# Patient Record
Sex: FEMALE | Race: WHITE | Employment: OTHER | URBAN - METROPOLITAN AREA
[De-identification: names, ages, dates, MRNs, and addresses within clinical notes are randomized per-mention and may not be internally consistent; named-entity substitution may affect disease eponyms.]

---

## 2023-12-21 ENCOUNTER — HOSPITAL ENCOUNTER (INPATIENT)
Age: 73
LOS: 13 days | Discharge: NURSING, REHAB OR LONG-TERM CARE | DRG: 330 | End: 2024-01-03
Attending: INTERNAL MEDICINE | Admitting: INTERNAL MEDICINE
Payer: MEDICARE

## 2023-12-21 DIAGNOSIS — K65.1 INTRA-ABDOMINAL ABSCESS (HCC): ICD-10-CM

## 2023-12-21 DIAGNOSIS — K57.40 DIVERTICULAR DISEASE OF BOTH SMALL AND LARGE INTESTINE WITH PERFORATION AND ABSCESS: Primary | ICD-10-CM

## 2023-12-21 DIAGNOSIS — K57.32 SIGMOID DIVERTICULITIS: ICD-10-CM

## 2023-12-21 PROBLEM — K63.0 ABSCESS OF SIGMOID COLON: Status: ACTIVE | Noted: 2023-12-21

## 2023-12-21 LAB
ALBUMIN SERPL-MCNC: 3.5 G/DL (ref 3.4–5)
ALBUMIN/GLOB SERPL: 1.1 {RATIO} (ref 1.1–2.2)
ALP SERPL-CCNC: 118 U/L (ref 40–129)
ALT SERPL-CCNC: 23 U/L (ref 10–40)
ANION GAP SERPL CALCULATED.3IONS-SCNC: 11 MMOL/L (ref 3–16)
AST SERPL-CCNC: 24 U/L (ref 15–37)
BILIRUB SERPL-MCNC: 0.9 MG/DL (ref 0–1)
BUN SERPL-MCNC: 14 MG/DL (ref 7–20)
CALCIUM SERPL-MCNC: 9.2 MG/DL (ref 8.3–10.6)
CHLORIDE SERPL-SCNC: 105 MMOL/L (ref 99–110)
CO2 SERPL-SCNC: 22 MMOL/L (ref 21–32)
CREAT SERPL-MCNC: 0.7 MG/DL (ref 0.6–1.2)
DEPRECATED RDW RBC AUTO: 13.1 % (ref 12.4–15.4)
GFR SERPLBLD CREATININE-BSD FMLA CKD-EPI: >60 ML/MIN/{1.73_M2}
GLUCOSE SERPL-MCNC: 175 MG/DL (ref 70–99)
HCT VFR BLD AUTO: 34 % (ref 36–48)
HGB BLD-MCNC: 11.8 G/DL (ref 12–16)
LACTATE BLDV-SCNC: 1.4 MMOL/L (ref 0.4–2)
MCH RBC QN AUTO: 33.1 PG (ref 26–34)
MCHC RBC AUTO-ENTMCNC: 34.8 G/DL (ref 31–36)
MCV RBC AUTO: 95.2 FL (ref 80–100)
PLATELET # BLD AUTO: 223 K/UL (ref 135–450)
PMV BLD AUTO: 7.6 FL (ref 5–10.5)
POTASSIUM SERPL-SCNC: 4.1 MMOL/L (ref 3.5–5.1)
PROT SERPL-MCNC: 6.8 G/DL (ref 6.4–8.2)
RBC # BLD AUTO: 3.57 M/UL (ref 4–5.2)
SODIUM SERPL-SCNC: 138 MMOL/L (ref 136–145)
WBC # BLD AUTO: 9 K/UL (ref 4–11)

## 2023-12-21 PROCEDURE — 36415 COLL VENOUS BLD VENIPUNCTURE: CPT

## 2023-12-21 PROCEDURE — 6360000002 HC RX W HCPCS: Performed by: INTERNAL MEDICINE

## 2023-12-21 PROCEDURE — 2580000003 HC RX 258: Performed by: INTERNAL MEDICINE

## 2023-12-21 PROCEDURE — 85027 COMPLETE CBC AUTOMATED: CPT

## 2023-12-21 PROCEDURE — 1200000000 HC SEMI PRIVATE

## 2023-12-21 PROCEDURE — 83605 ASSAY OF LACTIC ACID: CPT

## 2023-12-21 PROCEDURE — 87040 BLOOD CULTURE FOR BACTERIA: CPT

## 2023-12-21 PROCEDURE — 80053 COMPREHEN METABOLIC PANEL: CPT

## 2023-12-21 PROCEDURE — 6370000000 HC RX 637 (ALT 250 FOR IP): Performed by: INTERNAL MEDICINE

## 2023-12-21 RX ORDER — MAGNESIUM SULFATE IN WATER 40 MG/ML
2000 INJECTION, SOLUTION INTRAVENOUS PRN
Status: DISCONTINUED | OUTPATIENT
Start: 2023-12-21 | End: 2024-01-03 | Stop reason: HOSPADM

## 2023-12-21 RX ORDER — SODIUM CHLORIDE 0.9 % (FLUSH) 0.9 %
5-40 SYRINGE (ML) INJECTION PRN
Status: DISCONTINUED | OUTPATIENT
Start: 2023-12-21 | End: 2024-01-03 | Stop reason: HOSPADM

## 2023-12-21 RX ORDER — SODIUM CHLORIDE 0.9 % (FLUSH) 0.9 %
5-40 SYRINGE (ML) INJECTION EVERY 12 HOURS SCHEDULED
Status: DISCONTINUED | OUTPATIENT
Start: 2023-12-21 | End: 2024-01-03 | Stop reason: HOSPADM

## 2023-12-21 RX ORDER — ACETAMINOPHEN 650 MG/1
650 SUPPOSITORY RECTAL EVERY 6 HOURS PRN
Status: DISCONTINUED | OUTPATIENT
Start: 2023-12-21 | End: 2024-01-03 | Stop reason: HOSPADM

## 2023-12-21 RX ORDER — ENOXAPARIN SODIUM 100 MG/ML
40 INJECTION SUBCUTANEOUS DAILY
Status: DISCONTINUED | OUTPATIENT
Start: 2023-12-22 | End: 2023-12-28

## 2023-12-21 RX ORDER — POTASSIUM CHLORIDE 7.45 MG/ML
10 INJECTION INTRAVENOUS PRN
Status: DISCONTINUED | OUTPATIENT
Start: 2023-12-21 | End: 2024-01-03 | Stop reason: HOSPADM

## 2023-12-21 RX ORDER — ACETAMINOPHEN 325 MG/1
650 TABLET ORAL EVERY 6 HOURS PRN
Status: DISCONTINUED | OUTPATIENT
Start: 2023-12-21 | End: 2024-01-03 | Stop reason: HOSPADM

## 2023-12-21 RX ORDER — POTASSIUM CHLORIDE 20 MEQ/1
40 TABLET, EXTENDED RELEASE ORAL PRN
Status: DISCONTINUED | OUTPATIENT
Start: 2023-12-21 | End: 2024-01-03 | Stop reason: HOSPADM

## 2023-12-21 RX ORDER — SODIUM CHLORIDE, SODIUM LACTATE, POTASSIUM CHLORIDE, CALCIUM CHLORIDE 600; 310; 30; 20 MG/100ML; MG/100ML; MG/100ML; MG/100ML
INJECTION, SOLUTION INTRAVENOUS CONTINUOUS
Status: ACTIVE | OUTPATIENT
Start: 2023-12-21 | End: 2023-12-23

## 2023-12-21 RX ORDER — ONDANSETRON 4 MG/1
4 TABLET, ORALLY DISINTEGRATING ORAL EVERY 8 HOURS PRN
Status: DISCONTINUED | OUTPATIENT
Start: 2023-12-21 | End: 2024-01-03 | Stop reason: HOSPADM

## 2023-12-21 RX ORDER — ONDANSETRON 2 MG/ML
4 INJECTION INTRAMUSCULAR; INTRAVENOUS EVERY 6 HOURS PRN
Status: DISCONTINUED | OUTPATIENT
Start: 2023-12-21 | End: 2024-01-03 | Stop reason: HOSPADM

## 2023-12-21 RX ORDER — SODIUM CHLORIDE 9 MG/ML
INJECTION, SOLUTION INTRAVENOUS PRN
Status: DISCONTINUED | OUTPATIENT
Start: 2023-12-21 | End: 2024-01-03 | Stop reason: HOSPADM

## 2023-12-21 RX ORDER — POLYETHYLENE GLYCOL 3350 17 G/17G
17 POWDER, FOR SOLUTION ORAL DAILY PRN
Status: DISCONTINUED | OUTPATIENT
Start: 2023-12-21 | End: 2024-01-03 | Stop reason: HOSPADM

## 2023-12-21 RX ADMIN — SODIUM CHLORIDE: 9 INJECTION, SOLUTION INTRAVENOUS at 23:41

## 2023-12-21 RX ADMIN — Medication 10 ML: at 23:41

## 2023-12-21 RX ADMIN — SODIUM CHLORIDE, POTASSIUM CHLORIDE, SODIUM LACTATE AND CALCIUM CHLORIDE: 600; 310; 30; 20 INJECTION, SOLUTION INTRAVENOUS at 23:47

## 2023-12-21 RX ADMIN — PIPERACILLIN AND TAZOBACTAM 3375 MG: 3; .375 INJECTION, POWDER, FOR SOLUTION INTRAVENOUS at 23:45

## 2023-12-21 RX ADMIN — ACETAMINOPHEN 650 MG: 325 TABLET ORAL at 23:48

## 2023-12-21 RX ADMIN — ONDANSETRON 4 MG: 2 INJECTION INTRAMUSCULAR; INTRAVENOUS at 23:48

## 2023-12-21 ASSESSMENT — PAIN DESCRIPTION - LOCATION
LOCATION: ABDOMEN
LOCATION: ABDOMEN

## 2023-12-21 ASSESSMENT — PAIN SCALES - GENERAL
PAINLEVEL_OUTOF10: 3
PAINLEVEL_OUTOF10: 7
PAINLEVEL_OUTOF10: 7

## 2023-12-21 ASSESSMENT — PAIN DESCRIPTION - PAIN TYPE: TYPE: ACUTE PAIN

## 2023-12-21 ASSESSMENT — PAIN - FUNCTIONAL ASSESSMENT: PAIN_FUNCTIONAL_ASSESSMENT: ACTIVITIES ARE NOT PREVENTED

## 2023-12-21 ASSESSMENT — PAIN DESCRIPTION - DESCRIPTORS
DESCRIPTORS: ACHING;CRUSHING
DESCRIPTORS: ACHING

## 2023-12-21 ASSESSMENT — PAIN DESCRIPTION - ONSET: ONSET: ON-GOING

## 2023-12-21 ASSESSMENT — PAIN SCALES - WONG BAKER: WONGBAKER_NUMERICALRESPONSE: 2

## 2023-12-21 ASSESSMENT — PAIN DESCRIPTION - FREQUENCY: FREQUENCY: CONTINUOUS

## 2023-12-21 ASSESSMENT — PAIN DESCRIPTION - ORIENTATION
ORIENTATION: LOWER
ORIENTATION: LOWER;MID

## 2023-12-22 ENCOUNTER — APPOINTMENT (OUTPATIENT)
Dept: CT IMAGING | Age: 73
DRG: 330 | End: 2023-12-22
Attending: INTERNAL MEDICINE
Payer: MEDICARE

## 2023-12-22 LAB
ALBUMIN SERPL-MCNC: 3.1 G/DL (ref 3.4–5)
ALBUMIN/GLOB SERPL: 0.9 {RATIO} (ref 1.1–2.2)
ALP SERPL-CCNC: 100 U/L (ref 40–129)
ALT SERPL-CCNC: 21 U/L (ref 10–40)
ANION GAP SERPL CALCULATED.3IONS-SCNC: 12 MMOL/L (ref 3–16)
AST SERPL-CCNC: 17 U/L (ref 15–37)
BASOPHILS # BLD: 0 K/UL (ref 0–0.2)
BASOPHILS NFR BLD: 0.1 %
BILIRUB SERPL-MCNC: 0.7 MG/DL (ref 0–1)
BUN SERPL-MCNC: 17 MG/DL (ref 7–20)
CALCIUM SERPL-MCNC: 9.1 MG/DL (ref 8.3–10.6)
CHLORIDE SERPL-SCNC: 107 MMOL/L (ref 99–110)
CO2 SERPL-SCNC: 22 MMOL/L (ref 21–32)
CREAT SERPL-MCNC: 0.8 MG/DL (ref 0.6–1.2)
DEPRECATED RDW RBC AUTO: 13.2 % (ref 12.4–15.4)
EOSINOPHIL # BLD: 0 K/UL (ref 0–0.6)
EOSINOPHIL NFR BLD: 0.3 %
GFR SERPLBLD CREATININE-BSD FMLA CKD-EPI: >60 ML/MIN/{1.73_M2}
GLUCOSE SERPL-MCNC: 153 MG/DL (ref 70–99)
HCT VFR BLD AUTO: 32.4 % (ref 36–48)
HGB BLD-MCNC: 11 G/DL (ref 12–16)
INR PPP: 1.29 (ref 0.84–1.16)
LACTATE BLDV-SCNC: 1.3 MMOL/L (ref 0.4–2)
LYMPHOCYTES # BLD: 0.6 K/UL (ref 1–5.1)
LYMPHOCYTES NFR BLD: 6.4 %
MCH RBC QN AUTO: 32.5 PG (ref 26–34)
MCHC RBC AUTO-ENTMCNC: 34.1 G/DL (ref 31–36)
MCV RBC AUTO: 95.2 FL (ref 80–100)
MONOCYTES # BLD: 0.9 K/UL (ref 0–1.3)
MONOCYTES NFR BLD: 9.9 %
NEUTROPHILS # BLD: 7.2 K/UL (ref 1.7–7.7)
NEUTROPHILS NFR BLD: 83.3 %
PLATELET # BLD AUTO: 213 K/UL (ref 135–450)
PMV BLD AUTO: 7.7 FL (ref 5–10.5)
POTASSIUM SERPL-SCNC: 4 MMOL/L (ref 3.5–5.1)
PROT SERPL-MCNC: 6.4 G/DL (ref 6.4–8.2)
PROTHROMBIN TIME: 16.1 SEC (ref 11.5–14.8)
RBC # BLD AUTO: 3.4 M/UL (ref 4–5.2)
SODIUM SERPL-SCNC: 141 MMOL/L (ref 136–145)
WBC # BLD AUTO: 8.7 K/UL (ref 4–11)

## 2023-12-22 PROCEDURE — 0W9J30Z DRAINAGE OF PELVIC CAVITY WITH DRAINAGE DEVICE, PERCUTANEOUS APPROACH: ICD-10-PCS | Performed by: RADIOLOGY

## 2023-12-22 PROCEDURE — 87186 SC STD MICRODIL/AGAR DIL: CPT

## 2023-12-22 PROCEDURE — 6360000002 HC RX W HCPCS: Performed by: NURSE PRACTITIONER

## 2023-12-22 PROCEDURE — 6360000002 HC RX W HCPCS: Performed by: RADIOLOGY

## 2023-12-22 PROCEDURE — 99222 1ST HOSP IP/OBS MODERATE 55: CPT | Performed by: SURGERY

## 2023-12-22 PROCEDURE — 2580000003 HC RX 258: Performed by: INTERNAL MEDICINE

## 2023-12-22 PROCEDURE — 80053 COMPREHEN METABOLIC PANEL: CPT

## 2023-12-22 PROCEDURE — 1200000000 HC SEMI PRIVATE

## 2023-12-22 PROCEDURE — 85610 PROTHROMBIN TIME: CPT

## 2023-12-22 PROCEDURE — 83605 ASSAY OF LACTIC ACID: CPT

## 2023-12-22 PROCEDURE — 6360000002 HC RX W HCPCS: Performed by: INTERNAL MEDICINE

## 2023-12-22 PROCEDURE — 87077 CULTURE AEROBIC IDENTIFY: CPT

## 2023-12-22 PROCEDURE — 87070 CULTURE OTHR SPECIMN AEROBIC: CPT

## 2023-12-22 PROCEDURE — 85025 COMPLETE CBC W/AUTO DIFF WBC: CPT

## 2023-12-22 PROCEDURE — 87205 SMEAR GRAM STAIN: CPT

## 2023-12-22 PROCEDURE — 99151 MOD SED SAME PHYS/QHP <5 YRS: CPT

## 2023-12-22 PROCEDURE — 36415 COLL VENOUS BLD VENIPUNCTURE: CPT

## 2023-12-22 RX ORDER — MIDAZOLAM HYDROCHLORIDE 2 MG/2ML
INJECTION, SOLUTION INTRAMUSCULAR; INTRAVENOUS PRN
Status: COMPLETED | OUTPATIENT
Start: 2023-12-22 | End: 2023-12-22

## 2023-12-22 RX ORDER — HYDROMORPHONE HYDROCHLORIDE 1 MG/ML
0.5 INJECTION, SOLUTION INTRAMUSCULAR; INTRAVENOUS; SUBCUTANEOUS
Status: DISCONTINUED | OUTPATIENT
Start: 2023-12-22 | End: 2023-12-23

## 2023-12-22 RX ORDER — FENTANYL CITRATE 50 UG/ML
INJECTION, SOLUTION INTRAMUSCULAR; INTRAVENOUS PRN
Status: COMPLETED | OUTPATIENT
Start: 2023-12-22 | End: 2023-12-22

## 2023-12-22 RX ORDER — IBUPROFEN 200 MG
200 TABLET ORAL DAILY
COMMUNITY

## 2023-12-22 RX ADMIN — FENTANYL CITRATE 25 MCG: 50 INJECTION, SOLUTION INTRAMUSCULAR; INTRAVENOUS at 15:29

## 2023-12-22 RX ADMIN — MIDAZOLAM HYDROCHLORIDE 0.5 MG: 1 INJECTION, SOLUTION INTRAMUSCULAR; INTRAVENOUS at 15:32

## 2023-12-22 RX ADMIN — FENTANYL CITRATE 25 MCG: 50 INJECTION, SOLUTION INTRAMUSCULAR; INTRAVENOUS at 15:34

## 2023-12-22 RX ADMIN — Medication 10 ML: at 08:46

## 2023-12-22 RX ADMIN — PIPERACILLIN AND TAZOBACTAM 3375 MG: 3; .375 INJECTION, POWDER, FOR SOLUTION INTRAVENOUS at 16:10

## 2023-12-22 RX ADMIN — MIDAZOLAM HYDROCHLORIDE 1 MG: 1 INJECTION, SOLUTION INTRAMUSCULAR; INTRAVENOUS at 15:24

## 2023-12-22 RX ADMIN — PIPERACILLIN AND TAZOBACTAM 3375 MG: 3; .375 INJECTION, POWDER, FOR SOLUTION INTRAVENOUS at 08:45

## 2023-12-22 RX ADMIN — MIDAZOLAM HYDROCHLORIDE 1 MG: 1 INJECTION, SOLUTION INTRAMUSCULAR; INTRAVENOUS at 15:09

## 2023-12-22 RX ADMIN — FENTANYL CITRATE 25 MCG: 50 INJECTION, SOLUTION INTRAMUSCULAR; INTRAVENOUS at 15:31

## 2023-12-22 RX ADMIN — FENTANYL CITRATE 50 MCG: 50 INJECTION, SOLUTION INTRAMUSCULAR; INTRAVENOUS at 15:24

## 2023-12-22 RX ADMIN — MIDAZOLAM HYDROCHLORIDE 0.5 MG: 1 INJECTION, SOLUTION INTRAMUSCULAR; INTRAVENOUS at 15:29

## 2023-12-22 RX ADMIN — HYDROMORPHONE HYDROCHLORIDE 0.5 MG: 1 INJECTION, SOLUTION INTRAMUSCULAR; INTRAVENOUS; SUBCUTANEOUS at 22:27

## 2023-12-22 RX ADMIN — FENTANYL CITRATE 50 MCG: 50 INJECTION, SOLUTION INTRAMUSCULAR; INTRAVENOUS at 15:09

## 2023-12-22 ASSESSMENT — PAIN DESCRIPTION - LOCATION
LOCATION: ABDOMEN
LOCATION: ABDOMEN

## 2023-12-22 ASSESSMENT — PAIN DESCRIPTION - DESCRIPTORS
DESCRIPTORS: ACHING;SHARP
DESCRIPTORS: ACHING;SHARP

## 2023-12-22 ASSESSMENT — PAIN SCALES - GENERAL
PAINLEVEL_OUTOF10: 7
PAINLEVEL_OUTOF10: 3

## 2023-12-22 ASSESSMENT — PAIN DESCRIPTION - ORIENTATION
ORIENTATION: LOWER
ORIENTATION: LOWER;MID

## 2023-12-22 NOTE — CARE COORDINATION
General surgery note states bowel rest and po abx's at discharge.     No therapy ordered at this time. CM will follow for discharge plan and needs.     Bonnie Latif RN, BSN  [phone]

## 2023-12-22 NOTE — PROGRESS NOTES
Admission completed, patient alert and oriented, she was transfer from [facility] via EMS, patient c/o lower abd pain, she state that she haven't void since 4 am in the morning, bladder scan done w/ 357 ML urine retention, purewick in place, room air, hypoactive bowel sounds X 4 quadrants, lactated ringers 75ml/hr infusing, Iv antibiotic administered, vitals checked, neurovascular WNL, abdomen tender to palpate, distended, patient c/o nausea zofran 4 mg given, acetaminophen 650mg oral with sip of water, patient is NPO, care plane discussed with the patient.  Rosa Mclean RN

## 2023-12-22 NOTE — CARE COORDINATION
Chart reviewed at this time for discharge planning. Pt from [facility]. General surgery consult is pending. CM will follow for discharge needs.       Bonnie Latif RN, BSN  [phone]

## 2023-12-22 NOTE — PROGRESS NOTES
[facility]    12/22/2023 8:00 AM        Name: Tammy Frederick .              Admitted: 12/21/2023  Primary Care Provider: No primary care provider on file. (Tel: None)      Subjective:  .    Admitted with abd pain  CT shows sigmoid abscess with diverticulitis   Pt currently reports abd pain associated the the passage of gas.      Lives alone with her dogs in [facility].      She was NOT on any AC prior to admission   She does NOT take any prescription meds         Reviewed interval ancillary notes    Current Medications  sodium chloride flush 0.9 % injection 5-40 mL, 2 times per day  sodium chloride flush 0.9 % injection 5-40 mL, PRN  0.9 % sodium chloride infusion, PRN  potassium chloride (KLOR-CON M) extended release tablet 40 mEq, PRN   Or  potassium bicarb-citric acid (EFFER-K) effervescent tablet 40 mEq, PRN   Or  potassium chloride 10 mEq/100 mL IVPB (Peripheral Line), PRN  magnesium sulfate 2000 mg in 50 mL IVPB premix, PRN  enoxaparin (LOVENOX) injection 40 mg, Daily  ondansetron (ZOFRAN-ODT) disintegrating tablet 4 mg, Q8H PRN   Or  ondansetron (ZOFRAN) injection 4 mg, Q6H PRN  polyethylene glycol (GLYCOLAX) packet 17 g, Daily PRN  acetaminophen (TYLENOL) tablet 650 mg, Q6H PRN   Or  acetaminophen (TYLENOL) suppository 650 mg, Q6H PRN  lactated ringers IV soln infusion, Continuous  piperacillin-tazobactam (ZOSYN) 3,375 mg in sodium chloride 0.9 % 50 mL IVPB (mini-bag), Q8H        Objective:  /62   Pulse 93   Temp 97.6 °F (36.4 °C) (Oral)   Resp 16   Ht 1.524 m (5')   Wt 55.8 kg (123 lb)   SpO2 95%   BMI 24.02 kg/m²     Intake/Output Summary (Last 24 hours) at 12/22/2023 0800  Last data filed at 12/22/2023 0345  Gross per 24 hour   Intake 43.7 ml   Output 450 ml   Net -406.3 ml      Wt Readings from Last 3 Encounters:   12/21/23 55.8 kg (123 lb)   07/08/15 63.5 kg (140 lb)   07/07/11 58.1 kg (128 lb)       General appearance:  Appears comfortable  Eyes:  Sclera clear. Pupils equal.  ENT: Moist oral mucosa. Trachea midline, no adenopathy.  Cardiovascular: Regular rhythm, normal S1, S2. No murmur. No edema in lower extremities  Respiratory: Not using accessory muscles. Good inspiratory effort. Clear to auscultation bilaterally, no wheeze or crackles.   GI: Abdomen soft with active bowel sounds.  Generalized tenderness noted.     Musculoskeletal: No cyanosis in digits, neck supple  Neurology: CN 2-12 grossly intact. No speech or motor deficits  Psych: Normal affect. Alert and oriented in time, place and person  Skin: Warm, dry, normal turgor    Labs and Tests:  CBC:   Recent Labs     12/21/23 2249 12/22/23  0602   WBC 9.0 8.7   HGB 11.8* 11.0*    213     BMP:    Recent Labs     12/21/23 2249 12/22/23  0602    141   K 4.1 4.0    107   CO2 22 22   BUN 14 17   CREATININE 0.7 0.8   GLUCOSE 175* 153*     Hepatic:   Recent Labs     12/21/23 2249 12/22/23  0602   AST 24 17   ALT 23 21   BILITOT 0.9 0.7   ALKPHOS 118 100         Problem List  Principal Problem:    Abscess of sigmoid colon  Resolved Problems:    * No resolved hospital problems. *       Assessment & Plan:   Abscess of Colon:  I have placed consult and called IR for drainage of abscess.  She has been placed on IV Zosyn and GS will follow.  Abd pain/ diverticulitis:  on zosyn.  I added IV dilaudid  1/2 mg for pain control  Continue with IV hydration and supportive care.  NPO for now   Tobacco use:  cessation encouraged        Diet: Diet NPO  Code:Full Code  DVT PPX lovenox on hold for IR       Lanny Bray, APRN - CNP   12/22/2023 8:00 AM

## 2023-12-22 NOTE — PRE SEDATION
Sedation Pre-Procedure Note    Patient Name: Tammy Frederick   YOB: 1950  Room/Bed: [facility]-4480/4480-01  Medical Record Number: [id number]  Date: 12/22/2023   Time: 4:20 PM       Indication:  Abdominal Drain Placement    Consent: I have discussed with the patient and/or the patient representative the indication, alternatives, and the possible risks and/or complications of the planned procedure and the anesthesia methods. The patient and/or patient representative appear to understand and agree to proceed.    Vital Signs:   Vitals:    12/22/23 1600   BP: 134/83   Pulse: (!) 103   Resp: 16   Temp: 98.6 °F (37 °C)   SpO2: 93%       Past Medical History:   has a past medical history of Arthritis.    Past Surgical History:   has a past surgical history that includes Abdomen surgery; Total knee arthroplasty (5/16/2011); and joint replacement (9/17/12).    Medications:   Scheduled Meds:    sodium chloride flush  5-40 mL IntraVENous 2 times per day    [Held by provider] enoxaparin  40 mg SubCUTAneous Daily    piperacillin-tazobactam  3,375 mg IntraVENous Q8H     Continuous Infusions:    sodium chloride 10 mL/hr at 12/21/23 2341    lactated ringers IV soln 75 mL/hr at 12/21/23 2347     PRN Meds: HYDROmorphone, sodium chloride flush, sodium chloride, potassium chloride **OR** potassium alternative oral replacement **OR** potassium chloride, magnesium sulfate, ondansetron **OR** ondansetron, polyethylene glycol, acetaminophen **OR** acetaminophen  Home Meds:   Prior to Admission medications    Medication Sig Start Date End Date Taking? Authorizing Provider   nicotine polacrilex (NICORETTE) 2 MG gum Take 1 each by mouth as needed for Smoking cessation   Yes Provider, MD Ivone   ibuprofen (ADVIL;MOTRIN) 200 MG tablet Take 1 tablet by mouth daily   Yes ProviderIvone MD   rivaroxaban (XARELTO) 10 MG TABS tablet for DVT PROPHYLAXIS Take 1 tablet by mouth Daily. 9/18/12   Crescencio Sanders MD   therapeutic

## 2023-12-22 NOTE — PROGRESS NOTES
4 Eyes Skin Assessment     NAME:  Tammy Frederick  YOB: 1950  MEDICAL RECORD NUMBER:  [id number]    The patient is being assessed for  Admission    I agree that at least one RN has performed a thorough Head to Toe Skin Assessment on the patient. ALL assessment sites listed below have been assessed.      Areas assessed by both nurses:    Other all areas, no skin issues        Does the Patient have a Wound? No noted wound(s)       Rodriguez Prevention initiated by RN: Yes  Wound Care Orders initiated by RN: No    Pressure Injury (Stage 3,4, Unstageable, DTI, NWPT, and Complex wounds) if present, place Wound referral order by RN under : No    New Ostomies, if present place, Ostomy referral order under : No     Nurse 1 eSignature: Electronically signed by Rosa Mclean RN on 12/22/23 at 12:45 AM EST    **SHARE this note so that the co-signing nurse can place an eSignature**    Nurse 2 eSignature: Electronically signed by Zulay Aragon RN on 12/22/23 at 12:47 AM EST

## 2023-12-22 NOTE — BRIEF OP NOTE
Brief Postoperative Note      Patient: Tammy Frederick  YOB: 1950  MRN: [id number]    Date of Procedure: 12/22/2023    Abdominopelvic Abscess    Post-Op Diagnosis: Same       CT Guided Abscess Drain Placement    Lulu Gama MD    Anesthesia: Moderate Sedation    Estimated Blood Loss (mL): Minimal    Complications: None    Implants:  12 Citizen of [place] MPD      Drains:   Closed/Suction Drain Left Abdomen Bulb (Active)       External Urinary Catheter (Active)   Site Assessment Clean,dry & intact 12/22/23 0800       Findings: Successful 12 Citizen of [place] left anterior approach (no safe posterior window) abdominopelvic abscess drain placement. 10 cc of pus aspirated and sent to microbiology      Electronically signed by Lulu Gama MD on 12/22/2023 at 4:25 PM

## 2023-12-22 NOTE — H&P
Hospital Medicine History & Physical      Patient Name: Tammy Frederick    : 1950    PCP: No primary care provider on file.    Date of Service:  Patient seen and examined on 2023    Chief Complaint: Abdominal pain    History Of Present Illness:    Tammy Frederick is a 73 y.o. female who presents with lower abdominal pain.  Currently patient symptoms started about 5 days ago with lower abdominal pain.  For this she presented to OB/GYN 2 days later who did an ultrasound with no abnormality seen she was now sent to the ED for imaging-CAT scan.  Of note she was diagnosed with a UTI and sent home with oral antibiotics and as needed pain medication.  On the day of presentation, in the morning she says that worsening abdominal pain.  Also complains of inability to pass urine CAT scan shows presence of sigmoid abscess with diverticulitis.  Transferred to [place] for further management by general surgery.    Past Medical History:    Patient has a past medical history of Arthritis.    Past Surgical History:    Patient has a past surgical history that includes Abdomen surgery; Total knee arthroplasty (2011); and joint replacement (12).    Medications Prior to Admission:      Prior to Admission medications    Medication Sig Start Date End Date Taking? Authorizing Provider   rivaroxaban (XARELTO) 10 MG TABS tablet for DVT PROPHYLAXIS Take 1 tablet by mouth Daily. 12   Crescencio Sanders MD   therapeutic multivitamin-minerals (THERAGRAN-M) tablet Take 1 tablet by mouth daily.      Provider, MD Ivone       Allergies:  Patient has no known allergies.    Social History:      TOBACCO:   reports that she has never smoked. She does not have any smokeless tobacco history on file.  ETOH:   reports current alcohol use.  DRUGS:  has no history on file for drug use.    Family History:      Reviewed in detail positive as follows:    No family history on file.    REVIEW OF SYSTEMS:

## 2023-12-22 NOTE — PLAN OF CARE
Problem: Discharge Planning  Goal: Discharge to home or other facility with appropriate resources  12/22/2023 0847 by Anne Mann, RN  Outcome: Progressing     Problem: Pain  Goal: Verbalizes/displays adequate comfort level or baseline comfort level  12/22/2023 0847 by Anne Mann, RN  Outcome: Progressing     Problem: ABCDS Injury Assessment  Goal: Absence of physical injury  12/22/2023 0847 by Anne Mann, RN  Outcome: Progressing

## 2023-12-22 NOTE — CONSULTS
[facility]     Consult                                                     Patient Name: Tammy Frederick  MRN: [id number]  YOB: 1950  Admission date: 12/21/2023 10:11 PM   Date of evaluation: 12/22/2023  Primary Care Physician: No primary care provider on file.  Reason for consult: Abdominal pain  History of Present Illness:    Ms. Frederick is a 73 y.o. female who presents with sharp left lower quadrant abdominal pain that began about 5 days ago.  Initially presented to the gynecologist who did not ultrasound without abnormality and with continued symptoms referred to the ER where CT showed diverticulitis with abscess.  This was at an outside hospital in [place] and transferred to [facility] for further evaluation with surgery and interventional radiology.  This is her first attack.  Last colonoscopy more than 5 years ago and normal per patient.  Pain is worse with moving and coughing and better after urinating.  Last stool 2 days ago but passing flatus as recently as today.  Denies fevers chills chest pain dyspnea jaundice dysuria change in appetite weight nausea vomiting or other complaints.  Abdominal surgical history includes cholecystectomy and no other significant medical conditions    Past Medical History:        Diagnosis Date    Arthritis        Past Surgical History:        Procedure Laterality Date    ABDOMINAL SURGERY      cholecystectomy    JOINT REPLACEMENT  9/17/12    right knee    TOTAL KNEE ARTHROPLASTY  5/16/2011    Left knee       Scheduled Meds:   sodium chloride flush  5-40 mL IntraVENous 2 times per day    [Held by provider] enoxaparin  40 mg SubCUTAneous Daily    piperacillin-tazobactam  3,375 mg IntraVENous Q8H     Continuous Infusions:   sodium chloride 10 mL/hr at 12/21/23 2341    lactated ringers IV soln 75 mL/hr at 12/21/23 2347     PRN Meds:.sodium chloride flush, sodium chloride, potassium chloride **OR** potassium alternative oral  Pulse Resp SpO2 Height Weight   23 0830 116/73 98.1 °F (36.7 °C) Oral 93 16 94 % -- --   23 0545 115/62 97.6 °F (36.4 °C) Oral 93 16 95 % -- --   23 0200 102/67 98 °F (36.7 °C) Oral 90 16 94 % -- --   23 2200 106/68 97.6 °F (36.4 °C) Oral 77 16 95 % 1.524 m (5') 55.8 kg (123 lb)      TEMPERATURE HISTORY 24H: Temp (24hrs), Av.8 °F (36.6 °C), Min:97.6 °F (36.4 °C), Max:98.1 °F (36.7 °C)    BLOOD PRESSURE HISTORY: Systolic (36hrs), Av , Min:102 , Max:116    Diastolic (36hrs), Av, Min:62, Max:73    Admission Weight - Scale: 55.8 kg (123 lb)       Intake/Output Summary (Last 24 hours) at 2023 0940  Last data filed at 2023 0857  Gross per 24 hour   Intake 43.7 ml   Output 550 ml   Net -506.3 ml     Drain/tube Output:         Physical Exam:  Constitutional:  well-developed, well-nourished,   Neurologic: awake, Orientation:  Oriented to person, place, time,   follows commands, clear speech, cranial nerves grossly intact  Psychiatric: normal affect, no hallucinations  Eyes:  sclera clear, no visible discharge  Head, ears, nose, mouth, throat: normocephalic, without obvious abnormality, supple, symmetrical, trachea midline, no JVD, mucous membranes moist  Cardiac: regular rate and rhythm   Pulmonary: clear to auscultation bilaterally   GI: soft, non-distended, moderate left lower quadrant tenderness without peritonitis  Lymph: no palpable lymphadenopathy  Skin: no bruising or bleeding, normal skin color, texture, turgor, and no redness, warmth, or swelling    Labs:    CBC:    Recent Labs     23  2249 23  0602   WBC 9.0 8.7   HGB 11.8* 11.0*   HCT 34.0* 32.4*    213     BMP:    Recent Labs     23  0602    141   K 4.1 4.0    107   CO2 22 22   BUN 14 17   CREATININE 0.7 0.8   GLUCOSE 175* 153*     Hepatic:   Recent Labs     23  0602   AST 24 17   ALT 23 21   BILITOT 0.9 0.7   ALKPHOS 118 100     Amylase: No

## 2023-12-23 ENCOUNTER — APPOINTMENT (OUTPATIENT)
Dept: GENERAL RADIOLOGY | Age: 73
DRG: 330 | End: 2023-12-23
Attending: INTERNAL MEDICINE
Payer: MEDICARE

## 2023-12-23 LAB
ALBUMIN SERPL-MCNC: 3.2 G/DL (ref 3.4–5)
ALBUMIN/GLOB SERPL: 0.9 {RATIO} (ref 1.1–2.2)
ALP SERPL-CCNC: 106 U/L (ref 40–129)
ALT SERPL-CCNC: 15 U/L (ref 10–40)
ANION GAP SERPL CALCULATED.3IONS-SCNC: 12 MMOL/L (ref 3–16)
APTT BLD: 35.3 SEC (ref 22.7–35.9)
AST SERPL-CCNC: 13 U/L (ref 15–37)
BASOPHILS # BLD: 0 K/UL (ref 0–0.2)
BASOPHILS NFR BLD: 0.2 %
BILIRUB SERPL-MCNC: 0.5 MG/DL (ref 0–1)
BUN SERPL-MCNC: 20 MG/DL (ref 7–20)
CALCIUM SERPL-MCNC: 9.5 MG/DL (ref 8.3–10.6)
CHLORIDE SERPL-SCNC: 107 MMOL/L (ref 99–110)
CO2 SERPL-SCNC: 24 MMOL/L (ref 21–32)
CREAT SERPL-MCNC: 0.6 MG/DL (ref 0.6–1.2)
DEPRECATED RDW RBC AUTO: 13.6 % (ref 12.4–15.4)
EOSINOPHIL # BLD: 0 K/UL (ref 0–0.6)
EOSINOPHIL NFR BLD: 0.2 %
GFR SERPLBLD CREATININE-BSD FMLA CKD-EPI: >60 ML/MIN/{1.73_M2}
GLUCOSE SERPL-MCNC: 152 MG/DL (ref 70–99)
HCT VFR BLD AUTO: 34.1 % (ref 36–48)
HGB BLD-MCNC: 11.3 G/DL (ref 12–16)
INR PPP: 1.25 (ref 0.84–1.16)
LACTATE BLDV-SCNC: 1 MMOL/L (ref 0.4–2)
LACTATE BLDV-SCNC: 1.1 MMOL/L (ref 0.4–2)
LYMPHOCYTES # BLD: 0.5 K/UL (ref 1–5.1)
LYMPHOCYTES NFR BLD: 3.9 %
MAGNESIUM SERPL-MCNC: 2.2 MG/DL (ref 1.8–2.4)
MCH RBC QN AUTO: 32 PG (ref 26–34)
MCHC RBC AUTO-ENTMCNC: 33.2 G/DL (ref 31–36)
MCV RBC AUTO: 96.5 FL (ref 80–100)
MONOCYTES # BLD: 0.9 K/UL (ref 0–1.3)
MONOCYTES NFR BLD: 7.2 %
NEUTROPHILS # BLD: 11.5 K/UL (ref 1.7–7.7)
NEUTROPHILS NFR BLD: 88.5 %
PHOSPHATE SERPL-MCNC: 3.5 MG/DL (ref 2.5–4.9)
PLATELET # BLD AUTO: 267 K/UL (ref 135–450)
PMV BLD AUTO: 7.5 FL (ref 5–10.5)
POTASSIUM SERPL-SCNC: 4.7 MMOL/L (ref 3.5–5.1)
PREALB SERPL-MCNC: 5.7 MG/DL (ref 20–40)
PROT SERPL-MCNC: 6.8 G/DL (ref 6.4–8.2)
PROTHROMBIN TIME: 15.7 SEC (ref 11.5–14.8)
RBC # BLD AUTO: 3.53 M/UL (ref 4–5.2)
SODIUM SERPL-SCNC: 143 MMOL/L (ref 136–145)
TRANSFERRIN SERPL-MCNC: 144 MG/DL (ref 200–360)
WBC # BLD AUTO: 13 K/UL (ref 4–11)

## 2023-12-23 PROCEDURE — 71045 X-RAY EXAM CHEST 1 VIEW: CPT

## 2023-12-23 PROCEDURE — 1200000000 HC SEMI PRIVATE

## 2023-12-23 PROCEDURE — 85025 COMPLETE CBC W/AUTO DIFF WBC: CPT

## 2023-12-23 PROCEDURE — 6370000000 HC RX 637 (ALT 250 FOR IP): Performed by: NURSE PRACTITIONER

## 2023-12-23 PROCEDURE — 2580000003 HC RX 258: Performed by: SURGERY

## 2023-12-23 PROCEDURE — 85730 THROMBOPLASTIN TIME PARTIAL: CPT

## 2023-12-23 PROCEDURE — 84134 ASSAY OF PREALBUMIN: CPT

## 2023-12-23 PROCEDURE — 99232 SBSQ HOSP IP/OBS MODERATE 35: CPT | Performed by: SURGERY

## 2023-12-23 PROCEDURE — 6360000002 HC RX W HCPCS: Performed by: NURSE PRACTITIONER

## 2023-12-23 PROCEDURE — 80053 COMPREHEN METABOLIC PANEL: CPT

## 2023-12-23 PROCEDURE — 83605 ASSAY OF LACTIC ACID: CPT

## 2023-12-23 PROCEDURE — 84466 ASSAY OF TRANSFERRIN: CPT

## 2023-12-23 PROCEDURE — 74018 RADEX ABDOMEN 1 VIEW: CPT

## 2023-12-23 PROCEDURE — 83735 ASSAY OF MAGNESIUM: CPT

## 2023-12-23 PROCEDURE — 36415 COLL VENOUS BLD VENIPUNCTURE: CPT

## 2023-12-23 PROCEDURE — 6360000002 HC RX W HCPCS: Performed by: INTERNAL MEDICINE

## 2023-12-23 PROCEDURE — 84100 ASSAY OF PHOSPHORUS: CPT

## 2023-12-23 PROCEDURE — 2580000003 HC RX 258: Performed by: INTERNAL MEDICINE

## 2023-12-23 PROCEDURE — 85610 PROTHROMBIN TIME: CPT

## 2023-12-23 PROCEDURE — 6370000000 HC RX 637 (ALT 250 FOR IP): Performed by: SURGERY

## 2023-12-23 RX ORDER — HYDROMORPHONE HYDROCHLORIDE 1 MG/ML
1 INJECTION, SOLUTION INTRAMUSCULAR; INTRAVENOUS; SUBCUTANEOUS
Status: DISCONTINUED | OUTPATIENT
Start: 2023-12-23 | End: 2024-01-02

## 2023-12-23 RX ORDER — HYDROMORPHONE HYDROCHLORIDE 1 MG/ML
0.5 INJECTION, SOLUTION INTRAMUSCULAR; INTRAVENOUS; SUBCUTANEOUS
Status: DISCONTINUED | OUTPATIENT
Start: 2023-12-23 | End: 2024-01-02

## 2023-12-23 RX ORDER — OXYCODONE HYDROCHLORIDE 5 MG/1
10 TABLET ORAL EVERY 4 HOURS PRN
Status: DISCONTINUED | OUTPATIENT
Start: 2023-12-23 | End: 2024-01-03 | Stop reason: HOSPADM

## 2023-12-23 RX ORDER — PROMETHAZINE HYDROCHLORIDE 25 MG/1
25 SUPPOSITORY RECTAL EVERY 6 HOURS PRN
Status: DISCONTINUED | OUTPATIENT
Start: 2023-12-23 | End: 2024-01-03 | Stop reason: HOSPADM

## 2023-12-23 RX ORDER — BISACODYL 10 MG
10 SUPPOSITORY, RECTAL RECTAL DAILY PRN
Status: DISCONTINUED | OUTPATIENT
Start: 2023-12-23 | End: 2024-01-03 | Stop reason: HOSPADM

## 2023-12-23 RX ORDER — BISACODYL 10 MG
10 SUPPOSITORY, RECTAL RECTAL ONCE
Status: DISCONTINUED | OUTPATIENT
Start: 2023-12-23 | End: 2024-01-03 | Stop reason: HOSPADM

## 2023-12-23 RX ORDER — HYDROMORPHONE HYDROCHLORIDE 1 MG/ML
1 INJECTION, SOLUTION INTRAMUSCULAR; INTRAVENOUS; SUBCUTANEOUS EVERY 4 HOURS PRN
Status: DISCONTINUED | OUTPATIENT
Start: 2023-12-23 | End: 2023-12-29

## 2023-12-23 RX ORDER — ACETAMINOPHEN 325 MG/1
650 TABLET ORAL EVERY 6 HOURS
Status: DISCONTINUED | OUTPATIENT
Start: 2023-12-23 | End: 2023-12-28 | Stop reason: SDUPTHER

## 2023-12-23 RX ORDER — OXYCODONE HYDROCHLORIDE 5 MG/1
5 TABLET ORAL EVERY 4 HOURS PRN
Status: DISCONTINUED | OUTPATIENT
Start: 2023-12-23 | End: 2024-01-03 | Stop reason: HOSPADM

## 2023-12-23 RX ADMIN — PIPERACILLIN AND TAZOBACTAM 3375 MG: 3; .375 INJECTION, POWDER, FOR SOLUTION INTRAVENOUS at 23:34

## 2023-12-23 RX ADMIN — PROMETHAZINE HYDROCHLORIDE 25 MG: 25 SUPPOSITORY RECTAL at 18:27

## 2023-12-23 RX ADMIN — Medication 10 ML: at 09:40

## 2023-12-23 RX ADMIN — ONDANSETRON 4 MG: 2 INJECTION INTRAMUSCULAR; INTRAVENOUS at 09:38

## 2023-12-23 RX ADMIN — PIPERACILLIN AND TAZOBACTAM 3375 MG: 3; .375 INJECTION, POWDER, FOR SOLUTION INTRAVENOUS at 16:49

## 2023-12-23 RX ADMIN — ACETAMINOPHEN 650 MG: 325 TABLET ORAL at 21:46

## 2023-12-23 RX ADMIN — SODIUM CHLORIDE, POTASSIUM CHLORIDE, SODIUM LACTATE AND CALCIUM CHLORIDE: 600; 310; 30; 20 INJECTION, SOLUTION INTRAVENOUS at 16:42

## 2023-12-23 RX ADMIN — PIPERACILLIN AND TAZOBACTAM 3375 MG: 3; .375 INJECTION, POWDER, FOR SOLUTION INTRAVENOUS at 09:40

## 2023-12-23 RX ADMIN — HYDROMORPHONE HYDROCHLORIDE 1 MG: 1 INJECTION, SOLUTION INTRAMUSCULAR; INTRAVENOUS; SUBCUTANEOUS at 16:49

## 2023-12-23 RX ADMIN — HYDROMORPHONE HYDROCHLORIDE 0.5 MG: 1 INJECTION, SOLUTION INTRAMUSCULAR; INTRAVENOUS; SUBCUTANEOUS at 09:39

## 2023-12-23 RX ADMIN — PIPERACILLIN AND TAZOBACTAM 3375 MG: 3; .375 INJECTION, POWDER, FOR SOLUTION INTRAVENOUS at 00:32

## 2023-12-23 ASSESSMENT — PAIN SCALES - WONG BAKER
WONGBAKER_NUMERICALRESPONSE: 0
WONGBAKER_NUMERICALRESPONSE: 0

## 2023-12-23 ASSESSMENT — PAIN SCALES - GENERAL
PAINLEVEL_OUTOF10: 10
PAINLEVEL_OUTOF10: 6

## 2023-12-23 NOTE — PROGRESS NOTES
[facility]  Progress Note    Pt Name: Tammy Frederick  MRN: [id number]  YOB: 1950  Date of evaluation: 2023    Chief Complaint: abdominal pain      Subjective:    No acute events overnight  Pain and nausea worse this morning  Passing stool this morning    Vital Signs:  Patient Vitals for the past 24 hrs:   BP Temp Temp src Pulse Resp SpO2   23 1140 (!) 141/76 98.2 °F (36.8 °C) Oral 93 18 92 %   23 1009 -- -- -- -- 17 --   23 0915 (!) 145/76 98.1 °F (36.7 °C) Oral 99 18 92 %   23 0345 139/76 -- -- 99 18 --   23 0115 118/71 -- -- 92 18 --   23 2215 124/75 98 °F (36.7 °C) Oral 98 18 92 %   23 1604 -- -- -- -- 16 --   23 1601 -- -- -- -- 16 --   23 1600 134/83 98.6 °F (37 °C) Oral (!) 103 16 93 %   23 1540 (!) 145/90 -- -- 96 19 92 %   23 1535 127/79 -- -- 92 16 97 %   23 1530 119/72 -- -- 91 15 96 %   23 1525 115/71 -- -- 87 14 97 %   23 1520 120/74 -- -- (!) 103 22 97 %   23 1515 126/76 -- -- 96 21 97 %   23 1510 133/78 -- -- 94 28 97 %   23 1506 (!) 143/84 -- -- 94 18 97 %      TEMPERATURE HISTORY 24H: Temp (24hrs), Av.2 °F (36.8 °C), Min:98 °F (36.7 °C), Max:98.6 °F (37 °C)    BLOOD PRESSURE HISTORY: Systolic (36hrs), Av , Min:102 , Max:145    Diastolic (36hrs), Av, Min:62, Max:90      Intake/Output:    I/O last 3 completed shifts:  In: 43.7 [IV Piggyback:43.7]  Out: 570 [Urine:550; Drains:20]  No intake/output data recorded.  Drain/tube Output:    Closed/Suction Drain Left Abdomen Bulb-Output (ml): 20 ml      Physical Exam:  General: awake, alert, no acute distress, oriented to person, place, time and situation  Cardiac: regular rate and rhythm   Pulmonary: clear to auscultation bilaterally   Abdomen: soft, distended, moderate mid and left lower tenderness without peritonitis, drain with purulent output    Labs:  CBC:    Recent Labs      ORDERING SYSTEM PROVIDED HISTORY: abscess... pls do today... TECHNOLOGIST PROVIDED HISTORY: Reason for exam:->abscess... pls do today... Reason for Exam: Abscess of sigmoid colon TECHNIQUE: Automated exposure control, iterative reconstruction, and/or weight based adjustment of the mA/kV was utilized to reduce the radiation dose to as low as reasonably achievable. CONTRAST: None. SEDATION: 3 mg versed and 175 mcg fentanyl were titrated intravenously for moderate sedation monitored under my direction.  Total intraservice time of sedation was 35 minutes.  The patient's vital signs were monitored throughout the procedure and recorded in the patient's medical record by the nurse. FLUOROSCOPY DOSE AND TYPE: Radiation dose (DLP): 339.06 mGy-cm DESCRIPTION OF PROCEDURE: Informed consent was obtained after a detailed explanation of the procedure including risks, benefits, and alternatives.  Universal protocol was observed. Sterile gowns, masks, hats and gloves utilized for maximal sterile barrier. Patient was unable to tolerate lying prone on the CT table and thus limited CT of the abdomen and pelvis was performed with the patient and partially right lateral decubitus position which she felt most comfortable.  Once an appropriate percutaneous left anterolateral trajectory was marked on the patient's skin the site prepped and draped in sterile technique.  1% lidocaine was used for local anesthesia.  A 5 Pakistani Yueh needle was passed from a left anterolateral approach into the collection, 035 wire was coiled within the collection, subsequently a 12 [place] MPD drain was placed and the [place] loop formed within the collection.  Limited CT of the abdomen pelvis was again performed to confirm appropriate positioning of the tube.  10 cc of pus was aspirated and sent to microbiology for analysis.  The tube was secured by suture and sterile dressings were applied.  The patient tolerated the procedure well. FINDINGS: Left abdominopelvic

## 2023-12-23 NOTE — PROGRESS NOTES
Patient noted to be tearful this morning with 10/10 pain flank pain on left side of Abdomen with hypoactive bowel sounds in all four quadrants. BILLY drainage noted to be a purulent drainage. Bulb to suction not much output this shift. NG placed for N&V and distention. 18F 55 cm and verified placement with x-ray. Patient is NPO.  Pain medication given per mar Shift assessment done, VSS, A/O. Neuro checks WNL.  1630: patient still having episodes of vomiting, phenergan suppository ordered.

## 2023-12-23 NOTE — PLAN OF CARE
Problem: Discharge Planning  Goal: Discharge to home or other facility with appropriate resources  12/23/2023 1106 by Keesha Rojas, RN  Outcome: Progressing     Problem: Pain  Goal: Verbalizes/displays adequate comfort level or baseline comfort level  12/23/2023 1106 by Keesha Rojas, RN  Outcome: Progressing     Problem: ABCDS Injury Assessment  Goal: Absence of physical injury  12/23/2023 1106 by Keesha Rojas, RN  Outcome: Progressing

## 2023-12-23 NOTE — PROGRESS NOTES
[facility]    12/23/2023 7:45 AM        Name: Tammy Frederick .              Admitted: 12/21/2023  Primary Care Provider: No primary care provider on file. (Tel: None)      Subjective:  .    Admitted with abd pain  CT shows sigmoid abscess with diverticulitis   Pt had IR drain inserted on 12/22  Abd is very distended this am  She reports increased pain  NO bowel sounds or flatus.        Lives alone with her dogs in [facility].        Reviewed interval ancillary notes    Current Medications  HYDROmorphone HCl PF (DILAUDID) injection 0.5 mg, Q3H PRN  sodium chloride flush 0.9 % injection 5-40 mL, 2 times per day  sodium chloride flush 0.9 % injection 5-40 mL, PRN  0.9 % sodium chloride infusion, PRN  potassium chloride (KLOR-CON M) extended release tablet 40 mEq, PRN   Or  potassium bicarb-citric acid (EFFER-K) effervescent tablet 40 mEq, PRN   Or  potassium chloride 10 mEq/100 mL IVPB (Peripheral Line), PRN  magnesium sulfate 2000 mg in 50 mL IVPB premix, PRN  [Held by provider] enoxaparin (LOVENOX) injection 40 mg, Daily  ondansetron (ZOFRAN-ODT) disintegrating tablet 4 mg, Q8H PRN   Or  ondansetron (ZOFRAN) injection 4 mg, Q6H PRN  polyethylene glycol (GLYCOLAX) packet 17 g, Daily PRN  acetaminophen (TYLENOL) tablet 650 mg, Q6H PRN   Or  acetaminophen (TYLENOL) suppository 650 mg, Q6H PRN  lactated ringers IV soln infusion, Continuous  piperacillin-tazobactam (ZOSYN) 3,375 mg in sodium chloride 0.9 % 50 mL IVPB (mini-bag), Q8H        Objective:  /76   Pulse 99   Temp 98 °F (36.7 °C) (Oral)   Resp 18   Ht 1.524 m (5')   Wt 55.8 kg (123 lb)   SpO2 92%   BMI 24.02 kg/m²     Intake/Output Summary (Last 24 hours) at 12/23/2023 0745  Last data filed at 12/23/2023 0645  Gross per 24 hour   Intake --   Output 120 ml   Net -120 ml        Wt Readings from Last 3 Encounters:   12/21/23 55.8 kg (123 lb)   07/08/15 63.5 kg (140 lb)   07/07/11 58.1 kg (128 lb)       General

## 2023-12-23 NOTE — PROGRESS NOTES
Physician Progress Note      PATIENT:               SURYA SYKES  [facility] #:                  [id number]  :                       1950  ADMIT DATE:       2023 10:11 PM  DISCH DATE:  RESPONDING  PROVIDER #:        BLADIMIR Nugent CNP          QUERY TEXT:    Patient admitted with diverticulitis with abscess. If possible, please   document in progress notes and discharge summary further specificity regarding   the location of the abscess:    The medical record reflects the following:  Risk Factors:  Diverticulitis  Clinical Indicators: CT \"FINDINGS:  Left abdominopelvic abscess without safe window for posterior percutaneous   approach, thus left anterolateral approach was utilized to place the drain.\"    per H&P \"CT A/P showed sigmoid diverticulitis with 4.5 cm abscess.\" General   surgery \"she does have an abscess along the sigmoid from read of CT from   outside hospital and interventional radiology is consulted for percutaneous   drainage\"  Treatment: IV Zosyn, General surgery consult, and percutaneous drain placed  Options provided:  -- Diverticulitis with peritoneal abscess  -- Diverticulitis with intra-abdominal cavity abscess  -- Diverticulitis with focal abscess only  -- Other - I will add my own diagnosis  -- Disagree - Not applicable / Not valid  -- Disagree - Clinically unable to determine / Unknown  -- Refer to Clinical Documentation Reviewer    PROVIDER RESPONSE TEXT:    This patient has diverticulitis with focal abscess only.    Query created by: Raine Hopkins on 2023 1:47 PM      Electronically signed by:  BLADIMIR Nugent CNP 2023 4:16   PM

## 2023-12-24 LAB
ANION GAP SERPL CALCULATED.3IONS-SCNC: 11 MMOL/L (ref 3–16)
BACTERIA FLD AEROBE CULT: ABNORMAL
BASOPHILS # BLD: 0 K/UL (ref 0–0.2)
BASOPHILS NFR BLD: 0 %
BUN SERPL-MCNC: 19 MG/DL (ref 7–20)
CALCIUM SERPL-MCNC: 9.1 MG/DL (ref 8.3–10.6)
CHLORIDE SERPL-SCNC: 107 MMOL/L (ref 99–110)
CO2 SERPL-SCNC: 25 MMOL/L (ref 21–32)
CREAT SERPL-MCNC: 0.5 MG/DL (ref 0.6–1.2)
DEPRECATED RDW RBC AUTO: 13.6 % (ref 12.4–15.4)
EOSINOPHIL # BLD: 0 K/UL (ref 0–0.6)
EOSINOPHIL NFR BLD: 0 %
GFR SERPLBLD CREATININE-BSD FMLA CKD-EPI: >60 ML/MIN/{1.73_M2}
GLUCOSE SERPL-MCNC: 151 MG/DL (ref 70–99)
GRAM STN SPEC: ABNORMAL
HCT VFR BLD AUTO: 32.2 % (ref 36–48)
HGB BLD-MCNC: 10.6 G/DL (ref 12–16)
LACTATE BLDV-SCNC: 1.1 MMOL/L (ref 0.4–2)
LYMPHOCYTES # BLD: 0.6 K/UL (ref 1–5.1)
LYMPHOCYTES NFR BLD: 4 %
MAGNESIUM SERPL-MCNC: 2 MG/DL (ref 1.8–2.4)
MCH RBC QN AUTO: 31.3 PG (ref 26–34)
MCHC RBC AUTO-ENTMCNC: 32.8 G/DL (ref 31–36)
MCV RBC AUTO: 95.4 FL (ref 80–100)
MONOCYTES # BLD: 1 K/UL (ref 0–1.3)
MONOCYTES NFR BLD: 7 %
MYELOCYTES NFR BLD MANUAL: 1 %
NEUTROPHILS # BLD: 13 K/UL (ref 1.7–7.7)
NEUTROPHILS NFR BLD: 83 %
NEUTS BAND NFR BLD MANUAL: 5 % (ref 0–7)
ORGANISM: ABNORMAL
ORGANISM: ABNORMAL
PHOSPHATE SERPL-MCNC: 3 MG/DL (ref 2.5–4.9)
PLATELET # BLD AUTO: 289 K/UL (ref 135–450)
PLATELET BLD QL SMEAR: ADEQUATE
PMV BLD AUTO: 7.5 FL (ref 5–10.5)
POTASSIUM SERPL-SCNC: 3.8 MMOL/L (ref 3.5–5.1)
RBC # BLD AUTO: 3.37 M/UL (ref 4–5.2)
RBC MORPH BLD: NORMAL
SLIDE REVIEW: ABNORMAL
SODIUM SERPL-SCNC: 143 MMOL/L (ref 136–145)
WBC # BLD AUTO: 14.6 K/UL (ref 4–11)

## 2023-12-24 PROCEDURE — 99223 1ST HOSP IP/OBS HIGH 75: CPT | Performed by: INTERNAL MEDICINE

## 2023-12-24 PROCEDURE — 6360000002 HC RX W HCPCS: Performed by: INTERNAL MEDICINE

## 2023-12-24 PROCEDURE — 99232 SBSQ HOSP IP/OBS MODERATE 35: CPT | Performed by: SURGERY

## 2023-12-24 PROCEDURE — 85025 COMPLETE CBC W/AUTO DIFF WBC: CPT

## 2023-12-24 PROCEDURE — 84100 ASSAY OF PHOSPHORUS: CPT

## 2023-12-24 PROCEDURE — 2500000003 HC RX 250 WO HCPCS: Performed by: NURSE PRACTITIONER

## 2023-12-24 PROCEDURE — 6360000002 HC RX W HCPCS: Performed by: SURGERY

## 2023-12-24 PROCEDURE — 6360000002 HC RX W HCPCS: Performed by: NURSE PRACTITIONER

## 2023-12-24 PROCEDURE — 80048 BASIC METABOLIC PNL TOTAL CA: CPT

## 2023-12-24 PROCEDURE — 83605 ASSAY OF LACTIC ACID: CPT

## 2023-12-24 PROCEDURE — 1200000000 HC SEMI PRIVATE

## 2023-12-24 PROCEDURE — 2580000003 HC RX 258: Performed by: INTERNAL MEDICINE

## 2023-12-24 PROCEDURE — 83735 ASSAY OF MAGNESIUM: CPT

## 2023-12-24 RX ORDER — FLUCONAZOLE 2 MG/ML
400 INJECTION, SOLUTION INTRAVENOUS EVERY 24 HOURS
Status: DISCONTINUED | OUTPATIENT
Start: 2023-12-24 | End: 2023-12-26

## 2023-12-24 RX ORDER — DEXTROSE MONOHYDRATE, SODIUM CHLORIDE, AND POTASSIUM CHLORIDE 50; 1.49; 4.5 G/1000ML; G/1000ML; G/1000ML
INJECTION, SOLUTION INTRAVENOUS CONTINUOUS
Status: DISCONTINUED | OUTPATIENT
Start: 2023-12-24 | End: 2023-12-29

## 2023-12-24 RX ADMIN — Medication 10 ML: at 20:32

## 2023-12-24 RX ADMIN — HYDROMORPHONE HYDROCHLORIDE 0.5 MG: 1 INJECTION, SOLUTION INTRAMUSCULAR; INTRAVENOUS; SUBCUTANEOUS at 20:25

## 2023-12-24 RX ADMIN — PIPERACILLIN AND TAZOBACTAM 3375 MG: 3; .375 INJECTION, POWDER, FOR SOLUTION INTRAVENOUS at 09:22

## 2023-12-24 RX ADMIN — HYDROMORPHONE HYDROCHLORIDE 1 MG: 1 INJECTION, SOLUTION INTRAMUSCULAR; INTRAVENOUS; SUBCUTANEOUS at 09:18

## 2023-12-24 RX ADMIN — Medication 10 ML: at 09:19

## 2023-12-24 RX ADMIN — POTASSIUM CHLORIDE, DEXTROSE MONOHYDRATE AND SODIUM CHLORIDE: 150; 5; 450 INJECTION, SOLUTION INTRAVENOUS at 13:14

## 2023-12-24 RX ADMIN — PIPERACILLIN AND TAZOBACTAM 3375 MG: 3; .375 INJECTION, POWDER, FOR SOLUTION INTRAVENOUS at 15:08

## 2023-12-24 RX ADMIN — FLUCONAZOLE 400 MG: 400 INJECTION, SOLUTION INTRAVENOUS at 17:47

## 2023-12-24 ASSESSMENT — PAIN SCALES - GENERAL
PAINLEVEL_OUTOF10: 6
PAINLEVEL_OUTOF10: 5
PAINLEVEL_OUTOF10: 7

## 2023-12-24 ASSESSMENT — PAIN DESCRIPTION - LOCATION: LOCATION: ABDOMEN

## 2023-12-24 NOTE — PLAN OF CARE
Problem: Discharge Planning  Goal: Discharge to home or other facility with appropriate resources  12/24/2023 1752 by Keesha Rojas, RN  Outcome: Progressing     Problem: Pain  Goal: Verbalizes/displays adequate comfort level or baseline comfort level  12/24/2023 1752 by Keesha Rojas, RN  Outcome: Progressing     Problem: ABCDS Injury Assessment  Goal: Absence of physical injury  12/24/2023 1752 by Keesha Rojas, RN  Outcome: Progressing

## 2023-12-24 NOTE — PROGRESS NOTES
Shift assessment done, VSS, A/O. Neuro checks WNL. Denies pain at this time.  Meds given per MAR. Standard safety measures in place. The care plan and education has been reviewed and mutually agreed upon with the patient. NG in place with adequate output this shift.

## 2023-12-24 NOTE — CONSULTS
Infectious Diseases   Consult Note      Reason for Consult:  perforated diverticulitis    Requesting Physician:  Marcelo      Date of Admission: 12/21/2023  Subjective:   CHIEF COMPLAINT:   none given       HPI:     Tammy Frederick is a 73yoF with history of OA, max TKA    Admitted through ED 12/21/23 with cc abd pain and fond to have diverticulitis with associated abscess.   No prior history of diverticulitis  S/p IR drain placement 12/22/23.  Culture is positive for mixed enteric marco.  Course further c/b ileus      She is AF   WBC remains elevated at 14.6  Output from the drain has been minimal     ID is consulted for abx management                   Current abx:  Zosyn 3.375 q8       Past Surgical History:       Diagnosis Date    Arthritis          Procedure Laterality Date    ABDOMINAL SURGERY      cholecystectomy    JOINT REPLACEMENT  9/17/12    right knee    TOTAL KNEE ARTHROPLASTY  5/16/2011    Left knee       Social History:    TOBACCO:   reports that she has never smoked. She does not have any smokeless tobacco history on file.  ETOH:   reports current alcohol use.  There is no history of illicit drug use or other significant epidemiologic exposures.      Family History:   No family history on file.  There is no family history of autoimmune diseases or significant infectious diseases.      Current Medications:    Current Facility-Administered Medications: dextrose 5 % and 0.45 % NaCl with KCl 20 mEq infusion, , IntraVENous, Continuous  bisacodyl (DULCOLAX) suppository 10 mg, 10 mg, Rectal, Once  bisacodyl (DULCOLAX) suppository 10 mg, 10 mg, Rectal, Daily PRN  HYDROmorphone HCl PF (DILAUDID) injection 0.5 mg, 0.5 mg, IntraVENous, Q2H PRN **OR** HYDROmorphone HCl PF (DILAUDID) injection 1 mg, 1 mg, IntraVENous, Q2H PRN  oxyCODONE (ROXICODONE) immediate release tablet 5 mg, 5 mg, Oral, Q4H PRN **OR** oxyCODONE (ROXICODONE) immediate release tablet 10 mg, 10 mg, Oral, Q4H PRN  acetaminophen (TYLENOL) tablet

## 2023-12-24 NOTE — PROGRESS NOTES
[facility]    12/24/2023 7:21 AM        Name: Tammy Frederick .              Admitted: 12/21/2023  Primary Care Provider: No primary care provider on file. (Tel: None)      Subjective:  .    Admitted with abd pain from [facility]   CT shows sigmoid abscess with diverticulitis   Pt had IR drain inserted on 12/22  Developed increase abd pain / bloating on 12/23:  Imaging suggested Ileus vs early SBO  ( lactic acid normal)     Feels better today.  Had BM this am.    Lives alone with her dogs in [facility].        Reviewed interval ancillary notes    Current Medications  bisacodyl (DULCOLAX) suppository 10 mg, Once  bisacodyl (DULCOLAX) suppository 10 mg, Daily PRN  HYDROmorphone HCl PF (DILAUDID) injection 0.5 mg, Q2H PRN   Or  HYDROmorphone HCl PF (DILAUDID) injection 1 mg, Q2H PRN  oxyCODONE (ROXICODONE) immediate release tablet 5 mg, Q4H PRN   Or  oxyCODONE (ROXICODONE) immediate release tablet 10 mg, Q4H PRN  acetaminophen (TYLENOL) tablet 650 mg, Q6H  HYDROmorphone HCl PF (DILAUDID) injection 1 mg, Q4H PRN  phenol 1.4 % mouth spray 1 spray, Q2H PRN  promethazine (PHENERGAN) suppository 25 mg, Q6H PRN  sodium chloride flush 0.9 % injection 5-40 mL, 2 times per day  sodium chloride flush 0.9 % injection 5-40 mL, PRN  0.9 % sodium chloride infusion, PRN  potassium chloride (KLOR-CON M) extended release tablet 40 mEq, PRN   Or  potassium bicarb-citric acid (EFFER-K) effervescent tablet 40 mEq, PRN   Or  potassium chloride 10 mEq/100 mL IVPB (Peripheral Line), PRN  magnesium sulfate 2000 mg in 50 mL IVPB premix, PRN  [Held by provider] enoxaparin (LOVENOX) injection 40 mg, Daily  ondansetron (ZOFRAN-ODT) disintegrating tablet 4 mg, Q8H PRN   Or  ondansetron (ZOFRAN) injection 4 mg, Q6H PRN  polyethylene glycol (GLYCOLAX) packet 17 g, Daily PRN  acetaminophen (TYLENOL) tablet 650 mg, Q6H PRN   Or  acetaminophen (TYLENOL) suppository 650 mg, Q6H PRN  piperacillin-tazobactam

## 2023-12-24 NOTE — PROGRESS NOTES
[facility]  Progress Note    Pt Name: Tammy Frederick  MRN: [id number]  YOB: 1950  Date of evaluation: 2023    Chief Complaint: abdominal pain      Subjective:    No acute events overnight  Pain better today  Nausea better today with NG  Passing flatus, stool with suppository    Vital Signs:  Patient Vitals for the past 24 hrs:   BP Temp Temp src Pulse Resp SpO2   23 1245 (!) 143/73 98 °F (36.7 °C) Oral 79 18 --   23 1226 129/72 98 °F (36.7 °C) Oral 75 16 (!) 87 %   23 0948 -- -- -- -- 17 --   23 0900 (!) 151/88 98.8 °F (37.1 °C) Oral 81 19 92 %   23 2345 131/76 -- -- 88 20 --   23 2130 135/87 97.7 °F (36.5 °C) Oral 82 18 --   23 1719 -- -- -- -- 17 --   23 1600 (!) 144/90 98.1 °F (36.7 °C) Oral 94 18 94 %      TEMPERATURE HISTORY 24H: Temp (24hrs), Av.1 °F (36.7 °C), Min:97.7 °F (36.5 °C), Max:98.8 °F (37.1 °C)    BLOOD PRESSURE HISTORY: Systolic (36hrs), Av , Min:129 , Max:151    Diastolic (36hrs), Av, Min:72, Max:90      Intake/Output:    I/O last 3 completed shifts:  In: -   Out: 1275 [Urine:650; Emesis/NG output:600; Drains:25]  I/O this shift:  In: -   Out: 901 [Urine:1; Emesis/NG output:900]  Drain/tube Output:    Closed/Suction Drain Left Abdomen Bulb-Output (ml): 0 ml      Physical Exam:  General: awake, alert, no acute distress, oriented to person, place, time and situation  Cardiac: regular rate and rhythm   Pulmonary: clear to auscultation bilaterally   Abdomen: soft, less distended, mild mid and left lower tenderness without peritonitis, drain with purulent output    Labs:  CBC:    Recent Labs     23  0602 23  0540 23  0544   WBC 8.7 13.0* 14.6*   HGB 11.0* 11.3* 10.6*   HCT 32.4* 34.1* 32.2*    267 289     BMP:    Recent Labs     23  0602 23  0540 23  0544    143 143   K 4.0 4.7 3.8    107 107   CO2 22 24 25   BUN 17 20 19        Scheduled Meds:   bisacodyl  10 mg Rectal Once    acetaminophen  650 mg Oral Q6H    sodium chloride flush  5-40 mL IntraVENous 2 times per day    [Held by provider] enoxaparin  40 mg SubCUTAneous Daily    piperacillin-tazobactam  3,375 mg IntraVENous Q8H     Continuous Infusions:   dextrose 5% and 0.45% NaCl with KCl 20 mEq 100 mL/hr at 12/24/23 1314    sodium chloride 10 mL/hr at 12/21/23 2341     PRN Meds:.bisacodyl, HYDROmorphone **OR** HYDROmorphone, oxyCODONE **OR** oxyCODONE, HYDROmorphone, phenol, promethazine, sodium chloride flush, sodium chloride, potassium chloride **OR** potassium alternative oral replacement **OR** potassium chloride, magnesium sulfate, ondansetron **OR** ondansetron, polyethylene glycol, acetaminophen **OR** acetaminophen      Assessment:  Patient Active Problem List   Diagnosis    OA (osteoarthritis) of knee    Right TKR    Abscess of sigmoid colon     Acute sigmoid diverticulitis with abscess s/p percutaneous drainage  Ileus     Plan:  1. Abdominal exam benign, vital signs stable, no signs of toxicity. Does not need surgical exploration unless condition deteriorates.  Appreciate IR support, percutaneous drain placed, repeat CT in 1-2 days  2. Bowel rest, NG decompression  3. Monitor bowel function, passing stool  4. IVF, monitor and replace electrolytes as needed  5. Antibiotics per ID  6. Pain medication and antiemetics as needed with caution as may mask worsening exam  7. Will need elective colonoscopy to screen for other etiologies that may be mimicking diverticulitis  8. Defer management of remainder of other medical conditions to primary and consulting teams  9. Discharge planning, patient lives several hours away and would like to establish care after discharge at a facility closer to where she lives.  With general surgery or colorectal and GI follow-up, is reasonable after discharge to follow-up with the specialties    Alban Robertson MD, FACS  12/24/2023  1:44 PM

## 2023-12-25 ENCOUNTER — APPOINTMENT (OUTPATIENT)
Dept: CT IMAGING | Age: 73
DRG: 330 | End: 2023-12-25
Attending: INTERNAL MEDICINE
Payer: MEDICARE

## 2023-12-25 LAB
ALBUMIN SERPL-MCNC: 3.1 G/DL (ref 3.4–5)
ALBUMIN/GLOB SERPL: 1 {RATIO} (ref 1.1–2.2)
ALP SERPL-CCNC: 108 U/L (ref 40–129)
ALT SERPL-CCNC: 13 U/L (ref 10–40)
ANION GAP SERPL CALCULATED.3IONS-SCNC: 11 MMOL/L (ref 3–16)
AST SERPL-CCNC: 15 U/L (ref 15–37)
BASOPHILS # BLD: 0 K/UL (ref 0–0.2)
BASOPHILS NFR BLD: 0 %
BILIRUB SERPL-MCNC: 0.4 MG/DL (ref 0–1)
BUN SERPL-MCNC: 17 MG/DL (ref 7–20)
CALCIUM SERPL-MCNC: 8.8 MG/DL (ref 8.3–10.6)
CHLORIDE SERPL-SCNC: 110 MMOL/L (ref 99–110)
CO2 SERPL-SCNC: 27 MMOL/L (ref 21–32)
CREAT SERPL-MCNC: <0.5 MG/DL (ref 0.6–1.2)
DEPRECATED RDW RBC AUTO: 13.5 % (ref 12.4–15.4)
EOSINOPHIL # BLD: 0.2 K/UL (ref 0–0.6)
EOSINOPHIL NFR BLD: 2 %
GFR SERPLBLD CREATININE-BSD FMLA CKD-EPI: >60 ML/MIN/{1.73_M2}
GLUCOSE SERPL-MCNC: 158 MG/DL (ref 70–99)
HCT VFR BLD AUTO: 30.7 % (ref 36–48)
HGB BLD-MCNC: 10.2 G/DL (ref 12–16)
LYMPHOCYTES # BLD: 1.6 K/UL (ref 1–5.1)
LYMPHOCYTES NFR BLD: 15 %
MAGNESIUM SERPL-MCNC: 2.2 MG/DL (ref 1.8–2.4)
MCH RBC QN AUTO: 31.6 PG (ref 26–34)
MCHC RBC AUTO-ENTMCNC: 33.3 G/DL (ref 31–36)
MCV RBC AUTO: 95 FL (ref 80–100)
MONOCYTES # BLD: 0.5 K/UL (ref 0–1.3)
MONOCYTES NFR BLD: 5 %
MYELOCYTES NFR BLD MANUAL: 1 %
NEUTROPHILS # BLD: 8.5 K/UL (ref 1.7–7.7)
NEUTROPHILS NFR BLD: 77 %
PHOSPHATE SERPL-MCNC: 2.6 MG/DL (ref 2.5–4.9)
PLATELET # BLD AUTO: 302 K/UL (ref 135–450)
PLATELET BLD QL SMEAR: ADEQUATE
PMV BLD AUTO: 7.5 FL (ref 5–10.5)
POTASSIUM SERPL-SCNC: 3.6 MMOL/L (ref 3.5–5.1)
PROT SERPL-MCNC: 6.2 G/DL (ref 6.4–8.2)
RBC # BLD AUTO: 3.23 M/UL (ref 4–5.2)
RBC MORPH BLD: NORMAL
SLIDE REVIEW: ABNORMAL
SODIUM SERPL-SCNC: 148 MMOL/L (ref 136–145)
WBC # BLD AUTO: 10.9 K/UL (ref 4–11)

## 2023-12-25 PROCEDURE — 99232 SBSQ HOSP IP/OBS MODERATE 35: CPT | Performed by: SURGERY

## 2023-12-25 PROCEDURE — 85025 COMPLETE CBC W/AUTO DIFF WBC: CPT

## 2023-12-25 PROCEDURE — 1200000000 HC SEMI PRIVATE

## 2023-12-25 PROCEDURE — 74177 CT ABD & PELVIS W/CONTRAST: CPT

## 2023-12-25 PROCEDURE — 80053 COMPREHEN METABOLIC PANEL: CPT

## 2023-12-25 PROCEDURE — 6360000002 HC RX W HCPCS: Performed by: NURSE PRACTITIONER

## 2023-12-25 PROCEDURE — 6360000002 HC RX W HCPCS: Performed by: INTERNAL MEDICINE

## 2023-12-25 PROCEDURE — 6360000004 HC RX CONTRAST MEDICATION

## 2023-12-25 PROCEDURE — 2500000003 HC RX 250 WO HCPCS: Performed by: NURSE PRACTITIONER

## 2023-12-25 PROCEDURE — 6360000004 HC RX CONTRAST MEDICATION: Performed by: SURGERY

## 2023-12-25 PROCEDURE — 84100 ASSAY OF PHOSPHORUS: CPT

## 2023-12-25 PROCEDURE — 2580000003 HC RX 258: Performed by: INTERNAL MEDICINE

## 2023-12-25 PROCEDURE — 83735 ASSAY OF MAGNESIUM: CPT

## 2023-12-25 RX ADMIN — DIATRIZOATE MEGLUMINE AND DIATRIZOATE SODIUM 30 ML: 660; 100 LIQUID ORAL; RECTAL at 12:31

## 2023-12-25 RX ADMIN — IOPAMIDOL 75 ML: 755 INJECTION, SOLUTION INTRAVENOUS at 15:15

## 2023-12-25 RX ADMIN — FLUCONAZOLE 400 MG: 400 INJECTION, SOLUTION INTRAVENOUS at 16:24

## 2023-12-25 RX ADMIN — Medication 10 ML: at 09:37

## 2023-12-25 RX ADMIN — POTASSIUM CHLORIDE, DEXTROSE MONOHYDRATE AND SODIUM CHLORIDE: 150; 5; 450 INJECTION, SOLUTION INTRAVENOUS at 09:33

## 2023-12-25 RX ADMIN — ONDANSETRON 4 MG: 2 INJECTION INTRAMUSCULAR; INTRAVENOUS at 09:50

## 2023-12-25 RX ADMIN — HYDROMORPHONE HYDROCHLORIDE 1 MG: 1 INJECTION, SOLUTION INTRAMUSCULAR; INTRAVENOUS; SUBCUTANEOUS at 12:28

## 2023-12-25 RX ADMIN — HYDROMORPHONE HYDROCHLORIDE 1 MG: 1 INJECTION, SOLUTION INTRAMUSCULAR; INTRAVENOUS; SUBCUTANEOUS at 21:28

## 2023-12-25 RX ADMIN — PIPERACILLIN AND TAZOBACTAM 3375 MG: 3; .375 INJECTION, POWDER, FOR SOLUTION INTRAVENOUS at 17:07

## 2023-12-25 RX ADMIN — PIPERACILLIN AND TAZOBACTAM 3375 MG: 3; .375 INJECTION, POWDER, FOR SOLUTION INTRAVENOUS at 01:14

## 2023-12-25 RX ADMIN — PIPERACILLIN AND TAZOBACTAM 3375 MG: 3; .375 INJECTION, POWDER, FOR SOLUTION INTRAVENOUS at 09:35

## 2023-12-25 ASSESSMENT — PAIN - FUNCTIONAL ASSESSMENT
PAIN_FUNCTIONAL_ASSESSMENT: ACTIVITIES ARE NOT PREVENTED
PAIN_FUNCTIONAL_ASSESSMENT: ACTIVITIES ARE NOT PREVENTED

## 2023-12-25 ASSESSMENT — PAIN SCALES - GENERAL
PAINLEVEL_OUTOF10: 0
PAINLEVEL_OUTOF10: 8
PAINLEVEL_OUTOF10: 8
PAINLEVEL_OUTOF10: 0
PAINLEVEL_OUTOF10: 5

## 2023-12-25 ASSESSMENT — PAIN DESCRIPTION - PAIN TYPE
TYPE: SURGICAL PAIN
TYPE: ACUTE PAIN

## 2023-12-25 ASSESSMENT — PAIN DESCRIPTION - ONSET: ONSET: ON-GOING

## 2023-12-25 ASSESSMENT — PAIN DESCRIPTION - ORIENTATION
ORIENTATION: LOWER
ORIENTATION: LEFT
ORIENTATION: LOWER

## 2023-12-25 ASSESSMENT — PAIN DESCRIPTION - DESCRIPTORS
DESCRIPTORS: ACHING
DESCRIPTORS: ACHING;SHARP
DESCRIPTORS: ACHING

## 2023-12-25 ASSESSMENT — PAIN DESCRIPTION - LOCATION
LOCATION: ABDOMEN

## 2023-12-25 ASSESSMENT — PAIN DESCRIPTION - FREQUENCY: FREQUENCY: INTERMITTENT

## 2023-12-25 NOTE — PROGRESS NOTES
[facility]  Progress Note    Pt Name: Tammy Frederick  MRN: [id number]  YOB: 1950  Date of evaluation: 2023    Chief Complaint: abdominal pain      Subjective:    No acute events overnight  Pain controlled  No nausea with NG  Passing flatus, stool    Vital Signs:  Patient Vitals for the past 24 hrs:   BP Temp Temp src Pulse Resp SpO2   23 0915 -- 98.5 °F (36.9 °C) Oral 75 16 95 %   23 0615 -- -- -- -- -- 90 %   23 0530 (!) 171/83 98 °F (36.7 °C) Oral 75 -- (!) 87 %   23 0145 (!) 161/91 98 °F (36.7 °C) Oral 73 17 92 %   23 -- -- -- -- 17 --   23 (!) 158/89 98.1 °F (36.7 °C) Oral 77 18 91 %   23 1648 (!) 147/79 98.1 °F (36.7 °C) Oral 76 16 91 %   23 1245 (!) 143/73 98 °F (36.7 °C) Oral 79 18 --   23 1226 129/72 98 °F (36.7 °C) Oral 75 16 (!) 87 %      TEMPERATURE HISTORY 24H: Temp (24hrs), Av.1 °F (36.7 °C), Min:98 °F (36.7 °C), Max:98.5 °F (36.9 °C)    BLOOD PRESSURE HISTORY: Systolic (36hrs), Av , Min:129 , Max:171    Diastolic (36hrs), Av, Min:72, Max:91      Intake/Output:    I/O last 3 completed shifts:  In: -   Out:  [Urine:1; Emesis/NG output:]  I/O this shift:  In: 60 [P.O.:60]  Out: -   Drain/tube Output:    Closed/Suction Drain Left Abdomen Bulb-Output (ml): 0 ml      Physical Exam:  General: awake, alert, no acute distress, oriented to person, place, time and situation  Cardiac: regular rate and rhythm   Pulmonary: clear to auscultation bilaterally   Abdomen: soft, mildly distended, mild mid and left lower tenderness without peritonitis, drain with minimal purulent output    Labs:  CBC:    Recent Labs     23  0540 23  0544 23  0535   WBC 13.0* 14.6* 10.9   HGB 11.3* 10.6* 10.2*   HCT 34.1* 32.2* 30.7*    289 302     BMP:    Recent Labs     23  0540 23  0544 23  0535    143 148*   K 4.7 3.8 3.6    107 110   CO2

## 2023-12-25 NOTE — PROGRESS NOTES
Patient A&O X4, VS WNL, pt complaining of abdominal pain 6/10, dilaudid 0.5mg given, assessment done, pt ambulate in room with walker, NGT on intermittent suction, safety precautions in place, call light within reach, RN monitoring during shift.

## 2023-12-25 NOTE — PROGRESS NOTES
[facility]    12/25/2023 7:27 AM        Name: Tammy Frederick .              Admitted: 12/21/2023  Primary Care Provider: No primary care provider on file. (Tel: None)      Subjective:  .    Admitted with abd pain from [facility]   CT shows sigmoid abscess with diverticulitis   Pt had IR drain inserted on 12/22  Developed increase abd pain / bloating on 12/23:  Imaging suggested Ileus vs early SBO  ( lactic acid normal)     Feels better today.  Having BM's and passing gas      Evaluated by ID on 12/24/23 ,  Diflucan added     Lives alone with her dogs in [facility].        Reviewed interval ancillary notes    Current Medications  dextrose 5 % and 0.45 % NaCl with KCl 20 mEq infusion, Continuous  fluconazole (DIFLUCAN) in 0.9 % sodium chloride IVPB 400 mg, Q24H  bisacodyl (DULCOLAX) suppository 10 mg, Once  bisacodyl (DULCOLAX) suppository 10 mg, Daily PRN  HYDROmorphone HCl PF (DILAUDID) injection 0.5 mg, Q2H PRN   Or  HYDROmorphone HCl PF (DILAUDID) injection 1 mg, Q2H PRN  oxyCODONE (ROXICODONE) immediate release tablet 5 mg, Q4H PRN   Or  oxyCODONE (ROXICODONE) immediate release tablet 10 mg, Q4H PRN  acetaminophen (TYLENOL) tablet 650 mg, Q6H  HYDROmorphone HCl PF (DILAUDID) injection 1 mg, Q4H PRN  phenol 1.4 % mouth spray 1 spray, Q2H PRN  promethazine (PHENERGAN) suppository 25 mg, Q6H PRN  sodium chloride flush 0.9 % injection 5-40 mL, 2 times per day  sodium chloride flush 0.9 % injection 5-40 mL, PRN  0.9 % sodium chloride infusion, PRN  potassium chloride (KLOR-CON M) extended release tablet 40 mEq, PRN   Or  potassium bicarb-citric acid (EFFER-K) effervescent tablet 40 mEq, PRN   Or  potassium chloride 10 mEq/100 mL IVPB (Peripheral Line), PRN  magnesium sulfate 2000 mg in 50 mL IVPB premix, PRN  [Held by provider] enoxaparin (LOVENOX) injection 40 mg, Daily  ondansetron (ZOFRAN-ODT) disintegrating tablet 4 mg, Q8H PRN   Or  ondansetron (ZOFRAN) injection 4  15 13   BILITOT 0.5 0.4   ALKPHOS 106 108     12/22/23    Successful 12 Maltese left anterior approach (no safe posterior window) abdominopelvic abscess drain placement. 10 cc of pus aspirated and sent to microbiology     Abscess   Escherichia coli (1)      Antibiotic Interpretation Microscan  Method Status    ampicillin Sensitive 8 mcg/mL BACTERIAL SUSCEPTIBILITY PANEL BY PATSY     ampicillin-sulbactam Sensitive 4 mcg/mL BACTERIAL SUSCEPTIBILITY PANEL BY PATSY     ceFAZolin Sensitive <=4 mcg/mL BACTERIAL SUSCEPTIBILITY PANEL BY PATSY     cefepime Sensitive <=0.12 mcg/mL BACTERIAL SUSCEPTIBILITY PANEL BY PATSY     cefTRIAXone Sensitive <=0.25 mcg/mL BACTERIAL SUSCEPTIBILITY PANEL BY PATSY     ciprofloxacin Sensitive <=0.25 mcg/mL BACTERIAL SUSCEPTIBILITY PANEL BY PATSY     ertapenem Sensitive <=0.12 mcg/mL BACTERIAL SUSCEPTIBILITY PANEL BY PATSY     gentamicin Sensitive <=1 mcg/mL BACTERIAL SUSCEPTIBILITY PANEL BY PATSY     piperacillin-tazobactam Sensitive <=4 mcg/mL BACTERIAL SUSCEPTIBILITY PANEL BY PATSY     trimethoprim-sulfamethoxazole Sensitive <=20 mcg/mL BACTERIAL SUSCEPTIBILITY PANEL BY PATSY         Abd Xray: 12/23/23   MPRESSION:  Overall pattern of probable ileus.  A partial grade small-bowel obstruction  is considered less likely.            Problem List  Principal Problem:    Abscess of sigmoid colon  Resolved Problems:    * No resolved hospital problems. *       Assessment & Plan:   Abscess of Colon:   culture with E coli and Beta strept C:  currently on   On zosyn and diflucan.  Clinically she looks much improved.  GS and ID are following   Abd distention with possible ileus: appears to be resolving. No current pain  Abd pain/ diverticulitis:  on zosyn and IV diflucan   Tobacco use:  cessation encouraged  Appreciate all consults    Anticipate oral cipro/ flagyl at d/c         Diet: Diet NPO Exceptions are: Sips of Water with Meds  Code:Full Code  DVT PPX lovenox on hold for LESLIE Bray, APRN - CNP

## 2023-12-25 NOTE — PROGRESS NOTES
Pt began drinking PO contrast at 1230 per RN. Pt is due for scan at 1430. Called RN to notify her that transport is no longer here for the day and we would need to arrange for pt to get to us. Asked RN if she is able to bring pt in the next hour to which she stated she has 4 other patients requiring her attention and one PCA so no one will be able to do it and continued repeating she put in a transport request. Notified RN again that there is no transport here today. RN asked if CT Is able to come get pt to which I told her no. Will speak with charge nurse for best plan for pt as pt would need to drink PO again if scan gets too delayed.

## 2023-12-25 NOTE — PROGRESS NOTES
Shift assessment completed. Neuro WNL. Reports pain 5/10 and nausea at this time. AM meds administered per MAR. NGT remains in place at 50cm; draining well w/ small output. BILLY drain remains in place. The care plan and education has been reviewed and mutually agreed upon with the patient. Standard safety measures in place.

## 2023-12-25 NOTE — PLAN OF CARE
Problem: Discharge Planning  Goal: Discharge to home or other facility with appropriate resources  Recent Flowsheet Documentation  Taken 12/25/2023 0915 by Yue Watkins RN  Discharge to home or other facility with appropriate resources: Identify barriers to discharge with patient and caregiver     Problem: Pain  Goal: Verbalizes/displays adequate comfort level or baseline comfort level  Recent Flowsheet Documentation  Taken 12/25/2023 0915 by Yue Watkins RN  Verbalizes/displays adequate comfort level or baseline comfort level: Encourage patient to monitor pain and request assistance     Problem: Gastrointestinal - Adult  Goal: Minimal or absence of nausea and vomiting  Outcome: Progressing  Goal: Maintains or returns to baseline bowel function  Outcome: Progressing

## 2023-12-26 PROBLEM — A49.1 STREPTOCOCCAL INFECTION GROUP C: Status: ACTIVE | Noted: 2023-12-26

## 2023-12-26 PROBLEM — K65.1 INTRA-ABDOMINAL ABSCESS (HCC): Status: ACTIVE | Noted: 2023-12-26

## 2023-12-26 PROBLEM — K57.40 DIVERTICULAR DISEASE OF BOTH SMALL AND LARGE INTESTINE WITH PERFORATION AND ABSCESS: Status: ACTIVE | Noted: 2023-12-26

## 2023-12-26 PROBLEM — A49.8 E COLI INFECTION: Status: ACTIVE | Noted: 2023-12-26

## 2023-12-26 PROBLEM — K65.8 FECAL PERITONITIS (HCC): Status: ACTIVE | Noted: 2023-12-26

## 2023-12-26 LAB
ANION GAP SERPL CALCULATED.3IONS-SCNC: 7 MMOL/L (ref 3–16)
BACTERIA BLD CULT ORG #2: NORMAL
BACTERIA BLD CULT: NORMAL
BASOPHILS # BLD: 0 K/UL (ref 0–0.2)
BASOPHILS NFR BLD: 0 %
BUN SERPL-MCNC: 11 MG/DL (ref 7–20)
CALCIUM SERPL-MCNC: 8.7 MG/DL (ref 8.3–10.6)
CHLORIDE SERPL-SCNC: 109 MMOL/L (ref 99–110)
CO2 SERPL-SCNC: 28 MMOL/L (ref 21–32)
CREAT SERPL-MCNC: <0.5 MG/DL (ref 0.6–1.2)
CRP SERPL-MCNC: 67.1 MG/L (ref 0–5.1)
DEPRECATED RDW RBC AUTO: 13.7 % (ref 12.4–15.4)
EOSINOPHIL # BLD: 0 K/UL (ref 0–0.6)
EOSINOPHIL NFR BLD: 0 %
ERYTHROCYTE [SEDIMENTATION RATE] IN BLOOD BY WESTERGREN METHOD: 118 MM/HR (ref 0–30)
GFR SERPLBLD CREATININE-BSD FMLA CKD-EPI: >60 ML/MIN/{1.73_M2}
GLUCOSE SERPL-MCNC: 202 MG/DL (ref 70–99)
HCT VFR BLD AUTO: 29.3 % (ref 36–48)
HGB BLD-MCNC: 9.9 G/DL (ref 12–16)
LYMPHOCYTES # BLD: 1.3 K/UL (ref 1–5.1)
LYMPHOCYTES NFR BLD: 15 %
MAGNESIUM SERPL-MCNC: 2.1 MG/DL (ref 1.8–2.4)
MCH RBC QN AUTO: 32.2 PG (ref 26–34)
MCHC RBC AUTO-ENTMCNC: 33.9 G/DL (ref 31–36)
MCV RBC AUTO: 95 FL (ref 80–100)
MONOCYTES # BLD: 0.3 K/UL (ref 0–1.3)
MONOCYTES NFR BLD: 3 %
NEUTROPHILS # BLD: 7.3 K/UL (ref 1.7–7.7)
NEUTROPHILS NFR BLD: 82 %
PHOSPHATE SERPL-MCNC: 3.2 MG/DL (ref 2.5–4.9)
PLATELET # BLD AUTO: 272 K/UL (ref 135–450)
PLATELET BLD QL SMEAR: ADEQUATE
PMV BLD AUTO: 7 FL (ref 5–10.5)
POTASSIUM SERPL-SCNC: 3.5 MMOL/L (ref 3.5–5.1)
RBC # BLD AUTO: 3.08 M/UL (ref 4–5.2)
RBC MORPH BLD: NORMAL
SLIDE REVIEW: ABNORMAL
SODIUM SERPL-SCNC: 144 MMOL/L (ref 136–145)
WBC # BLD AUTO: 8.9 K/UL (ref 4–11)

## 2023-12-26 PROCEDURE — 86140 C-REACTIVE PROTEIN: CPT

## 2023-12-26 PROCEDURE — 80048 BASIC METABOLIC PNL TOTAL CA: CPT

## 2023-12-26 PROCEDURE — 6370000000 HC RX 637 (ALT 250 FOR IP): Performed by: INTERNAL MEDICINE

## 2023-12-26 PROCEDURE — 36415 COLL VENOUS BLD VENIPUNCTURE: CPT

## 2023-12-26 PROCEDURE — 99233 SBSQ HOSP IP/OBS HIGH 50: CPT | Performed by: INTERNAL MEDICINE

## 2023-12-26 PROCEDURE — 2500000003 HC RX 250 WO HCPCS: Performed by: NURSE PRACTITIONER

## 2023-12-26 PROCEDURE — 85652 RBC SED RATE AUTOMATED: CPT

## 2023-12-26 PROCEDURE — 6360000002 HC RX W HCPCS: Performed by: NURSE PRACTITIONER

## 2023-12-26 PROCEDURE — 84100 ASSAY OF PHOSPHORUS: CPT

## 2023-12-26 PROCEDURE — 83735 ASSAY OF MAGNESIUM: CPT

## 2023-12-26 PROCEDURE — 85025 COMPLETE CBC W/AUTO DIFF WBC: CPT

## 2023-12-26 PROCEDURE — 99232 SBSQ HOSP IP/OBS MODERATE 35: CPT | Performed by: SURGERY

## 2023-12-26 PROCEDURE — 1200000000 HC SEMI PRIVATE

## 2023-12-26 PROCEDURE — 6370000000 HC RX 637 (ALT 250 FOR IP): Performed by: SURGERY

## 2023-12-26 PROCEDURE — 2580000003 HC RX 258: Performed by: INTERNAL MEDICINE

## 2023-12-26 PROCEDURE — 6360000002 HC RX W HCPCS: Performed by: INTERNAL MEDICINE

## 2023-12-26 RX ORDER — FLUCONAZOLE 2 MG/ML
200 INJECTION, SOLUTION INTRAVENOUS EVERY 24 HOURS
Status: DISCONTINUED | OUTPATIENT
Start: 2023-12-26 | End: 2024-01-03 | Stop reason: HOSPADM

## 2023-12-26 RX ORDER — LACTOBACILLUS RHAMNOSUS GG 10B CELL
1 CAPSULE ORAL 2 TIMES DAILY WITH MEALS
Status: DISCONTINUED | OUTPATIENT
Start: 2023-12-26 | End: 2024-01-03 | Stop reason: HOSPADM

## 2023-12-26 RX ADMIN — HYDROMORPHONE HYDROCHLORIDE 1 MG: 1 INJECTION, SOLUTION INTRAMUSCULAR; INTRAVENOUS; SUBCUTANEOUS at 05:54

## 2023-12-26 RX ADMIN — FLUCONAZOLE 200 MG: 2 INJECTION, SOLUTION INTRAVENOUS at 14:19

## 2023-12-26 RX ADMIN — Medication 10 ML: at 08:09

## 2023-12-26 RX ADMIN — PIPERACILLIN AND TAZOBACTAM 3375 MG: 3; .375 INJECTION, POWDER, FOR SOLUTION INTRAVENOUS at 15:30

## 2023-12-26 RX ADMIN — ACETAMINOPHEN 650 MG: 325 TABLET ORAL at 15:28

## 2023-12-26 RX ADMIN — PIPERACILLIN AND TAZOBACTAM 3375 MG: 3; .375 INJECTION, POWDER, FOR SOLUTION INTRAVENOUS at 01:29

## 2023-12-26 RX ADMIN — ACETAMINOPHEN 650 MG: 325 TABLET ORAL at 11:17

## 2023-12-26 RX ADMIN — Medication 1 CAPSULE: at 18:00

## 2023-12-26 RX ADMIN — PIPERACILLIN AND TAZOBACTAM 3375 MG: 3; .375 INJECTION, POWDER, FOR SOLUTION INTRAVENOUS at 08:07

## 2023-12-26 RX ADMIN — POTASSIUM CHLORIDE, DEXTROSE MONOHYDRATE AND SODIUM CHLORIDE: 150; 5; 450 INJECTION, SOLUTION INTRAVENOUS at 15:31

## 2023-12-26 ASSESSMENT — PAIN DESCRIPTION - ORIENTATION: ORIENTATION: LEFT

## 2023-12-26 ASSESSMENT — PAIN DESCRIPTION - DESCRIPTORS: DESCRIPTORS: ACHING

## 2023-12-26 ASSESSMENT — PAIN DESCRIPTION - LOCATION: LOCATION: ABDOMEN

## 2023-12-26 ASSESSMENT — PAIN SCALES - GENERAL: PAINLEVEL_OUTOF10: 9

## 2023-12-26 NOTE — PROGRESS NOTES
148*   K 4.7 3.8 3.6    107 110   CO2 24 25 27   BUN 20 19 17   CREATININE 0.6 0.5* <0.5*   GLUCOSE 152* 151* 158*     Hepatic:   Recent Labs     12/23/23  0540 12/25/23  0535   AST 13* 15   ALT 15 13   BILITOT 0.5 0.4   ALKPHOS 106 108     Amylase: No results for input(s): \"AMYLASE\" in the last 72 hours.  Lipase: No results for input(s): \"LIPASE\" in the last 72 hours.  Mag:      Recent Labs     12/23/23  0540 12/24/23  0544 12/25/23  0535   MG 2.20 2.00 2.20      Phos:     Recent Labs     12/23/23  0540 12/24/23  0544 12/25/23  0535   PHOS 3.5 3.0 2.6      Coags:   Recent Labs     12/23/23  0540   INR 1.25*   APTT 35.3       Cultures:  Relevant cultures documented under results section     Pathology:   No relevant pathology     Imaging:  I have personally reviewed the following films:  CT ABDOMEN PELVIS W IV CONTRAST Additional Contrast? Oral (water soluble contrast through NG) [[id number]]    Collected: 12/25/23 1732    Updated: 12/26/23 1202    Narrative:     EXAMINATION:  CT OF THE ABDOMEN AND PELVIS WITH CONTRAST, 12/25/2023 3:13 pm    TECHNIQUE:  CT of the abdomen and pelvis was performed with the administration of  intravenous contrast. Multiplanar reformatted images are provided for review.  Automated exposure control, iterative reconstruction, and/or weight based  adjustment of the mA/kV was utilized to reduce the radiation dose to as low  as reasonably achievable.    COMPARISON:  None    HISTORY:  ORDERING SYSTEM PROVIDED HISTORY: Sigmoid abscess/drain followup, ileus/SBO  followup.  TECHNOLOGIST PROVIDED HISTORY:  Additional Contrast?  Oral  Reason for Exam:  Sigmoid abscess/drain followup, ileus/SBO followup.  Reason for Exam:  Abd pain, bloody stool.    FINDINGS:  Lower Chest: Lung bases demonstrate atelectatic changes and trace bilateral  effusions.  Calcified mediastinal nodes.    Organs: Liver appears unremarkable.  Status post cholecystectomy.  Pancreas  and spleen, adrenals, kidneys, aorta  confirm appropriate positioning of the tube.  10 cc of pus was aspirated and sent to microbiology for analysis.  The tube was secured by suture and sterile dressings were applied.  The patient tolerated the procedure well. FINDINGS: Left abdominopelvic abscess without safe window for posterior percutaneous approach, thus left anterolateral approach was utilized to place the drain.     Technically successful 12 [place] abdominopelvic abscess drain placement from a left anterolateral approach.       Scheduled Meds:   fluconazole  400 mg IntraVENous Q24H    bisacodyl  10 mg Rectal Once    acetaminophen  650 mg Oral Q6H    sodium chloride flush  5-40 mL IntraVENous 2 times per day    [Held by provider] enoxaparin  40 mg SubCUTAneous Daily    piperacillin-tazobactam  3,375 mg IntraVENous Q8H     Continuous Infusions:   dextrose 5% and 0.45% NaCl with KCl 20 mEq 100 mL/hr at 12/25/23 0933    sodium chloride 10 mL/hr at 12/21/23 2341     PRN Meds:.bisacodyl, HYDROmorphone **OR** HYDROmorphone, oxyCODONE **OR** oxyCODONE, HYDROmorphone, phenol, promethazine, sodium chloride flush, sodium chloride, potassium chloride **OR** potassium alternative oral replacement **OR** potassium chloride, magnesium sulfate, ondansetron **OR** ondansetron, polyethylene glycol, acetaminophen **OR** acetaminophen      Assessment:  Patient Active Problem List   Diagnosis    OA (osteoarthritis) of knee    Right TKR    Abscess of sigmoid colon     Acute sigmoid diverticulitis with abscess s/p percutaneous drainage  Ileus     Plan:  1. Abdominal exam benign, vital signs stable, no signs of toxicity. Does not need surgical exploration unless condition deteriorates.  Appreciate IR support, percutaneous drain placed and recent imaging shows collapse of fluid cavity (drain removed), two additional fluid collections one that can be drained and the other that is too deep, as she is improving will monitor these with repeat CT in 1-2 weeks  2. NG removed,

## 2023-12-26 NOTE — PROGRESS NOTES
[facility]    12/26/2023 11:08 AM        Name: Tammy Frederick .              Admitted: 12/21/2023  Primary Care Provider: No primary care provider on file. (Tel: None)      Subjective:  .    Admitted with abd pain from [facility]   CT shows sigmoid abscess with diverticulitis   Pt had IR drain inserted on 12/22. Developed increase abd pain / bloating on 12/23:  Imaging suggested Ileus vs early SBO  ( lactic acid normal)   Evaluated by ID on 12/24/23 ,  Diflucan added   Feels better today. Still complains of bloating. Had BM. No nausea. Had NG removed and is on clears.          Lives alone with her dogs in [facility].        Reviewed interval ancillary notes    Current Medications  dextrose 5 % and 0.45 % NaCl with KCl 20 mEq infusion, Continuous  fluconazole (DIFLUCAN) in 0.9 % sodium chloride IVPB 400 mg, Q24H  bisacodyl (DULCOLAX) suppository 10 mg, Once  bisacodyl (DULCOLAX) suppository 10 mg, Daily PRN  HYDROmorphone HCl PF (DILAUDID) injection 0.5 mg, Q2H PRN   Or  HYDROmorphone HCl PF (DILAUDID) injection 1 mg, Q2H PRN  oxyCODONE (ROXICODONE) immediate release tablet 5 mg, Q4H PRN   Or  oxyCODONE (ROXICODONE) immediate release tablet 10 mg, Q4H PRN  acetaminophen (TYLENOL) tablet 650 mg, Q6H  HYDROmorphone HCl PF (DILAUDID) injection 1 mg, Q4H PRN  phenol 1.4 % mouth spray 1 spray, Q2H PRN  promethazine (PHENERGAN) suppository 25 mg, Q6H PRN  sodium chloride flush 0.9 % injection 5-40 mL, 2 times per day  sodium chloride flush 0.9 % injection 5-40 mL, PRN  0.9 % sodium chloride infusion, PRN  potassium chloride (KLOR-CON M) extended release tablet 40 mEq, PRN   Or  potassium bicarb-citric acid (EFFER-K) effervescent tablet 40 mEq, PRN   Or  potassium chloride 10 mEq/100 mL IVPB (Peripheral Line), PRN  magnesium sulfate 2000 mg in 50 mL IVPB premix, PRN  [Held by provider] enoxaparin (LOVENOX) injection 40 mg, Daily  ondansetron (ZOFRAN-ODT) disintegrating tablet 4  mg, Q8H PRN   Or  ondansetron (ZOFRAN) injection 4 mg, Q6H PRN  polyethylene glycol (GLYCOLAX) packet 17 g, Daily PRN  acetaminophen (TYLENOL) tablet 650 mg, Q6H PRN   Or  acetaminophen (TYLENOL) suppository 650 mg, Q6H PRN  piperacillin-tazobactam (ZOSYN) 3,375 mg in sodium chloride 0.9 % 50 mL IVPB (mini-bag), Q8H        Objective:  BP (!) 166/87   Pulse 61   Temp 97.4 °F (36.3 °C) (Axillary)   Resp 18   Ht 1.524 m (5')   Wt 55.8 kg (123 lb)   SpO2 91%   BMI 24.02 kg/m²     Intake/Output Summary (Last 24 hours) at 12/26/2023 1108  Last data filed at 12/26/2023 0745  Gross per 24 hour   Intake 2093.28 ml   Output 300 ml   Net 1793.28 ml        Wt Readings from Last 3 Encounters:   12/21/23 55.8 kg (123 lb)   07/08/15 63.5 kg (140 lb)   07/07/11 58.1 kg (128 lb)       General appearance:  Appears chronically ill but looks much better today.  She is alert and pleasant   Eyes: Sclera clear. Pupils equal.  ENT: Moist oral mucosa. Trachea midline, no adenopathy.  Cardiovascular: Regular rhythm, normal S1, S2. No murmur. No edema in lower extremities  Respiratory: Not using accessory muscles. Good inspiratory effort. Clear to auscultation bilaterally, no wheeze or crackles.   GI: Abdomen softly distended with active  bowel sounds. No belly pain to palpation.  IR drain/ BILLY with tan to yellow purulence noted   Musculoskeletal: No cyanosis in digits, neck supple  Neurology: CN 2-12 grossly intact. No speech or motor deficits  Psych: Normal affect. Alert and oriented in time, place and person  Skin: Warm, dry, normal turgor    Labs and Tests:  CBC:   Recent Labs     12/24/23  0544 12/25/23  0535 12/26/23  0443   WBC 14.6* 10.9 8.9   HGB 10.6* 10.2* 9.9*    302 272       BMP:    Recent Labs     12/24/23  0544 12/25/23  0535 12/26/23  0443    148* 144   K 3.8 3.6 3.5    110 109   CO2 25 27 28   BUN 19 17 11   CREATININE 0.5* <0.5* <0.5*   GLUCOSE 151* 158* 202*       Hepatic:   Recent Labs

## 2023-12-26 NOTE — PROGRESS NOTES
2123: shift assessment done, VSS, ambulated to the bathroom using walker, call ligt within reach, plan of care ongoing. The care plan and education has been reviewed and mutually agreed upon with the patient.

## 2023-12-26 NOTE — PLAN OF CARE
Problem: Discharge Planning  Goal: Discharge to home or other facility with appropriate resources  Outcome: Progressing     Problem: Pain  Goal: Verbalizes/displays adequate comfort level or baseline comfort level  Outcome: Progressing     Problem: ABCDS Injury Assessment  Goal: Absence of physical injury  Outcome: Progressing     Problem: Safety - Adult  Goal: Free from fall injury  Outcome: Progressing     Problem: Gastrointestinal - Adult  Goal: Minimal or absence of nausea and vomiting  Outcome: Progressing  Goal: Maintains or returns to baseline bowel function  Outcome: Progressing

## 2023-12-26 NOTE — CONSULTS
Nutrition Education    Educated on low fiber / diverticulitis  Learners: Patient  Readiness: Eager  Method: Explanation, Demonstration, and Handout  Response: Verbalizes Understanding  Contact name and number provided.    OMARI AJ RD, LD  Contact Number: [phone]

## 2023-12-26 NOTE — CARE COORDINATION
Discharge Planning Note:    Talked with Babita Fox:    - [facility] is running benefits.    - Ertapenem IV    - Fluconazole    Will continue to follow.    MARIELA WilkersonN RN    [facility]  Phone: [phone]

## 2023-12-26 NOTE — PROGRESS NOTES
Shift assessment completed. Neuro WNL. Denies any pain at this time. AM meds administered per MAR. NGT removed per order. BILLY drain remains in place. The care plan and education has been reviewed and mutually agreed upon with the patient. Standard safety measures in place.

## 2023-12-26 NOTE — PROGRESS NOTES
Infectious Diseases   Progress Note      Admission Date: 12/21/2023  Hospital Day: Hospital Day: 6   Attending: Kendell Nguyen MD  Date of service: 12/26/2023     Chief complaint/ Reason for consult:     Fecal peritonitis and intra-abdominal abscess  Perforated diverticulitis  S/p CT-guided BILLY drain placement by interventional neurology on December 22 -abscess fluid cultures are growing group B streptococcus and E. Coli  Multiloculated intra-abdominal abscesses on CT scan done on December 25  S/p cholecystectomy in the past    Microbiology:        I have reviewed allavailable micro lab data and cultures    Blood culture (2/2) - collected on 12/21/2023: Negative  Abdominal abscess fluid culture  - collected on 12/22/2023: Group C streptococcus, E. coli    Susceptibility    Escherichia coli (1)    Antibiotic Interpretation Microscan  Method Status    ampicillin Sensitive 8 mcg/mL BACTERIAL SUSCEPTIBILITY PANEL BY PATSY     ampicillin-sulbactam Sensitive 4 mcg/mL BACTERIAL SUSCEPTIBILITY PANEL BY PATSY     ceFAZolin Sensitive <=4 mcg/mL BACTERIAL SUSCEPTIBILITY PANEL BY PATSY     cefepime Sensitive <=0.12 mcg/mL BACTERIAL SUSCEPTIBILITY PANEL BY PATSY     cefTRIAXone Sensitive <=0.25 mcg/mL BACTERIAL SUSCEPTIBILITY PANEL BY PATSY     ciprofloxacin Sensitive <=0.25 mcg/mL BACTERIAL SUSCEPTIBILITY PANEL BY PATSY     ertapenem Sensitive <=0.12 mcg/mL BACTERIAL SUSCEPTIBILITY PANEL BY PATSY     gentamicin Sensitive <=1 mcg/mL BACTERIAL SUSCEPTIBILITY PANEL BY PATSY     piperacillin-tazobactam Sensitive <=4 mcg/mL BACTERIAL SUSCEPTIBILITY PANEL BY PATSY     trimethoprim-sulfamethoxazole Sensitive <=20 mcg/mL BACTERIAL SUSCEPTIBILITY PANEL BY PATSY                  Antibiotics and immunizations:       Current antibiotics: All antibiotics and their doses were reviewed by me    Recent Abx Admin                     piperacillin-tazobactam (ZOSYN) 3,375 mg in sodium chloride 0.9 % 50 mL IVPB (mini-bag) (mg) 3,375 mg New Bag 12/26/23 0810         BILITOT 0.4 12/25/2023 05:35 AM    LABALBU 3.1 12/25/2023 05:35 AM       CPK: No results found for: \"CKTOTAL\"  ESR: No results found for: \"SEDRATE\"  CRP: No results found for: \"CRP\"        Imaging:    All pertinent images and reports for the current visit were reviewed by me during this visit.  I reviewed the chest x-ray/CT scan/MRI images and independently interpreted the findings and results today.    CT ABDOMEN PELVIS W IV CONTRAST Additional Contrast? Oral (water soluble contrast through NG)   Final Result   1. Scattered fluid collections in the abdomen and pelvis as described above.   These may be loculated abscesses.   2. No evidence of bowel obstruction or perforation.   3. Mildly prominent small bowel loops with wall thickening consistent with   enteritis.   4. Status post cholecystectomy.   5. Atelectatic changes in the lung bases with trace bilateral effusions.         XR CHEST PORTABLE   Final Result   Enteric tube tip and side port project in the expected location of the   stomach.      Bibasilar airspace opacities which could represent atelectasis and/or   infiltrate.         XR ABDOMEN (KUB) (SINGLE AP VIEW)   Final Result   Overall pattern of probable ileus.  A partial grade small-bowel obstruction   is considered less likely.         CT ABSCESS DRAINAGE W CATH PLACEMENT S&I   Final Result   Technically successful 12 Swiss abdominopelvic abscess drain placement from   a left anterolateral approach.             Medications: All current and past medications were reviewed.     fluconazole  200 mg IntraVENous Q24H    lactobacillus  1 capsule Oral BID WC    bisacodyl  10 mg Rectal Once    acetaminophen  650 mg Oral Q6H    sodium chloride flush  5-40 mL IntraVENous 2 times per day    [Held by provider] enoxaparin  40 mg SubCUTAneous Daily    piperacillin-tazobactam  3,375 mg IntraVENous Q8H        dextrose 5% and 0.45% NaCl with KCl 20 mEq Stopped (12/25/23 9918)    sodium chloride Stopped (12/23/23

## 2023-12-27 ENCOUNTER — ANESTHESIA EVENT (OUTPATIENT)
Dept: OPERATING ROOM | Age: 73
End: 2023-12-27
Payer: MEDICARE

## 2023-12-27 PROBLEM — R70.0 ELEVATED ERYTHROCYTE SEDIMENTATION RATE: Status: ACTIVE | Noted: 2023-12-27

## 2023-12-27 PROBLEM — R79.82 CRP ELEVATED: Status: ACTIVE | Noted: 2023-12-27

## 2023-12-27 PROBLEM — Z79.2 RECEIVING INTRAVENOUS ANTIBIOTIC TREATMENT AS OUTPATIENT: Status: ACTIVE | Noted: 2023-12-27

## 2023-12-27 PROBLEM — Z71.89 COMPLEX CARE COORDINATION: Status: ACTIVE | Noted: 2023-12-27

## 2023-12-27 LAB
ANION GAP SERPL CALCULATED.3IONS-SCNC: 7 MMOL/L (ref 3–16)
BASOPHILS # BLD: 0 K/UL (ref 0–0.2)
BASOPHILS NFR BLD: 0 %
BUN SERPL-MCNC: 7 MG/DL (ref 7–20)
CALCIUM SERPL-MCNC: 8.5 MG/DL (ref 8.3–10.6)
CHLORIDE SERPL-SCNC: 105 MMOL/L (ref 99–110)
CO2 SERPL-SCNC: 28 MMOL/L (ref 21–32)
CREAT SERPL-MCNC: <0.5 MG/DL (ref 0.6–1.2)
DEPRECATED RDW RBC AUTO: 13.5 % (ref 12.4–15.4)
EOSINOPHIL # BLD: 0 K/UL (ref 0–0.6)
EOSINOPHIL NFR BLD: 0 %
GFR SERPLBLD CREATININE-BSD FMLA CKD-EPI: >60 ML/MIN/{1.73_M2}
GLUCOSE SERPL-MCNC: 194 MG/DL (ref 70–99)
HCT VFR BLD AUTO: 30.6 % (ref 36–48)
HGB BLD-MCNC: 10.2 G/DL (ref 12–16)
LYMPHOCYTES # BLD: 0.8 K/UL (ref 1–5.1)
LYMPHOCYTES NFR BLD: 8 %
MAGNESIUM SERPL-MCNC: 1.8 MG/DL (ref 1.8–2.4)
MCH RBC QN AUTO: 31.9 PG (ref 26–34)
MCHC RBC AUTO-ENTMCNC: 33.4 G/DL (ref 31–36)
MCV RBC AUTO: 95.5 FL (ref 80–100)
METAMYELOCYTES NFR BLD MANUAL: 1 %
MONOCYTES # BLD: 0.8 K/UL (ref 0–1.3)
MONOCYTES NFR BLD: 8 %
NEUTROPHILS # BLD: 8.5 K/UL (ref 1.7–7.7)
NEUTROPHILS NFR BLD: 83 %
PLATELET # BLD AUTO: 316 K/UL (ref 135–450)
PLATELET BLD QL SMEAR: ADEQUATE
PMV BLD AUTO: 7.6 FL (ref 5–10.5)
POTASSIUM SERPL-SCNC: 3.6 MMOL/L (ref 3.5–5.1)
RBC # BLD AUTO: 3.21 M/UL (ref 4–5.2)
RBC MORPH BLD: NORMAL
SLIDE REVIEW: ABNORMAL
SODIUM SERPL-SCNC: 140 MMOL/L (ref 136–145)
WBC # BLD AUTO: 10.1 K/UL (ref 4–11)

## 2023-12-27 PROCEDURE — 02HV33Z INSERTION OF INFUSION DEVICE INTO SUPERIOR VENA CAVA, PERCUTANEOUS APPROACH: ICD-10-PCS | Performed by: INTERNAL MEDICINE

## 2023-12-27 PROCEDURE — 99232 SBSQ HOSP IP/OBS MODERATE 35: CPT | Performed by: SURGERY

## 2023-12-27 PROCEDURE — C1751 CATH, INF, PER/CENT/MIDLINE: HCPCS

## 2023-12-27 PROCEDURE — 85025 COMPLETE CBC W/AUTO DIFF WBC: CPT

## 2023-12-27 PROCEDURE — 36415 COLL VENOUS BLD VENIPUNCTURE: CPT

## 2023-12-27 PROCEDURE — 2500000003 HC RX 250 WO HCPCS: Performed by: NURSE PRACTITIONER

## 2023-12-27 PROCEDURE — 6370000000 HC RX 637 (ALT 250 FOR IP): Performed by: SURGERY

## 2023-12-27 PROCEDURE — 80048 BASIC METABOLIC PNL TOTAL CA: CPT

## 2023-12-27 PROCEDURE — 2580000003 HC RX 258: Performed by: PHYSICIAN ASSISTANT

## 2023-12-27 PROCEDURE — 6360000002 HC RX W HCPCS: Performed by: SURGERY

## 2023-12-27 PROCEDURE — 99233 SBSQ HOSP IP/OBS HIGH 50: CPT | Performed by: INTERNAL MEDICINE

## 2023-12-27 PROCEDURE — 1200000000 HC SEMI PRIVATE

## 2023-12-27 PROCEDURE — 6370000000 HC RX 637 (ALT 250 FOR IP): Performed by: INTERNAL MEDICINE

## 2023-12-27 PROCEDURE — 83735 ASSAY OF MAGNESIUM: CPT

## 2023-12-27 PROCEDURE — 6360000002 HC RX W HCPCS: Performed by: INTERNAL MEDICINE

## 2023-12-27 PROCEDURE — 2500000003 HC RX 250 WO HCPCS: Performed by: PHYSICIAN ASSISTANT

## 2023-12-27 PROCEDURE — 36569 INSJ PICC 5 YR+ W/O IMAGING: CPT

## 2023-12-27 PROCEDURE — 2580000003 HC RX 258: Performed by: INTERNAL MEDICINE

## 2023-12-27 RX ORDER — LACTOBACILLUS RHAMNOSUS GG 10B CELL
1 CAPSULE ORAL 2 TIMES DAILY
Qty: 60 CAPSULE | Refills: 0 | Status: SHIPPED | OUTPATIENT
Start: 2023-12-27 | End: 2024-01-26

## 2023-12-27 RX ORDER — SODIUM CHLORIDE 0.9 % (FLUSH) 0.9 %
5-40 SYRINGE (ML) INJECTION PRN
Status: DISCONTINUED | OUTPATIENT
Start: 2023-12-27 | End: 2024-01-03 | Stop reason: HOSPADM

## 2023-12-27 RX ORDER — LIDOCAINE HYDROCHLORIDE 10 MG/ML
5 INJECTION, SOLUTION EPIDURAL; INFILTRATION; INTRACAUDAL; PERINEURAL ONCE
Status: COMPLETED | OUTPATIENT
Start: 2023-12-27 | End: 2023-12-27

## 2023-12-27 RX ORDER — SODIUM CHLORIDE 9 MG/ML
25 INJECTION, SOLUTION INTRAVENOUS PRN
Status: DISCONTINUED | OUTPATIENT
Start: 2023-12-27 | End: 2024-01-03 | Stop reason: HOSPADM

## 2023-12-27 RX ORDER — SODIUM CHLORIDE 0.9 % (FLUSH) 0.9 %
5-40 SYRINGE (ML) INJECTION EVERY 12 HOURS SCHEDULED
Status: DISCONTINUED | OUTPATIENT
Start: 2023-12-27 | End: 2024-01-03 | Stop reason: HOSPADM

## 2023-12-27 RX ADMIN — OXYCODONE HYDROCHLORIDE 10 MG: 5 TABLET ORAL at 11:36

## 2023-12-27 RX ADMIN — Medication 10 ML: at 14:26

## 2023-12-27 RX ADMIN — Medication 1 CAPSULE: at 09:25

## 2023-12-27 RX ADMIN — ACETAMINOPHEN 650 MG: 325 TABLET ORAL at 16:56

## 2023-12-27 RX ADMIN — HYDROMORPHONE HYDROCHLORIDE 0.5 MG: 1 INJECTION, SOLUTION INTRAMUSCULAR; INTRAVENOUS; SUBCUTANEOUS at 05:59

## 2023-12-27 RX ADMIN — ACETAMINOPHEN 650 MG: 325 TABLET ORAL at 00:04

## 2023-12-27 RX ADMIN — FLUCONAZOLE 200 MG: 2 INJECTION, SOLUTION INTRAVENOUS at 14:21

## 2023-12-27 RX ADMIN — HYDROMORPHONE HYDROCHLORIDE 0.5 MG: 1 INJECTION, SOLUTION INTRAMUSCULAR; INTRAVENOUS; SUBCUTANEOUS at 00:29

## 2023-12-27 RX ADMIN — Medication 1 CAPSULE: at 16:56

## 2023-12-27 RX ADMIN — PIPERACILLIN AND TAZOBACTAM 3375 MG: 3; .375 INJECTION, POWDER, FOR SOLUTION INTRAVENOUS at 14:23

## 2023-12-27 RX ADMIN — LIDOCAINE HYDROCHLORIDE ANHYDROUS 5 ML: 10 INJECTION, SOLUTION INFILTRATION at 14:08

## 2023-12-27 RX ADMIN — PIPERACILLIN AND TAZOBACTAM 3375 MG: 3; .375 INJECTION, POWDER, FOR SOLUTION INTRAVENOUS at 00:06

## 2023-12-27 RX ADMIN — ACETAMINOPHEN 650 MG: 325 TABLET ORAL at 11:36

## 2023-12-27 RX ADMIN — POTASSIUM CHLORIDE, DEXTROSE MONOHYDRATE AND SODIUM CHLORIDE: 150; 5; 450 INJECTION, SOLUTION INTRAVENOUS at 18:33

## 2023-12-27 RX ADMIN — Medication 10 ML: at 14:20

## 2023-12-27 ASSESSMENT — PAIN DESCRIPTION - DESCRIPTORS
DESCRIPTORS: ACHING
DESCRIPTORS: ACHING
DESCRIPTORS: SHARP

## 2023-12-27 ASSESSMENT — PAIN DESCRIPTION - LOCATION
LOCATION: ABDOMEN

## 2023-12-27 ASSESSMENT — PAIN DESCRIPTION - ORIENTATION
ORIENTATION: LEFT
ORIENTATION: LEFT

## 2023-12-27 ASSESSMENT — PAIN - FUNCTIONAL ASSESSMENT: PAIN_FUNCTIONAL_ASSESSMENT: ACTIVITIES ARE NOT PREVENTED

## 2023-12-27 ASSESSMENT — PAIN SCALES - GENERAL
PAINLEVEL_OUTOF10: 6
PAINLEVEL_OUTOF10: 10
PAINLEVEL_OUTOF10: 2
PAINLEVEL_OUTOF10: 7

## 2023-12-27 NOTE — PROGRESS NOTES
Infectious Diseases   Progress Note      Admission Date: 12/21/2023  Hospital Day: Hospital Day: 7   Attending: Kendell Nguyen MD  Date of service: 12/27/2023     Chief complaint/ Reason for consult:     Fecal peritonitis and intra-abdominal abscess  Perforated diverticulitis  S/p CT-guided BILLY drain placement by interventional neurology on December 22 -abscess fluid cultures are growing group B streptococcus and E. Coli  Multiloculated intra-abdominal abscesses on CT scan done on December 25  S/p cholecystectomy in the past    Microbiology:        I have reviewed allavailable micro lab data and cultures    Blood culture (2/2) - collected on 12/21/2023: Negative  Abdominal abscess fluid culture  - collected on 12/22/2023: Group C streptococcus, E. coli    Susceptibility    Escherichia coli (1)    Antibiotic Interpretation Microscan  Method Status    ampicillin Sensitive 8 mcg/mL BACTERIAL SUSCEPTIBILITY PANEL BY PATSY     ampicillin-sulbactam Sensitive 4 mcg/mL BACTERIAL SUSCEPTIBILITY PANEL BY PATSY     ceFAZolin Sensitive <=4 mcg/mL BACTERIAL SUSCEPTIBILITY PANEL BY PATSY     cefepime Sensitive <=0.12 mcg/mL BACTERIAL SUSCEPTIBILITY PANEL BY PATSY     cefTRIAXone Sensitive <=0.25 mcg/mL BACTERIAL SUSCEPTIBILITY PANEL BY PATSY     ciprofloxacin Sensitive <=0.25 mcg/mL BACTERIAL SUSCEPTIBILITY PANEL BY PATSY     ertapenem Sensitive <=0.12 mcg/mL BACTERIAL SUSCEPTIBILITY PANEL BY PATSY     gentamicin Sensitive <=1 mcg/mL BACTERIAL SUSCEPTIBILITY PANEL BY PATSY     piperacillin-tazobactam Sensitive <=4 mcg/mL BACTERIAL SUSCEPTIBILITY PANEL BY PATSY     trimethoprim-sulfamethoxazole Sensitive <=20 mcg/mL BACTERIAL SUSCEPTIBILITY PANEL BY PATSY                  Antibiotics and immunizations:       Current antibiotics: All antibiotics and their doses were reviewed by me    Recent Abx Admin                     piperacillin-tazobactam (ZOSYN) 3,375 mg in sodium chloride 0.9 % 50 mL IVPB (mini-bag) (mg) 3,375 mg New Bag 12/27/23 6421

## 2023-12-27 NOTE — PROGRESS NOTES
[facility]    12/27/2023 12:15 PM        Name: Tammy Frederick .              Admitted: 12/21/2023  Primary Care Provider: No primary care provider on file. (Tel: None)      Subjective:  .    Admitted with abd pain from [facility]   CT shows sigmoid abscess with diverticulitis   Pt had IR drain inserted on 12/22. Developed increase abd pain / bloating on 12/23:  Imaging suggested Ileus vs early SBO  ( lactic acid normal). Evaluated by ID on 12/24/23 ,  Diflucan added. NG removed on 12/26.     Patient seen this am. She states she is having 8/10 pain which worsened after she drank clear ensure. She tells me she just wants to have surgery.           Lives alone with her dogs in [facility].        Reviewed interval ancillary notes    Current Medications  lidocaine PF 1 % injection 5 mL, Once  sodium chloride flush 0.9 % injection 5-40 mL, 2 times per day  sodium chloride flush 0.9 % injection 5-40 mL, PRN  0.9 % sodium chloride infusion, PRN  fluconazole (DIFLUCAN) in 0.9 % sodium chloride IVPB 200 mg, Q24H  lactobacillus (CULTURELLE) capsule 1 capsule, BID WC  dextrose 5 % and 0.45 % NaCl with KCl 20 mEq infusion, Continuous  bisacodyl (DULCOLAX) suppository 10 mg, Once  bisacodyl (DULCOLAX) suppository 10 mg, Daily PRN  HYDROmorphone HCl PF (DILAUDID) injection 0.5 mg, Q2H PRN   Or  HYDROmorphone HCl PF (DILAUDID) injection 1 mg, Q2H PRN  oxyCODONE (ROXICODONE) immediate release tablet 5 mg, Q4H PRN   Or  oxyCODONE (ROXICODONE) immediate release tablet 10 mg, Q4H PRN  acetaminophen (TYLENOL) tablet 650 mg, Q6H  HYDROmorphone HCl PF (DILAUDID) injection 1 mg, Q4H PRN  phenol 1.4 % mouth spray 1 spray, Q2H PRN  promethazine (PHENERGAN) suppository 25 mg, Q6H PRN  sodium chloride flush 0.9 % injection 5-40 mL, 2 times per day  sodium chloride flush 0.9 % injection 5-40 mL, PRN  0.9 % sodium chloride infusion, PRN  potassium chloride (KLOR-CON M) extended release tablet 40 mEq,

## 2023-12-27 NOTE — PROGRESS NOTES
1934: shift assessment done, patient verbalized she is feeling better after her NGT and BILLY drain was out, call light within reach, plan of care ongoing. The care plan and education has been reviewed and mutually agreed upon with the patient.

## 2023-12-27 NOTE — PROGRESS NOTES
Occupational and Physical Therapy  Tammy Frederick    Upon entering room patient crying and stating is in 10/10 pain. Stating \"the pain is back\"-- patient pointed to lower abdominal area.   Patient thought the pain came back after drinking some Ensure.      Nurse in room giving pain medication. Patient stated she is in too much pain to work with therapy at this time.     Will attempt again as schedule allows.    Thank you,  Abigail Cortes, OTR/L [id number]

## 2023-12-27 NOTE — PROGRESS NOTES
Shift assessment completed. Neuro WNL. Denies any pain at this time. AM meds administered per MAR. The care plan and education has been reviewed and mutually agreed upon with the patient. Standard safety measures in place.

## 2023-12-27 NOTE — PROGRESS NOTES
[facility]  Progress Note    Pt Name: Tammy Frederick  MRN: [id number]  YOB: 1950  Date of evaluation: 2023    Chief Complaint: abdominal pain      Subjective:    No acute events overnight  Pain worse this morning with liquids  No nausea but did not tolerate diet  Passing flatus, stool, feels distended    Vital Signs:  Patient Vitals for the past 24 hrs:   BP Temp Temp src Pulse Resp SpO2   23 0915 -- 98.5 °F (36.9 °C) Oral 75 16 95 %   23 0615 -- -- -- -- -- 90 %   23 0530 (!) 171/83 98 °F (36.7 °C) Oral 75 -- (!) 87 %   23 0145 (!) 161/91 98 °F (36.7 °C) Oral 73 17 92 %   23 -- -- -- -- 17 --   23 (!) 158/89 98.1 °F (36.7 °C) Oral 77 18 91 %   23 1648 (!) 147/79 98.1 °F (36.7 °C) Oral 76 16 91 %   23 1245 (!) 143/73 98 °F (36.7 °C) Oral 79 18 --   23 1226 129/72 98 °F (36.7 °C) Oral 75 16 (!) 87 %      TEMPERATURE HISTORY 24H: Temp (24hrs), Av.1 °F (36.7 °C), Min:98 °F (36.7 °C), Max:98.5 °F (36.9 °C)    BLOOD PRESSURE HISTORY: Systolic (36hrs), Av , Min:129 , Max:171    Diastolic (36hrs), Av, Min:72, Max:91      Intake/Output:    I/O last 3 completed shifts:  In: -   Out:  [Urine:1; Emesis/NG output:]  I/O this shift:  In: 60 [P.O.:60]  Out: -   Drain/tube Output:    Closed/Suction Drain Left Abdomen Bulb-Output (ml): 0 ml      Physical Exam:  General: awake, alert, no acute distress, oriented to person, place, time and situation  Cardiac: regular rate and rhythm   Pulmonary: clear to auscultation bilaterally   Abdomen: soft, mildly distended, moderate left lower abdominal tenderness, drain removed yesterday    Labs:  CBC:    Recent Labs     23  0540 23  0544 23  0535   WBC 13.0* 14.6* 10.9   HGB 11.3* 10.6* 10.2*   HCT 34.1* 32.2* 30.7*    289 302     BMP:    Recent Labs     23  0540 23  0544 23  0535    143 148*   K 4.7  positioning of the tube.  10 cc of pus was aspirated and sent to microbiology for analysis.  The tube was secured by suture and sterile dressings were applied.  The patient tolerated the procedure well. FINDINGS: Left abdominopelvic abscess without safe window for posterior percutaneous approach, thus left anterolateral approach was utilized to place the drain.     Technically successful 12 Slov[place] abdominopelvic abscess drain placement from a left anterolateral approach.       Scheduled Meds:   fluconazole  400 mg IntraVENous Q24H    bisacodyl  10 mg Rectal Once    acetaminophen  650 mg Oral Q6H    sodium chloride flush  5-40 mL IntraVENous 2 times per day    [Held by provider] enoxaparin  40 mg SubCUTAneous Daily    piperacillin-tazobactam  3,375 mg IntraVENous Q8H     Continuous Infusions:   dextrose 5% and 0.45% NaCl with KCl 20 mEq 100 mL/hr at 12/25/23 0933    sodium chloride 10 mL/hr at 12/21/23 2341     PRN Meds:.bisacodyl, HYDROmorphone **OR** HYDROmorphone, oxyCODONE **OR** oxyCODONE, HYDROmorphone, phenol, promethazine, sodium chloride flush, sodium chloride, potassium chloride **OR** potassium alternative oral replacement **OR** potassium chloride, magnesium sulfate, ondansetron **OR** ondansetron, polyethylene glycol, acetaminophen **OR** acetaminophen      Assessment:  Patient Active Problem List   Diagnosis    OA (osteoarthritis) of knee    Right TKR    Abscess of sigmoid colon     Acute sigmoid diverticulitis with abscess s/p percutaneous drainage  Ileus     Plan:  1. Abdominal exam worse today, refractory to maximal medical management and patient is not able to tolerate diet. Repeat imaging showed two fluid collections, only one amenable to drainage. Surgical intervention is the next step. Risks include but not limited to bleeding, infection, damage to surrounding structures, anastomotic and wound complications, need for additional procedures, very high likelihood of needing a colostomy that may be

## 2023-12-27 NOTE — FLOWSHEET NOTE
PICC line education:    -Risks  -Benefits  -Alternatives  -Procedure    Discussed the above with patient, verbalized understanding, answered all questions. Provided with information on PICC care to review. PICC tip verified via [employer] (Ok to use). Reported off to patient's  Nurse Yue CLANCY.

## 2023-12-27 NOTE — CARE COORDINATION
Discharge Planning Note:    Patient will need Home IV ABX upon discharge:    - Infusion Agency    [facility] - ran benefits  [address] [place] [address]     Phone: [phone]    - Nursing Agency    [facility]  [address]  [place] [address]     Phone: [phone]  Fax: [phone]    - Faxed the requested document to [facility].    Note: Surgery to reassess for possible surgery.    Will continue to follow.    MARIELA WilkersonN RN    [facility]  Phone: [phone]

## 2023-12-28 ENCOUNTER — ANESTHESIA (OUTPATIENT)
Dept: OPERATING ROOM | Age: 73
End: 2023-12-28
Payer: MEDICARE

## 2023-12-28 LAB
ALBUMIN SERPL-MCNC: 3.1 G/DL (ref 3.4–5)
ALBUMIN/GLOB SERPL: 1 {RATIO} (ref 1.1–2.2)
ALP SERPL-CCNC: 82 U/L (ref 40–129)
ALT SERPL-CCNC: 19 U/L (ref 10–40)
ANION GAP SERPL CALCULATED.3IONS-SCNC: 9 MMOL/L (ref 3–16)
AST SERPL-CCNC: 22 U/L (ref 15–37)
BASOPHILS # BLD: 0 K/UL (ref 0–0.2)
BASOPHILS NFR BLD: 0 %
BILIRUB SERPL-MCNC: 0.5 MG/DL (ref 0–1)
BUN SERPL-MCNC: 4 MG/DL (ref 7–20)
CALCIUM SERPL-MCNC: 8.4 MG/DL (ref 8.3–10.6)
CHLORIDE SERPL-SCNC: 105 MMOL/L (ref 99–110)
CO2 SERPL-SCNC: 25 MMOL/L (ref 21–32)
CREAT SERPL-MCNC: <0.5 MG/DL (ref 0.6–1.2)
DEPRECATED RDW RBC AUTO: 13.2 % (ref 12.4–15.4)
EOSINOPHIL # BLD: 0 K/UL (ref 0–0.6)
EOSINOPHIL NFR BLD: 0 %
GFR SERPLBLD CREATININE-BSD FMLA CKD-EPI: >60 ML/MIN/{1.73_M2}
GLUCOSE SERPL-MCNC: 146 MG/DL (ref 70–99)
HCT VFR BLD AUTO: 32 % (ref 36–48)
HGB BLD-MCNC: 11.3 G/DL (ref 12–16)
LYMPHOCYTES # BLD: 1 K/UL (ref 1–5.1)
LYMPHOCYTES NFR BLD: 12 %
MAGNESIUM SERPL-MCNC: 1.8 MG/DL (ref 1.8–2.4)
MCH RBC QN AUTO: 33.4 PG (ref 26–34)
MCHC RBC AUTO-ENTMCNC: 35.4 G/DL (ref 31–36)
MCV RBC AUTO: 94.4 FL (ref 80–100)
MONOCYTES # BLD: 0.2 K/UL (ref 0–1.3)
MONOCYTES NFR BLD: 2 %
NEUTROPHILS # BLD: 7.2 K/UL (ref 1.7–7.7)
NEUTROPHILS NFR BLD: 85 %
NEUTS BAND NFR BLD MANUAL: 1 % (ref 0–7)
PHOSPHATE SERPL-MCNC: 3.2 MG/DL (ref 2.5–4.9)
PLATELET # BLD AUTO: 349 K/UL (ref 135–450)
PLATELET BLD QL SMEAR: ADEQUATE
PMV BLD AUTO: 6.9 FL (ref 5–10.5)
POTASSIUM SERPL-SCNC: 3.4 MMOL/L (ref 3.5–5.1)
PROT SERPL-MCNC: 6.2 G/DL (ref 6.4–8.2)
RBC # BLD AUTO: 3.39 M/UL (ref 4–5.2)
RBC MORPH BLD: NORMAL
SLIDE REVIEW: ABNORMAL
SODIUM SERPL-SCNC: 139 MMOL/L (ref 136–145)
WBC # BLD AUTO: 8.4 K/UL (ref 4–11)

## 2023-12-28 PROCEDURE — 84100 ASSAY OF PHOSPHORUS: CPT

## 2023-12-28 PROCEDURE — 2500000003 HC RX 250 WO HCPCS: Performed by: NURSE ANESTHETIST, CERTIFIED REGISTERED

## 2023-12-28 PROCEDURE — 99233 SBSQ HOSP IP/OBS HIGH 50: CPT | Performed by: INTERNAL MEDICINE

## 2023-12-28 PROCEDURE — 7100000001 HC PACU RECOVERY - ADDTL 15 MIN: Performed by: SURGERY

## 2023-12-28 PROCEDURE — 87205 SMEAR GRAM STAIN: CPT

## 2023-12-28 PROCEDURE — 88307 TISSUE EXAM BY PATHOLOGIST: CPT

## 2023-12-28 PROCEDURE — 0DT80ZZ RESECTION OF SMALL INTESTINE, OPEN APPROACH: ICD-10-PCS | Performed by: SURGERY

## 2023-12-28 PROCEDURE — 3600000014 HC SURGERY LEVEL 4 ADDTL 15MIN: Performed by: SURGERY

## 2023-12-28 PROCEDURE — 83735 ASSAY OF MAGNESIUM: CPT

## 2023-12-28 PROCEDURE — 85025 COMPLETE CBC W/AUTO DIFF WBC: CPT

## 2023-12-28 PROCEDURE — 6360000002 HC RX W HCPCS

## 2023-12-28 PROCEDURE — 49020 DRAINAGE ABDOM ABSCESS OPEN: CPT | Performed by: SURGERY

## 2023-12-28 PROCEDURE — 3600000004 HC SURGERY LEVEL 4 BASE: Performed by: SURGERY

## 2023-12-28 PROCEDURE — 3700000000 HC ANESTHESIA ATTENDED CARE: Performed by: SURGERY

## 2023-12-28 PROCEDURE — 2709999900 HC NON-CHARGEABLE SUPPLY: Performed by: SURGERY

## 2023-12-28 PROCEDURE — C1729 CATH, DRAINAGE: HCPCS | Performed by: SURGERY

## 2023-12-28 PROCEDURE — A4217 STERILE WATER/SALINE, 500 ML: HCPCS | Performed by: SURGERY

## 2023-12-28 PROCEDURE — 6360000002 HC RX W HCPCS: Performed by: SURGERY

## 2023-12-28 PROCEDURE — 6360000002 HC RX W HCPCS: Performed by: ANESTHESIOLOGY

## 2023-12-28 PROCEDURE — 2580000003 HC RX 258: Performed by: INTERNAL MEDICINE

## 2023-12-28 PROCEDURE — 7100000000 HC PACU RECOVERY - FIRST 15 MIN: Performed by: SURGERY

## 2023-12-28 PROCEDURE — 94761 N-INVAS EAR/PLS OXIMETRY MLT: CPT

## 2023-12-28 PROCEDURE — 6370000000 HC RX 637 (ALT 250 FOR IP): Performed by: SURGERY

## 2023-12-28 PROCEDURE — 87102 FUNGUS ISOLATION CULTURE: CPT

## 2023-12-28 PROCEDURE — 44146 PARTIAL REMOVAL OF COLON: CPT | Performed by: SURGERY

## 2023-12-28 PROCEDURE — 1200000000 HC SEMI PRIVATE

## 2023-12-28 PROCEDURE — 87070 CULTURE OTHR SPECIMN AEROBIC: CPT

## 2023-12-28 PROCEDURE — 87075 CULTR BACTERIA EXCEPT BLOOD: CPT

## 2023-12-28 PROCEDURE — 2720000010 HC SURG SUPPLY STERILE: Performed by: SURGERY

## 2023-12-28 PROCEDURE — 80053 COMPREHEN METABOLIC PANEL: CPT

## 2023-12-28 PROCEDURE — 2580000003 HC RX 258: Performed by: NURSE ANESTHETIST, CERTIFIED REGISTERED

## 2023-12-28 PROCEDURE — 88302 TISSUE EXAM BY PATHOLOGIST: CPT

## 2023-12-28 PROCEDURE — 6360000002 HC RX W HCPCS: Performed by: INTERNAL MEDICINE

## 2023-12-28 PROCEDURE — 6360000002 HC RX W HCPCS: Performed by: NURSE ANESTHETIST, CERTIFIED REGISTERED

## 2023-12-28 PROCEDURE — 3700000001 HC ADD 15 MINUTES (ANESTHESIA): Performed by: SURGERY

## 2023-12-28 PROCEDURE — 2580000003 HC RX 258: Performed by: SURGERY

## 2023-12-28 PROCEDURE — 44120 REMOVAL OF SMALL INTESTINE: CPT | Performed by: SURGERY

## 2023-12-28 PROCEDURE — 0D1B0Z4 BYPASS ILEUM TO CUTANEOUS, OPEN APPROACH: ICD-10-PCS | Performed by: SURGERY

## 2023-12-28 PROCEDURE — 2700000000 HC OXYGEN THERAPY PER DAY

## 2023-12-28 PROCEDURE — 44139 MOBILIZATION OF COLON: CPT | Performed by: SURGERY

## 2023-12-28 PROCEDURE — 44955 APPENDECTOMY ADD-ON: CPT | Performed by: SURGERY

## 2023-12-28 PROCEDURE — 87076 CULTURE ANAEROBE IDENT EACH: CPT

## 2023-12-28 PROCEDURE — 0DTJ0ZZ RESECTION OF APPENDIX, OPEN APPROACH: ICD-10-PCS | Performed by: SURGERY

## 2023-12-28 PROCEDURE — 0DTN0ZZ RESECTION OF SIGMOID COLON, OPEN APPROACH: ICD-10-PCS | Performed by: SURGERY

## 2023-12-28 RX ORDER — MORPHINE SULFATE 2 MG/ML
2 INJECTION, SOLUTION INTRAMUSCULAR; INTRAVENOUS
Status: DISCONTINUED | OUTPATIENT
Start: 2023-12-28 | End: 2023-12-29

## 2023-12-28 RX ORDER — SODIUM CHLORIDE 9 MG/ML
INJECTION, SOLUTION INTRAVENOUS CONTINUOUS PRN
Status: DISCONTINUED | OUTPATIENT
Start: 2023-12-28 | End: 2023-12-28 | Stop reason: SDUPTHER

## 2023-12-28 RX ORDER — KETAMINE HCL IN NACL, ISO-OSM 100MG/10ML
SYRINGE (ML) INJECTION PRN
Status: DISCONTINUED | OUTPATIENT
Start: 2023-12-28 | End: 2023-12-28 | Stop reason: SDUPTHER

## 2023-12-28 RX ORDER — FENTANYL CITRATE 50 UG/ML
INJECTION, SOLUTION INTRAMUSCULAR; INTRAVENOUS PRN
Status: DISCONTINUED | OUTPATIENT
Start: 2023-12-28 | End: 2023-12-28 | Stop reason: SDUPTHER

## 2023-12-28 RX ORDER — LIDOCAINE HYDROCHLORIDE 10 MG/ML
1 INJECTION, SOLUTION EPIDURAL; INFILTRATION; INTRACAUDAL; PERINEURAL
Status: DISCONTINUED | OUTPATIENT
Start: 2023-12-28 | End: 2023-12-28 | Stop reason: HOSPADM

## 2023-12-28 RX ORDER — ACETAMINOPHEN 325 MG/1
650 TABLET ORAL EVERY 6 HOURS
Status: DISCONTINUED | OUTPATIENT
Start: 2023-12-28 | End: 2024-01-03 | Stop reason: HOSPADM

## 2023-12-28 RX ORDER — MAGNESIUM SULFATE HEPTAHYDRATE 500 MG/ML
INJECTION, SOLUTION INTRAMUSCULAR; INTRAVENOUS PRN
Status: DISCONTINUED | OUTPATIENT
Start: 2023-12-28 | End: 2023-12-28 | Stop reason: SDUPTHER

## 2023-12-28 RX ORDER — SODIUM CHLORIDE, SODIUM LACTATE, POTASSIUM CHLORIDE, CALCIUM CHLORIDE 600; 310; 30; 20 MG/100ML; MG/100ML; MG/100ML; MG/100ML
INJECTION, SOLUTION INTRAVENOUS CONTINUOUS
Status: DISCONTINUED | OUTPATIENT
Start: 2023-12-28 | End: 2023-12-29

## 2023-12-28 RX ORDER — DEXAMETHASONE SODIUM PHOSPHATE 4 MG/ML
INJECTION, SOLUTION INTRA-ARTICULAR; INTRALESIONAL; INTRAMUSCULAR; INTRAVENOUS; SOFT TISSUE PRN
Status: DISCONTINUED | OUTPATIENT
Start: 2023-12-28 | End: 2023-12-28 | Stop reason: SDUPTHER

## 2023-12-28 RX ORDER — ONDANSETRON 4 MG/1
4 TABLET, ORALLY DISINTEGRATING ORAL EVERY 8 HOURS PRN
Status: DISCONTINUED | OUTPATIENT
Start: 2023-12-28 | End: 2024-01-03 | Stop reason: HOSPADM

## 2023-12-28 RX ORDER — HYDRALAZINE HYDROCHLORIDE 20 MG/ML
10 INJECTION INTRAMUSCULAR; INTRAVENOUS
Status: DISCONTINUED | OUTPATIENT
Start: 2023-12-28 | End: 2023-12-28 | Stop reason: HOSPADM

## 2023-12-28 RX ORDER — ONDANSETRON 2 MG/ML
4 INJECTION INTRAMUSCULAR; INTRAVENOUS EVERY 6 HOURS PRN
Status: DISCONTINUED | OUTPATIENT
Start: 2023-12-28 | End: 2024-01-03 | Stop reason: HOSPADM

## 2023-12-28 RX ORDER — SODIUM CHLORIDE 9 MG/ML
INJECTION, SOLUTION INTRAVENOUS PRN
Status: DISCONTINUED | OUTPATIENT
Start: 2023-12-28 | End: 2023-12-28 | Stop reason: HOSPADM

## 2023-12-28 RX ORDER — SODIUM CHLORIDE 0.9 % (FLUSH) 0.9 %
5-40 SYRINGE (ML) INJECTION PRN
Status: DISCONTINUED | OUTPATIENT
Start: 2023-12-28 | End: 2023-12-28 | Stop reason: HOSPADM

## 2023-12-28 RX ORDER — LABETALOL HYDROCHLORIDE 5 MG/ML
10 INJECTION, SOLUTION INTRAVENOUS
Status: DISCONTINUED | OUTPATIENT
Start: 2023-12-28 | End: 2023-12-28 | Stop reason: HOSPADM

## 2023-12-28 RX ORDER — LIDOCAINE HYDROCHLORIDE 20 MG/ML
INJECTION, SOLUTION EPIDURAL; INFILTRATION; INTRACAUDAL; PERINEURAL PRN
Status: DISCONTINUED | OUTPATIENT
Start: 2023-12-28 | End: 2023-12-28 | Stop reason: SDUPTHER

## 2023-12-28 RX ORDER — SODIUM CHLORIDE 0.9 % (FLUSH) 0.9 %
5-40 SYRINGE (ML) INJECTION EVERY 12 HOURS SCHEDULED
Status: DISCONTINUED | OUTPATIENT
Start: 2023-12-28 | End: 2023-12-28 | Stop reason: HOSPADM

## 2023-12-28 RX ORDER — ROCURONIUM BROMIDE 10 MG/ML
INJECTION, SOLUTION INTRAVENOUS PRN
Status: DISCONTINUED | OUTPATIENT
Start: 2023-12-28 | End: 2023-12-28 | Stop reason: SDUPTHER

## 2023-12-28 RX ORDER — PROPOFOL 10 MG/ML
INJECTION, EMULSION INTRAVENOUS PRN
Status: DISCONTINUED | OUTPATIENT
Start: 2023-12-28 | End: 2023-12-28 | Stop reason: SDUPTHER

## 2023-12-28 RX ORDER — MAGNESIUM HYDROXIDE 1200 MG/15ML
LIQUID ORAL CONTINUOUS PRN
Status: COMPLETED | OUTPATIENT
Start: 2023-12-28 | End: 2023-12-28

## 2023-12-28 RX ORDER — PHENYLEPHRINE HCL IN 0.9% NACL 1 MG/10 ML
SYRINGE (ML) INTRAVENOUS PRN
Status: DISCONTINUED | OUTPATIENT
Start: 2023-12-28 | End: 2023-12-28 | Stop reason: SDUPTHER

## 2023-12-28 RX ORDER — SODIUM CHLORIDE 9 MG/ML
INJECTION, SOLUTION INTRAVENOUS CONTINUOUS
Status: DISCONTINUED | OUTPATIENT
Start: 2023-12-28 | End: 2023-12-29

## 2023-12-28 RX ORDER — HYDROMORPHONE HYDROCHLORIDE 2 MG/ML
0.25 INJECTION, SOLUTION INTRAMUSCULAR; INTRAVENOUS; SUBCUTANEOUS EVERY 5 MIN PRN
Status: DISCONTINUED | OUTPATIENT
Start: 2023-12-28 | End: 2023-12-28 | Stop reason: HOSPADM

## 2023-12-28 RX ORDER — OXYCODONE HYDROCHLORIDE 5 MG/1
5 TABLET ORAL
Status: DISCONTINUED | OUTPATIENT
Start: 2023-12-28 | End: 2023-12-28 | Stop reason: HOSPADM

## 2023-12-28 RX ORDER — SUCCINYLCHOLINE/SOD CL,ISO/PF 200MG/10ML
SYRINGE (ML) INTRAVENOUS PRN
Status: DISCONTINUED | OUTPATIENT
Start: 2023-12-28 | End: 2023-12-28 | Stop reason: SDUPTHER

## 2023-12-28 RX ORDER — MORPHINE SULFATE 4 MG/ML
4 INJECTION, SOLUTION INTRAMUSCULAR; INTRAVENOUS
Status: DISCONTINUED | OUTPATIENT
Start: 2023-12-28 | End: 2023-12-29

## 2023-12-28 RX ORDER — HYDROMORPHONE HYDROCHLORIDE 2 MG/ML
INJECTION, SOLUTION INTRAMUSCULAR; INTRAVENOUS; SUBCUTANEOUS
Status: COMPLETED
Start: 2023-12-28 | End: 2023-12-28

## 2023-12-28 RX ORDER — OXYCODONE HYDROCHLORIDE 5 MG/1
10 TABLET ORAL EVERY 4 HOURS PRN
Status: DISCONTINUED | OUTPATIENT
Start: 2023-12-28 | End: 2023-12-28 | Stop reason: SDUPTHER

## 2023-12-28 RX ORDER — ENOXAPARIN SODIUM 100 MG/ML
40 INJECTION SUBCUTANEOUS DAILY
Status: DISCONTINUED | OUTPATIENT
Start: 2023-12-28 | End: 2024-01-03 | Stop reason: HOSPADM

## 2023-12-28 RX ORDER — OXYCODONE HYDROCHLORIDE 5 MG/1
5 TABLET ORAL EVERY 4 HOURS PRN
Status: DISCONTINUED | OUTPATIENT
Start: 2023-12-28 | End: 2023-12-28 | Stop reason: SDUPTHER

## 2023-12-28 RX ORDER — SODIUM CHLORIDE 0.9 % (FLUSH) 0.9 %
5-40 SYRINGE (ML) INJECTION EVERY 12 HOURS SCHEDULED
Status: DISCONTINUED | OUTPATIENT
Start: 2023-12-28 | End: 2024-01-03 | Stop reason: HOSPADM

## 2023-12-28 RX ORDER — ONDANSETRON 2 MG/ML
INJECTION INTRAMUSCULAR; INTRAVENOUS PRN
Status: DISCONTINUED | OUTPATIENT
Start: 2023-12-28 | End: 2023-12-28 | Stop reason: SDUPTHER

## 2023-12-28 RX ORDER — FENTANYL CITRATE 50 UG/ML
25 INJECTION, SOLUTION INTRAMUSCULAR; INTRAVENOUS EVERY 5 MIN PRN
Status: DISCONTINUED | OUTPATIENT
Start: 2023-12-28 | End: 2023-12-28 | Stop reason: HOSPADM

## 2023-12-28 RX ORDER — SODIUM CHLORIDE 9 MG/ML
INJECTION, SOLUTION INTRAVENOUS PRN
Status: DISCONTINUED | OUTPATIENT
Start: 2023-12-28 | End: 2024-01-03 | Stop reason: HOSPADM

## 2023-12-28 RX ORDER — PROCHLORPERAZINE EDISYLATE 5 MG/ML
5 INJECTION INTRAMUSCULAR; INTRAVENOUS
Status: DISCONTINUED | OUTPATIENT
Start: 2023-12-28 | End: 2023-12-28 | Stop reason: HOSPADM

## 2023-12-28 RX ORDER — DEXMEDETOMIDINE HYDROCHLORIDE 100 UG/ML
INJECTION, SOLUTION INTRAVENOUS PRN
Status: DISCONTINUED | OUTPATIENT
Start: 2023-12-28 | End: 2023-12-28 | Stop reason: SDUPTHER

## 2023-12-28 RX ORDER — ONDANSETRON 2 MG/ML
4 INJECTION INTRAMUSCULAR; INTRAVENOUS
Status: DISCONTINUED | OUTPATIENT
Start: 2023-12-28 | End: 2023-12-28 | Stop reason: HOSPADM

## 2023-12-28 RX ORDER — SODIUM CHLORIDE 0.9 % (FLUSH) 0.9 %
5-40 SYRINGE (ML) INJECTION PRN
Status: DISCONTINUED | OUTPATIENT
Start: 2023-12-28 | End: 2024-01-03 | Stop reason: HOSPADM

## 2023-12-28 RX ADMIN — PIPERACILLIN AND TAZOBACTAM 3375 MG: 3; .375 INJECTION, POWDER, FOR SOLUTION INTRAVENOUS at 00:14

## 2023-12-28 RX ADMIN — FENTANYL CITRATE 50 MCG: 50 INJECTION, SOLUTION INTRAMUSCULAR; INTRAVENOUS at 10:29

## 2023-12-28 RX ADMIN — Medication 30 MG: at 11:36

## 2023-12-28 RX ADMIN — PIPERACILLIN AND TAZOBACTAM 3375 MG: 3; .375 INJECTION, POWDER, FOR SOLUTION INTRAVENOUS at 16:12

## 2023-12-28 RX ADMIN — OXYCODONE HYDROCHLORIDE 10 MG: 5 TABLET ORAL at 18:04

## 2023-12-28 RX ADMIN — OXYCODONE HYDROCHLORIDE 10 MG: 5 TABLET ORAL at 23:53

## 2023-12-28 RX ADMIN — HYDROMORPHONE HYDROCHLORIDE 0.25 MG: 2 INJECTION, SOLUTION INTRAMUSCULAR; INTRAVENOUS; SUBCUTANEOUS at 13:14

## 2023-12-28 RX ADMIN — FENTANYL CITRATE 25 MCG: 50 INJECTION, SOLUTION INTRAMUSCULAR; INTRAVENOUS at 10:37

## 2023-12-28 RX ADMIN — FENTANYL CITRATE 25 MCG: 50 INJECTION, SOLUTION INTRAMUSCULAR; INTRAVENOUS at 10:21

## 2023-12-28 RX ADMIN — HYDROMORPHONE HYDROCHLORIDE 0.25 MG: 2 INJECTION, SOLUTION INTRAMUSCULAR; INTRAVENOUS; SUBCUTANEOUS at 13:27

## 2023-12-28 RX ADMIN — HYDROMORPHONE HYDROCHLORIDE 0.25 MG: 2 INJECTION, SOLUTION INTRAMUSCULAR; INTRAVENOUS; SUBCUTANEOUS at 13:36

## 2023-12-28 RX ADMIN — ROCURONIUM BROMIDE 5 MG: 10 INJECTION, SOLUTION INTRAVENOUS at 10:29

## 2023-12-28 RX ADMIN — MAGNESIUM SULFATE HEPTAHYDRATE 1 G: 500 INJECTION, SOLUTION INTRAMUSCULAR; INTRAVENOUS at 10:37

## 2023-12-28 RX ADMIN — ROCURONIUM BROMIDE 45 MG: 10 INJECTION, SOLUTION INTRAVENOUS at 10:45

## 2023-12-28 RX ADMIN — Medication 10 ML: at 21:24

## 2023-12-28 RX ADMIN — Medication 200 MCG: at 11:45

## 2023-12-28 RX ADMIN — FENTANYL CITRATE 100 MCG: 50 INJECTION, SOLUTION INTRAMUSCULAR; INTRAVENOUS at 12:43

## 2023-12-28 RX ADMIN — ACETAMINOPHEN 650 MG: 325 TABLET ORAL at 21:21

## 2023-12-28 RX ADMIN — Medication 20 MG: at 10:59

## 2023-12-28 RX ADMIN — DEXAMETHASONE SODIUM PHOSPHATE 8 MG: 4 INJECTION, SOLUTION INTRAMUSCULAR; INTRAVENOUS at 10:37

## 2023-12-28 RX ADMIN — DEXMEDETOMIDINE HYDROCHLORIDE 10 MCG: 100 INJECTION, SOLUTION INTRAVENOUS at 10:58

## 2023-12-28 RX ADMIN — PROPOFOL 130 MG: 10 INJECTION, EMULSION INTRAVENOUS at 10:29

## 2023-12-28 RX ADMIN — HYDROMORPHONE HYDROCHLORIDE 0.25 MG: 2 INJECTION, SOLUTION INTRAMUSCULAR; INTRAVENOUS; SUBCUTANEOUS at 13:06

## 2023-12-28 RX ADMIN — SODIUM CHLORIDE: 9 INJECTION, SOLUTION INTRAVENOUS at 16:04

## 2023-12-28 RX ADMIN — SODIUM CHLORIDE, PRESERVATIVE FREE 10 ML: 5 INJECTION INTRAVENOUS at 21:25

## 2023-12-28 RX ADMIN — Medication 80 MG: at 10:30

## 2023-12-28 RX ADMIN — DEXMEDETOMIDINE HYDROCHLORIDE 10 MCG: 100 INJECTION, SOLUTION INTRAVENOUS at 11:36

## 2023-12-28 RX ADMIN — SODIUM CHLORIDE: 9 INJECTION, SOLUTION INTRAVENOUS at 10:21

## 2023-12-28 RX ADMIN — SODIUM CHLORIDE: 9 INJECTION, SOLUTION INTRAVENOUS at 12:40

## 2023-12-28 RX ADMIN — ACETAMINOPHEN 650 MG: 325 TABLET ORAL at 00:15

## 2023-12-28 RX ADMIN — SODIUM CHLORIDE, PRESERVATIVE FREE 10 ML: 5 INJECTION INTRAVENOUS at 21:26

## 2023-12-28 RX ADMIN — FENTANYL CITRATE 50 MCG: 50 INJECTION, SOLUTION INTRAMUSCULAR; INTRAVENOUS at 11:05

## 2023-12-28 RX ADMIN — HYDROMORPHONE HYDROCHLORIDE 1 MG: 1 INJECTION, SOLUTION INTRAMUSCULAR; INTRAVENOUS; SUBCUTANEOUS at 16:04

## 2023-12-28 RX ADMIN — LIDOCAINE HYDROCHLORIDE 100 MG: 20 INJECTION, SOLUTION EPIDURAL; INFILTRATION; INTRACAUDAL; PERINEURAL at 10:29

## 2023-12-28 RX ADMIN — MORPHINE SULFATE 4 MG: 4 INJECTION, SOLUTION INTRAMUSCULAR; INTRAVENOUS at 21:22

## 2023-12-28 RX ADMIN — ENOXAPARIN SODIUM 40 MG: 100 INJECTION SUBCUTANEOUS at 16:12

## 2023-12-28 RX ADMIN — ONDANSETRON 4 MG: 2 INJECTION INTRAMUSCULAR; INTRAVENOUS at 12:32

## 2023-12-28 RX ADMIN — ROCURONIUM BROMIDE 10 MG: 10 INJECTION, SOLUTION INTRAVENOUS at 11:53

## 2023-12-28 RX ADMIN — SUGAMMADEX 200 MG: 100 INJECTION, SOLUTION INTRAVENOUS at 12:32

## 2023-12-28 RX ADMIN — PIPERACILLIN AND TAZOBACTAM 3375 MG: 3; .375 INJECTION, POWDER, FOR SOLUTION INTRAVENOUS at 09:04

## 2023-12-28 RX ADMIN — PIPERACILLIN AND TAZOBACTAM 3375 MG: 3; .375 INJECTION, POWDER, FOR SOLUTION INTRAVENOUS at 23:51

## 2023-12-28 RX ADMIN — Medication 10 ML: at 21:30

## 2023-12-28 RX ADMIN — DEXMEDETOMIDINE HYDROCHLORIDE 6 MCG: 100 INJECTION, SOLUTION INTRAVENOUS at 12:45

## 2023-12-28 RX ADMIN — FENTANYL CITRATE 50 MCG: 50 INJECTION, SOLUTION INTRAMUSCULAR; INTRAVENOUS at 11:13

## 2023-12-28 ASSESSMENT — PAIN SCALES - GENERAL
PAINLEVEL_OUTOF10: 8
PAINLEVEL_OUTOF10: 6
PAINLEVEL_OUTOF10: 10
PAINLEVEL_OUTOF10: 4
PAINLEVEL_OUTOF10: 10
PAINLEVEL_OUTOF10: 8
PAINLEVEL_OUTOF10: 9
PAINLEVEL_OUTOF10: 10
PAINLEVEL_OUTOF10: 7
PAINLEVEL_OUTOF10: 2
PAINLEVEL_OUTOF10: 9
PAINLEVEL_OUTOF10: 7
PAINLEVEL_OUTOF10: 6

## 2023-12-28 ASSESSMENT — PAIN DESCRIPTION - LOCATION: LOCATION: ABDOMEN

## 2023-12-28 ASSESSMENT — PAIN - FUNCTIONAL ASSESSMENT: PAIN_FUNCTIONAL_ASSESSMENT: 0-10

## 2023-12-28 ASSESSMENT — PAIN SCALES - WONG BAKER
WONGBAKER_NUMERICALRESPONSE: 0
WONGBAKER_NUMERICALRESPONSE: 2
WONGBAKER_NUMERICALRESPONSE: 2

## 2023-12-28 ASSESSMENT — PAIN DESCRIPTION - DESCRIPTORS
DESCRIPTORS: ACHING;SHARP
DESCRIPTORS: ACHING

## 2023-12-28 ASSESSMENT — PAIN DESCRIPTION - ORIENTATION: ORIENTATION: MID

## 2023-12-28 NOTE — PROGRESS NOTES
[facility]    12/28/2023 8:55 AM        Name: Tammy Frederick .              Admitted: 12/21/2023  Primary Care Provider: No primary care provider on file. (Tel: None)      Subjective:  .    Admitted with abd pain from [facility]   CT shows sigmoid abscess with diverticulitis   Pt had IR drain inserted on 12/22. Developed increase abd pain / bloating on 12/23:  Imaging suggested Ileus vs early SBO  ( lactic acid normal). Evaluated by ID on 12/24/23 ,  Diflucan added. NG removed on 12/26.     Patient seen this am down in preop area. Pain remains 8/10. No nausea.         Lives alone with her dogs in [facility].        Reviewed interval ancillary notes    Current Medications  sodium chloride flush 0.9 % injection 5-40 mL, 2 times per day  sodium chloride flush 0.9 % injection 5-40 mL, PRN  0.9 % sodium chloride infusion, PRN  fluconazole (DIFLUCAN) in 0.9 % sodium chloride IVPB 200 mg, Q24H  lactobacillus (CULTURELLE) capsule 1 capsule, BID WC  dextrose 5 % and 0.45 % NaCl with KCl 20 mEq infusion, Continuous  bisacodyl (DULCOLAX) suppository 10 mg, Once  bisacodyl (DULCOLAX) suppository 10 mg, Daily PRN  HYDROmorphone HCl PF (DILAUDID) injection 0.5 mg, Q2H PRN   Or  HYDROmorphone HCl PF (DILAUDID) injection 1 mg, Q2H PRN  oxyCODONE (ROXICODONE) immediate release tablet 5 mg, Q4H PRN   Or  oxyCODONE (ROXICODONE) immediate release tablet 10 mg, Q4H PRN  acetaminophen (TYLENOL) tablet 650 mg, Q6H  HYDROmorphone HCl PF (DILAUDID) injection 1 mg, Q4H PRN  phenol 1.4 % mouth spray 1 spray, Q2H PRN  promethazine (PHENERGAN) suppository 25 mg, Q6H PRN  sodium chloride flush 0.9 % injection 5-40 mL, 2 times per day  sodium chloride flush 0.9 % injection 5-40 mL, PRN  0.9 % sodium chloride infusion, PRN  potassium chloride (KLOR-CON M) extended release tablet 40 mEq, PRN   Or  potassium bicarb-citric acid (EFFER-K) effervescent tablet 40 mEq, PRN   Or  potassium chloride 10 mEq/100  ID and surgery on board-currently on zosyn and diflucan. No fever or leukocytosis. Discussed with Dr Robertson. Given worsening symptoms, not tolerating diet she will undergo exploratory lap, washout, possible colostomy this am.  She is medically optimized.  Hypokalemia-replace  Ileus: resolved.           Diet: Diet NPO Exceptions are: Sips of Water with Meds  Code:Full Code  DVT PPX lovenox      Carolyn Bella PA-C   12/28/2023 8:55 AM

## 2023-12-28 NOTE — PROGRESS NOTES
Occupational/Physical Therapy    Tammy Frederick     OT/PT orders received. Pt is scheduled to have exploratory lap this date. Will hold therapy at this time and re-assess following surgery.     Angie Thompson, M/OT, OTR/L- [id number]   Thanks, Meri Holt, PT, DPT [id number]

## 2023-12-28 NOTE — ANESTHESIA POSTPROCEDURE EVALUATION
Department of Anesthesiology  Postprocedure Note    Patient: Lobito Gamboa  MRN: [id number]  [address] [facility] [address] [facility] 1950  Date of evaluation: 12/28/2023    Procedure Summary       Date: 12/28/23 Room / Location: [address]    Anesthesia Start: 1021 Anesthesia Stop: 1254    Procedure: EXPLORATORY LAPAROTOMY, SIGMOID RESECTION, SMALL BOWEL RESECTION, APPENDECTOMY, DIVERTING LOOP ILEOSTOMY (Abdomen) Diagnosis:       Sigmoid diverticulitis      (Sigmoid diverticulitis [K57.32])    Surgeons: Puja Goodman MD Responsible Provider: Corby Linares MD    Anesthesia Type: general ASA Status: 3            Anesthesia Type: No value filed. Edgard Phase I: Edgard Score: 8    Edgard Phase II:      Anesthesia Post Evaluation    Patient location during evaluation: PACU  Patient participation: complete - patient participated  Level of consciousness: awake and alert  Airway patency: patent  Nausea & Vomiting: no nausea and no vomiting  Cardiovascular status: hemodynamically stable  Respiratory status: acceptable  Hydration status: stable  Multimodal analgesia pain management approach  Pain management: adequate    No notable events documented.

## 2023-12-28 NOTE — PROGRESS NOTES
2000: shift assessment done, VSS, ambulated to the bathroom, call light within reach, plan of care ongoing. The care plan and education has been reviewed and mutually agreed upon with the patient.

## 2023-12-28 NOTE — PROGRESS NOTES
Infectious Diseases   Progress Note      Admission Date: 12/21/2023  Hospital Day: Hospital Day: 8   Attending: Kendell Nguyen MD  Date of service: 12/28/2023     Chief complaint/ Reason for consult:     Fecal peritonitis and intra-abdominal abscess  Perforated diverticulitis  S/p CT-guided BILLY drain placement by interventional neurology on December 22 -abscess fluid cultures are growing group B streptococcus and E. Coli  Multiloculated intra-abdominal abscesses on CT scan done on December 25  S/p cholecystectomy in the past    Microbiology:        I have reviewed allavailable micro lab data and cultures    Blood culture (2/2) - collected on 12/21/2023: Negative  Abdominal abscess fluid culture  - collected on 12/22/2023: Group C streptococcus, E. coli    Susceptibility    Escherichia coli (1)    Antibiotic Interpretation Microscan  Method Status    ampicillin Sensitive 8 mcg/mL BACTERIAL SUSCEPTIBILITY PANEL BY PATSY     ampicillin-sulbactam Sensitive 4 mcg/mL BACTERIAL SUSCEPTIBILITY PANEL BY PATSY     ceFAZolin Sensitive <=4 mcg/mL BACTERIAL SUSCEPTIBILITY PANEL BY PATSY     cefepime Sensitive <=0.12 mcg/mL BACTERIAL SUSCEPTIBILITY PANEL BY PATSY     cefTRIAXone Sensitive <=0.25 mcg/mL BACTERIAL SUSCEPTIBILITY PANEL BY PATSY     ciprofloxacin Sensitive <=0.25 mcg/mL BACTERIAL SUSCEPTIBILITY PANEL BY PATSY     ertapenem Sensitive <=0.12 mcg/mL BACTERIAL SUSCEPTIBILITY PANEL BY PATSY     gentamicin Sensitive <=1 mcg/mL BACTERIAL SUSCEPTIBILITY PANEL BY PATSY     piperacillin-tazobactam Sensitive <=4 mcg/mL BACTERIAL SUSCEPTIBILITY PANEL BY PATSY     trimethoprim-sulfamethoxazole Sensitive <=20 mcg/mL BACTERIAL SUSCEPTIBILITY PANEL BY PATSY                  Antibiotics and immunizations:       Current antibiotics: All antibiotics and their doses were reviewed by me    Recent Abx Admin                     piperacillin-tazobactam (ZOSYN) 3,375 mg in sodium chloride 0.9 % 50 mL IVPB (mini-bag) (mg) 3,375 mg New Bag 12/28/23 0904      of 22, ALT of 19 and serum bilirubin 0.5.    Hemoglobin is 11.3 and platelet count 349,000    Plan:     Diagnostic Workup:    Follow-up on surgical cultures and pathology  Continue to follow  fever curve, WBC count and blood cultures.  Continue to monitor blood counts, liver and renal function.    Antimicrobials:    Will continue IV Zosyn 3.375 g every 8 hours  Continue IV fluconazole 200 mg daily  Will follow-up on the postoperative course and will determine final duration for antibiotics at discharge accordingly  Surgical site care  Pain control per primary  We will follow up on the culture results and clinical progress and will make further recommendations accordingly.  Continue close vitals monitoring.  Maintain good glycemic control.  Fall precautions.  Aspiration precautions.  Continue to watch for new fever or diarrhea.  DVT prophylaxis.  Discussed all above with patient and RN.      Drug Monitoring:    Continue monitoring for antibiotic toxicity as follows: CBC, CMP   Continue to watch for following: new or worsening fever, new hypotension, hives, lip swelling and redness or purulence at vascular access sites.     I/v access Management:    Continue to monitor i.v access sites for erythema, induration, discharge or tenderness.   As always, continue efforts to minimize tubes/lines/drains as clinically appropriate to reduce chances of line associated infections.    Patient education and counseling:        The patient was educated in detail about the side-effects of various antibiotics and things to watch for like new rashes, lip swelling, severe reaction, worsening diarrhea, break through fever etc.  Discussed patient's condition and what to expect. All of the patient's questions were addressed in a satisfactory manner and patient verbalized understanding all instructions.      Level of complexity of visit and medical decision making: High     Risk of Complications/Morbidity: High     Illness(es)/ Infection

## 2023-12-28 NOTE — PLAN OF CARE
Problem: Discharge Planning  Goal: Discharge to home or other facility with appropriate resources  12/28/2023 1154 by Elisa Gonzalez RN  Outcome: Progressing  12/27/2023 2313 by Dayron Campbell RN  Outcome: Progressing     Problem: Pain  Goal: Verbalizes/displays adequate comfort level or baseline comfort level  12/28/2023 1154 by Elisa Gonzalez RN  Outcome: Progressing  12/27/2023 2313 by Dayron Campbell RN  Outcome: Progressing     Problem: ABCDS Injury Assessment  Goal: Absence of physical injury  12/28/2023 1154 by Elisa Gonzalez RN  Outcome: Progressing  12/27/2023 2313 by Dayron Campbell RN  Outcome: Progressing     Problem: Safety - Adult  Goal: Free from fall injury  12/28/2023 1154 by Elisa Gonzalez RN  Outcome: Progressing  12/27/2023 2313 by Dayron Campbell RN  Outcome: Progressing     Problem: Gastrointestinal - Adult  Goal: Minimal or absence of nausea and vomiting  12/28/2023 1154 by Elisa Gonzalez RN  Outcome: Progressing  12/27/2023 2313 by Dayron Campbell RN  Outcome: Progressing  Goal: Maintains or returns to baseline bowel function  12/28/2023 1154 by Elisa Gonzalez RN  Outcome: Progressing  12/27/2023 2313 by Dayron Campbell RN  Outcome: Progressing     Problem: Nutrition Deficit:  Goal: Optimize nutritional status  Outcome: Progressing

## 2023-12-28 NOTE — PROGRESS NOTES
Nutrition Note    RECOMMENDATIONS  PO Diet: NPO  Other: Please obtain updated wt of the pt and document wt method  Nutrition Support:   Pt has been identified as at risk for refeeding syndrome per ASPEN guidelines. The following is recommended to reduce the risk of refeeding syndrome:  Check potassium, magnesium, and phosphorus prior to initiating nutrition and daily x 3 days following initiation of nutrition support.   Administer 100 mg thiamine prior to initiating nutrition support and continue daily for at least 5-7 days.   Pt's receiving PN should have an MVI added to their PN daily.   Monitor closely and correct lytes (Phos,Mg,K+)  Recommend discontinue IV fluids of D5/.45NS at 100 mL/hr prior to initiation of TPN and Clinimix 5/20 starting at 40 mL/hr  Recommend 250 mL 20% lipids 2 times per week    NUTRITION ASSESSMENT   Pt triggered for NPO/clear liquid status x 7 days. Pt with acute sigmoid diverticulitis with abscess s/p percutaneous drainage 12/22, developed ileus. Started on clear liquid diet 12/26 but did not tolerate d/t worsening abdominal pain. NPO today for ex lap, abdominal washout, sigmoidectomy, and possible ostomy. Pt off unit and no wt hx in EMR since 2015 prior to current admission to assess for recent wt changes. Note of decreased intake for 5 days prior to current admission. Per general surgery, will likely need initation of TPN. RD will place TPN recommendations in chart and continue to monitor.     Nutrition Related Findings: D5/.45NS at 100 mL/hr. K+ 3.4. LBM 12/27. No edema noted.  Wounds: None  Nutrition Education:  Education completed (low fiber diet education 12/26)   Nutrition Goals: Initiate nutrition support, by next RD assessment, Tolerate nutrition support at goal rate     MALNUTRITION ASSESSMENT   Acute Illness  Malnutrition Status: Insufficient data    NUTRITION DIAGNOSIS   Inadequate protein-energy intake related to altered GI function as evidenced by NPO or clear liquid  status due to medical condition    CURRENT NUTRITION THERAPIES  Diet NPO Exceptions are: Sips of Water with Meds     PO Intake: NPO   PO Supplement Intake:NPO    Current Parenteral Nutrition Recs:  Goal PN Orders Provides: Clinimix 5/20 at 80 mL/hr provides 1920 mL total volume, 1690 calories without lipids, 96 grams of protein, and dextrose load of 4.78 mg/kg/min  Additional calorie sources:  D5/.45NS at 100 mL/hr provides 408 calories per day from dextrose     ANTHROPOMETRICS  Current Height: 152.4 cm (5')  Current Weight - Scale: 55.8 kg (123 lb)    Ideal Body Weight (IBW): 100 lbs  (45 kg)        BMI: 24    COMPARATIVE STANDARDS  Total Energy Requirements (kcals/day): 0909-1875     Protein (g):         Fluid (mL/day):  8823-7278    The patient will be monitored per nutrition standards of care. Consult dietitian if additional nutrition interventions are needed prior to RD reassessment.     Hollie Noriega MS, RD, LD    Contact: [phone]

## 2023-12-28 NOTE — PROGRESS NOTES
6:06 PM  Saldana draining clear yellow urine - saldana care provided. Ostomy pouch with small amount of bloody drainage. Midline incision dressing clean dry and intact - no drainage noted. BILLY draining sanguineous drainage - bulb is compressed and has been emptied and charted. Pain controlled with dilaudid and oxycodone. Patient is satisfied at this time. Denies nausea or vomiting. Patient tolerating lcear liquid diet - 50% of meal eaten. VSS. Patient stable and denied needs when writer left room.

## 2023-12-28 NOTE — PROGRESS NOTES
Sleeping, easily aroused, VSS, pain decreased, seen by anesthesia, meets criteria for pacu discharge

## 2023-12-28 NOTE — BRIEF OP NOTE
[facility]  Brief Operative Note  Pt Name: Tammy Frederick  CSN: [id number]  YOB: 1950    Date of Procedure: 12/28/2023    Pre-operative Diagnosis: Perforated sigmoid diverticulitis with multiple intra-abdominal abscesses    Post-operative Diagnosis: same     Procedure:  Exploratory laparotomy, takedown of splenic flexure, small bowel resection with anastomosis, appendectomy, sigmoidectomy with low pelvic anastomosis and diverting loop ileostomy    Surgeon(s):  Alban Robertson MD     Surgical Assistant: Gerry Germain    Anesthesia:  General anesthesia    Findings: Full note dictated, Dictation Job Number: [id number]    Estimated Blood Loss: 50 ml    Complications: None    Specimens:  abscess purulence for culture, small bowel resection, appendix, sigmoid    Implants:  * No implants in log *    Drains: BILLY in left abdomen   Closed/Suction Drain Left Abdomen Bulb (Active)       Urinary Catheter 12/28/23 [place] (Active)       [REMOVED] Closed/Suction Drain Left Abdomen Bulb (Removed)   Site Description Clean, dry & intact 12/26/23 1934   Dressing Status Clean, dry & intact 12/26/23 1934   Drainage Appearance Brown 12/26/23 1934   Drain Status To bulb suction 12/26/23 1934   Output (ml) 0 ml 12/24/23 1318       [REMOVED] NG/OG/NJ/NE Tube Nasogastric 18 fr Left nostril (Removed)   Surrounding Skin Clean, dry & intact 12/26/23 0745   Securement device Adhesive based lara 12/26/23 0745   Status Suction-low intermittent 12/26/23 0745   Placement Verified X-Ray (Initial) 12/26/23 0745   NG/OG/NJ/NE External Measurement (cm) 50 cm 12/26/23 0745   Drainage Appearance Bile;Green 12/26/23 0745   Output (mL) 0 ml 12/26/23 0745       [REMOVED] External Urinary Catheter (Removed)   Site Assessment Clean,dry & intact 12/23/23 0800       Condition: stable     Disposition and Post-operative plan: PACU, med/surg montes     Alban Robertson MD, FACS  12/28/2023  12:33 PM

## 2023-12-28 NOTE — ANESTHESIA PRE PROCEDURE
Department of Anesthesiology  Preprocedure Note       Name:  Teofilo Simental   Age:  68 y.o.  :  1950                                          MRN:  [id number]         Date:  2023      Surgeon: Kellen Gurrola):  Ana Laura Holly MD    Procedure: Procedure(s):  EXPLORATORY LAPAROTOMY, SIGMOID RESECTION, POSSIBLE COLOSTOMY    Medications prior to admission:   Prior to Admission medications    Medication Sig Start Date End Date Taking? Authorizing Provider   lactobacillus (CULTURELLE) capsule Take 1 capsule by mouth in the morning and at bedtime 23 Yes Enzo Rivas MD   nicotine polacrilex (NICORETTE) 2 MG gum Take 1 each by mouth as needed for Smoking cessation   Yes Provider, MD Ivone   ibuprofen (ADVIL;MOTRIN) 200 MG tablet Take 1 tablet by mouth daily   Yes ProviderIvone MD   rivaroxaban (XARELTO) 10 MG TABS tablet for DVT PROPHYLAXIS Take 1 tablet by mouth Daily.  12   Yary Reyes MD   therapeutic multivitamin-minerals [facility]) tablet Take 1 tablet by mouth daily    Provider, MD Ivone       Current medications:    Current Facility-Administered Medications   Medication Dose Route Frequency Provider Last Rate Last Admin    sodium chloride flush 0.9 % injection 5-40 mL  5-40 mL IntraVENous 2 times per day Aurora Ovalle PA-C   10 mL at 23 1420    sodium chloride flush 0.9 % injection 5-40 mL  5-40 mL IntraVENous PRN Carolyn Bella PA-C        0.9 % sodium chloride infusion  25 mL IntraVENous PRN Carolyn Bella PA-C        fluconazole (DIFLUCAN) in 0.9 % sodium chloride IVPB 200 mg  200 mg IntraVENous Q24H Enzo Rivas MD   Paused at 23 1522    lactobacillus (CULTURELLE) capsule 1 capsule  1 capsule Oral BID  Shane Pate MD   1 capsule at 23 1656    dextrose 5 % and 0.45 % NaCl with KCl 20 mEq infusion   IntraVENous Continuous DechristopherLandon APRN -  mL/hr at 23 1833 New Bag at 23 1833

## 2023-12-28 NOTE — OP NOTE
[address] [address]                                OPERATIVE REPORT    PATIENT NAME: SURYA SYKES                   :        1950  MED REC NO:   [id number]                          ROOM:       [id number]  ACCOUNT NO:   [id number]                           ADMIT DATE: 2023  PROVIDER:     Alban Robertson MD    DATE OF PROCEDURE:  2023    PREOPERATIVE DIAGNOSES:  Perforated sigmoid diverticulitis with multiple  intra-abdominal abscesses.    POSTOPERATIVE DIAGNOSES:  Perforated sigmoid diverticulitis with  multiple intra-abdominal abscesses.    OPERATION PERFORMED:  Exploratory laparotomy, takedown of splenic  flexure, small bowel resection with anastomosis, appendectomy,  sigmoidectomy with low pelvic anastomosis, and diverting loop ileostomy.    SURGEON:  Alban Robertson MD    ANESTHESIA:  General.    INDICATIONS:  This is a 73-year female who presents to [facility]  with acute perforated sigmoid diverticulitis with an abscess that was  Treated with a percutaneous drain.  Repeat imaging showed that the abscess cavity  had collapse around the sigmoid but that there were two other separate  intra-abdominal abscesses, only one of which would be minimal to  percutaneous drainage.  As the patient had been improving up until the  point that percutaneous drain was removed and decided to observe the two  other abscesses.  However, the patient shortly had recurrence of  symptoms.  As only one of the other abscesses that would be able to be  drained, the only other option would be surgical intervention at this  point.  The patient lives far away with not much surgical care and as  such, we have discussed definitive surgical intervention for the sigmoid  diverticulitis, which would include exploratory laparotomy and  sigmoidectomy with possible ostomy as well as abdominal washout of these  abscess.  Risks, benefits, and  were identified and without any sign of  concern.  As the patient had multiple risk factors for anastomotic  complications including malnutrition and massively infected field as  well as a low pelvic anastomosis, I felt the diverting loop ileostomy  would be the best option for this patient; although, the anastomosis did  look good.  As this was going to be diverted, I did not test the  anastomosis for over this point.  The two ends of the small bowel were  aligned with multiple interrupted 3-0 silk sutures.  The corners of  staple lines were cut, anastomosis was created with the blue load of the  [place] stapler.  The anastomosis was visualized to be widely patent and  hemostatic and a common enterotomy was sealed with another blue load of  the TX60 staggering the staple lines to optimize blood flow to the  edges.  The small bowel was then turned to the abdomen taking care not  to open the mesentery.  Approximately 30 cm of proximal to this, an area  of small bowel, which appeared to be relatively uninvolved inflammatory  process, was chosen to be the site for the diverting loop ileostomy.  A  hole in the mesentery was then made and a red rubber catheter was placed  through this.  The patient had preoperative ostomy markings and the  right lower quadrant was used as the optimal site.  A cylinder of skin  was excised with the cautery and this was dissected down to the external  oblique with a cautery.  The anterior fascia over the rectus muscle was  incised in a cruciate fashion and the rectus muscle was split along the  direction of their fibers and the posterior sheath entered with the  cautery as well.  The tract was then dilated with my fingers and  diverting loop ileostomy brought out directly through this, taking care  not to twist the mesentery where the bowel.  Again, hemostasis was  verified in the abdomen and a 19-Ash drain was brought to the left  abdomen and laid down into the pelvis over the

## 2023-12-29 LAB
ANION GAP SERPL CALCULATED.3IONS-SCNC: 5 MMOL/L (ref 3–16)
BASOPHILS # BLD: 0.1 K/UL (ref 0–0.2)
BASOPHILS NFR BLD: 0.4 %
BUN SERPL-MCNC: 8 MG/DL (ref 7–20)
CALCIUM SERPL-MCNC: 7.1 MG/DL (ref 8.3–10.6)
CHLORIDE SERPL-SCNC: 111 MMOL/L (ref 99–110)
CO2 SERPL-SCNC: 24 MMOL/L (ref 21–32)
CREAT SERPL-MCNC: <0.5 MG/DL (ref 0.6–1.2)
DEPRECATED RDW RBC AUTO: 13.5 % (ref 12.4–15.4)
EOSINOPHIL # BLD: 0 K/UL (ref 0–0.6)
EOSINOPHIL NFR BLD: 0 %
GFR SERPLBLD CREATININE-BSD FMLA CKD-EPI: >60 ML/MIN/{1.73_M2}
GLUCOSE SERPL-MCNC: 165 MG/DL (ref 70–99)
HCT VFR BLD AUTO: 27.4 % (ref 36–48)
HGB BLD-MCNC: 9.1 G/DL (ref 12–16)
LOEFFLER MB STN SPEC: NORMAL
LYMPHOCYTES # BLD: 0.6 K/UL (ref 1–5.1)
LYMPHOCYTES NFR BLD: 3.7 %
MAGNESIUM SERPL-MCNC: 1.7 MG/DL (ref 1.8–2.4)
MCH RBC QN AUTO: 31.5 PG (ref 26–34)
MCHC RBC AUTO-ENTMCNC: 33 G/DL (ref 31–36)
MCV RBC AUTO: 95.2 FL (ref 80–100)
MONOCYTES # BLD: 0.9 K/UL (ref 0–1.3)
MONOCYTES NFR BLD: 6.1 %
NEUTROPHILS # BLD: 13.5 K/UL (ref 1.7–7.7)
NEUTROPHILS NFR BLD: 89.8 %
PHOSPHATE SERPL-MCNC: 2.6 MG/DL (ref 2.5–4.9)
PLATELET # BLD AUTO: 336 K/UL (ref 135–450)
PMV BLD AUTO: 6.8 FL (ref 5–10.5)
POTASSIUM SERPL-SCNC: 3.6 MMOL/L (ref 3.5–5.1)
RBC # BLD AUTO: 2.88 M/UL (ref 4–5.2)
SODIUM SERPL-SCNC: 140 MMOL/L (ref 136–145)
WBC # BLD AUTO: 15 K/UL (ref 4–11)

## 2023-12-29 PROCEDURE — 85025 COMPLETE CBC W/AUTO DIFF WBC: CPT

## 2023-12-29 PROCEDURE — 84100 ASSAY OF PHOSPHORUS: CPT

## 2023-12-29 PROCEDURE — 6360000002 HC RX W HCPCS: Performed by: SURGERY

## 2023-12-29 PROCEDURE — 97530 THERAPEUTIC ACTIVITIES: CPT

## 2023-12-29 PROCEDURE — 99024 POSTOP FOLLOW-UP VISIT: CPT | Performed by: SURGERY

## 2023-12-29 PROCEDURE — 94761 N-INVAS EAR/PLS OXIMETRY MLT: CPT

## 2023-12-29 PROCEDURE — 97161 PT EVAL LOW COMPLEX 20 MIN: CPT

## 2023-12-29 PROCEDURE — 94150 VITAL CAPACITY TEST: CPT

## 2023-12-29 PROCEDURE — 1200000000 HC SEMI PRIVATE

## 2023-12-29 PROCEDURE — 80048 BASIC METABOLIC PNL TOTAL CA: CPT

## 2023-12-29 PROCEDURE — 6360000002 HC RX W HCPCS: Performed by: INTERNAL MEDICINE

## 2023-12-29 PROCEDURE — 2580000003 HC RX 258: Performed by: INTERNAL MEDICINE

## 2023-12-29 PROCEDURE — 6360000002 HC RX W HCPCS: Performed by: PHYSICIAN ASSISTANT

## 2023-12-29 PROCEDURE — 2500000003 HC RX 250 WO HCPCS: Performed by: SURGERY

## 2023-12-29 PROCEDURE — 99232 SBSQ HOSP IP/OBS MODERATE 35: CPT | Performed by: INTERNAL MEDICINE

## 2023-12-29 PROCEDURE — 97116 GAIT TRAINING THERAPY: CPT

## 2023-12-29 PROCEDURE — 83735 ASSAY OF MAGNESIUM: CPT

## 2023-12-29 PROCEDURE — 6370000000 HC RX 637 (ALT 250 FOR IP): Performed by: SURGERY

## 2023-12-29 PROCEDURE — 97535 SELF CARE MNGMENT TRAINING: CPT

## 2023-12-29 PROCEDURE — 2580000003 HC RX 258: Performed by: SURGERY

## 2023-12-29 PROCEDURE — 97166 OT EVAL MOD COMPLEX 45 MIN: CPT

## 2023-12-29 RX ORDER — MAGNESIUM SULFATE 1 G/100ML
1000 INJECTION INTRAVENOUS ONCE
Status: COMPLETED | OUTPATIENT
Start: 2023-12-29 | End: 2023-12-29

## 2023-12-29 RX ORDER — DEXTROSE MONOHYDRATE, SODIUM CHLORIDE, AND POTASSIUM CHLORIDE 50; 1.49; 4.5 G/1000ML; G/1000ML; G/1000ML
INJECTION, SOLUTION INTRAVENOUS CONTINUOUS
Status: DISCONTINUED | OUTPATIENT
Start: 2023-12-29 | End: 2023-12-30

## 2023-12-29 RX ADMIN — ACETAMINOPHEN 650 MG: 325 TABLET ORAL at 04:26

## 2023-12-29 RX ADMIN — OXYCODONE HYDROCHLORIDE 10 MG: 5 TABLET ORAL at 21:57

## 2023-12-29 RX ADMIN — ACETAMINOPHEN 650 MG: 325 TABLET ORAL at 09:25

## 2023-12-29 RX ADMIN — Medication 1 CAPSULE: at 09:23

## 2023-12-29 RX ADMIN — HYDROMORPHONE HYDROCHLORIDE 1 MG: 1 INJECTION, SOLUTION INTRAMUSCULAR; INTRAVENOUS; SUBCUTANEOUS at 17:00

## 2023-12-29 RX ADMIN — MAGNESIUM SULFATE HEPTAHYDRATE 1000 MG: 1 INJECTION, SOLUTION INTRAVENOUS at 14:48

## 2023-12-29 RX ADMIN — ACETAMINOPHEN 650 MG: 325 TABLET ORAL at 21:52

## 2023-12-29 RX ADMIN — ENOXAPARIN SODIUM 40 MG: 100 INJECTION SUBCUTANEOUS at 09:24

## 2023-12-29 RX ADMIN — Medication 1 CAPSULE: at 17:04

## 2023-12-29 RX ADMIN — ERTAPENEM SODIUM 1000 MG: 1 INJECTION INTRAMUSCULAR; INTRAVENOUS at 16:05

## 2023-12-29 RX ADMIN — HYDROMORPHONE HYDROCHLORIDE 1 MG: 1 INJECTION, SOLUTION INTRAMUSCULAR; INTRAVENOUS; SUBCUTANEOUS at 10:27

## 2023-12-29 RX ADMIN — POTASSIUM CHLORIDE, DEXTROSE MONOHYDRATE AND SODIUM CHLORIDE: 150; 5; 450 INJECTION, SOLUTION INTRAVENOUS at 14:41

## 2023-12-29 RX ADMIN — Medication 10 ML: at 21:52

## 2023-12-29 RX ADMIN — OXYCODONE HYDROCHLORIDE 10 MG: 5 TABLET ORAL at 09:25

## 2023-12-29 RX ADMIN — FLUCONAZOLE 200 MG: 2 INJECTION, SOLUTION INTRAVENOUS at 14:46

## 2023-12-29 RX ADMIN — PIPERACILLIN AND TAZOBACTAM 3375 MG: 3; .375 INJECTION, POWDER, FOR SOLUTION INTRAVENOUS at 09:32

## 2023-12-29 RX ADMIN — ACETAMINOPHEN 650 MG: 325 TABLET ORAL at 17:04

## 2023-12-29 RX ADMIN — OXYCODONE HYDROCHLORIDE 10 MG: 5 TABLET ORAL at 04:38

## 2023-12-29 ASSESSMENT — PAIN DESCRIPTION - DESCRIPTORS
DESCRIPTORS: BURNING
DESCRIPTORS: ACHING
DESCRIPTORS: SHARP

## 2023-12-29 ASSESSMENT — PAIN SCALES - GENERAL
PAINLEVEL_OUTOF10: 0
PAINLEVEL_OUTOF10: 4
PAINLEVEL_OUTOF10: 10
PAINLEVEL_OUTOF10: 9
PAINLEVEL_OUTOF10: 8
PAINLEVEL_OUTOF10: 8

## 2023-12-29 ASSESSMENT — PAIN DESCRIPTION - LOCATION
LOCATION: ABDOMEN

## 2023-12-29 ASSESSMENT — PAIN - FUNCTIONAL ASSESSMENT: PAIN_FUNCTIONAL_ASSESSMENT: ACTIVITIES ARE NOT PREVENTED

## 2023-12-29 ASSESSMENT — PAIN DESCRIPTION - ORIENTATION
ORIENTATION: LEFT;MID
ORIENTATION: RIGHT
ORIENTATION: MID

## 2023-12-29 NOTE — CARE COORDINATION
Dr Pate aware to have pt get dose of Invanz here before discharge home per inpt pharmacy also.     Bonnie Latif, RN, BSN  [phone]

## 2023-12-29 NOTE — CARE COORDINATION
Per IDR's pt will not discharge today d/t had surgery yesterday.    CM contact Babita with [employer] [phone] and updated no discharge today.     Pt will go home with [facility] and information placed on haley.    Bonnie Latif RN, BSN  [phone]

## 2023-12-29 NOTE — PROGRESS NOTES
Infectious Diseases   Progress Note      Admission Date: 12/21/2023  Hospital Day: Hospital Day: 9   Attending: Ciara Archer MD  Date of service: 12/29/2023     Chief complaint/ Reason for consult:     Fecal peritonitis and intra-abdominal abscess  Perforated diverticulitis  S/p CT-guided BILLY drain placement by interventional neurology on December 22 -abscess fluid cultures are growing group B streptococcus and E. Coli  Multiloculated intra-abdominal abscesses on CT scan done on December 25  S/p cholecystectomy in the past    Microbiology:        I have reviewed allavailable micro lab data and cultures    Blood culture (2/2) - collected on 12/21/2023: Negative  Abdominal abscess fluid culture  - collected on 12/22/2023: Group C streptococcus, E. coli    Susceptibility    Escherichia coli (1)    Antibiotic Interpretation Microscan  Method Status    ampicillin Sensitive 8 mcg/mL BACTERIAL SUSCEPTIBILITY PANEL BY PATSY     ampicillin-sulbactam Sensitive 4 mcg/mL BACTERIAL SUSCEPTIBILITY PANEL BY PATSY     ceFAZolin Sensitive <=4 mcg/mL BACTERIAL SUSCEPTIBILITY PANEL BY PATSY     cefepime Sensitive <=0.12 mcg/mL BACTERIAL SUSCEPTIBILITY PANEL BY PATSY     cefTRIAXone Sensitive <=0.25 mcg/mL BACTERIAL SUSCEPTIBILITY PANEL BY PATSY     ciprofloxacin Sensitive <=0.25 mcg/mL BACTERIAL SUSCEPTIBILITY PANEL BY PATSY     ertapenem Sensitive <=0.12 mcg/mL BACTERIAL SUSCEPTIBILITY PANEL BY PATSY     gentamicin Sensitive <=1 mcg/mL BACTERIAL SUSCEPTIBILITY PANEL BY PATSY     piperacillin-tazobactam Sensitive <=4 mcg/mL BACTERIAL SUSCEPTIBILITY PANEL BY PATSY     trimethoprim-sulfamethoxazole Sensitive <=20 mcg/mL BACTERIAL SUSCEPTIBILITY PANEL BY PATSY                  Antibiotics and immunizations:       Current antibiotics: All antibiotics and their doses were reviewed by me    Recent Abx Admin                     piperacillin-tazobactam (ZOSYN) 3,375 mg in sodium chloride 0.9 % 50 mL IVPB (mini-bag) (mg) 3,375 mg New Bag 12/29/23 6711

## 2023-12-29 NOTE — PROGRESS NOTES
Assessment complete, pts on clear liquid diet, vitals done, ice pack provided, pain controlled with prn morphine and oxycodone, all her medications administered as per mar, incision sites on the abd dressing clean and dry, hollie drain emptied bloody drainage, ileostomy bag clean, saldana care done, refused for ambulation at this time but want to wait till morning to get up, care plan discussed and updated with patient.   Rosa Mclean RN

## 2023-12-29 NOTE — PROGRESS NOTES
[facility]        Post-op Note    Pt Name: Tammy Frederick  MRN: [id number]  YOB: 1950  Date of evaluation: 2023    Post-op Day #1    Subjective:    No acute events overnight  Pain controlled  No nausea with clear liquids  Small flatus and enteric output into ostomy    Vital Signs:  Patient Vitals for the past 24 hrs:   BP Temp Temp src Pulse Resp SpO2 Height   23 0508 -- -- -- -- 16 -- --   23 0415 134/84 98 °F (36.7 °C) Oral 75 16 -- --   23 0023 -- -- -- -- 16 -- --   23 2345 138/83 97.4 °F (36.3 °C) Oral 84 16 98 % --   23 -- -- -- -- 16 -- --   23 -- -- -- -- 16 -- --   23 1945 136/84 97.9 °F (36.6 °C) Oral 85 16 97 % --   23 1800 137/84 -- -- 87 16 -- --   23 1709 (!) 142/91 97.9 °F (36.6 °C) Oral 87 18 96 % --   23 1626 -- -- -- -- 16 96 % --   23 1440 129/81 98.3 °F (36.8 °C) Oral 88 15 95 % --   23 1410 127/80 98.3 °F (36.8 °C) Oral 87 15 93 % --   23 1345 128/79 -- -- 81 15 96 % --   23 1330 126/71 -- -- 74 19 95 % --   23 1315 122/68 -- -- 73 21 94 % --   23 1314 122/68 -- -- 75 27 93 % --   23 1305 132/78 97.3 °F (36.3 °C) -- 76 22 92 % --   23 1300 127/83 -- -- 73 24 95 % --   23 1255 129/69 -- -- 70 17 97 % --   23 1250 129/72 97.9 °F (36.6 °C) Temporal 72 16 98 % --   23 1018 -- -- -- -- -- -- 1.524 m (5')      TEMPERATURE HISTORY 24H: Temp (24hrs), Av.9 °F (36.6 °C), Min:97.3 °F (36.3 °C), Max:98.3 °F (36.8 °C)    BLOOD PRESSURE HISTORY: Systolic (36hrs), Av , Min:122 , Max:180    Diastolic (36hrs), Av, Min:68, Max:91      Intake/Output:    I/O last 3 completed shifts:  In: 1240 [P.O.:240; I.V.:1000]  Out: 1065 [Urine:880; Drains:185]  No intake/output data recorded.  Drain/tube Output:    [REMOVED] Closed/Suction Drain Left Abdomen Bulb-Output (ml): 0 ml  Closed/Suction Drain Left Abdomen

## 2023-12-29 NOTE — PROGRESS NOTES
around, walk dogs  Recent Falls: No falls in last 6 months     Examination   Vision:   Vision Corrective Device: wears glasses at all times  Hearing:   WFL  Perception:   WFL  Observation:   General Observation:  saldana catheter, ostomy with abdominal bandage and suction drain left abdomen bulb  Posture:   Good, mild flexion at hips due to abdominal pain  Sensation:   reports numbness and tingling in (R) UE-- sometimes by hands get numb  Proprioception:    WFL  Tone:   Normotonic  Coordination Testing:   Coordination and Movement Description: fine motor impairments, tremors, decreased speed, decreased accuracy  -- bilateral hand tremors for several years, able to feed herself soup but reports handwriting is awful  ROM:   (B) UE AROM WFL  Strength:   (B) UE strength grossly WFL    Therapist Clinical Decision Making (Complexity): medium complexity  Clinical Presentation: stable-- feels much better since surgery      Subjective  General: Patient supine in bed, pleasant and cooperative reports feeling better after surgery. Requesting to wash hair and get bath    Last BP taken by nursing in semi-fowlers position  153/80, 97/ on 1L and heart rate 87    Vitals taken supine in bed after full ADL in semi-fowlers position  130/77, heart rate 102, on room air for ADL and sats at 94%, temp 97.5    Pain: 6/10.  Location: abdomen  Pain Interventions: pain medication in place prior to arrival, ice applied, repositioned , and therapy activities modified        Activities of Daily Living  Basic Activities of Daily Living  Feeding: Independent  Feeding Comments: on liquids  Grooming: supervision  Grooming Comments: stood for 5-7 minutes to wash hair standing at sink, wash face and brush dentures.   Upper Extremity Bathing: minimal assistance  Lower Extremity Bathing: moderate assistance   Bathing Comments: sponge bathing sitting on BSC at sink  Upper Extremity Dressing: minimal assistance  Lower Extremity Dressing: moderate

## 2023-12-29 NOTE — PLAN OF CARE
Problem: Discharge Planning  Goal: Discharge to home or other facility with appropriate resources  Outcome: Progressing     Problem: Pain  Goal: Verbalizes/displays adequate comfort level or baseline comfort level  Outcome: Progressing     Problem: ABCDS Injury Assessment  Goal: Absence of physical injury  Outcome: Progressing     Problem: Safety - Adult  Goal: Free from fall injury  Outcome: Progressing     Problem: Gastrointestinal - Adult  Goal: Minimal or absence of nausea and vomiting  Outcome: Progressing  Goal: Maintains or returns to baseline bowel function  Outcome: Progressing     Problem: Nutrition Deficit:  Goal: Optimize nutritional status  Outcome: Progressing

## 2023-12-29 NOTE — PROGRESS NOTES
[facility] - Inpatient Rehabilitation Department   Phone: [phone]    Physical Therapy    [x] Initial Evaluation            [] Daily Treatment Note         [] Discharge Summary      Patient: Tammy Frederick   : 1950   MRN: [id number]   Date of Service:  2023  Admitting Diagnosis: Abscess of sigmoid colon  Current Admission Summary: Pt is a 72 yo female initially seen at [facility] in [place] for severe abdominal pain x3 days. CT of abdomen showed sigmoid diverticulitis with abscess. Pt transferred to [facility] for surgical evaluation on .    - abdominal drain placed   - worsening of pain and noted additional fluid pockets    - exploratory lap with takedown of splenic flexure, small bowel resection, appendectomy, sigmoidectomy, and diverting loop ileostomy  Past Medical History:  has a past medical history of Arthritis.  Past Surgical History:  has a past surgical history that includes Abdomen surgery; Total knee arthroplasty (2011); joint replacement (12); and colostomy (N/A, 2023).    Discharge Recommendations: Tammy Frederick scored a 18/24 on the AM-PAC short mobility form. Current research shows that an AM-PAC score of 18 or greater is typically associated with a discharge to the patient's home setting. Based on the patient's AM-PAC score and their current functional mobility deficits, it is recommended that the patient have 2-3 sessions per week of Physical Therapy at d/c to increase the patient's independence.  At this time, this patient demonstrates the endurance and safety to discharge home with PRN assist and home PT and a follow up treatment frequency of 2-3x/wk.  Please see assessment section for further patient specific details.    HOME HEALTH CARE: LEVEL 3 SAFETY  - Initial home health evaluation to occur within 24-48 hours, in patient home   - Therapy evaluations in home within 24-48 hours of discharge; including DME and home  support for 5 minutes in order to complete ADLs    Above goals reviewed on 12/29/2023.  All goals are ongoing at this time unless indicated above.      Therapy Session Time      Individual Group Co-treatment   Time In     1054   Time Out     1209   Minutes     75     Timed Code Treatment Minutes:  60 Minutes  Total Treatment Minutes:  75 Minutes       Electronically Signed By: Meri Galvin, PT      Meri Galvin PT, DPT #[id number]

## 2023-12-29 NOTE — CARE COORDINATION
EDGAR spoke to [facility] infusion branch in [phone] and the pharmacist states they will not have IV Fluconazole until January 3rd.    EDGAR called Babita with [facility] [phone] and she will have local branch supply and notify [facility] of this.     Edgar called [facility] infusion company [phone] and spoke to pharmacist Lauryn and gave IV abx verbal orders and also faxed to them at [phone].    EDGAR then noted pt has not received IV ertapenem while here in hospital and she needs dose here before discharge. EDGAR sent Perfect serve message to Dr Pate to have him order for pt to receive. Message not yet read.    EDGAR also called pt's RN and updated her on the above.    Bonnie Latif, RN, BSN  [phone]

## 2023-12-29 NOTE — CARE COORDINATION
Ostomy Referral Follow-up Progress Note      NAME:  Tammy Frederick  MEDICAL RECORD NUMBER:  [id number]  AGE: 73 y.o.   GENDER:  female  :  1950  TODAY'S DATE:  2023    Subjective:    Tammy Frederick is a 73 y.o. female referred by:   [x] Physician  [x] Nursing  [] Other:     New    Objective    /77   Pulse (!) 102   Temp 97.5 °F (36.4 °C)   Resp 18   Ht 1.524 m (5')   Wt 55.8 kg (123 lb)   SpO2 94%   BMI 24.02 kg/m²     Rodriguez Risk Score Rodriguez Scale Score: 22    Assessment   Surgeon Robertosn    Ileostomy     Patient has loop ileostomy, red, moist, protrudes above skin line. Red rubber bridge in place. Output watery       Colostomy           Urostomy               Intake/Output Summary (Last 24 hours) at 2023 1733  Last data filed at 2023 1648  Gross per 24 hour   Intake 240 ml   Output 775 ml   Net -535 ml       Plan:   Plan for Ostomy Care:        ILEOSTOMY CARE   Supplies: pouch Coloplast, wafer #[id number], pouch #[id number],  scissors, elastic barriers Coloplast #[id number]   Empty pouch when it is 1/3 to 1/2 full of gas or/and stool, this will be approximately 5 to 8 times daily.    Change ileostomy dressing every 3 to 4 days.    Change ileostomy dressing whenever it leaks to prevent skin damage. Measure stoma with each dressing change.   POUCH CHANGES  For pouch changes:  empty pouch, then remove.   Clean skin with water only and dry. DO NOT USE WIPES!!   Measure stoma. Transfer measurements to wafer. Cut wafer to fit base of stoma, snug not tight to stoma. Work red stoma bridge inside of wafer. Make sure wafer fits base of stoma. If too big; it will leak.   To place pouch; remove adhesive; line up adhesive coupling inside blue line on wafer. Press wafer and pouch together. (like closing a Ziploc bag).  Close end of pouch  Place warm blanket for 10 minutes,  over pouch to help with adhesion.   Place Elastic strips around edges of wafer to help support dressing.     Ostomy Plan of

## 2023-12-29 NOTE — PROGRESS NOTES
Incentive Spirometry education and demonstration completed by Respiratory Therapy Yes      Response to education: Very Good     Teaching Time: 5 minutes    Minimum Predicted Vital Capacity - 684 mL.  Patient's Actual Vital Capacity - 1500 mL. Turning over to Nursing for routine follow-up Yes.    Electronically signed by Sunitha Mckeon RCP on 12/29/2023 at 5:39 PM

## 2023-12-29 NOTE — PROGRESS NOTES
[facility]    12/29/2023 4:15 PM        Name: Tammy Frederick .              Admitted: 12/21/2023  Primary Care Provider: No primary care provider on file. (Tel: None)      Subjective:  .    Admitted with abd pain from [facility]   CT shows sigmoid abscess with diverticulitis   Pt had IR drain inserted on 12/22. Developed increase abd pain / bloating on 12/23:  Imaging suggested Ileus vs early SBO  ( lactic acid normal). Evaluated by ID on 12/24/23 ,  Diflucan added. NG removed on 12/26. Was taken to the OR for Exploratory laparotomy, takedown of splenic flexure, small bowel resection with anastomosis, appendectomy, sigmoidectomy with low pelvic anastomosis and diverting loop ileostomy by Dr Robertson on 12/28    Having post op pain. Feels better today than yesterday    Lives alone with her dogs in [facility].        Reviewed interval ancillary notes    Current Medications  dextrose 5 % and 0.45 % NaCl with KCl 20 mEq infusion, Continuous  ertapenem (INVanz) 1,000 mg in sodium chloride 0.9 % 50 mL IVPB (mini-bag), Q24H  sodium chloride flush 0.9 % injection 5-40 mL, 2 times per day  sodium chloride flush 0.9 % injection 5-40 mL, PRN  0.9 % sodium chloride infusion, PRN  ondansetron (ZOFRAN-ODT) disintegrating tablet 4 mg, Q8H PRN   Or  ondansetron (ZOFRAN) injection 4 mg, Q6H PRN  enoxaparin (LOVENOX) injection 40 mg, Daily  morphine (PF) injection 2 mg, Q2H PRN   Or  morphine injection 4 mg, Q2H PRN  acetaminophen (TYLENOL) tablet 650 mg, Q6H  sodium chloride flush 0.9 % injection 5-40 mL, 2 times per day  sodium chloride flush 0.9 % injection 5-40 mL, PRN  0.9 % sodium chloride infusion, PRN  fluconazole (DIFLUCAN) in 0.9 % sodium chloride IVPB 200 mg, Q24H  lactobacillus (CULTURELLE) capsule 1 capsule, BID WC  bisacodyl (DULCOLAX) suppository 10 mg, Once  bisacodyl (DULCOLAX) suppository 10 mg, Daily PRN  HYDROmorphone HCl PF (DILAUDID) injection 0.5 mg, Q2H PRN    (HCC)    E coli infection    Fecal peritonitis (HCC)    Streptococcal infection group C    Diverticular disease of both small and large intestine with perforation and abscess    Complex care coordination    Receiving intravenous antibiotic treatment as outpatient    CRP elevated    Elevated erythrocyte sedimentation rate  Resolved Problems:    * No resolved hospital problems. *       Assessment & Plan:   Diverticulitis with perforation and multiple abscess:  PO day 1 from Exploratory laparotomy, takedown of splenic flexure, small bowel resection with anastomosis, appendectomy, sigmoidectomy with low pelvic anastomosis and diverting loop ileostomy by Dr Robertson on 12/28. Cultures from previous drain positive for E coli and Beta strept C. ID and surgery on board-currently on invanz and diflucan. No fever  Leukocytosis-suspect this is reactive-repeat in am.  Ileus: resolved.           Diet: ADULT DIET; Full Liquid  ADULT ORAL NUTRITION SUPPLEMENT; Breakfast, Lunch, Dinner; Standard High Calorie/High Protein Oral Supplement  Code:Full Code  DVT PPX ruby Bella PA-C   12/29/2023 4:15 PM

## 2023-12-30 LAB
ANION GAP SERPL CALCULATED.3IONS-SCNC: 5 MMOL/L (ref 3–16)
ANION GAP SERPL CALCULATED.3IONS-SCNC: 9 MMOL/L (ref 3–16)
BASOPHILS # BLD: 0 K/UL (ref 0–0.2)
BASOPHILS # BLD: 0 K/UL (ref 0–0.2)
BASOPHILS NFR BLD: 0.1 %
BASOPHILS NFR BLD: 0.3 %
BUN SERPL-MCNC: 4 MG/DL (ref 7–20)
BUN SERPL-MCNC: 6 MG/DL (ref 7–20)
CALCIUM SERPL-MCNC: 7.9 MG/DL (ref 8.3–10.6)
CALCIUM SERPL-MCNC: 7.9 MG/DL (ref 8.3–10.6)
CHLORIDE SERPL-SCNC: 104 MMOL/L (ref 99–110)
CHLORIDE SERPL-SCNC: 104 MMOL/L (ref 99–110)
CO2 SERPL-SCNC: 23 MMOL/L (ref 21–32)
CO2 SERPL-SCNC: 26 MMOL/L (ref 21–32)
CREAT SERPL-MCNC: <0.5 MG/DL (ref 0.6–1.2)
CREAT SERPL-MCNC: <0.5 MG/DL (ref 0.6–1.2)
DEPRECATED RDW RBC AUTO: 13.4 % (ref 12.4–15.4)
DEPRECATED RDW RBC AUTO: 13.7 % (ref 12.4–15.4)
EOSINOPHIL # BLD: 0.1 K/UL (ref 0–0.6)
EOSINOPHIL # BLD: 0.1 K/UL (ref 0–0.6)
EOSINOPHIL NFR BLD: 0.8 %
EOSINOPHIL NFR BLD: 1 %
GFR SERPLBLD CREATININE-BSD FMLA CKD-EPI: >60 ML/MIN/{1.73_M2}
GFR SERPLBLD CREATININE-BSD FMLA CKD-EPI: >60 ML/MIN/{1.73_M2}
GLUCOSE BLD-MCNC: 144 MG/DL (ref 70–99)
GLUCOSE SERPL-MCNC: 147 MG/DL (ref 70–99)
GLUCOSE SERPL-MCNC: 171 MG/DL (ref 70–99)
HCT VFR BLD AUTO: 27.4 % (ref 36–48)
HCT VFR BLD AUTO: 27.6 % (ref 36–48)
HGB BLD-MCNC: 9 G/DL (ref 12–16)
HGB BLD-MCNC: 9.3 G/DL (ref 12–16)
LACTATE BLDV-SCNC: 1.8 MMOL/L (ref 0.4–2)
LYMPHOCYTES # BLD: 0.9 K/UL (ref 1–5.1)
LYMPHOCYTES # BLD: 1 K/UL (ref 1–5.1)
LYMPHOCYTES NFR BLD: 6.7 %
LYMPHOCYTES NFR BLD: 7.4 %
MAGNESIUM SERPL-MCNC: 1.5 MG/DL (ref 1.8–2.4)
MCH RBC QN AUTO: 31 PG (ref 26–34)
MCH RBC QN AUTO: 31.8 PG (ref 26–34)
MCHC RBC AUTO-ENTMCNC: 32.7 G/DL (ref 31–36)
MCHC RBC AUTO-ENTMCNC: 33.8 G/DL (ref 31–36)
MCV RBC AUTO: 94.1 FL (ref 80–100)
MCV RBC AUTO: 94.9 FL (ref 80–100)
MONOCYTES # BLD: 1.1 K/UL (ref 0–1.3)
MONOCYTES # BLD: 1.1 K/UL (ref 0–1.3)
MONOCYTES NFR BLD: 7.6 %
MONOCYTES NFR BLD: 8.1 %
NEUTROPHILS # BLD: 11 K/UL (ref 1.7–7.7)
NEUTROPHILS # BLD: 11.7 K/UL (ref 1.7–7.7)
NEUTROPHILS NFR BLD: 83.7 %
NEUTROPHILS NFR BLD: 84.3 %
PERFORMED ON: ABNORMAL
PLATELET # BLD AUTO: 377 K/UL (ref 135–450)
PLATELET # BLD AUTO: 436 K/UL (ref 135–450)
PMV BLD AUTO: 6.9 FL (ref 5–10.5)
PMV BLD AUTO: 7 FL (ref 5–10.5)
POTASSIUM SERPL-SCNC: 3.4 MMOL/L (ref 3.5–5.1)
POTASSIUM SERPL-SCNC: 4 MMOL/L (ref 3.5–5.1)
RBC # BLD AUTO: 2.91 M/UL (ref 4–5.2)
RBC # BLD AUTO: 2.91 M/UL (ref 4–5.2)
SODIUM SERPL-SCNC: 135 MMOL/L (ref 136–145)
SODIUM SERPL-SCNC: 136 MMOL/L (ref 136–145)
WBC # BLD AUTO: 13.1 K/UL (ref 4–11)
WBC # BLD AUTO: 14 K/UL (ref 4–11)

## 2023-12-30 PROCEDURE — 1200000000 HC SEMI PRIVATE

## 2023-12-30 PROCEDURE — 2580000003 HC RX 258: Performed by: SURGERY

## 2023-12-30 PROCEDURE — 6360000002 HC RX W HCPCS: Performed by: PHYSICIAN ASSISTANT

## 2023-12-30 PROCEDURE — 83735 ASSAY OF MAGNESIUM: CPT

## 2023-12-30 PROCEDURE — 6360000002 HC RX W HCPCS: Performed by: SURGERY

## 2023-12-30 PROCEDURE — 6360000002 HC RX W HCPCS: Performed by: INTERNAL MEDICINE

## 2023-12-30 PROCEDURE — 85025 COMPLETE CBC W/AUTO DIFF WBC: CPT

## 2023-12-30 PROCEDURE — 83605 ASSAY OF LACTIC ACID: CPT

## 2023-12-30 PROCEDURE — 2500000003 HC RX 250 WO HCPCS: Performed by: SURGERY

## 2023-12-30 PROCEDURE — 80048 BASIC METABOLIC PNL TOTAL CA: CPT

## 2023-12-30 PROCEDURE — 2580000003 HC RX 258: Performed by: INTERNAL MEDICINE

## 2023-12-30 PROCEDURE — 6370000000 HC RX 637 (ALT 250 FOR IP): Performed by: SURGERY

## 2023-12-30 PROCEDURE — 99024 POSTOP FOLLOW-UP VISIT: CPT | Performed by: SURGERY

## 2023-12-30 RX ORDER — HYDRALAZINE HYDROCHLORIDE 20 MG/ML
10 INJECTION INTRAMUSCULAR; INTRAVENOUS EVERY 6 HOURS PRN
Status: DISCONTINUED | OUTPATIENT
Start: 2023-12-30 | End: 2024-01-03 | Stop reason: HOSPADM

## 2023-12-30 RX ORDER — MAGNESIUM SULFATE IN WATER 40 MG/ML
2000 INJECTION, SOLUTION INTRAVENOUS ONCE
Status: COMPLETED | OUTPATIENT
Start: 2023-12-30 | End: 2023-12-30

## 2023-12-30 RX ORDER — KETOROLAC TROMETHAMINE 15 MG/ML
15 INJECTION, SOLUTION INTRAMUSCULAR; INTRAVENOUS ONCE
Status: COMPLETED | OUTPATIENT
Start: 2023-12-30 | End: 2023-12-30

## 2023-12-30 RX ORDER — POTASSIUM CHLORIDE 7.45 MG/ML
10 INJECTION INTRAVENOUS
Status: COMPLETED | OUTPATIENT
Start: 2023-12-30 | End: 2023-12-30

## 2023-12-30 RX ADMIN — ACETAMINOPHEN 650 MG: 325 TABLET ORAL at 04:21

## 2023-12-30 RX ADMIN — OXYCODONE HYDROCHLORIDE 10 MG: 5 TABLET ORAL at 11:12

## 2023-12-30 RX ADMIN — MAGNESIUM SULFATE HEPTAHYDRATE 2000 MG: 40 INJECTION, SOLUTION INTRAVENOUS at 19:13

## 2023-12-30 RX ADMIN — POTASSIUM CHLORIDE 10 MEQ: 7.46 INJECTION, SOLUTION INTRAVENOUS at 19:12

## 2023-12-30 RX ADMIN — Medication 10 ML: at 09:09

## 2023-12-30 RX ADMIN — ACETAMINOPHEN 650 MG: 325 TABLET ORAL at 16:46

## 2023-12-30 RX ADMIN — Medication 1 CAPSULE: at 16:47

## 2023-12-30 RX ADMIN — POTASSIUM CHLORIDE, DEXTROSE MONOHYDRATE AND SODIUM CHLORIDE: 150; 5; 450 INJECTION, SOLUTION INTRAVENOUS at 00:55

## 2023-12-30 RX ADMIN — HYDROMORPHONE HYDROCHLORIDE 1 MG: 1 INJECTION, SOLUTION INTRAMUSCULAR; INTRAVENOUS; SUBCUTANEOUS at 18:12

## 2023-12-30 RX ADMIN — ACETAMINOPHEN 650 MG: 325 TABLET ORAL at 22:15

## 2023-12-30 RX ADMIN — HYDROMORPHONE HYDROCHLORIDE 1 MG: 1 INJECTION, SOLUTION INTRAMUSCULAR; INTRAVENOUS; SUBCUTANEOUS at 12:26

## 2023-12-30 RX ADMIN — POTASSIUM CHLORIDE 10 MEQ: 7.46 INJECTION, SOLUTION INTRAVENOUS at 22:15

## 2023-12-30 RX ADMIN — HYDRALAZINE HYDROCHLORIDE 10 MG: 20 INJECTION INTRAMUSCULAR; INTRAVENOUS at 16:58

## 2023-12-30 RX ADMIN — ERTAPENEM SODIUM 1000 MG: 1 INJECTION INTRAMUSCULAR; INTRAVENOUS at 15:05

## 2023-12-30 RX ADMIN — ENOXAPARIN SODIUM 40 MG: 100 INJECTION SUBCUTANEOUS at 09:05

## 2023-12-30 RX ADMIN — ACETAMINOPHEN 650 MG: 325 TABLET ORAL at 09:06

## 2023-12-30 RX ADMIN — Medication 10 ML: at 09:12

## 2023-12-30 RX ADMIN — POTASSIUM CHLORIDE 10 MEQ: 7.46 INJECTION, SOLUTION INTRAVENOUS at 20:27

## 2023-12-30 RX ADMIN — FLUCONAZOLE 200 MG: 2 INJECTION, SOLUTION INTRAVENOUS at 13:23

## 2023-12-30 RX ADMIN — KETOROLAC TROMETHAMINE 15 MG: 15 INJECTION, SOLUTION INTRAMUSCULAR; INTRAVENOUS at 22:15

## 2023-12-30 RX ADMIN — POTASSIUM CHLORIDE 10 MEQ: 7.46 INJECTION, SOLUTION INTRAVENOUS at 21:16

## 2023-12-30 RX ADMIN — Medication 10 ML: at 22:21

## 2023-12-30 RX ADMIN — Medication 1 CAPSULE: at 09:06

## 2023-12-30 RX ADMIN — OXYCODONE HYDROCHLORIDE 10 MG: 5 TABLET ORAL at 04:21

## 2023-12-30 RX ADMIN — OXYCODONE HYDROCHLORIDE 10 MG: 5 TABLET ORAL at 16:46

## 2023-12-30 ASSESSMENT — PAIN DESCRIPTION - LOCATION
LOCATION: ABDOMEN

## 2023-12-30 ASSESSMENT — PAIN DESCRIPTION - FREQUENCY: FREQUENCY: CONTINUOUS

## 2023-12-30 ASSESSMENT — PAIN DESCRIPTION - PAIN TYPE
TYPE: SURGICAL PAIN

## 2023-12-30 ASSESSMENT — PAIN DESCRIPTION - ORIENTATION
ORIENTATION: RIGHT;MID
ORIENTATION: RIGHT;MID
ORIENTATION: MID
ORIENTATION: MID
ORIENTATION: RIGHT;MID
ORIENTATION: MID

## 2023-12-30 ASSESSMENT — PAIN DESCRIPTION - ONSET: ONSET: ON-GOING

## 2023-12-30 ASSESSMENT — PAIN DESCRIPTION - DESCRIPTORS
DESCRIPTORS: ACHING
DESCRIPTORS: ACHING;ITCHING
DESCRIPTORS: ACHING

## 2023-12-30 ASSESSMENT — PAIN SCALES - GENERAL
PAINLEVEL_OUTOF10: 3
PAINLEVEL_OUTOF10: 3
PAINLEVEL_OUTOF10: 9
PAINLEVEL_OUTOF10: 8
PAINLEVEL_OUTOF10: 10
PAINLEVEL_OUTOF10: 10
PAINLEVEL_OUTOF10: 8
PAINLEVEL_OUTOF10: 7
PAINLEVEL_OUTOF10: 7
PAINLEVEL_OUTOF10: 10

## 2023-12-30 NOTE — PROGRESS NOTES
Tammy Frederick is a 73 y.o. female patient.    Current Facility-Administered Medications   Medication Dose Route Frequency Provider Last Rate Last Admin    dextrose 5 % and 0.45 % NaCl with KCl 20 mEq infusion   IntraVENous Continuous Carolyn Bella PA-C 75 mL/hr at 12/30/23 1049 Rate Change at 12/30/23 1049    ertapenem (INVanz) 1,000 mg in sodium chloride 0.9 % 50 mL IVPB (mini-bag)  1,000 mg IntraVENous Q24H Shane Pate MD   Stopped at 12/29/23 1710    sodium chloride flush 0.9 % injection 5-40 mL  5-40 mL IntraVENous 2 times per day Alban Robertson MD   10 mL at 12/30/23 0909    sodium chloride flush 0.9 % injection 5-40 mL  5-40 mL IntraVENous PRN Alban Robertson MD   10 mL at 12/28/23 2125    0.9 % sodium chloride infusion   IntraVENous PRN Alban Robertson MD        ondansetron (ZOFRAN-ODT) disintegrating tablet 4 mg  4 mg Oral Q8H PRN Alban Robertson MD        Or    ondansetron (ZOFRAN) injection 4 mg  4 mg IntraVENous Q6H PRN Alban Robertson MD        enoxaparin (LOVENOX) injection 40 mg  40 mg SubCUTAneous Daily Alban Robertson MD   40 mg at 12/30/23 0905    acetaminophen (TYLENOL) tablet 650 mg  650 mg Oral Q6H Alban Robertson MD   650 mg at 12/30/23 0906    sodium chloride flush 0.9 % injection 5-40 mL  5-40 mL IntraVENous 2 times per day Alban Robertson MD   10 mL at 12/30/23 0912    sodium chloride flush 0.9 % injection 5-40 mL  5-40 mL IntraVENous PRN Alban Robertson MD   10 mL at 12/28/23 2126    0.9 % sodium chloride infusion  25 mL IntraVENous PRN Alban Robertson MD        fluconazole (DIFLUCAN) in 0.9 % sodium chloride IVPB 200 mg  200 mg IntraVENous Q24H Alban Robertson  mL/hr at 12/29/23 1446 200 mg at 12/29/23 1446    lactobacillus (CULTURELLE) capsule 1 capsule  1 capsule Oral BID WC Alban Robertson MD   1 capsule at 12/30/23 0906    bisacodyl (DULCOLAX) suppository 10 mg  10 mg Rectal Once Alban Robertson MD        bisacodyl (DULCOLAX) suppository 10 mg  10 mg Rectal

## 2023-12-30 NOTE — PROGRESS NOTES
[facility]    12/30/2023 9:54 AM        Name: Tammy Frederick .              Admitted: 12/21/2023  Primary Care Provider: No primary care provider on file. (Tel: None)      Subjective:  .    Admitted with abd pain from [facility]   CT shows sigmoid abscess with diverticulitis   Pt had IR drain inserted on 12/22. Developed increase abd pain / bloating on 12/23:  Imaging suggested Ileus vs early SBO  ( lactic acid normal). Evaluated by ID on 12/24/23 ,  Diflucan added. NG removed on 12/26. Was taken to the OR for Exploratory laparotomy, takedown of splenic flexure, small bowel resection with anastomosis, appendectomy, sigmoidectomy with low pelvic anastomosis and diverting loop ileostomy by Dr Robertson on 12/28    Having post op pain. Feels about the same today. No cp or sob. Tolerating clears but doesn't want much.    Lives alone with her dogs in [facility].        Reviewed interval ancillary notes    Current Medications  dextrose 5 % and 0.45 % NaCl with KCl 20 mEq infusion, Continuous  ertapenem (INVanz) 1,000 mg in sodium chloride 0.9 % 50 mL IVPB (mini-bag), Q24H  sodium chloride flush 0.9 % injection 5-40 mL, 2 times per day  sodium chloride flush 0.9 % injection 5-40 mL, PRN  0.9 % sodium chloride infusion, PRN  ondansetron (ZOFRAN-ODT) disintegrating tablet 4 mg, Q8H PRN   Or  ondansetron (ZOFRAN) injection 4 mg, Q6H PRN  enoxaparin (LOVENOX) injection 40 mg, Daily  acetaminophen (TYLENOL) tablet 650 mg, Q6H  sodium chloride flush 0.9 % injection 5-40 mL, 2 times per day  sodium chloride flush 0.9 % injection 5-40 mL, PRN  0.9 % sodium chloride infusion, PRN  fluconazole (DIFLUCAN) in 0.9 % sodium chloride IVPB 200 mg, Q24H  lactobacillus (CULTURELLE) capsule 1 capsule, BID WC  bisacodyl (DULCOLAX) suppository 10 mg, Once  bisacodyl (DULCOLAX) suppository 10 mg, Daily PRN  HYDROmorphone HCl PF (DILAUDID) injection 0.5 mg, Q2H PRN   Or  HYDROmorphone HCl PF (DILAUDID)

## 2023-12-30 NOTE — PROGRESS NOTES
Rapid  response called by nursing staff.  Patient with epigastric abdominal pain 10 out of 10 severe in nature slightly tachycardic with hr 110s blood pressure is elvated 168/80, patient is tearful and anxious.  Had diverticulitis with perforation and multiple abscess status post ex lap on 1228.  Patient BMP stable CBC with a white count of 13,000 from 15,000 the day before hemoglobin stable at 9.  Does have Dilaudid ordered every 2 hours last dose of Dilaudid was given at 12 PM    Does have epigastric tenderness to palpation but is not guarding hypoactive bowel sounds    Discussed case with this surgery Dr. Davis, no need for urgent imaging repeat CBC BMP and lactic acid.have asked nursing to give prn dilaudid that has been ordered     Lactic acid wnl, cbc similar to prior. Bmp stable, but with hypokalemia will replace    I spent 31 minutes providing critical care services to this patient thus far today

## 2023-12-30 NOTE — PROGRESS NOTES
Shift assessment complete, /96, pt A&Ox4 in bed, c/o pain and itching to abd 6/10. Franklin draining yellow/clear output. BILLY putting out small pink output. Ileostomy putting out watery red/brown output. Scheduled med given per MAR, POC and education reviewed with the pt. Safety measures in place. All needs met at this time, call light in reach, will continue to monitor.    1100: Franklin removed, pt tolerated well.     1200: Pt ambulated in jaramillo x1, tolerated well.     1800: Pt c/o SOB and sudden pain, MD notified, rapid called. Placed on 2L oxygen. STAT lab sent to lab.     1835: Pt vitals stable, more relaxed, still c/o pain after given pain med.

## 2023-12-31 LAB
ANION GAP SERPL CALCULATED.3IONS-SCNC: 5 MMOL/L (ref 3–16)
BASOPHILS # BLD: 0.1 K/UL (ref 0–0.2)
BASOPHILS NFR BLD: 1 %
BUN SERPL-MCNC: 6 MG/DL (ref 7–20)
CALCIUM SERPL-MCNC: 8.4 MG/DL (ref 8.3–10.6)
CHLORIDE SERPL-SCNC: 101 MMOL/L (ref 99–110)
CO2 SERPL-SCNC: 26 MMOL/L (ref 21–32)
CREAT SERPL-MCNC: <0.5 MG/DL (ref 0.6–1.2)
DEPRECATED RDW RBC AUTO: 13.8 % (ref 12.4–15.4)
EOSINOPHIL # BLD: 0 K/UL (ref 0–0.6)
EOSINOPHIL NFR BLD: 0 %
GFR SERPLBLD CREATININE-BSD FMLA CKD-EPI: >60 ML/MIN/{1.73_M2}
GLUCOSE SERPL-MCNC: 120 MG/DL (ref 70–99)
HCT VFR BLD AUTO: 24.9 % (ref 36–48)
HGB BLD-MCNC: 8.4 G/DL (ref 12–16)
LYMPHOCYTES # BLD: 0.9 K/UL (ref 1–5.1)
LYMPHOCYTES NFR BLD: 7 %
MCH RBC QN AUTO: 31.8 PG (ref 26–34)
MCHC RBC AUTO-ENTMCNC: 33.7 G/DL (ref 31–36)
MCV RBC AUTO: 94.6 FL (ref 80–100)
MONOCYTES # BLD: 0.8 K/UL (ref 0–1.3)
MONOCYTES NFR BLD: 7 %
MYELOCYTES NFR BLD MANUAL: 1 %
NEUTROPHILS # BLD: 9.3 K/UL (ref 1.7–7.7)
NEUTROPHILS NFR BLD: 83 %
PLATELET # BLD AUTO: 377 K/UL (ref 135–450)
PLATELET BLD QL SMEAR: ADEQUATE
PMV BLD AUTO: 6.6 FL (ref 5–10.5)
POTASSIUM SERPL-SCNC: 4.2 MMOL/L (ref 3.5–5.1)
RBC # BLD AUTO: 2.64 M/UL (ref 4–5.2)
RBC MORPH BLD: NORMAL
SLIDE REVIEW: ABNORMAL
SODIUM SERPL-SCNC: 132 MMOL/L (ref 136–145)
VARIANT LYMPHS NFR BLD MANUAL: 1 % (ref 0–6)
WBC # BLD AUTO: 11.1 K/UL (ref 4–11)

## 2023-12-31 PROCEDURE — 6370000000 HC RX 637 (ALT 250 FOR IP): Performed by: SURGERY

## 2023-12-31 PROCEDURE — 85025 COMPLETE CBC W/AUTO DIFF WBC: CPT

## 2023-12-31 PROCEDURE — 80048 BASIC METABOLIC PNL TOTAL CA: CPT

## 2023-12-31 PROCEDURE — 6360000002 HC RX W HCPCS: Performed by: INTERNAL MEDICINE

## 2023-12-31 PROCEDURE — 2580000003 HC RX 258: Performed by: SURGERY

## 2023-12-31 PROCEDURE — 1200000000 HC SEMI PRIVATE

## 2023-12-31 PROCEDURE — 6360000002 HC RX W HCPCS: Performed by: SURGERY

## 2023-12-31 PROCEDURE — 99024 POSTOP FOLLOW-UP VISIT: CPT | Performed by: SURGERY

## 2023-12-31 PROCEDURE — 2580000003 HC RX 258: Performed by: INTERNAL MEDICINE

## 2023-12-31 RX ADMIN — HYDROMORPHONE HYDROCHLORIDE 1 MG: 1 INJECTION, SOLUTION INTRAMUSCULAR; INTRAVENOUS; SUBCUTANEOUS at 10:03

## 2023-12-31 RX ADMIN — Medication 10 ML: at 07:49

## 2023-12-31 RX ADMIN — HYDROMORPHONE HYDROCHLORIDE 1 MG: 1 INJECTION, SOLUTION INTRAMUSCULAR; INTRAVENOUS; SUBCUTANEOUS at 21:02

## 2023-12-31 RX ADMIN — ONDANSETRON 4 MG: 2 INJECTION INTRAMUSCULAR; INTRAVENOUS at 18:08

## 2023-12-31 RX ADMIN — ACETAMINOPHEN 650 MG: 325 TABLET ORAL at 15:08

## 2023-12-31 RX ADMIN — Medication 10 ML: at 07:48

## 2023-12-31 RX ADMIN — ENOXAPARIN SODIUM 40 MG: 100 INJECTION SUBCUTANEOUS at 07:48

## 2023-12-31 RX ADMIN — ERTAPENEM SODIUM 1000 MG: 1 INJECTION INTRAMUSCULAR; INTRAVENOUS at 13:32

## 2023-12-31 RX ADMIN — OXYCODONE HYDROCHLORIDE 10 MG: 5 TABLET ORAL at 02:49

## 2023-12-31 RX ADMIN — ACETAMINOPHEN 650 MG: 325 TABLET ORAL at 04:23

## 2023-12-31 RX ADMIN — ACETAMINOPHEN 650 MG: 325 TABLET ORAL at 09:57

## 2023-12-31 RX ADMIN — HYDROMORPHONE HYDROCHLORIDE 1 MG: 1 INJECTION, SOLUTION INTRAMUSCULAR; INTRAVENOUS; SUBCUTANEOUS at 15:08

## 2023-12-31 RX ADMIN — ACETAMINOPHEN 650 MG: 325 TABLET ORAL at 23:07

## 2023-12-31 RX ADMIN — Medication 1 CAPSULE: at 07:48

## 2023-12-31 RX ADMIN — Medication 1 CAPSULE: at 15:08

## 2023-12-31 RX ADMIN — FLUCONAZOLE 200 MG: 2 INJECTION, SOLUTION INTRAVENOUS at 13:31

## 2023-12-31 ASSESSMENT — PAIN DESCRIPTION - PAIN TYPE
TYPE: SURGICAL PAIN
TYPE: SURGICAL PAIN

## 2023-12-31 ASSESSMENT — PAIN DESCRIPTION - LOCATION
LOCATION: ABDOMEN

## 2023-12-31 ASSESSMENT — PAIN DESCRIPTION - DESCRIPTORS
DESCRIPTORS: ACHING
DESCRIPTORS: ACHING;SORE

## 2023-12-31 ASSESSMENT — PAIN SCALES - GENERAL
PAINLEVEL_OUTOF10: 8
PAINLEVEL_OUTOF10: 7
PAINLEVEL_OUTOF10: 8
PAINLEVEL_OUTOF10: 4
PAINLEVEL_OUTOF10: 3
PAINLEVEL_OUTOF10: 7
PAINLEVEL_OUTOF10: 5

## 2023-12-31 ASSESSMENT — PAIN DESCRIPTION - ORIENTATION
ORIENTATION: MID

## 2023-12-31 ASSESSMENT — PAIN DESCRIPTION - ONSET: ONSET: ON-GOING

## 2023-12-31 ASSESSMENT — PAIN DESCRIPTION - FREQUENCY: FREQUENCY: INTERMITTENT

## 2023-12-31 NOTE — PROGRESS NOTES
Shift assessment complete, VSS, pt A&Ox4 in bed. Dressing to abd intact, CDI, c/o pain to abd 3/10, pt refused pain med at this time. BILLY drain putting out small pink output. Ileostomy putting small brown output. Scheduled med given per MAR, POC and education reviewed with the pt. Safety measures in place. All needs met at this time, call light in reach, will continue to monitor.     1800: Pt ambulated about 300 feet in jaramillo, tolerated well.

## 2023-12-31 NOTE — PROGRESS NOTES
Tammy Frederick is a 73 y.o. female patient.    Current Facility-Administered Medications   Medication Dose Route Frequency Provider Last Rate Last Admin    hydrALAZINE (APRESOLINE) injection 10 mg  10 mg IntraVENous Q6H PRN Carolyn Bella PA-C   10 mg at 12/30/23 1658    ertapenem (INVanz) 1,000 mg in sodium chloride 0.9 % 50 mL IVPB (mini-bag)  1,000 mg IntraVENous Q24H Shane Pate MD   Stopped at 12/31/23 1445    sodium chloride flush 0.9 % injection 5-40 mL  5-40 mL IntraVENous 2 times per day Alban Robertson MD   10 mL at 12/31/23 0748    sodium chloride flush 0.9 % injection 5-40 mL  5-40 mL IntraVENous PRN Alban Robertson MD   10 mL at 12/28/23 2125    0.9 % sodium chloride infusion   IntraVENous PRN Alban Robertson MD        ondansetron (ZOFRAN-ODT) disintegrating tablet 4 mg  4 mg Oral Q8H PRN Alban Robertson MD        Or    ondansetron (ZOFRAN) injection 4 mg  4 mg IntraVENous Q6H PRN Alban Robertson MD        enoxaparin (LOVENOX) injection 40 mg  40 mg SubCUTAneous Daily Alban Robertson MD   40 mg at 12/31/23 0748    acetaminophen (TYLENOL) tablet 650 mg  650 mg Oral Q6H Alban Robertson MD   650 mg at 12/31/23 1508    sodium chloride flush 0.9 % injection 5-40 mL  5-40 mL IntraVENous 2 times per day Alban Robertson MD   10 mL at 12/31/23 0749    sodium chloride flush 0.9 % injection 5-40 mL  5-40 mL IntraVENous PRN Alban Robertson MD   10 mL at 12/28/23 2126    0.9 % sodium chloride infusion  25 mL IntraVENous PRN Alban Robertson MD        fluconazole (DIFLUCAN) in 0.9 % sodium chloride IVPB 200 mg  200 mg IntraVENous Q24H Alban Robertson  mL/hr at 12/31/23 1331 200 mg at 12/31/23 1331    lactobacillus (CULTURELLE) capsule 1 capsule  1 capsule Oral BID WC Alban Robertson MD   1 capsule at 12/31/23 1508    bisacodyl (DULCOLAX) suppository 10 mg  10 mg Rectal Once Alban Robertson MD        bisacodyl (DULCOLAX) suppository 10 mg  10 mg Rectal Daily PRN Alban Robertson MD

## 2024-01-01 LAB
ANION GAP SERPL CALCULATED.3IONS-SCNC: 8 MMOL/L (ref 3–16)
BASOPHILS # BLD: 0.1 K/UL (ref 0–0.2)
BASOPHILS NFR BLD: 0.5 %
BUN SERPL-MCNC: 6 MG/DL (ref 7–20)
CALCIUM SERPL-MCNC: 8.8 MG/DL (ref 8.3–10.6)
CHLORIDE SERPL-SCNC: 100 MMOL/L (ref 99–110)
CO2 SERPL-SCNC: 27 MMOL/L (ref 21–32)
CREAT SERPL-MCNC: <0.5 MG/DL (ref 0.6–1.2)
DEPRECATED RDW RBC AUTO: 13.4 % (ref 12.4–15.4)
EOSINOPHIL # BLD: 0.2 K/UL (ref 0–0.6)
EOSINOPHIL NFR BLD: 1.6 %
GFR SERPLBLD CREATININE-BSD FMLA CKD-EPI: >60 ML/MIN/{1.73_M2}
GLUCOSE SERPL-MCNC: 111 MG/DL (ref 70–99)
HCT VFR BLD AUTO: 25.4 % (ref 36–48)
HGB BLD-MCNC: 8.9 G/DL (ref 12–16)
LYMPHOCYTES # BLD: 0.8 K/UL (ref 1–5.1)
LYMPHOCYTES NFR BLD: 7.9 %
MCH RBC QN AUTO: 33 PG (ref 26–34)
MCHC RBC AUTO-ENTMCNC: 35 G/DL (ref 31–36)
MCV RBC AUTO: 94.2 FL (ref 80–100)
MONOCYTES # BLD: 0.9 K/UL (ref 0–1.3)
MONOCYTES NFR BLD: 8.4 %
NEUTROPHILS # BLD: 8.6 K/UL (ref 1.7–7.7)
NEUTROPHILS NFR BLD: 81.6 %
PLATELET # BLD AUTO: 457 K/UL (ref 135–450)
PMV BLD AUTO: 6.9 FL (ref 5–10.5)
POTASSIUM SERPL-SCNC: 4.2 MMOL/L (ref 3.5–5.1)
RBC # BLD AUTO: 2.7 M/UL (ref 4–5.2)
SODIUM SERPL-SCNC: 135 MMOL/L (ref 136–145)
WBC # BLD AUTO: 10.6 K/UL (ref 4–11)

## 2024-01-01 PROCEDURE — 2580000003 HC RX 258: Performed by: SURGERY

## 2024-01-01 PROCEDURE — 6360000002 HC RX W HCPCS: Performed by: SURGERY

## 2024-01-01 PROCEDURE — 6370000000 HC RX 637 (ALT 250 FOR IP): Performed by: SURGERY

## 2024-01-01 PROCEDURE — 85025 COMPLETE CBC W/AUTO DIFF WBC: CPT

## 2024-01-01 PROCEDURE — 6360000002 HC RX W HCPCS: Performed by: INTERNAL MEDICINE

## 2024-01-01 PROCEDURE — 99024 POSTOP FOLLOW-UP VISIT: CPT | Performed by: SURGERY

## 2024-01-01 PROCEDURE — 1200000000 HC SEMI PRIVATE

## 2024-01-01 PROCEDURE — 2580000003 HC RX 258: Performed by: INTERNAL MEDICINE

## 2024-01-01 PROCEDURE — 80048 BASIC METABOLIC PNL TOTAL CA: CPT

## 2024-01-01 RX ADMIN — FLUCONAZOLE 200 MG: 2 INJECTION, SOLUTION INTRAVENOUS at 15:21

## 2024-01-01 RX ADMIN — Medication 10 ML: at 00:24

## 2024-01-01 RX ADMIN — Medication 10 ML: at 08:06

## 2024-01-01 RX ADMIN — ACETAMINOPHEN 650 MG: 325 TABLET ORAL at 15:23

## 2024-01-01 RX ADMIN — Medication 1 CAPSULE: at 08:05

## 2024-01-01 RX ADMIN — HYDROMORPHONE HYDROCHLORIDE 1 MG: 1 INJECTION, SOLUTION INTRAMUSCULAR; INTRAVENOUS; SUBCUTANEOUS at 12:49

## 2024-01-01 RX ADMIN — ACETAMINOPHEN 650 MG: 325 TABLET ORAL at 05:18

## 2024-01-01 RX ADMIN — HYDROMORPHONE HYDROCHLORIDE 1 MG: 1 INJECTION, SOLUTION INTRAMUSCULAR; INTRAVENOUS; SUBCUTANEOUS at 19:23

## 2024-01-01 RX ADMIN — ERTAPENEM SODIUM 1000 MG: 1 INJECTION INTRAMUSCULAR; INTRAVENOUS at 14:42

## 2024-01-01 RX ADMIN — Medication 10 ML: at 22:53

## 2024-01-01 RX ADMIN — HYDROMORPHONE HYDROCHLORIDE 1 MG: 1 INJECTION, SOLUTION INTRAMUSCULAR; INTRAVENOUS; SUBCUTANEOUS at 15:21

## 2024-01-01 RX ADMIN — Medication 1 CAPSULE: at 15:21

## 2024-01-01 RX ADMIN — ACETAMINOPHEN 650 MG: 325 TABLET ORAL at 23:55

## 2024-01-01 RX ADMIN — ENOXAPARIN SODIUM 40 MG: 100 INJECTION SUBCUTANEOUS at 08:05

## 2024-01-01 RX ADMIN — Medication 10 ML: at 22:52

## 2024-01-01 RX ADMIN — HYDROMORPHONE HYDROCHLORIDE 1 MG: 1 INJECTION, SOLUTION INTRAMUSCULAR; INTRAVENOUS; SUBCUTANEOUS at 00:20

## 2024-01-01 RX ADMIN — OXYCODONE HYDROCHLORIDE 10 MG: 5 TABLET ORAL at 08:05

## 2024-01-01 ASSESSMENT — PAIN DESCRIPTION - LOCATION
LOCATION: ABDOMEN

## 2024-01-01 ASSESSMENT — PAIN DESCRIPTION - DESCRIPTORS
DESCRIPTORS: ACHING;DISCOMFORT
DESCRIPTORS: ACHING;BURNING

## 2024-01-01 ASSESSMENT — PAIN DESCRIPTION - FREQUENCY
FREQUENCY: INTERMITTENT
FREQUENCY: INTERMITTENT

## 2024-01-01 ASSESSMENT — PAIN SCALES - GENERAL
PAINLEVEL_OUTOF10: 8
PAINLEVEL_OUTOF10: 7
PAINLEVEL_OUTOF10: 9
PAINLEVEL_OUTOF10: 6
PAINLEVEL_OUTOF10: 8
PAINLEVEL_OUTOF10: 8

## 2024-01-01 ASSESSMENT — PAIN DESCRIPTION - ORIENTATION
ORIENTATION: MID
ORIENTATION: MID

## 2024-01-01 ASSESSMENT — PAIN DESCRIPTION - PAIN TYPE
TYPE: SURGICAL PAIN
TYPE: SURGICAL PAIN

## 2024-01-01 ASSESSMENT — PAIN DESCRIPTION - ONSET
ONSET: ON-GOING
ONSET: ON-GOING

## 2024-01-01 NOTE — PROGRESS NOTES
[facility]    12/31/2023 1:22 PM        Name: Tammy Frederick .              Admitted: 12/21/2023  Primary Care Provider: No primary care provider on file. (Tel: None)      Subjective:  .    Admitted with abd pain from [facility]   CT shows sigmoid abscess with diverticulitis   Pt had IR drain inserted on 12/22. Developed increase abd pain / bloating on 12/23:  Imaging suggested Ileus vs early SBO  ( lactic acid normal). Evaluated by ID on 12/24/23 ,  Diflucan added. NG removed on 12/26. Was taken to the OR for Exploratory laparotomy, takedown of splenic flexure, small bowel resection with anastomosis, appendectomy, sigmoidectomy with low pelvic anastomosis and diverting loop ileostomy by Dr Robertson on 12/28    Having post op pain. Feels about the same today. No cp or sob. Did have rapid response yesterday evening however it was pain related and she had not had dilaudid in 6 hours. Tolerating full liquids.     Lives alone with her dogs in [facility].        Reviewed interval ancillary notes    Current Medications  hydrALAZINE (APRESOLINE) injection 10 mg, Q6H PRN  ertapenem (INVanz) 1,000 mg in sodium chloride 0.9 % 50 mL IVPB (mini-bag), Q24H  sodium chloride flush 0.9 % injection 5-40 mL, 2 times per day  sodium chloride flush 0.9 % injection 5-40 mL, PRN  0.9 % sodium chloride infusion, PRN  ondansetron (ZOFRAN-ODT) disintegrating tablet 4 mg, Q8H PRN   Or  ondansetron (ZOFRAN) injection 4 mg, Q6H PRN  enoxaparin (LOVENOX) injection 40 mg, Daily  acetaminophen (TYLENOL) tablet 650 mg, Q6H  sodium chloride flush 0.9 % injection 5-40 mL, 2 times per day  sodium chloride flush 0.9 % injection 5-40 mL, PRN  0.9 % sodium chloride infusion, PRN  fluconazole (DIFLUCAN) in 0.9 % sodium chloride IVPB 200 mg, Q24H  lactobacillus (CULTURELLE) capsule 1 capsule, BID WC  bisacodyl (DULCOLAX) suppository 10 mg, Once  bisacodyl (DULCOLAX) suppository 10 mg, Daily PRN  HYDROmorphone HCl

## 2024-01-01 NOTE — PROGRESS NOTES
[facility]    1/1/2024 1:19 PM        Name: Tammy Frederick .              Admitted: 12/21/2023  Primary Care Provider: No primary care provider on file. (Tel: None)      Subjective:  .    Admitted with abd pain from [facility]   CT shows sigmoid abscess with diverticulitis   Pt had IR drain inserted on 12/22. Developed increase abd pain / bloating on 12/23:  Imaging suggested Ileus vs early SBO  ( lactic acid normal). Evaluated by ID on 12/24/23 ,  Diflucan added. NG removed on 12/26. Was taken to the OR for Exploratory laparotomy, takedown of splenic flexure, small bowel resection with anastomosis, appendectomy, sigmoidectomy with low pelvic anastomosis and diverting loop ileostomy by Dr Robertson on 12/28    Having post op pain. Feels about the same today. No cp or sob. Did have rapid response Friday Saturday evening however it was pain related and she had not had dilaudid in 6 hours. Tolerating full liquids.     Lives alone with her dogs in [facility].        Reviewed interval ancillary notes    Current Medications  hydrALAZINE (APRESOLINE) injection 10 mg, Q6H PRN  ertapenem (INVanz) 1,000 mg in sodium chloride 0.9 % 50 mL IVPB (mini-bag), Q24H  sodium chloride flush 0.9 % injection 5-40 mL, 2 times per day  sodium chloride flush 0.9 % injection 5-40 mL, PRN  0.9 % sodium chloride infusion, PRN  ondansetron (ZOFRAN-ODT) disintegrating tablet 4 mg, Q8H PRN   Or  ondansetron (ZOFRAN) injection 4 mg, Q6H PRN  enoxaparin (LOVENOX) injection 40 mg, Daily  acetaminophen (TYLENOL) tablet 650 mg, Q6H  sodium chloride flush 0.9 % injection 5-40 mL, 2 times per day  sodium chloride flush 0.9 % injection 5-40 mL, PRN  0.9 % sodium chloride infusion, PRN  fluconazole (DIFLUCAN) in 0.9 % sodium chloride IVPB 200 mg, Q24H  lactobacillus (CULTURELLE) capsule 1 capsule, BID WC  bisacodyl (DULCOLAX) suppository 10 mg, Once  bisacodyl (DULCOLAX) suppository 10 mg, Daily PRN  HYDROmorphone  abscess (HCC)    E coli infection    Fecal peritonitis (HCC)    Streptococcal infection group C    Diverticular disease of both small and large intestine with perforation and abscess    Complex care coordination    Receiving intravenous antibiotic treatment as outpatient    CRP elevated    Elevated erythrocyte sedimentation rate  Resolved Problems:    * No resolved hospital problems. *       Assessment & Plan:   Diverticulitis with perforation and multiple abscess:  PO day 4 from Exploratory laparotomy, takedown of splenic flexure, small bowel resection with anastomosis, appendectomy, sigmoidectomy with low pelvic anastomosis and diverting loop ileostomy by Dr Robertson on 12/28. Cultures from previous drain positive for E coli and Beta strept C. ID and surgery on board-currently on invanz and diflucan. No fever. WIll need IV abx and diflucan until 1/12.  Leukocytosis-resolved.   Disposition-hopefully can advance diet and dc in the next 48 hours.          Diet: ADULT DIET; Full Liquid  ADULT ORAL NUTRITION SUPPLEMENT; Breakfast, Lunch, Dinner; Standard High Calorie/High Protein Oral Supplement  Code:Full Code  DVT PPX lovenox      Carolyn Bella PA-C   1/1/2024 1:19 PM

## 2024-01-01 NOTE — PROGRESS NOTES
Tammy Frederick is a 73 y.o. female patient.    Current Facility-Administered Medications   Medication Dose Route Frequency Provider Last Rate Last Admin    hydrALAZINE (APRESOLINE) injection 10 mg  10 mg IntraVENous Q6H PRN Carolyn Bella PA-C   10 mg at 12/30/23 1658    ertapenem (INVanz) 1,000 mg in sodium chloride 0.9 % 50 mL IVPB (mini-bag)  1,000 mg IntraVENous Q24H Shane Pate  mL/hr at 01/01/24 1442 1,000 mg at 01/01/24 1442    sodium chloride flush 0.9 % injection 5-40 mL  5-40 mL IntraVENous 2 times per day Alban Robertson MD   10 mL at 01/01/24 0806    sodium chloride flush 0.9 % injection 5-40 mL  5-40 mL IntraVENous PRN Alban Robertson MD   10 mL at 12/28/23 2125    0.9 % sodium chloride infusion   IntraVENous PRN Alban Robertson MD        ondansetron (ZOFRAN-ODT) disintegrating tablet 4 mg  4 mg Oral Q8H PRN Alban Robertson MD        Or    ondansetron (ZOFRAN) injection 4 mg  4 mg IntraVENous Q6H PRN Alban Robertson MD   4 mg at 12/31/23 1808    enoxaparin (LOVENOX) injection 40 mg  40 mg SubCUTAneous Daily Alban Robertson MD   40 mg at 01/01/24 0805    acetaminophen (TYLENOL) tablet 650 mg  650 mg Oral Q6H Alban Robertson MD   650 mg at 01/01/24 0518    sodium chloride flush 0.9 % injection 5-40 mL  5-40 mL IntraVENous 2 times per day Alban Robertson MD   10 mL at 01/01/24 0024    sodium chloride flush 0.9 % injection 5-40 mL  5-40 mL IntraVENous PRN Alban Robertson MD   10 mL at 12/28/23 2126    0.9 % sodium chloride infusion  25 mL IntraVENous PRN Alban Robertson MD        fluconazole (DIFLUCAN) in 0.9 % sodium chloride IVPB 200 mg  200 mg IntraVENous Q24H Alban Robertson  mL/hr at 12/31/23 1331 200 mg at 12/31/23 1331    lactobacillus (CULTURELLE) capsule 1 capsule  1 capsule Oral BID  Alban Robertson MD   1 capsule at 01/01/24 0805    bisacodyl (DULCOLAX) suppository 10 mg  10 mg Rectal Once Alban Robertson MD        bisacodyl (DULCOLAX) suppository 10 mg  10 mg  Rectal Daily PRN Alban Robertson MD        HYDROmorphone HCl PF (DILAUDID) injection 0.5 mg  0.5 mg IntraVENous Q2H PRAlban Murrell MD   0.5 mg at 12/27/23 0559    Or    HYDROmorphone HCl PF (DILAUDID) injection 1 mg  1 mg IntraVENous Q2H PRN Alban Robertson MD   1 mg at 01/01/24 1249    oxyCODONE (ROXICODONE) immediate release tablet 5 mg  5 mg Oral Q4H PRAlban Murrell MD        Or    oxyCODONE (ROXICODONE) immediate release tablet 10 mg  10 mg Oral Q4H PRAlban Murrell MD   10 mg at 01/01/24 0805    phenol 1.4 % mouth spray 1 spray  1 spray Mouth/Throat Q2H PRAlban Murrell MD        promethazine (PHENERGAN) suppository 25 mg  25 mg Rectal Q6H PRAlban Murrell MD   25 mg at 12/23/23 1827    sodium chloride flush 0.9 % injection 5-40 mL  5-40 mL IntraVENous 2 times per day Alban Robertson MD   10 mL at 01/01/24 0024    sodium chloride flush 0.9 % injection 5-40 mL  5-40 mL IntraVENous PRAlban Murrell MD        0.9 % sodium chloride infusion   IntraVENous PRAlban Murrell MD   Stopped at 12/23/23 1645    potassium chloride (KLOR-CON M) extended release tablet 40 mEq  40 mEq Oral PRAlban Murrell MD        Or    potassium bicarb-citric acid (EFFER-K) effervescent tablet 40 mEq  40 mEq Oral PRAlban Murrell MD        Or    potassium chloride 10 mEq/100 mL IVPB (Peripheral Line)  10 mEq IntraVENous PRAlban Murrell MD        magnesium sulfate 2000 mg in 50 mL IVPB premix  2,000 mg IntraVENous PRAlban Murrell MD        ondansetron (ZOFRAN-ODT) disintegrating tablet 4 mg  4 mg Oral Q8H PRAlban Murrell MD        Or    ondansetron (ZOFRAN) injection 4 mg  4 mg IntraVENous Q6H PRAlban Murrell MD   4 mg at 12/25/23 0950    polyethylene glycol (GLYCOLAX) packet 17 g  17 g Oral Daily PRN Alban Robertson MD        acetaminophen (TYLENOL) tablet 650 mg  650 mg Oral Q6H PRN Alban Robertson MD   650 mg at 12/21/23 2343    Or    acetaminophen (TYLENOL) suppository 650

## 2024-01-01 NOTE — PROGRESS NOTES
2100: shift assessment done, VSS, ambulated to the bathroom, PRN medication given as per MAR, call light within reach, plan of care ongoing. The care plan and education has been reviewed and mutually agreed upon with the patient.

## 2024-01-02 LAB
ANION GAP SERPL CALCULATED.3IONS-SCNC: 7 MMOL/L (ref 3–16)
BACTERIA FLD AEROBE CULT: ABNORMAL
BASOPHILS # BLD: 0.1 K/UL (ref 0–0.2)
BASOPHILS NFR BLD: 1 %
BUN SERPL-MCNC: 7 MG/DL (ref 7–20)
CALCIUM SERPL-MCNC: 8.7 MG/DL (ref 8.3–10.6)
CHLORIDE SERPL-SCNC: 98 MMOL/L (ref 99–110)
CO2 SERPL-SCNC: 29 MMOL/L (ref 21–32)
CREAT SERPL-MCNC: <0.5 MG/DL (ref 0.6–1.2)
DEPRECATED RDW RBC AUTO: 13.3 % (ref 12.4–15.4)
EOSINOPHIL # BLD: 0.1 K/UL (ref 0–0.6)
EOSINOPHIL NFR BLD: 1.2 %
GFR SERPLBLD CREATININE-BSD FMLA CKD-EPI: >60 ML/MIN/{1.73_M2}
GLUCOSE SERPL-MCNC: 139 MG/DL (ref 70–99)
GRAM STN SPEC: ABNORMAL
HCT VFR BLD AUTO: 24.9 % (ref 36–48)
HGB BLD-MCNC: 8.6 G/DL (ref 12–16)
LYMPHOCYTES # BLD: 0.6 K/UL (ref 1–5.1)
LYMPHOCYTES NFR BLD: 5.2 %
MCH RBC QN AUTO: 32.5 PG (ref 26–34)
MCHC RBC AUTO-ENTMCNC: 34.7 G/DL (ref 31–36)
MCV RBC AUTO: 93.8 FL (ref 80–100)
MONOCYTES # BLD: 1 K/UL (ref 0–1.3)
MONOCYTES NFR BLD: 8.8 %
NEUTROPHILS # BLD: 9.3 K/UL (ref 1.7–7.7)
NEUTROPHILS NFR BLD: 83.8 %
ORGANISM: ABNORMAL
PLATELET # BLD AUTO: 481 K/UL (ref 135–450)
PMV BLD AUTO: 6.8 FL (ref 5–10.5)
POTASSIUM SERPL-SCNC: 4.3 MMOL/L (ref 3.5–5.1)
RBC # BLD AUTO: 2.65 M/UL (ref 4–5.2)
SODIUM SERPL-SCNC: 134 MMOL/L (ref 136–145)
WBC # BLD AUTO: 11.1 K/UL (ref 4–11)

## 2024-01-02 PROCEDURE — 2580000003 HC RX 258: Performed by: SURGERY

## 2024-01-02 PROCEDURE — 80048 BASIC METABOLIC PNL TOTAL CA: CPT

## 2024-01-02 PROCEDURE — APPSS15 APP SPLIT SHARED TIME 0-15 MINUTES: Performed by: NURSE PRACTITIONER

## 2024-01-02 PROCEDURE — 6360000002 HC RX W HCPCS: Performed by: SURGERY

## 2024-01-02 PROCEDURE — 2580000003 HC RX 258: Performed by: INTERNAL MEDICINE

## 2024-01-02 PROCEDURE — 6360000002 HC RX W HCPCS: Performed by: INTERNAL MEDICINE

## 2024-01-02 PROCEDURE — 6370000000 HC RX 637 (ALT 250 FOR IP): Performed by: SURGERY

## 2024-01-02 PROCEDURE — APPNB30 APP NON BILLABLE TIME 0-30 MINS: Performed by: NURSE PRACTITIONER

## 2024-01-02 PROCEDURE — 6360000002 HC RX W HCPCS: Performed by: NURSE PRACTITIONER

## 2024-01-02 PROCEDURE — 85025 COMPLETE CBC W/AUTO DIFF WBC: CPT

## 2024-01-02 PROCEDURE — 1200000000 HC SEMI PRIVATE

## 2024-01-02 PROCEDURE — 99233 SBSQ HOSP IP/OBS HIGH 50: CPT | Performed by: INTERNAL MEDICINE

## 2024-01-02 PROCEDURE — 6360000002 HC RX W HCPCS: Performed by: PHYSICIAN ASSISTANT

## 2024-01-02 PROCEDURE — 99024 POSTOP FOLLOW-UP VISIT: CPT | Performed by: SURGERY

## 2024-01-02 RX ORDER — KETOROLAC TROMETHAMINE 15 MG/ML
15 INJECTION, SOLUTION INTRAMUSCULAR; INTRAVENOUS EVERY 6 HOURS
Status: DISCONTINUED | OUTPATIENT
Start: 2024-01-02 | End: 2024-01-03 | Stop reason: HOSPADM

## 2024-01-02 RX ADMIN — ONDANSETRON 4 MG: 2 INJECTION INTRAMUSCULAR; INTRAVENOUS at 17:42

## 2024-01-02 RX ADMIN — HYDROMORPHONE HYDROCHLORIDE 1 MG: 1 INJECTION, SOLUTION INTRAMUSCULAR; INTRAVENOUS; SUBCUTANEOUS at 08:28

## 2024-01-02 RX ADMIN — Medication 10 ML: at 08:28

## 2024-01-02 RX ADMIN — FLUCONAZOLE 200 MG: 2 INJECTION, SOLUTION INTRAVENOUS at 14:56

## 2024-01-02 RX ADMIN — KETOROLAC TROMETHAMINE 15 MG: 15 INJECTION, SOLUTION INTRAMUSCULAR; INTRAVENOUS at 17:29

## 2024-01-02 RX ADMIN — KETOROLAC TROMETHAMINE 15 MG: 15 INJECTION, SOLUTION INTRAMUSCULAR; INTRAVENOUS at 23:12

## 2024-01-02 RX ADMIN — Medication 10 ML: at 23:12

## 2024-01-02 RX ADMIN — Medication 1 CAPSULE: at 17:29

## 2024-01-02 RX ADMIN — KETOROLAC TROMETHAMINE 15 MG: 15 INJECTION, SOLUTION INTRAMUSCULAR; INTRAVENOUS at 11:17

## 2024-01-02 RX ADMIN — ENOXAPARIN SODIUM 40 MG: 100 INJECTION SUBCUTANEOUS at 08:28

## 2024-01-02 RX ADMIN — Medication 10 ML: at 10:03

## 2024-01-02 RX ADMIN — HYDRALAZINE HYDROCHLORIDE 10 MG: 20 INJECTION INTRAMUSCULAR; INTRAVENOUS at 17:42

## 2024-01-02 RX ADMIN — ACETAMINOPHEN 650 MG: 325 TABLET ORAL at 23:11

## 2024-01-02 RX ADMIN — Medication 1 CAPSULE: at 08:28

## 2024-01-02 RX ADMIN — ERTAPENEM SODIUM 1000 MG: 1 INJECTION INTRAMUSCULAR; INTRAVENOUS at 14:06

## 2024-01-02 RX ADMIN — HYDROMORPHONE HYDROCHLORIDE 1 MG: 1 INJECTION, SOLUTION INTRAMUSCULAR; INTRAVENOUS; SUBCUTANEOUS at 05:42

## 2024-01-02 ASSESSMENT — ENCOUNTER SYMPTOMS
RHINORRHEA: 0
SINUS PRESSURE: 0
SINUS PAIN: 0
SHORTNESS OF BREATH: 0
EYE REDNESS: 0
COUGH: 0
SORE THROAT: 0
EYE DISCHARGE: 0
DIARRHEA: 0
NAUSEA: 0
ABDOMINAL PAIN: 0
CONSTIPATION: 0
BACK PAIN: 0
WHEEZING: 0

## 2024-01-02 ASSESSMENT — PAIN DESCRIPTION - ORIENTATION
ORIENTATION: MID
ORIENTATION: MID

## 2024-01-02 ASSESSMENT — PAIN DESCRIPTION - DESCRIPTORS
DESCRIPTORS: ACHING
DESCRIPTORS: ACHING

## 2024-01-02 ASSESSMENT — PAIN DESCRIPTION - PAIN TYPE
TYPE: SURGICAL PAIN
TYPE: SURGICAL PAIN

## 2024-01-02 ASSESSMENT — PAIN DESCRIPTION - LOCATION
LOCATION: ABDOMEN
LOCATION: ABDOMEN

## 2024-01-02 ASSESSMENT — PAIN DESCRIPTION - ONSET: ONSET: ON-GOING

## 2024-01-02 ASSESSMENT — PAIN SCALES - GENERAL
PAINLEVEL_OUTOF10: 6
PAINLEVEL_OUTOF10: 5
PAINLEVEL_OUTOF10: 8
PAINLEVEL_OUTOF10: 7

## 2024-01-02 ASSESSMENT — PAIN DESCRIPTION - FREQUENCY: FREQUENCY: INTERMITTENT

## 2024-01-02 NOTE — PROGRESS NOTES
1920: shift assessment done, VSS, PRN medication given as per MAR, ambulated to the bathroom, call light within reach, plan of care ongoing. The care plan and education has been reviewed and mutually agreed upon with the patient.

## 2024-01-02 NOTE — CARE COORDINATION
Case Management Assessment  Initial Evaluation    Date/Time of Evaluation: 1/2/2024 10:13 AM   Assessment Completed by: FAUZIA MONGE RN    If patient is discharged prior to next notation, then this note serves as note for discharge by case management.    Patient Name: Tammy Frederick                   YOB: 1950  Diagnosis: Abscess of sigmoid colon [K63.0]                   Date / Time: 12/21/2023 10:11 PM    Patient Admission Status: Inpatient   Readmission Risk (Low < 19, Mod (19-27), High > 27): Readmission Risk Score: 7.1    Current PCP: No primary care provider on file.  PCP verified by CM? Yes    Chart Reviewed: Yes      History Provided by: Patient  Patient Orientation: Alert and Oriented, Person, Place, Situation    Patient Cognition: Alert    Hospitalization in the last 30 days (Readmission):  No    If yes, Readmission Assessment in CM Navigator will be completed.    Advance Directives:      Code Status: Full Code   Patient's Primary Decision Maker is: Legal Next of Kin      Discharge Planning:    Patient lives with: Alone Type of Home: House (single level with no steps to enter)  Primary Care Giver: Self  Patient Support Systems include: Family Members, Other (Comment) (sister in law Heidy)   Current Financial resources: Other (Comment)  Current community resources: None  Current services prior to admission: Durable Medical Equipment            Current DME: Cane, Walker, Other (Comment) (states has two walkers one with wheels and one without)            Type of Home Care services:  None    ADLS  Prior functional level: Independent in ADLs/IADLs  Current functional level: Assistance with the following:, Other (see comment) (Has new ostomy and needs IV abx's)    PT AM-PAC: 18 /24  OT AM-PAC: 17 /24    Family can provide assistance at DC: No (family live hours away from pt)  Would you like Case Management to discuss the discharge plan with any other family members/significant others, and if  local snf's recommended.     CM called [facility] [phone] and chose option for admissions and left call back number.    CM then sent referrals over [facility] to : [facility] [place], [facility] and [place].    If pt does not get approved for SNF she is set up with [facility] and [facility].     The Plan for Transition of Care is related to the following treatment goals of Abscess of sigmoid colon [K63.0]    IF APPLICABLE: The Patient and/or patient representative Tammy and her family were provided with a choice of provider and agrees with the discharge plan. Freedom of choice list with basic dialogue that supports the patient's individualized plan of care/goals and shares the quality data associated with the providers was provided to: Patient, Patient Representative   Patient Representative Name: Heidy sister in law     The Patient and/or Patient Representative Agree with the Discharge Plan? Yes    Bonnie Latif RN, BSN  [phone]

## 2024-01-02 NOTE — PROGRESS NOTES
Nutrition Note    RECOMMENDATIONS  PO Diet: regular, add low fiber modifier  ONS: modify to Ensure Plus High Protein BID      NUTRITION ASSESSMENT   Diet advanced to regular on 1/1/24 and pt reports good tolerance and eating well. Ensure Plus High Protein is ordered TID. Pt reports only drinking 1-2 a day.  Recommend low fiber r/t recent small bowel resection and ileostomy.     Nutrition Related Findings: +output in ileostomy  Wounds: Surgical Incision  Nutrition Education:  Education completed   Nutrition Goals: PO intake 50% or greater, by next RD assessment     MALNUTRITION ASSESSMENT   Acute Illness  Malnutrition Status: No malnutrition      NUTRITION DIAGNOSIS   Increased nutrient needs related to increase demand for energy/nutrients as evidenced by wounds (s/p SBR and new ileostomy)      CURRENT NUTRITION THERAPIES  ADULT ORAL NUTRITION SUPPLEMENT; Breakfast, Lunch, Dinner; Standard High Calorie/High Protein Oral Supplement  ADULT DIET; Regular     PO Intake: 26-50%   PO Supplement Intake:26-50%      ANTHROPOMETRICS  Current Height: 152.4 cm (5')  Current Weight - Scale: 59 kg (130 lb 1.1 oz)    Ideal Body Weight (IBW): 100 lbs  (45 kg)        BMI: 25.4      COMPARATIVE STANDARDS  Total Energy Requirements (kcals/day): 1400 - 1680     Protein (g):  67 - 112       Fluid (mL/day):  1400  - 1680    The patient will be monitored per nutrition standards of care. Consult dietitian if additional nutrition interventions are needed prior to RD reassessment.     OMARI AJ, RD, LD    Contact: [phone]

## 2024-01-02 NOTE — CARE COORDINATION
EDGAR emailed to pt's sister in law insurance and ratings lists to : [email or web address].    EDGAR then called Heidy and informed her that Ibeth with [facility] can accept pt and that she and I can both discuss with pt. EDGAR has not yet heard back from [facility].    Sister in law again stating she does not think pt can go home with \"those IV abx's.\" CM discussed again with her as I did earlier with her on phone and in pt's room that once a facility has been chosen and can start pre cert that we have to wait for insurance approval and if not approved the only option would be going home with the home care and infusion services. She verbalized understanding then got another call and CM could not hold.    Will follow up later with pt and sister in law.    Bonnie Latif, RN, BSN  [phone]

## 2024-01-02 NOTE — PROGRESS NOTES
[facility]        Post-op Note    Pt Name: Tammy Frederick  MRN: [id number]  YOB: 1950  Date of evaluation: 2024    Postoperative Day #5    Subjective:    No acute events overnight  Pain controlled  No nausea or vomiting with regular diet  Flatus and stool output from ileostomy  Resting in bed at this time      Vital Signs:  Patient Vitals for the past 24 hrs:   BP Temp Temp src Pulse Resp SpO2 Height   24 1340 125/69 98.3 °F (36.8 °C) Oral 90 16 94 % --   24 0858 -- -- -- -- 18 -- --   24 0819 -- -- -- -- -- -- 1.524 m (5')   24 0815 (!) 144/87 98.1 °F (36.7 °C) -- 91 17 93 % --   24 0540 137/73 98.2 °F (36.8 °C) Oral 88 18 96 % --   24 2354 115/66 97.7 °F (36.5 °C) Oral 88 18 93 % --   24 -- -- -- -- 16 -- --   24 1920 115/70 98.1 °F (36.7 °C) Oral 81 16 96 % --   24 1824 115/67 -- -- -- 16 -- --   24 1709 -- (!) 96.2 °F (35.7 °C) Temporal 95 -- 94 % --        TEMPERATURE HISTORY 24H: Temp (24hrs), Av.8 °F (36.6 °C), Min:96.2 °F (35.7 °C), Max:98.3 °F (36.8 °C)    BLOOD PRESSURE HISTORY: Systolic (36hrs), Av , Min:115 , Max:144    Diastolic (36hrs), Av, Min:66, Max:87      Intake/Output:    I/O last 3 completed shifts:  In: 360 [P.O.:360]  Out: 1560 [Drains:85; Stool:1475]  I/O this shift:  In: -   Out: 520 [Drains:20; Stool:500]  Drain/tube Output:    [REMOVED] Closed/Suction Drain Left Abdomen Bulb-Output (ml): 0 ml  [REMOVED] Closed/Suction Drain Left Abdomen Bulb-Output (ml): 20 ml      Physical Exam:  General: awake, alert, no acute distress, oriented to person, place, time and situation  Abdomen: soft, non-distended, appropriate incisional tenderness, stoma pink/viable and stoma bar in place with loose brown stool output, dressing clean dry and intact  Drain: serosanguineous    Labs:  CBC:    Recent Labs     23  0430 24  0518 24  0640   WBC 11.1* 10.6 11.1*

## 2024-01-02 NOTE — PROGRESS NOTES
[facility]    1/2/2024 8:11 AM        Name: Tammy Frederick .              Admitted: 12/21/2023  Primary Care Provider: No primary care provider on file. (Tel: None)      Subjective:  .    Admitted with abd pain from [facility]   CT shows sigmoid abscess with diverticulitis   Pt had IR drain inserted on 12/22. Developed increase abd pain / bloating on 12/23:  Imaging suggested Ileus vs early SBO  ( lactic acid normal). Evaluated by ID on 12/24/23 ,  Diflucan added. NG removed on 12/26. Was taken to the OR for Exploratory laparotomy, takedown of splenic flexure, small bowel resection with anastomosis, appendectomy, sigmoidectomy with low pelvic anastomosis and diverting loop ileostomy by Dr Robertson on 12/28      Seen this am.  Reports stabbling abd pain at the site of BILLY drain.  Dressing removed.  Incision was cleansed but is unremarkable.  Scant serous drainage noted.  Soft stool per colostomy.    Reports uncontrolled pain and has been taking IV dilaudid.  I have added schedule toradol         Lives alone with her dogs in [facility].        Reviewed interval ancillary notes    Current Medications  hydrALAZINE (APRESOLINE) injection 10 mg, Q6H PRN  ertapenem (INVanz) 1,000 mg in sodium chloride 0.9 % 50 mL IVPB (mini-bag), Q24H  sodium chloride flush 0.9 % injection 5-40 mL, 2 times per day  sodium chloride flush 0.9 % injection 5-40 mL, PRN  0.9 % sodium chloride infusion, PRN  ondansetron (ZOFRAN-ODT) disintegrating tablet 4 mg, Q8H PRN   Or  ondansetron (ZOFRAN) injection 4 mg, Q6H PRN  enoxaparin (LOVENOX) injection 40 mg, Daily  acetaminophen (TYLENOL) tablet 650 mg, Q6H  sodium chloride flush 0.9 % injection 5-40 mL, 2 times per day  sodium chloride flush 0.9 % injection 5-40 mL, PRN  0.9 % sodium chloride infusion, PRN  fluconazole (DIFLUCAN) in 0.9 % sodium chloride IVPB 200 mg, Q24H  lactobacillus (CULTURELLE) capsule 1 capsule, BID WC  bisacodyl (DULCOLAX)  crackles.   GI: ileostomy R abdomen-liquid in bag. Operative dressing removed. Incision well approximated and clean , active bowel sounds noted   Musculoskeletal: No cyanosis in digits, neck supple  Neurology: CN 2-12 grossly intact. No speech or motor deficits  Psych: Normal affect. Alert and oriented in time, place and person  Skin: Warm, dry, normal turgor    Labs and Tests:  CBC:   Recent Labs     12/31/23  0430 01/01/24  0518 01/02/24  0640   WBC 11.1* 10.6 11.1*   HGB 8.4* 8.9* 8.6*    457* 481*       BMP:    Recent Labs     12/31/23  0430 01/01/24  0518 01/02/24  0640   * 135* 134*   K 4.2 4.2 4.3    100 98*   CO2 26 27 29   BUN 6* 6* 7   CREATININE <0.5* <0.5* <0.5*   GLUCOSE 120* 111* 139*       Hepatic:   No results for input(s): \"AST\", \"ALT\", \"ALB\", \"BILITOT\", \"ALKPHOS\" in the last 72 hours.  12/22/23    Successful 12 [place] left anterior approach (no safe posterior window) abdominopelvic abscess drain placement. 10 cc of pus aspirated and sent to microbiology     Abscess   Escherichia coli (1)      Antibiotic Interpretation Microscan  Method Status    ampicillin Sensitive 8 mcg/mL BACTERIAL SUSCEPTIBILITY PANEL BY PATSY     ampicillin-sulbactam Sensitive 4 mcg/mL BACTERIAL SUSCEPTIBILITY PANEL BY PATSY     ceFAZolin Sensitive <=4 mcg/mL BACTERIAL SUSCEPTIBILITY PANEL BY PATSY     cefepime Sensitive <=0.12 mcg/mL BACTERIAL SUSCEPTIBILITY PANEL BY PATSY     cefTRIAXone Sensitive <=0.25 mcg/mL BACTERIAL SUSCEPTIBILITY PANEL BY PATSY     ciprofloxacin Sensitive <=0.25 mcg/mL BACTERIAL SUSCEPTIBILITY PANEL BY PATSY     ertapenem Sensitive <=0.12 mcg/mL BACTERIAL SUSCEPTIBILITY PANEL BY PATSY     gentamicin Sensitive <=1 mcg/mL BACTERIAL SUSCEPTIBILITY PANEL BY PATSY     piperacillin-tazobactam Sensitive <=4 mcg/mL BACTERIAL SUSCEPTIBILITY PANEL BY PATSY     trimethoprim-sulfamethoxazole Sensitive <=20 mcg/mL BACTERIAL SUSCEPTIBILITY PANEL BY PATSY         Abd Xray: 12/23/23   MPRESSION:  Overall pattern of

## 2024-01-02 NOTE — PROGRESS NOTES
Infectious Diseases   Progress Note      Admission Date: 12/21/2023  Hospital Day: Hospital Day: 13   Attending: Ciara Archer MD  Date of service: 1/2/2024     Chief complaint/ Reason for consult:     Fecal peritonitis and intra-abdominal abscess  Perforated diverticulitis  S/p CT-guided BILLY drain placement by interventional neurology on December 22 -abscess fluid cultures are growing group B streptococcus and E. Coli  Multiloculated intra-abdominal abscesses on CT scan done on December 25  S/p cholecystectomy in the past    Microbiology:        I have reviewed allavailable micro lab data and cultures    Blood culture (2/2) - collected on 12/21/2023: Negative  Abdominal abscess fluid culture  - collected on 12/22/2023: Group C streptococcus, E. coli    Susceptibility    Escherichia coli (1)    Antibiotic Interpretation Microscan  Method Status    ampicillin Sensitive 8 mcg/mL BACTERIAL SUSCEPTIBILITY PANEL BY PATSY     ampicillin-sulbactam Sensitive 4 mcg/mL BACTERIAL SUSCEPTIBILITY PANEL BY PATSY     ceFAZolin Sensitive <=4 mcg/mL BACTERIAL SUSCEPTIBILITY PANEL BY PATSY     cefepime Sensitive <=0.12 mcg/mL BACTERIAL SUSCEPTIBILITY PANEL BY PATSY     cefTRIAXone Sensitive <=0.25 mcg/mL BACTERIAL SUSCEPTIBILITY PANEL BY PATSY     ciprofloxacin Sensitive <=0.25 mcg/mL BACTERIAL SUSCEPTIBILITY PANEL BY PATSY     ertapenem Sensitive <=0.12 mcg/mL BACTERIAL SUSCEPTIBILITY PANEL BY PATSY     gentamicin Sensitive <=1 mcg/mL BACTERIAL SUSCEPTIBILITY PANEL BY PATSY     piperacillin-tazobactam Sensitive <=4 mcg/mL BACTERIAL SUSCEPTIBILITY PANEL BY PATSY     trimethoprim-sulfamethoxazole Sensitive <=20 mcg/mL BACTERIAL SUSCEPTIBILITY PANEL BY PATSY                  Antibiotics and immunizations:       Current antibiotics: All antibiotics and their doses were reviewed by me    Recent Abx Admin                     ertapenem (INVanz) 1,000 mg in sodium chloride 0.9 % 50 mL IVPB (mini-bag) (mg) 1,000 mg New Bag 01/02/24 1406     1,000 mg New

## 2024-01-02 NOTE — CARE COORDINATION
CM met with pt and explained that [facility] can accept her.     [facility] and [facility]. Pt wants to call her sister in law to discuss. CM explained importance of choosing a facility in order to start insurance process. She verbalized understanding.      Bonnie Latif RN, BSN  [phone]

## 2024-01-02 NOTE — PLAN OF CARE
Problem: Discharge Planning  Goal: Discharge to home or other facility with appropriate resources  1/2/2024 1048 by Ladan Briceño RN  Outcome: Progressing  1/2/2024 0206 by Dayron Campbell RN  Outcome: Progressing     Problem: Pain  Goal: Verbalizes/displays adequate comfort level or baseline comfort level  1/2/2024 1048 by Ladan Briceño RN  Outcome: Progressing  1/2/2024 0206 by Dayron Campbell RN  Outcome: Progressing     Problem: ABCDS Injury Assessment  Goal: Absence of physical injury  1/2/2024 1048 by Ladan Briceño RN  Outcome: Progressing  1/2/2024 0206 by Dayron Campbell RN  Outcome: Progressing     Problem: Safety - Adult  Goal: Free from fall injury  1/2/2024 1048 by Ladan Briceño RN  Outcome: Progressing  1/2/2024 0206 by Dayron Campbell RN  Outcome: Progressing     Problem: Gastrointestinal - Adult  Goal: Minimal or absence of nausea and vomiting  1/2/2024 1048 by Ladan Briceño RN  Outcome: Progressing  1/2/2024 0206 by Dayron Campbell RN  Outcome: Progressing  Goal: Maintains or returns to baseline bowel function  1/2/2024 1048 by Ladan Briceño RN  Outcome: Progressing  1/2/2024 0206 by Dayron Campbell RN  Outcome: Progressing     Problem: Nutrition Deficit:  Goal: Optimize nutritional status  1/2/2024 1048 by Ladan Briceño RN  Outcome: Progressing  1/2/2024 0206 by Dayron Campbell RN  Outcome: Progressing

## 2024-01-02 NOTE — CARE COORDINATION
Pt's sister in law called EDGAR and she states she talked to admissions at [facility] and they have beds.     EDGAR then received call from Abida with [facility] and she can accept patient and is starting pre cert.     EDGAR updated pt and her sister in law she was on the phone with.     Bonnie Latif, RN, BSN  [phone]

## 2024-01-02 NOTE — PROGRESS NOTES
Shift assessment complete, VSS, A&Ox4, medications given per MAR.  Dressing is CDI, bowel sounds active, abdomen is soft. Patient denies any further pain at this time. The care plan and education has been reviewed and mutually agreed upon with the patient. All safety precautions in place, gilda light, and belongings within reach.

## 2024-01-03 ENCOUNTER — CLINICAL DOCUMENTATION (OUTPATIENT)
Dept: INFECTIOUS DISEASES | Age: 74
End: 2024-01-03

## 2024-01-03 VITALS
TEMPERATURE: 98.4 F | DIASTOLIC BLOOD PRESSURE: 78 MMHG | HEIGHT: 60 IN | WEIGHT: 128.53 LBS | OXYGEN SATURATION: 96 % | SYSTOLIC BLOOD PRESSURE: 111 MMHG | HEART RATE: 80 BPM | BODY MASS INDEX: 25.23 KG/M2 | RESPIRATION RATE: 17 BRPM

## 2024-01-03 LAB
ANION GAP SERPL CALCULATED.3IONS-SCNC: 7 MMOL/L (ref 3–16)
BACTERIA SPEC ANAEROBE CULT: NORMAL
BASOPHILS # BLD: 0 K/UL (ref 0–0.2)
BASOPHILS NFR BLD: 0 %
BUN SERPL-MCNC: 10 MG/DL (ref 7–20)
CALCIUM SERPL-MCNC: 8.6 MG/DL (ref 8.3–10.6)
CHLORIDE SERPL-SCNC: 103 MMOL/L (ref 99–110)
CO2 SERPL-SCNC: 28 MMOL/L (ref 21–32)
CREAT SERPL-MCNC: 0.5 MG/DL (ref 0.6–1.2)
DEPRECATED RDW RBC AUTO: 13.4 % (ref 12.4–15.4)
EOSINOPHIL # BLD: 0.2 K/UL (ref 0–0.6)
EOSINOPHIL NFR BLD: 2 %
GFR SERPLBLD CREATININE-BSD FMLA CKD-EPI: >60 ML/MIN/{1.73_M2}
GLUCOSE SERPL-MCNC: 115 MG/DL (ref 70–99)
HCT VFR BLD AUTO: 24.7 % (ref 36–48)
HGB BLD-MCNC: 8.4 G/DL (ref 12–16)
LYMPHOCYTES # BLD: 0.5 K/UL (ref 1–5.1)
LYMPHOCYTES NFR BLD: 6 %
MCH RBC QN AUTO: 31.8 PG (ref 26–34)
MCHC RBC AUTO-ENTMCNC: 33.9 G/DL (ref 31–36)
MCV RBC AUTO: 94 FL (ref 80–100)
MONOCYTES # BLD: 0.4 K/UL (ref 0–1.3)
MONOCYTES NFR BLD: 5 %
MYELOCYTES NFR BLD MANUAL: 1 %
NEUTROPHILS # BLD: 7.2 K/UL (ref 1.7–7.7)
NEUTROPHILS NFR BLD: 86 %
PLATELET # BLD AUTO: 489 K/UL (ref 135–450)
PMV BLD AUTO: 6.7 FL (ref 5–10.5)
POTASSIUM SERPL-SCNC: 4.5 MMOL/L (ref 3.5–5.1)
RBC # BLD AUTO: 2.63 M/UL (ref 4–5.2)
RBC MORPH BLD: NORMAL
SLIDE REVIEW: ABNORMAL
SODIUM SERPL-SCNC: 138 MMOL/L (ref 136–145)
WBC # BLD AUTO: 8.3 K/UL (ref 4–11)

## 2024-01-03 PROCEDURE — 99024 POSTOP FOLLOW-UP VISIT: CPT | Performed by: SURGERY

## 2024-01-03 PROCEDURE — 6360000002 HC RX W HCPCS: Performed by: SURGERY

## 2024-01-03 PROCEDURE — 2580000003 HC RX 258: Performed by: SURGERY

## 2024-01-03 PROCEDURE — 6360000002 HC RX W HCPCS: Performed by: INTERNAL MEDICINE

## 2024-01-03 PROCEDURE — 85025 COMPLETE CBC W/AUTO DIFF WBC: CPT

## 2024-01-03 PROCEDURE — 6360000002 HC RX W HCPCS: Performed by: NURSE PRACTITIONER

## 2024-01-03 PROCEDURE — 6370000000 HC RX 637 (ALT 250 FOR IP): Performed by: SURGERY

## 2024-01-03 PROCEDURE — 80048 BASIC METABOLIC PNL TOTAL CA: CPT

## 2024-01-03 PROCEDURE — 2580000003 HC RX 258: Performed by: INTERNAL MEDICINE

## 2024-01-03 PROCEDURE — 99232 SBSQ HOSP IP/OBS MODERATE 35: CPT | Performed by: INTERNAL MEDICINE

## 2024-01-03 RX ORDER — HYDROCODONE BITARTRATE AND ACETAMINOPHEN 5; 325 MG/1; MG/1
1 TABLET ORAL EVERY 6 HOURS PRN
Qty: 18 TABLET | Refills: 0 | Status: SHIPPED | OUTPATIENT
Start: 2024-01-03 | End: 2024-01-10

## 2024-01-03 RX ADMIN — Medication 1 CAPSULE: at 07:35

## 2024-01-03 RX ADMIN — ERTAPENEM SODIUM 1000 MG: 1 INJECTION INTRAMUSCULAR; INTRAVENOUS at 15:13

## 2024-01-03 RX ADMIN — FLUCONAZOLE 200 MG: 2 INJECTION, SOLUTION INTRAVENOUS at 14:08

## 2024-01-03 RX ADMIN — KETOROLAC TROMETHAMINE 15 MG: 15 INJECTION, SOLUTION INTRAMUSCULAR; INTRAVENOUS at 11:00

## 2024-01-03 RX ADMIN — Medication 10 ML: at 07:36

## 2024-01-03 RX ADMIN — ACETAMINOPHEN 650 MG: 325 TABLET ORAL at 04:48

## 2024-01-03 RX ADMIN — KETOROLAC TROMETHAMINE 15 MG: 15 INJECTION, SOLUTION INTRAMUSCULAR; INTRAVENOUS at 04:47

## 2024-01-03 RX ADMIN — Medication 10 ML: at 07:35

## 2024-01-03 RX ADMIN — ENOXAPARIN SODIUM 40 MG: 100 INJECTION SUBCUTANEOUS at 07:35

## 2024-01-03 ASSESSMENT — ENCOUNTER SYMPTOMS
SINUS PAIN: 0
EYE DISCHARGE: 0
COUGH: 0
ABDOMINAL PAIN: 0
CONSTIPATION: 0
WHEEZING: 0
EYE REDNESS: 0
SHORTNESS OF BREATH: 0
RHINORRHEA: 0
SORE THROAT: 0
BACK PAIN: 0
NAUSEA: 0
SINUS PRESSURE: 0
DIARRHEA: 0

## 2024-01-03 NOTE — CARE COORDINATION
01/03/24 1406   IMM Letter   IMM Letter given to Patient/Family/Significant other/Guardian/POA/by:    IMM Letter date given: 01/03/24   IMM Letter time given: 1328     Electronically signed by TESSY Marshall, LSW on 1/3/2024 at 2:06 PM

## 2024-01-03 NOTE — PROGRESS NOTES
Ostomy Referral Follow-up Progress Note      NAME:  Tammy Frederick  MEDICAL RECORD NUMBER:  [id number]  AGE: 73 y.o.   GENDER:  female  :  1950  TODAY'S DATE:  2024    Subjective:    Tammy Frederick is a 73 y.o. female referred by:   [x] Physician  [] Nursing  [] Other:     New    Objective    /62   Pulse 86   Temp 98.2 °F (36.8 °C) (Oral)   Resp 17   Ht 1.524 m (5')   Wt 59 kg (130 lb 1.1 oz)   SpO2 95%   BMI 25.40 kg/m²     Rodriguez Risk Score Rodriguez Scale Score: 19    Assessment   Surgeon Robertson    Ileostomy       Loop stoma 30 mm diameter, red rubber bridge in place    Ileostomy/Jejunostomy RLQ Ileostomy (Active)   Stomal Appliance 2 piece;Changed 24 171   Flange Size (inches) 2.25 Inches 24 171   Stoma  Assessment Red;Moist;Protrudes 24 171   Peristomal Assessment Clean, dry & intact 24 171   Mucocutaneous Junction Intact 24 171   Treatment Pouch change;Site care;Heat applied;Tape changed 24 171   Stool Appearance Watery 24 171   Stool Color Brown 24 171   Stool Amount Large 24 171   Output (mL) 250 ml 24 1719   Number of days: 5         Intake/Output Summary (Last 24 hours) at 2024 1924  Last data filed at 2024 1719  Gross per 24 hour   Intake --   Output 855 ml   Net -855 ml       Plan:   Plan for Ostomy Care:     ILEOSTOMY CARE   Supplies: Coloplast; wafer #[id number], pouch #[id number],  scissors, elastic barriers #[id number]   Empty pouch when it is 1/3 to 1/2 full of gas or/and stool, this will be approximately 5 to 8 times daily.    Change ileostomy dressing every 3 to 4 days.    Change ileostomy dressing whenever it leaks to prevent skin damage. Measure stoma with each dressing change. (30 mm diameter on 24)  POUCH CHANGES  For pouch changes:  empty pouch, then remove.   Clean skin with water only and dry. DO NOT USE WIPES!!   Measure stoma  (30 mm diameter on 24). Transfer measurements to wafer. Cut wafer to

## 2024-01-03 NOTE — DISCHARGE INSTRUCTIONS
[facility]    Discharge Instructions      Activity  - activity as tolerated, no driving while on analgesics, and no heavy lifting for 6 weeks; it is OK to be up walking around--walking up and down stairs is also OK. Do what is comfortable: stop and rest if you feel tired.    Diet  - regular diet  - Drink plenty of fluids     Pain  - You may use narcotic pain medication as prescribed for breakthrough pain  - You can use OTC Tylenol or Ibuprofen for 1-2 weeks (may need to adjust the dose as directed on the bottle if enteric coated ibuprofen chosen)  - Avoid taking narcotic pain medication on an empty stomach which may result in nausea, if pain medication is causing nausea try decreasing the dose  - Narcotic pain medication is not necessary, please contact the office if pain is not controlled  - Narcotic pain medication will not be refilled on weekends and after hours  - Please contact the office Monday-Friday 8a-4p if a refill is requested    Nausea  - Avoid taking narcotic pain medication on an empty stomach which may result in nausea  - If pain medication is causing nausea you may try decreasing the dose  - Nausea can be common for 24 hours after surgery--if nausea uncontrolled or worsening, contact the office or go to the ED    Constipation  - Pain medication can be constipating  - Often, it helps to take a stool softener with the goal of at least one soft bowel movement daily with minimal straining  - You may also need to increase water and fiber intake--may supplement with Benefiber and increasing your activity will also be helpful  - If a stool softener is needed, the following OTC medications are recommend: Colace 100 mg by mouth twice daily, Miralax 17 gm by mouth 1-3 times daily with glass of water, dulcolax suppositories, and Fleets enemas    Wound Care  - keep wound clean and dry  - If incisional bleeding is noted, hold firm pressure for 15-20 minutes. If remains

## 2024-01-03 NOTE — CARE COORDINATION
Precert is pending to [facility].  Called Jose, [phone], in admissions at the facility and left a message asking for an update on the precert.  Waiting on a call back.    Electronically signed by TESSY Marshall, ARELISW on 1/3/2024 at 12:46 PM

## 2024-01-03 NOTE — PROGRESS NOTES
Infectious Diseases   Progress Note      Admission Date: 12/21/2023  Hospital Day: Hospital Day: 14   Attending: Ciara Archer MD  Date of service: 1/3/2024     Chief complaint/ Reason for consult:     Fecal peritonitis and intra-abdominal abscess  Perforated diverticulitis  S/p CT-guided BILLY drain placement by interventional neurology on December 22 -abscess fluid cultures are growing group B streptococcus and E. Coli  Multiloculated intra-abdominal abscesses on CT scan done on December 25  S/p cholecystectomy in the past    Microbiology:        I have reviewed allavailable micro lab data and cultures    Blood culture (2/2) - collected on 12/21/2023: Negative  Abdominal abscess fluid culture  - collected on 12/22/2023: Group C streptococcus, E. coli    Susceptibility    Escherichia coli (1)    Antibiotic Interpretation Microscan  Method Status    ampicillin Sensitive 8 mcg/mL BACTERIAL SUSCEPTIBILITY PANEL BY PATSY     ampicillin-sulbactam Sensitive 4 mcg/mL BACTERIAL SUSCEPTIBILITY PANEL BY PATSY     ceFAZolin Sensitive <=4 mcg/mL BACTERIAL SUSCEPTIBILITY PANEL BY PATSY     cefepime Sensitive <=0.12 mcg/mL BACTERIAL SUSCEPTIBILITY PANEL BY PATSY     cefTRIAXone Sensitive <=0.25 mcg/mL BACTERIAL SUSCEPTIBILITY PANEL BY PATSY     ciprofloxacin Sensitive <=0.25 mcg/mL BACTERIAL SUSCEPTIBILITY PANEL BY PATSY     ertapenem Sensitive <=0.12 mcg/mL BACTERIAL SUSCEPTIBILITY PANEL BY PATSY     gentamicin Sensitive <=1 mcg/mL BACTERIAL SUSCEPTIBILITY PANEL BY PATSY     piperacillin-tazobactam Sensitive <=4 mcg/mL BACTERIAL SUSCEPTIBILITY PANEL BY PATSY     trimethoprim-sulfamethoxazole Sensitive <=20 mcg/mL BACTERIAL SUSCEPTIBILITY PANEL BY PATSY                  Antibiotics and immunizations:       Current antibiotics: All antibiotics and their doses were reviewed by me    Recent Abx Admin                     fluconazole (DIFLUCAN) in 0.9 % sodium chloride IVPB 200 mg (mg) 200 mg New Bag 01/03/24 1408     200 mg New Bag 01/02/24 1456  blood cultures.  Continue to monitor blood counts, liver and renal function.    Antimicrobials:    Continue IV ertapenem 1 g every 24 hours  Continue IV Diflucan 200 mg daily  Plans for possible discharge to nursing home noted.  My infusion orders are in the [facility]  Surgical site care.  Continue close vitals monitoring.  Maintain good glycemic control.  Fall precautions.  Aspiration precautions.  Continue to watch for new fever or diarrhea.  DVT prophylaxis.  Discussed all above with patient and RN.      Drug Monitoring:    Continue monitoring for antibiotic toxicity as follows: CBC, CMP   Continue to watch for following: new or worsening fever, new hypotension, hives, lip swelling and redness or purulence at vascular access sites.     I/v access Management:    Continue to monitor i.v access sites for erythema, induration, discharge or tenderness.   As always, continue efforts to minimize tubes/lines/drains as clinically appropriate to reduce chances of line associated infections.    Patient education and counseling:        The patient was educated in detail about the side-effects of various antibiotics and things to watch for like new rashes, lip swelling, severe reaction, worsening diarrhea, break through fever etc.  Discussed patient's condition and what to expect. All of the patient's questions were addressed in a satisfactory manner and patient verbalized understanding all instructions.      Level of complexity of visit and medical decision making: High         Thank you for involving me in the care of your patient. I will continue to follow. If you have anyadditional questions, please do not hesitate to contact me.      Subjective:     Interval history: Interval history was obtained from chart review and patient/ RN.  She is afebrile.  She is tolerating the antibiotics okay.  No diarrhea    REVIEW OF SYSTEMS:      Review of Systems   Constitutional:  Negative for chills, diaphoresis and fever.   HENT:  Negative

## 2024-01-03 NOTE — PROGRESS NOTES
[facility]    1/3/2024 8:03 AM        Name: Tammy Frederick .              Admitted: 12/21/2023  Primary Care Provider: No primary care provider on file. (Tel: None)      Subjective:  .    Admitted with abd pain from [facility]   CT shows sigmoid abscess with diverticulitis   Pt had IR drain inserted on 12/22. Developed increase abd pain / bloating on 12/23:  Imaging suggested Ileus vs early SBO  ( lactic acid normal). Evaluated by ID on 12/24/23 ,  Diflucan added. NG removed on 12/26. Was taken to the OR for Exploratory laparotomy, takedown of splenic flexure, small bowel resection with anastomosis, appendectomy, sigmoidectomy with low pelvic anastomosis and diverting loop ileostomy by Dr Robertson on 12/28      Seen this am.  Pain control much improved since BILLY drain was removed yesterday .  Feels comfortable with possible d/c today to ECF       Lives alone with her dogs in [facility].        Reviewed interval ancillary notes    Current Medications  ketorolac (TORADOL) injection 15 mg, Q6H  hydrALAZINE (APRESOLINE) injection 10 mg, Q6H PRN  ertapenem (INVanz) 1,000 mg in sodium chloride 0.9 % 50 mL IVPB (mini-bag), Q24H  sodium chloride flush 0.9 % injection 5-40 mL, 2 times per day  sodium chloride flush 0.9 % injection 5-40 mL, PRN  0.9 % sodium chloride infusion, PRN  ondansetron (ZOFRAN-ODT) disintegrating tablet 4 mg, Q8H PRN   Or  ondansetron (ZOFRAN) injection 4 mg, Q6H PRN  enoxaparin (LOVENOX) injection 40 mg, Daily  acetaminophen (TYLENOL) tablet 650 mg, Q6H  sodium chloride flush 0.9 % injection 5-40 mL, 2 times per day  sodium chloride flush 0.9 % injection 5-40 mL, PRN  0.9 % sodium chloride infusion, PRN  fluconazole (DIFLUCAN) in 0.9 % sodium chloride IVPB 200 mg, Q24H  lactobacillus (CULTURELLE) capsule 1 capsule, BID WC  bisacodyl (DULCOLAX) suppository 10 mg, Once  bisacodyl (DULCOLAX) suppository 10 mg, Daily PRN  oxyCODONE (ROXICODONE) immediate release

## 2024-01-03 NOTE — PROGRESS NOTES
Shift assessment complete, VSS, A&Ox4, medications given per MAR. Patient reports no pain this AM. Bowel sounds active, ileostomy draining appropriately. Dressing to abdomen is CDI. Patient denies any further needs at this time. The care plan and education has been reviewed and mutually agreed upon with the patient. All safety precautions in place, call light, and belongings within reach.

## 2024-01-03 NOTE — CARE COORDINATION
MARCELINA received a call back from Jose at [facility], [phone], who stateed pt's precert was approved.  Informed her that pt is medically ready for discharge today.  Jose confirmed they can take her today.  Reviewed chart and pt does not qualify for stretcher transport and her insurance does not cover wheelchair transports.  Called pt's friend, Heidy [phone], who confirmed she can transport patient to the facility with her car.  Stated she will be here at the hospital today between 5 and 5:30pm to pick her up.  MARCELINA informed charge RN of this information.    HENS completed.  IMM completed.  Called Jose at [facility] and informed of discharge time today.  All discharge paperwork faxed.    Discharge Plan:  [facility]  [id number]  [place] [address]  Phone: [phone]    Electronically signed by TESSY Marshall, LSW on 1/3/2024 at 2:30 PM

## 2024-01-03 NOTE — PROGRESS NOTES
Discharge instructions reviewed with patient. Patient transported by friend to [facility], report given to facility. No further questions at this time.

## 2024-01-03 NOTE — PROGRESS NOTES
[phone]*       BMP:    Recent Labs     12/31/23  0430 01/01/24  0518 01/02/24  0640   * 135* 134*   K 4.2 4.2 4.3    100 98*   CO2 26 27 29   BUN 6* 6* 7   CREATININE <0.5* <0.5* <0.5*   GLUCOSE 120* 111* 139*       Hepatic:   No results for input(s): \"AST\", \"ALT\", \"ALB\", \"BILITOT\", \"ALKPHOS\" in the last 72 hours.    Amylase: No results for input(s): \"AMYLASE\" in the last 72 hours.  Lipase: No results for input(s): \"LIPASE\" in the last 72 hours.  Mag:      Recent Labs     12/30/23  1817   MG 1.50*        Phos:     No results for input(s): \"PHOS\" in the last 72 hours.     Coags: No results for input(s): \"INR\", \"APTT\" in the last 72 hours.    Cultures:  Anaerobic culture  No results for input(s): \"LABANAE\" in the last 72 hours.    Blood culture  No results for input(s): \"BC\" in the last 72 hours.    Blood culture 2  No results for input(s): \"BLOODCULT2\" in the last 72 hours.    Body fluid culture  No results for input(s): \"BLOODCULT2\" in the last 72 hours.    Surgical culture  No results for input(s): \"CXSURG\" in the last 72 hours.    Fecal occult  No results for input(s): \"OCCULTBLDFEC\" in the last 72 hours.    Gram stain  No results for input(s): \"LABGRAM\" in the last 72 hours.      Stool culture 1  No results for input(s): \"CXST\" in the last 72 hours.    Stool culture 2  No results for input(s): \"STOOLCULT2\" in the last 72 hours.    Urine culture  No results for input(s): \"LABURIN\" in the last 72 hours.    Wound abscess  No results for input(s): \"WNDABS\" in the last 72 hours.    C Diff   No results for input(s): \"CDIFFTOXAB\" in the last 72 hours.      Pathology:  OR 12/28/2023--FINAL DIAGNOSIS:        A. Sigmoid colon, segmental resection:      - 14.5 cm segment of colon with:      - Acute diverticulitis with a peridiverticular abscess      Organizing serosal inflammatory exudate and fibrous adhesions.      - No mucosal polyp or neoplasm.        B. Appendix, appendectomy:      - Benign appendix  with serosal-periappendiceal organizing inflammatory        exudate and adhesions.      - No mucosal or transmural acute inflammation is identified.       C.  Small bowel, segmental resection:     -     16.5 cm segment of small bowel with:     -     Organizing serosal inflammatory exudate and fibrous adhesions.     -     Negative for changes of inflammatory bowel disease or ischemic     change.     -     No mucosal polyp or neoplasm.     COMMENT:   The sigmoid colon segment shows acute diverticulitis with a   peridiverticular abscess and extensive serosal inflammatory exudate with fibrous adhesions.  The changes involving the serosal surface of the appendix and small bowel appear secondary to the diverticulitis.     Imaging:  I have personally reviewed the following films:    No results found.    Scheduled Meds:   ketorolac  15 mg IntraVENous Q6H    ertapenem (INVanz) IVPB  1,000 mg IntraVENous Q24H    sodium chloride flush  5-40 mL IntraVENous 2 times per day    enoxaparin  40 mg SubCUTAneous Daily    acetaminophen  650 mg Oral Q6H    sodium chloride flush  5-40 mL IntraVENous 2 times per day    fluconazole  200 mg IntraVENous Q24H    lactobacillus  1 capsule Oral BID WC    bisacodyl  10 mg Rectal Once    sodium chloride flush  5-40 mL IntraVENous 2 times per day     Continuous Infusions:   sodium chloride      sodium chloride      sodium chloride Stopped (12/23/23 1645)     PRN Meds:.hydrALAZINE, sodium chloride flush, sodium chloride, ondansetron **OR** ondansetron, sodium chloride flush, sodium chloride, bisacodyl, oxyCODONE **OR** oxyCODONE, phenol, promethazine, sodium chloride flush, sodium chloride, potassium chloride **OR** potassium alternative oral replacement **OR** potassium chloride, magnesium sulfate, ondansetron **OR** ondansetron, polyethylene glycol, acetaminophen **OR** acetaminophen      Assessment:  OR Date 12/28/23, Exploratory laparotomy, takedown of splenic  flexure, small bowel resection

## 2024-01-03 NOTE — PROGRESS NOTES
OPAT Nurse Coordinator Assessment Note      Primary ID Diagnosis: Multiple intra-abdominal abscesses   ID Provider: Dr. Pate      IV Antimicrobial Therapy Plan:  IV ertapenem 1 g every 24 hours, IV fluconazole 200 mg daily    IV Access: PICC    Family Support: sister-in-law Heidy (family, neighbors)     Contact information: see [facility]    Outpatient services (including home infusion, home health, facility referral: See recent case management/social work note for finalization of services    ID follow up appointment:  will need f/u and CT prior to term made after DC       Patient Assessment    I spoke with patient at bedside and completed an initial assessment and education on OPAT program. I explained the OPAT program and its service (IV antimicrobial management, lab monitoring, follow up appointments reminders, etc.) to the patient. Patient states she will be going to a SNF for IV abx administration at [place]. After this discussion, the patient appeared to be a good candidate for the OPAT program.     Patient was educated about how PICC line is placed, s/s of infection at the PICC line insertion site, how to keep the PICC line dressing dry while bathing, weightlifting limitations of 10 pounds or more, avoiding intense physical work and any repetitive motion in arm the PICC line was placed and how the PICC line dressing must be changed each week along with weekly lab monitoring. Patient was given information about IV antimicrobials including possible administration options (continuous, IV push, number of infusions) and estimated duration of therapy.     Patient verbalized understanding and provide teach back on assessment discussion stated above. Patient agreed for the OPAT team to leave messages on their cell phone.      OPAT program packet including contacts were left with patient in the event they would have questions or concerns. The OPAT nurse coordinator will continue to follow the patient peripherally for

## 2024-01-04 ENCOUNTER — CLINICAL DOCUMENTATION (OUTPATIENT)
Dept: INFECTIOUS DISEASES | Age: 74
End: 2024-01-04

## 2024-01-04 ENCOUNTER — TELEPHONE (OUTPATIENT)
Dept: INFECTIOUS DISEASES | Age: 74
End: 2024-01-04

## 2024-01-04 ENCOUNTER — ENROLLMENT (OUTPATIENT)
Dept: INFECTIOUS DISEASES | Age: 74
End: 2024-01-04

## 2024-01-04 DIAGNOSIS — K65.1 INTRA-ABDOMINAL ABSCESS (HCC): Primary | ICD-10-CM

## 2024-01-04 RX ORDER — FLUCONAZOLE 2 MG/ML
200 INJECTION, SOLUTION INTRAVENOUS EVERY 24 HOURS
Qty: 2100 ML | Refills: 0
Start: 2024-01-04 | End: 2024-01-25

## 2024-01-04 NOTE — PROGRESS NOTES
Dr. Pate has placed a referral order for pharmacist to manage Outpatient Parental Antimicrobial Therapy (OPAT) pursuant the ID Collaborative Practice Agreement.     Pertinent PMH and HPI:  PMH OA with bilateral TKA    Presents with ongoing lower abdominal pain + difficulty urinating - found to have diverticulitis with abscess     Pertinent Objective Data:    Wt Readings from Last 1 Encounters:   24 58.3 kg (128 lb 8.5 oz)      BMI Readings from Last 1 Encounters:   24 25.10 kg/m²      Serum creatinine: 0.5 mg/dL (L) 24 0455  Estimated creatinine clearance: 81 mL/min (A)    Lab Results   Component Value Date    CRP 67.1 (H) 2023       Lab Results   Component Value Date    SEDRATE 118 (H) 2023       Imagin/23 XRAY:  IMPRESSION:  Overall pattern of probable ileus.  A partial grade small-bowel obstruction  is considered less likely.    CT:  Peritoneum/Retroperitoneum: No evidence of retroperitoneal lymphadenopathy  although small scattered nodes are seen.  Scattered fluid collections with  one in the left mid abdomen measuring 3.3 x 3.6 cm.  In the upper pelvis to  the right of midline a 5.2 x 3.5 cm collection with mildly thickened wall.  A  pelvic collection slightly to the right of midline measures 4.4 x 3.6 cm and  the low pelvic collection to the right of midline measures 6.5 x 2.7 cm.  These may be loculated abscesses.    IMPRESSION:  1. Scattered fluid collections in the abdomen and pelvis as described above.  These may be loculated abscesses.  2. No evidence of bowel obstruction or perforation.  3. Mildly prominent small bowel loops with wall thickening consistent with  enteritis.  4. Status post cholecystectomy.  5. Atelectatic changes in the lung bases with trace bilateral effusions.       Micro:    colonic abscess aspirate: EColi, Group C strep   surgical cultures: diptheroids in broth     OPAT Orders:  Diagnosis  L abdominopelvic abscess s/p anterolateral  Tremfya Counseling: I discussed with the patient the risks of guselkumab including but not limited to immunosuppression, serious infections, and drug reactions.  The patient understands that monitoring is required including a PPD at baseline and must alert us or the primary physician if symptoms of infection or other concerning signs are noted.

## 2024-01-04 NOTE — TELEPHONE ENCOUNTER
Spoke with nurse Castillo at [facility], all OPAT orders verified. He states he will have their  contact this nurse for f/u appt and CT scheduling. Office contact information provided

## 2024-01-04 NOTE — DISCHARGE SUMMARY
[facility] DISCHARGE SUMMARY    Patient Demographics    Patient. Tammy Frederick  Date of Birth. 1950  MRN. [id number]     Primary care provider. No primary care provider on file.  (Tel: None)    Admit date: 12/21/2023    Discharge date 1/3/231/3/2024  Note Date: 1/4/2024     Reason for Hospitalization.   No chief complaint on file.        Significant Findings.   Principal Problem:    Abscess of sigmoid colon  Active Problems:    Intra-abdominal abscess (HCC)    E coli infection    Fecal peritonitis (HCC)    Streptococcal infection group C    Diverticular disease of both small and large intestine with perforation and abscess    Complex care coordination    Receiving intravenous antibiotic treatment as outpatient    CRP elevated    Elevated erythrocyte sedimentation rate  Resolved Problems:    * No resolved hospital problems. *       Problems and results from this hospitalization that need follow up.  Needs follow up CT abd scan on 1/23/24   Follow up visit with DALI Levine on 1/25/24     Significant test results and incidental findings.      Invasive procedures and treatments.   12/22/23 Successful 12 [place] left anterior approach (no safe posterior window) abdominopelvic abscess drain placement. 10 cc of pus aspirated and sent to microbiology   Culture grew E coli that was pan sensitive     Exploratory laparotomy, takedown of splenic flexure, small bowel resection with anastomosis, appendectomy, sigmoidectomy with low pelvic anastomosis and diverting loop ileostomy by Dr Robertson on 12/28/23       Problem-based Hospital Course.  The patient was a transfer from [facility] due to a large diverticular abscess.  She was admitted and followed by general surgery, IR and ID.  She was admitted and treated for the following:     Diverticulitis with perforation and multiple abscess:  she initially had IR drain

## 2024-01-05 NOTE — TELEPHONE ENCOUNTER
Second call placed to  at [facility] regarding need for CT and f/u appt. LMOM requesting all back asap

## 2024-01-05 NOTE — PROGRESS NOTES
Physician Progress Note      PATIENT:               SURYA SYKES  [facility] #:                  [id number]  :                       1950  ADMIT DATE:       2023 10:11 PM  DISCH DATE:        1/3/2024 5:22 PM  RESPONDING  PROVIDER #:        BLADIMIR Nugent CNP          QUERY TEXT:    Patient admitted with abdominal pain.   Noted documentation of diverticulitis   with focal abscess only in query response on  and multiple intraabdominal   abscesses (in peritoneal cavity) with peritonitis in OP note on  and   subsequent documentation.  If possible, please document in progress notes and discharge summary if you   are evaluating and /or treating any of the following:      The medical record reflects the following:  Risk Factors: 72 yo with diverticulitis    Clinical Indicators: OP note , he patient did have signs of peritonitis   around the abscesses that were known to be present prior to exploration. These   abscess cavities were noted to be in the peritoneal cavity    Treatment: ileostomy, ex lap, abx, hollie drain  Options provided:  -- Fecal peritonitis and intra-abdominal abscesses confirmed and   diverticulitis with focal abscess only ruled out  -- Other - I will add my own diagnosis  -- Disagree - Not applicable / Not valid  -- Disagree - Clinically unable to determine / Unknown  -- Refer to Clinical Documentation Reviewer    PROVIDER RESPONSE TEXT:    Fecal peritonitis due to intra abdominal abscesses and diverticular abscess.    Query created by: Leti Pabon on 2024 8:06 AM      Electronically signed by:  BLADIMIR Nugent CNP 2024 3:24 PM

## 2024-01-05 NOTE — TELEPHONE ENCOUNTER
Spoke with nurse manager Jerome and advised of FIDEL Abarca 1/23 arrival 1pm, NPO 4 hours prior and she v/u

## 2024-01-08 LAB
ALBUMIN SERPL-MCNC: 3.7 G/DL
ALP BLD-CCNC: 156 U/L
ALT SERPL-CCNC: 101 U/L
ANION GAP SERPL CALCULATED.3IONS-SCNC: 17.9 MMOL/L
AST SERPL-CCNC: 92 U/L
BASOPHILS ABSOLUTE: ABNORMAL
BASOPHILS RELATIVE PERCENT: 1.1 %
BILIRUB SERPL-MCNC: 0.4 MG/DL (ref 0.1–1.4)
BUN BLDV-MCNC: 16 MG/DL
C-REACTIVE PROTEIN: 5.9
CALCIUM SERPL-MCNC: 9.7 MG/DL
CHLORIDE BLD-SCNC: 98 MMOL/L
CO2: 23 MMOL/L
CREAT SERPL-MCNC: 0.9 MG/DL
EGFR: 62
EOSINOPHILS ABSOLUTE: ABNORMAL
EOSINOPHILS RELATIVE PERCENT: 1.3 %
FUNGUS SPEC CULT: NORMAL
GLUCOSE BLD-MCNC: 153 MG/DL
HCT VFR BLD CALC: 29.9 % (ref 36–46)
HEMOGLOBIN: 9.4 G/DL (ref 12–16)
LOEFFLER MB STN SPEC: NORMAL
LYMPHOCYTES ABSOLUTE: ABNORMAL
LYMPHOCYTES RELATIVE PERCENT: 17.9 %
MCH RBC QN AUTO: 30 PG
MCHC RBC AUTO-ENTMCNC: 31.4 G/DL
MCV RBC AUTO: 95.5 FL
MONOCYTES ABSOLUTE: ABNORMAL
MONOCYTES RELATIVE PERCENT: 9.8 %
NEUTROPHILS ABSOLUTE: ABNORMAL
NEUTROPHILS RELATIVE PERCENT: 69 %
PDW BLD-RTO: 12.7 %
PLATELET # BLD: 541 K/ΜL
PMV BLD AUTO: ABNORMAL FL
POTASSIUM SERPL-SCNC: 4.9 MMOL/L
RBC # BLD: 3.12 10^6/ΜL
SEDIMENTATION RATE, ERYTHROCYTE: 65
SODIUM BLD-SCNC: 134 MMOL/L
TOTAL PROTEIN: 7.1
WBC # BLD: 5.5 10^3/ML

## 2024-01-10 ENCOUNTER — TELEPHONE (OUTPATIENT)
Dept: INFECTIOUS DISEASES | Age: 74
End: 2024-01-10

## 2024-01-10 NOTE — TELEPHONE ENCOUNTER
OPAT Nurse Coordinator Weekly Update Note    Current OPAT plan:   IV ertapenem 1 g every 24 hours, IV fluconazole 200 mg daily   Tentative end date: 24    Diagnosis:  Multiple intra-abdominal abscesses     Assessment:  Spoke with patient's nurse Elham who states patient is doing well, afebrile and no c/o pain. She states labs were drawn 24 and they will fax when results received. Office contact information provided for needs/updates    Follow up appts:  Dr. Robertson: 1/15/24  CT 24  Dr. Pate: 24

## 2024-01-11 DIAGNOSIS — K65.1 INTRA-ABDOMINAL ABSCESS (HCC): ICD-10-CM

## 2024-01-11 NOTE — TELEPHONE ENCOUNTER
Note elevated LFTs on ertapenem and fluconazole.  Either agent can cause this but suspect it is from the fluconazole.  This was empiric anti-fungal coverage. Current fluid fungal cultures still with no growth.    If okay with Dr. Pate, would prefer to stop fluconazole and see how she does clinically off of it.   Will monitor next week's enzymes.    Luiza Acevedo, PharmD, BCPS  Clinical Pharmacy Specialist- [facility] Infectious Disease  Phone: [phone] (also available on [employer])  Fax: [phone]    For Pharmacy Admin Tracking Only    Program: Medical Group  CPA in place: Yes  Time Spent (min): 10

## 2024-01-12 NOTE — TELEPHONE ENCOUNTER
Per Dr. Herlinda he to stop IV fluconazole    Spoke with nurse Castillo at [facility] and advised of MD orders, he states they will stop as of today as patient has not had dose yet and labs are scheduled for redraw Monday. Office contact information reviewed for needs

## 2024-01-15 ENCOUNTER — OFFICE VISIT (OUTPATIENT)
Dept: SURGERY | Age: 74
End: 2024-01-15

## 2024-01-15 VITALS — SYSTOLIC BLOOD PRESSURE: 98 MMHG | DIASTOLIC BLOOD PRESSURE: 68 MMHG | WEIGHT: 103 LBS | BODY MASS INDEX: 20.12 KG/M2

## 2024-01-15 DIAGNOSIS — Z09 SURGERY FOLLOW-UP: Primary | ICD-10-CM

## 2024-01-15 LAB
FUNGUS SPEC CULT: NORMAL
LOEFFLER MB STN SPEC: NORMAL

## 2024-01-15 PROCEDURE — 99024 POSTOP FOLLOW-UP VISIT: CPT | Performed by: SURGERY

## 2024-01-15 NOTE — PROGRESS NOTES
Chest: Lung bases demonstrate atelectatic changes and trace bilateral effusions.  Calcified mediastinal nodes. Organs: Liver appears unremarkable.  Status post cholecystectomy.  Pancreas and spleen, adrenals, kidneys, aorta and IVC appear stable. GI/Bowel: No evidence of bowel obstruction or perforation however, there is mildly prominent small bowel loops with wall thickening consistent with enteritis.  No acute appendicitis. Pelvis: The uterus, adnexa and urinary bladder appear stable.  No obvious pelvic lymphadenopathy. Peritoneum/Retroperitoneum: No evidence of retroperitoneal lymphadenopathy although small scattered nodes are seen.  Scattered fluid collections with one in the left mid abdomen measuring 3.3 x 3.6 cm.  In the upper pelvis to the right of midline a 5.2 x 3.5 cm collection with mildly thickened wall.  A pelvic collection slightly to the right of midline measures 4.4 x 3.6 cm and the low pelvic collection to the right of midline measures 6.5 x 2.7 cm. These may be loculated abscesses. Bones/Soft Tissues: No acute abnormality.     1. Scattered fluid collections in the abdomen and pelvis as described above. These may be loculated abscesses. 2. No evidence of bowel obstruction or perforation. 3. Mildly prominent small bowel loops with wall thickening consistent with enteritis. 4. Status post cholecystectomy. 5. Atelectatic changes in the lung bases with trace bilateral effusions.     XR CHEST PORTABLE    Result Date: 12/25/2023  EXAMINATION: ONE XRAY VIEW OF THE CHEST 12/23/2023 2:18 pm COMPARISON: 12/23/2023 HISTORY: ORDERING SYSTEM PROVIDED HISTORY: Check NG placement TECHNOLOGIST PROVIDED HISTORY: Reason for exam:->Check NG placement Reason for Exam: Check NG placement FINDINGS: Enteric tube tip and side port project in the expected location of the stomach. Heart size is within normal limits for technique.  No pleural effusion or pneumothorax is seen.  There are bibasilar airspace opacities.

## 2024-01-15 NOTE — PATIENT INSTRUCTIONS
Will refer to wound center for ostomy evaluation  No heavy lifting over 20lbs for 6 weeks total postop  Diet and activity as tolerated otherwise  Regarding ostomy, can remove bar at 4-6 weeks after surgery, contrast enema at 3 months and then consider reversal  Patient lives out of town and would like to establish closer to home if able. If so, I will be available to help facilitate transfer of care. Otherwise, can follow with me in several months for discussion

## 2024-01-16 LAB
ALBUMIN SERPL-MCNC: 4.5 G/DL
ALP BLD-CCNC: 213 U/L
ALT SERPL-CCNC: 106 U/L
ANION GAP SERPL CALCULATED.3IONS-SCNC: 20.4 MMOL/L
AST SERPL-CCNC: 85 U/L
BASOPHILS ABSOLUTE: ABNORMAL
BASOPHILS RELATIVE PERCENT: 1.2 %
BILIRUB SERPL-MCNC: 0.6 MG/DL (ref 0.1–1.4)
BUN BLDV-MCNC: 27 MG/DL
C-REACTIVE PROTEIN: 1.4
CALCIUM SERPL-MCNC: 10.9 MG/DL
CHLORIDE BLD-SCNC: 97 MMOL/L
CO2: 24 MMOL/L
CREAT SERPL-MCNC: 1.2 MG/DL
EGFR: 44
EOSINOPHILS ABSOLUTE: ABNORMAL
EOSINOPHILS RELATIVE PERCENT: 3 %
GLUCOSE BLD-MCNC: 148 MG/DL
HCT VFR BLD CALC: 35.9 % (ref 36–46)
HEMOGLOBIN: 11.6 G/DL (ref 12–16)
LYMPHOCYTES ABSOLUTE: 1.2 /ΜL
LYMPHOCYTES RELATIVE PERCENT: 21.4 %
MCH RBC QN AUTO: 29.7 PG
MCHC RBC AUTO-ENTMCNC: 33.3 G/DL
MCV RBC AUTO: 91.8 FL
MONOCYTES ABSOLUTE: ABNORMAL
MONOCYTES RELATIVE PERCENT: 8 %
NEUTROPHILS ABSOLUTE: 3.8 /ΜL
NEUTROPHILS RELATIVE PERCENT: 65.5 %
PDW BLD-RTO: 13 %
PLATELET # BLD: 482 K/ΜL
PMV BLD AUTO: ABNORMAL FL
POTASSIUM SERPL-SCNC: 5.4 MMOL/L
RBC # BLD: 3.91 10^6/ΜL
SEDIMENTATION RATE, ERYTHROCYTE: 90
SODIUM BLD-SCNC: 136 MMOL/L
TOTAL PROTEIN: 8.3
WBC # BLD: 5.8 10^3/ML

## 2024-01-17 ENCOUNTER — TELEPHONE (OUTPATIENT)
Dept: INFECTIOUS DISEASES | Age: 74
End: 2024-01-17

## 2024-01-17 DIAGNOSIS — K65.1 INTRA-ABDOMINAL ABSCESS (HCC): ICD-10-CM

## 2024-01-17 NOTE — TELEPHONE ENCOUNTER
OPAT Nurse Coordinator Weekly Update Note    Current OPAT plan:   IV ertapenem 1 g every 24 hours  Tentative end date: 1/25/24    Diagnosis:  Multiple intra-abdominal abscesses     Assessment:  Spoke with patient's nurse Elham, requested weekly labs and she states she will fax now. States patient is doing well, no fevers, abd pain or s/s of infection. V/u of CT on 1/23 and f/u 1/25. Office contact information reviewed for needs/updates    Follow up appts:  CT A/P 1/23/24  Dr. Pate: 1/25/24

## 2024-01-18 NOTE — TELEPHONE ENCOUNTER
Spoke with patient's nurse Gina, she states they did recheck K with results of 4.7. Requested lab redraw per pharmacist note and she states they will try to arrange redraw in the AM. She states patient is doing well, eating and drinking fine. V/u of maintaining adequate hydration. Will f/u tomorrow to see if labs have been redrawn

## 2024-01-18 NOTE — TELEPHONE ENCOUNTER
Reviewed labs-   Peculiar/unexpected results based on how well patient is doing clinically per SNF RN and surgery appt last week.     Patient remains on IV ertapenem alone for multiple intra-abdominal abscesses.   LFTs have not improved but remain stable since stopping fluconazole. Could be slow to resolve. They are not getting worse.    K, SCr, and AG elevated. Not noting any medication that would cause this.   From my understanding, patient is able to tolerate normal diet and fluid intake.     Prefer to repeat labs- CMP and CBCwdiff if SNF able.  Ensure she is staying hydrated at facility.    Dago LeeD, BCPS  Clinical Pharmacy Specialist- [facility]  Phone: [phone] (also available on uConnect)  Fax: [phone]    For Pharmacy Admin Tracking Only    Program: Medical Group  CPA in place: Yes  Intervention Detail: Lab(s) Ordered  Time Spent (min): 10

## 2024-01-19 DIAGNOSIS — K65.1 INTRA-ABDOMINAL ABSCESS (HCC): ICD-10-CM

## 2024-01-19 LAB
ALBUMIN SERPL-MCNC: ABNORMAL G/DL
ALP BLD-CCNC: ABNORMAL U/L
ALT SERPL-CCNC: ABNORMAL U/L
ANION GAP SERPL CALCULATED.3IONS-SCNC: 21.2 MMOL/L
AST SERPL-CCNC: ABNORMAL U/L
BASOPHILS ABSOLUTE: ABNORMAL
BASOPHILS RELATIVE PERCENT: 1.5 %
BILIRUB SERPL-MCNC: ABNORMAL MG/DL
BUN BLDV-MCNC: 43 MG/DL
CALCIUM SERPL-MCNC: 10.8 MG/DL
CHLORIDE BLD-SCNC: 93 MMOL/L
CO2: 23 MMOL/L
CREAT SERPL-MCNC: 1.4 MG/DL
EGFR: 37
EOSINOPHILS ABSOLUTE: ABNORMAL
EOSINOPHILS RELATIVE PERCENT: 1.9 %
GLUCOSE BLD-MCNC: 130 MG/DL
HCT VFR BLD CALC: 36 % (ref 36–46)
HEMOGLOBIN: 11.8 G/DL (ref 12–16)
LYMPHOCYTES ABSOLUTE: 1.3 /ΜL
LYMPHOCYTES RELATIVE PERCENT: 23.7 %
MCH RBC QN AUTO: 29.4 PG
MCHC RBC AUTO-ENTMCNC: 32.8 G/DL
MCV RBC AUTO: 89.8 FL
MONOCYTES ABSOLUTE: ABNORMAL
MONOCYTES RELATIVE PERCENT: 9 %
NEUTROPHILS ABSOLUTE: 3.4 /ΜL
NEUTROPHILS RELATIVE PERCENT: 63.3 %
PDW BLD-RTO: 13 %
PLATELET # BLD: 438 K/ΜL
PMV BLD AUTO: ABNORMAL FL
POTASSIUM SERPL-SCNC: 5.2 MMOL/L
RBC # BLD: 4.01 10^6/ΜL
SODIUM BLD-SCNC: 132 MMOL/L
TOTAL PROTEIN: ABNORMAL
WBC # BLD: 5.4 10^3/ML

## 2024-01-19 NOTE — TELEPHONE ENCOUNTER
Spoke with nurse Gina at [facility], she states labs were drawn this AM and will results late this evening or tomorrow AM. Warner phone number provided to call if labs are worsening and she v/u.

## 2024-01-19 NOTE — TELEPHONE ENCOUNTER
Received repeat labs, noted worsening BUN/Cr. Labs routed to Dr. Pate and reviewed.   Per Dr. Pate:   Repeat CMP Monday. If develops any clinical worsening over the weekend, should go to the ER. Recommend improving PO hydration.    Spoke with Gina, and advised of MD note and she v/u. States patient is still feeling fine. Reviewed oncall number for needs over the weekend as well

## 2024-01-22 ENCOUNTER — TELEPHONE (OUTPATIENT)
Dept: INFECTIOUS DISEASES | Age: 74
End: 2024-01-22

## 2024-01-22 LAB
ALBUMIN SERPL-MCNC: 4.9 G/DL
ALP BLD-CCNC: 197 U/L
ALT SERPL-CCNC: 50 U/L
ANION GAP SERPL CALCULATED.3IONS-SCNC: 24.7 MMOL/L
AST SERPL-CCNC: 49 U/L
BASOPHILS ABSOLUTE: ABNORMAL
BASOPHILS RELATIVE PERCENT: 1.8 %
BILIRUB SERPL-MCNC: 0.7 MG/DL (ref 0.1–1.4)
BUN BLDV-MCNC: 79 MG/DL
C-REACTIVE PROTEIN: 0.7
CALCIUM SERPL-MCNC: 10.3 MG/DL
CHLORIDE BLD-SCNC: 88 MMOL/L
CO2: 21 MMOL/L
CREAT SERPL-MCNC: 3.1 MG/DL
EGFR: 15
EOSINOPHILS ABSOLUTE: ABNORMAL
EOSINOPHILS RELATIVE PERCENT: 1.5 %
FUNGUS SPEC CULT: NORMAL
GLUCOSE BLD-MCNC: 143 MG/DL
HCT VFR BLD CALC: 37.5 % (ref 36–46)
HEMOGLOBIN: 12.9 G/DL (ref 12–16)
LOEFFLER MB STN SPEC: NORMAL
LYMPHOCYTES ABSOLUTE: 1.2 /ΜL
LYMPHOCYTES RELATIVE PERCENT: 20 %
MCH RBC QN AUTO: 30 PG
MCHC RBC AUTO-ENTMCNC: 34.4 G/DL
MCV RBC AUTO: 87.2 FL
MONOCYTES ABSOLUTE: ABNORMAL
MONOCYTES RELATIVE PERCENT: 9.7 %
NEUTROPHILS ABSOLUTE: 4.1 /ΜL
NEUTROPHILS RELATIVE PERCENT: 66.4 %
PDW BLD-RTO: 13.2 %
PLATELET # BLD: 446 K/ΜL
PMV BLD AUTO: ABNORMAL FL
POTASSIUM SERPL-SCNC: 5.7 MMOL/L
RBC # BLD: 4.3 10^6/ΜL
SODIUM BLD-SCNC: 128 MMOL/L
TOTAL PROTEIN: 8.6
WBC # BLD: 6.2 10^3/ML

## 2024-01-22 NOTE — TELEPHONE ENCOUNTER
OPAT Nurse Coordinator Weekly Update Note    Current OPAT plan:  IV ertapenem 1 g every 24 hours  Tentative end date 1/25/24    Diagnosis:  Multiple intra-abdominal abscesses     Assessment:  Spoke with  nurse Amarilis who states patient is doing well and no complaints. Working with therapy and eating and drinking well. Weekly labs drawn this AM and they will fax as soon as they are available. Verified CT and f/u appt this week and she v/u    Follow up appts:  [facility]: 1/23/24  Dr. Pate: 1/25/24

## 2024-01-22 NOTE — TELEPHONE ENCOUNTER
Per Dr. Pate:   DC ertapenem  Nephrology consult ASAP  Cancel CT tomorrow due to contrast and worsening Scr  If labs worsen in AM recommend evaluation in ER    Spoke with patients nurse Amarilis and advised of above. She will fax today's lab results now and notify this nurse tomorrow of lab results for further orders  Call to central scheduling to cancel CT tomorrow

## 2024-01-22 NOTE — TELEPHONE ENCOUNTER
OPAT CRITICAL VALUE    Lab result: BUN 79 Cr 3.1    Verified in EPIC: not on EPIC, received at SNF    MD/Pharmacist notified via PS: Dr. Pate and Luiza Acevedo PharmD    Note in EPIC: yes    Patient phone call assessment: see previous note    New orders:     Received call from Amarilis nurse at [facility] stating they received call on critical results: BUN 79 and Cr 3.1. She states NP at facility has ordered IVF and decreased Invanz to 500 mg q24h.    Perfect Serve to Dr. Pate and Luiza Acevedo PharmD for further orders. Per previous note, patient is asymptomatic at this time.

## 2024-01-23 ENCOUNTER — HOSPITAL ENCOUNTER (EMERGENCY)
Age: 74
Discharge: ANOTHER HOSPITAL | End: 2024-01-23
Attending: EMERGENCY MEDICINE
Payer: MEDICARE

## 2024-01-23 ENCOUNTER — HOSPITAL ENCOUNTER (INPATIENT)
Age: 74
LOS: 3 days | Discharge: HOME | End: 2024-01-26
Attending: HOSPITALIST
Payer: MEDICARE

## 2024-01-23 VITALS
RESPIRATION RATE: 16 BRPM | BODY MASS INDEX: 20.42 KG/M2 | HEIGHT: 60 IN | DIASTOLIC BLOOD PRESSURE: 69 MMHG | SYSTOLIC BLOOD PRESSURE: 111 MMHG | HEART RATE: 82 BPM | WEIGHT: 104 LBS | TEMPERATURE: 98 F | OXYGEN SATURATION: 99 %

## 2024-01-23 DIAGNOSIS — N17.9 ACUTE KIDNEY INJURY (HCC): Primary | ICD-10-CM

## 2024-01-23 DIAGNOSIS — K65.1 INTRA-ABDOMINAL ABSCESS (HCC): ICD-10-CM

## 2024-01-23 DIAGNOSIS — E87.1 HYPONATREMIA: ICD-10-CM

## 2024-01-23 DIAGNOSIS — Z79.2 LONG TERM (CURRENT) USE OF ANTIBIOTICS: ICD-10-CM

## 2024-01-23 LAB
ALBUMIN SERPL-MCNC: 4.2 G/DL (ref 3.4–5)
ALBUMIN/GLOB SERPL: 1.2 {RATIO} (ref 1.1–2.2)
ALP SERPL-CCNC: 162 U/L (ref 40–129)
ALT SERPL-CCNC: 25 U/L (ref 10–40)
ANION GAP SERPL CALCULATED.3IONS-SCNC: 15 MMOL/L (ref 3–16)
AST SERPL-CCNC: 21 U/L (ref 15–37)
BASOPHILS # BLD: 0 K/UL (ref 0–0.2)
BASOPHILS NFR BLD: 0.6 %
BILIRUB SERPL-MCNC: 0.3 MG/DL (ref 0–1)
BILIRUB UR QL STRIP.AUTO: NEGATIVE
BUN SERPL-MCNC: 68 MG/DL (ref 7–20)
CALCIUM SERPL-MCNC: 9.6 MG/DL (ref 8.3–10.6)
CHLORIDE SERPL-SCNC: 87 MMOL/L (ref 99–110)
CLARITY UR: CLEAR
CO2 SERPL-SCNC: 22 MMOL/L (ref 21–32)
COLOR UR: YELLOW
CREAT SERPL-MCNC: 1.4 MG/DL (ref 0.6–1.2)
DEPRECATED RDW RBC AUTO: 14.5 % (ref 12.4–15.4)
EOSINOPHIL # BLD: 0.1 K/UL (ref 0–0.6)
EOSINOPHIL NFR BLD: 1.1 %
FLUAV RNA RESP QL NAA+PROBE: NOT DETECTED
FLUBV RNA RESP QL NAA+PROBE: NOT DETECTED
GFR SERPLBLD CREATININE-BSD FMLA CKD-EPI: 40 ML/MIN/{1.73_M2}
GLUCOSE SERPL-MCNC: 135 MG/DL (ref 70–99)
GLUCOSE UR STRIP.AUTO-MCNC: NEGATIVE MG/DL
HCT VFR BLD AUTO: 34.7 % (ref 36–48)
HGB BLD-MCNC: 11.7 G/DL (ref 12–16)
HGB UR QL STRIP.AUTO: NEGATIVE
KETONES UR STRIP.AUTO-MCNC: NEGATIVE MG/DL
LEUKOCYTE ESTERASE UR QL STRIP.AUTO: NEGATIVE
LYMPHOCYTES # BLD: 1 K/UL (ref 1–5.1)
LYMPHOCYTES NFR BLD: 13.5 %
MCH RBC QN AUTO: 30.3 PG (ref 26–34)
MCHC RBC AUTO-ENTMCNC: 33.8 G/DL (ref 31–36)
MCV RBC AUTO: 89.7 FL (ref 80–100)
MONOCYTES # BLD: 0.6 K/UL (ref 0–1.3)
MONOCYTES NFR BLD: 9 %
NEUTROPHILS # BLD: 5.4 K/UL (ref 1.7–7.7)
NEUTROPHILS NFR BLD: 75.8 %
NITRITE UR QL STRIP.AUTO: NEGATIVE
OSMOLALITY UR: 535 MOSM/KG (ref 390–1070)
PH UR STRIP.AUTO: 5.5 [PH] (ref 5–8)
PLATELET # BLD AUTO: 360 K/UL (ref 135–450)
PMV BLD AUTO: 7.3 FL (ref 5–10.5)
POTASSIUM SERPL-SCNC: 4 MMOL/L (ref 3.5–5.1)
PROT SERPL-MCNC: 7.8 G/DL (ref 6.4–8.2)
PROT UR STRIP.AUTO-MCNC: NEGATIVE MG/DL
RBC # BLD AUTO: 3.87 M/UL (ref 4–5.2)
SARS-COV-2 RNA RESP QL NAA+PROBE: NOT DETECTED
SODIUM SERPL-SCNC: 124 MMOL/L (ref 136–145)
SP GR UR STRIP.AUTO: 1.02 (ref 1–1.03)
UA COMPLETE W REFLEX CULTURE PNL UR: NORMAL
UA DIPSTICK W REFLEX MICRO PNL UR: NORMAL
URN SPEC COLLECT METH UR: NORMAL
UROBILINOGEN UR STRIP-ACNC: 0.2 E.U./DL
WBC # BLD AUTO: 7.1 K/UL (ref 4–11)

## 2024-01-23 PROCEDURE — 81003 URINALYSIS AUTO W/O SCOPE: CPT

## 2024-01-23 PROCEDURE — 80053 COMPREHEN METABOLIC PANEL: CPT

## 2024-01-23 PROCEDURE — 96365 THER/PROPH/DIAG IV INF INIT: CPT

## 2024-01-23 PROCEDURE — 87636 SARSCOV2 & INF A&B AMP PRB: CPT

## 2024-01-23 PROCEDURE — 36415 COLL VENOUS BLD VENIPUNCTURE: CPT

## 2024-01-23 PROCEDURE — 1200000000 HC SEMI PRIVATE

## 2024-01-23 PROCEDURE — 2580000003 HC RX 258: Performed by: REGISTERED NURSE

## 2024-01-23 PROCEDURE — 6360000002 HC RX W HCPCS: Performed by: REGISTERED NURSE

## 2024-01-23 PROCEDURE — 83935 ASSAY OF URINE OSMOLALITY: CPT

## 2024-01-23 PROCEDURE — 85025 COMPLETE CBC W/AUTO DIFF WBC: CPT

## 2024-01-23 PROCEDURE — 99285 EMERGENCY DEPT VISIT HI MDM: CPT

## 2024-01-23 RX ORDER — SODIUM CHLORIDE 9 MG/ML
INJECTION, SOLUTION INTRAVENOUS PRN
Status: DISCONTINUED | OUTPATIENT
Start: 2024-01-23 | End: 2024-01-26 | Stop reason: HOSPADM

## 2024-01-23 RX ORDER — ONDANSETRON 2 MG/ML
4 INJECTION INTRAMUSCULAR; INTRAVENOUS EVERY 6 HOURS PRN
Status: DISCONTINUED | OUTPATIENT
Start: 2024-01-23 | End: 2024-01-26 | Stop reason: HOSPADM

## 2024-01-23 RX ORDER — SENNOSIDES A AND B 8.6 MG/1
1 TABLET, FILM COATED ORAL DAILY PRN
Status: DISCONTINUED | OUTPATIENT
Start: 2024-01-23 | End: 2024-01-26 | Stop reason: HOSPADM

## 2024-01-23 RX ORDER — LACTOBACILLUS RHAMNOSUS GG 10B CELL
1 CAPSULE ORAL 2 TIMES DAILY
Status: DISCONTINUED | OUTPATIENT
Start: 2024-01-24 | End: 2024-01-26 | Stop reason: HOSPADM

## 2024-01-23 RX ORDER — SODIUM CHLORIDE 9 MG/ML
INJECTION, SOLUTION INTRAVENOUS CONTINUOUS
Status: DISCONTINUED | OUTPATIENT
Start: 2024-01-24 | End: 2024-01-24

## 2024-01-23 RX ORDER — SODIUM CHLORIDE 0.9 % (FLUSH) 0.9 %
10 SYRINGE (ML) INJECTION EVERY 12 HOURS SCHEDULED
Status: DISCONTINUED | OUTPATIENT
Start: 2024-01-24 | End: 2024-01-26 | Stop reason: HOSPADM

## 2024-01-23 RX ORDER — ACETAMINOPHEN 650 MG/1
650 SUPPOSITORY RECTAL EVERY 6 HOURS PRN
Status: DISCONTINUED | OUTPATIENT
Start: 2024-01-23 | End: 2024-01-26 | Stop reason: HOSPADM

## 2024-01-23 RX ORDER — SODIUM CHLORIDE 0.9 % (FLUSH) 0.9 %
10 SYRINGE (ML) INJECTION PRN
Status: DISCONTINUED | OUTPATIENT
Start: 2024-01-23 | End: 2024-01-26 | Stop reason: HOSPADM

## 2024-01-23 RX ORDER — ACETAMINOPHEN 325 MG/1
650 TABLET ORAL EVERY 6 HOURS PRN
Status: DISCONTINUED | OUTPATIENT
Start: 2024-01-23 | End: 2024-01-26 | Stop reason: HOSPADM

## 2024-01-23 RX ORDER — HEPARIN SODIUM 5000 [USP'U]/ML
5000 INJECTION, SOLUTION INTRAVENOUS; SUBCUTANEOUS EVERY 8 HOURS SCHEDULED
Status: DISCONTINUED | OUTPATIENT
Start: 2024-01-24 | End: 2024-01-26 | Stop reason: HOSPADM

## 2024-01-23 RX ORDER — SODIUM CHLORIDE 9 MG/ML
1000 INJECTION, SOLUTION INTRAVENOUS CONTINUOUS
Status: DISCONTINUED | OUTPATIENT
Start: 2024-01-23 | End: 2024-01-23 | Stop reason: HOSPADM

## 2024-01-23 RX ADMIN — SODIUM CHLORIDE 1000 ML: 9 INJECTION, SOLUTION INTRAVENOUS at 20:24

## 2024-01-23 RX ADMIN — PIPERACILLIN SODIUM AND TAZOBACTAM SODIUM 3375 MG: 3; .375 INJECTION, POWDER, LYOPHILIZED, FOR SOLUTION INTRAVENOUS at 20:28

## 2024-01-23 ASSESSMENT — PAIN - FUNCTIONAL ASSESSMENT: PAIN_FUNCTIONAL_ASSESSMENT: NONE - DENIES PAIN

## 2024-01-23 ASSESSMENT — ENCOUNTER SYMPTOMS: SHORTNESS OF BREATH: 0

## 2024-01-23 NOTE — TELEPHONE ENCOUNTER
Above all reviewed.  I agree with Luiza's recommendation.  Recommend patient to be sent to the ER for further evaluation of acute kidney injury issues and keep on with holding ertapenem in the meanwhile

## 2024-01-23 NOTE — TELEPHONE ENCOUNTER
Spoke with patient's nurse Elham at [facility] and advised of orders to have patient evaluated in ER for SUSY and she v/u. States they will have patient brought to F for continuity of care

## 2024-01-23 NOTE — TELEPHONE ENCOUNTER
Upon actual receipt of labs from yesterday-  It is our formal recommendation that patient go to ED.  I doubt they can get a nephro consult in the facility that quickly.    Whole BMP is abnormal including hyponatremia, hyperkalemia, AGMA, SUSY.    Luiza Acevedo, PharmD, [facility]  Clinical Pharmacy Specialist- [facility]  Phone: [phone] (also available on Spotsetter)  Fax: [phone]    For Pharmacy Admin Tracking Only    Program: Medical Group  CPA in place: Yes  Intervention Detail: Referral to Other Provider  Time Spent (min): 10

## 2024-01-23 NOTE — ED PROVIDER NOTES
Emergency Department Attending SUPERVISORY Provider Note  Location: [facility] ED  1/23/2024     Chief Complaint   Patient presents with    Abnormal Lab     Patient sent fom [facility] for abnormal lab.         PATIENT ID:  Tammy Frederick is a 73 y.o. female    Past Medical History:   Diagnosis Date    Arthritis        Tammy Frederick was evaluated in the Emergency Department for concerns of SUSY in the setting of Invanz use for recent intra-abdominal abscess and infection.  Labs at [facility] indicated that worsening renal function occurring since starting Invanz.  Recently seen by infectious disease at [facility] has already gotten some IV fluids, and I was asked to see this patient as we are transferring her back to [facility] for surgical and infectious diseases management given her SUSY with antibiotic use.  She has no real concerns, no lightheadedness or dizziness, and her abdominal pain still persist after surgery, that is not new..          Vitals:    01/23/24 1652   BP: 115/78   Pulse: 89   Resp: 16   Temp: 98 °F (36.7 °C)   SpO2: 97%        BASIC EXAM:  No acute distress.  Interactive, conversational, pleasant.  Respiratory rate regular, lungs are clear to auscultation bilaterally.  No obvious murmurs.  Abdomen is soft and nontender on my exam no significant leg swelling.       Labs Reviewed   CBC WITH AUTO DIFFERENTIAL - Abnormal; Notable for the following components:       Result Value    RBC 3.87 (*)     Hemoglobin 11.7 (*)     Hematocrit 34.7 (*)     All other components within normal limits   COMPREHENSIVE METABOLIC PANEL W/ REFLEX TO MG FOR LOW K - Abnormal; Notable for the following components:    Sodium 124 (*)     Chloride 87 (*)     Glucose 135 (*)     BUN 68 (*)     Creatinine 1.4 (*)     Est, Glom Filt Rate 40 (*)     Alkaline Phosphatase 162 (*)     All other components within normal limits   URINALYSIS WITH REFLEX TO CULTURE   OSMOLALITY, URINE         PLAN:   -Patient 
resection, appendectomy. They take Invanz.     Nursing Notes were all reviewed and agreed with or any disagreements were addressed  in the HPI.      REVIEW OF SYSTEMS    (2-9 systems for level 4, 10 or more for level 5)     Review of Systems   Constitutional:  Negative for chills and fever.   Respiratory:  Negative for chest tightness and shortness of breath.    Cardiovascular:  Negative for chest pain.       Positives and Pertinent negatives as per HPI.  Except as noted abovein the ROS, all other systems were reviewed and negative.       PAST MEDICAL HISTORY     Past Medical History:   Diagnosis Date    Arthritis          SURGICAL HISTORY     Past Surgical History:   Procedure Laterality Date    ABDOMEN SURGERY      cholecystectomy    COLOSTOMY N/A 12/28/2023    EXPLORATORY LAPAROTOMY, SIGMOID RESECTION, SMALL BOWEL RESECTION, APPENDECTOMY, DIVERTING LOOP ILEOSTOMY performed by Alban Robertson MD at [facility] OR    JOINT REPLACEMENT  9/17/12    right knee    TOTAL KNEE ARTHROPLASTY  5/16/2011    Left knee         CURRENTMEDICATIONS       Previous Medications    ERTAPENEM (INVANZ) INFUSION    Infuse 1,000 mg intravenously every 24 hours for 21 days Compound per protocol.    IBUPROFEN (ADVIL;MOTRIN) 200 MG TABLET    Take 1 tablet by mouth daily    LACTOBACILLUS (CULTURELLE) CAPSULE    Take 1 capsule by mouth in the morning and at bedtime    NICOTINE POLACRILEX (NICORETTE) 2 MG GUM    Take 1 each by mouth as needed for Smoking cessation    THERAPEUTIC MULTIVITAMIN-MINERALS (THERAGRAN-M) TABLET    Take 1 tablet by mouth daily         ALLERGIES     Patient has no known allergies.    FAMILYHISTORY     History reviewed. No pertinent family history.       SOCIAL HISTORY       Social History     Socioeconomic History    Marital status:      Spouse name: None    Number of children: None    Years of education: None    Highest education level: None   Tobacco Use    Smoking status: Never   Substance and Sexual Activity

## 2024-01-24 ENCOUNTER — APPOINTMENT (OUTPATIENT)
Dept: CT IMAGING | Age: 74
End: 2024-01-24
Attending: HOSPITALIST
Payer: MEDICARE

## 2024-01-24 PROBLEM — Z45.2 PERIPHERALLY INSERTED CENTRAL CATHETER (PICC) IN PLACE: Status: ACTIVE | Noted: 2024-01-24

## 2024-01-24 PROBLEM — Z90.49 HISTORY OF CHOLECYSTECTOMY: Status: ACTIVE | Noted: 2024-01-24

## 2024-01-24 LAB
ALBUMIN SERPL-MCNC: 3.8 G/DL (ref 3.4–5)
ANION GAP SERPL CALCULATED.3IONS-SCNC: 14 MMOL/L (ref 3–16)
BACTERIA URNS QL MICRO: NORMAL /HPF
BASOPHILS # BLD: 0 K/UL (ref 0–0.2)
BASOPHILS NFR BLD: 0.7 %
BILIRUB UR QL STRIP.AUTO: NEGATIVE
BUN SERPL-MCNC: 62 MG/DL (ref 7–20)
CALCIUM SERPL-MCNC: 9 MG/DL (ref 8.3–10.6)
CHLORIDE SERPL-SCNC: 96 MMOL/L (ref 99–110)
CHLORIDE UR-SCNC: <20 MMOL/L
CLARITY UR: ABNORMAL
CO2 SERPL-SCNC: 21 MMOL/L (ref 21–32)
COLOR UR: YELLOW
CREAT SERPL-MCNC: 1.2 MG/DL (ref 0.6–1.2)
CREAT UR-MCNC: 91.1 MG/DL (ref 28–259)
CRP SERPL-MCNC: <3 MG/L (ref 0–5.1)
DEPRECATED RDW RBC AUTO: 14.1 % (ref 12.4–15.4)
EOSINOPHIL # BLD: 0.1 K/UL (ref 0–0.6)
EOSINOPHIL NFR BLD: 1.3 %
EPI CELLS #/AREA URNS AUTO: 1 /HPF (ref 0–5)
ERYTHROCYTE [SEDIMENTATION RATE] IN BLOOD BY WESTERGREN METHOD: 47 MM/HR (ref 0–30)
GFR SERPLBLD CREATININE-BSD FMLA CKD-EPI: 48 ML/MIN/{1.73_M2}
GLUCOSE SERPL-MCNC: 114 MG/DL (ref 70–99)
GLUCOSE UR STRIP.AUTO-MCNC: NEGATIVE MG/DL
HCT VFR BLD AUTO: 31 % (ref 36–48)
HGB BLD-MCNC: 10.5 G/DL (ref 12–16)
HGB UR QL STRIP.AUTO: NEGATIVE
HYALINE CASTS #/AREA URNS AUTO: 3 /LPF (ref 0–8)
KETONES UR STRIP.AUTO-MCNC: NEGATIVE MG/DL
LEUKOCYTE ESTERASE UR QL STRIP.AUTO: NEGATIVE
LYMPHOCYTES # BLD: 1 K/UL (ref 1–5.1)
LYMPHOCYTES NFR BLD: 21.8 %
MCH RBC QN AUTO: 30.1 PG (ref 26–34)
MCHC RBC AUTO-ENTMCNC: 34 G/DL (ref 31–36)
MCV RBC AUTO: 88.4 FL (ref 80–100)
MONOCYTES # BLD: 0.5 K/UL (ref 0–1.3)
MONOCYTES NFR BLD: 11.3 %
NEUTROPHILS # BLD: 3 K/UL (ref 1.7–7.7)
NEUTROPHILS NFR BLD: 64.9 %
NITRITE UR QL STRIP.AUTO: NEGATIVE
OSMOLALITY SERPL: 295 MOSM/KG (ref 280–301)
OSMOLALITY UR: 556 MOSM/KG (ref 390–1070)
PH UR STRIP.AUTO: 5 [PH] (ref 5–8)
PHOSPHATE SERPL-MCNC: 3.4 MG/DL (ref 2.5–4.9)
PLATELET # BLD AUTO: 323 K/UL (ref 135–450)
PMV BLD AUTO: 7.6 FL (ref 5–10.5)
POTASSIUM SERPL-SCNC: 4.2 MMOL/L (ref 3.5–5.1)
PROT UR STRIP.AUTO-MCNC: NEGATIVE MG/DL
RBC # BLD AUTO: 3.51 M/UL (ref 4–5.2)
RBC CLUMPS #/AREA URNS AUTO: 1 /HPF (ref 0–4)
SODIUM SERPL-SCNC: 125 MMOL/L (ref 136–145)
SODIUM SERPL-SCNC: 131 MMOL/L (ref 136–145)
SODIUM SERPL-SCNC: 131 MMOL/L (ref 136–145)
SODIUM SERPL-SCNC: 134 MMOL/L (ref 136–145)
SODIUM UR-SCNC: <20 MMOL/L
SP GR UR STRIP.AUTO: 1.02 (ref 1–1.03)
TSH SERPL DL<=0.005 MIU/L-ACNC: 1.69 UIU/ML (ref 0.27–4.2)
UA COMPLETE W REFLEX CULTURE PNL UR: ABNORMAL
UA DIPSTICK W REFLEX MICRO PNL UR: YES
URATE SERPL-MCNC: 8.5 MG/DL (ref 2.6–6)
URN SPEC COLLECT METH UR: ABNORMAL
UROBILINOGEN UR STRIP-ACNC: 0.2 E.U./DL
WBC # BLD AUTO: 4.7 K/UL (ref 4–11)
WBC #/AREA URNS AUTO: 1 /HPF (ref 0–5)

## 2024-01-24 PROCEDURE — 82570 ASSAY OF URINE CREATININE: CPT

## 2024-01-24 PROCEDURE — 82436 ASSAY OF URINE CHLORIDE: CPT

## 2024-01-24 PROCEDURE — 83930 ASSAY OF BLOOD OSMOLALITY: CPT

## 2024-01-24 PROCEDURE — 84300 ASSAY OF URINE SODIUM: CPT

## 2024-01-24 PROCEDURE — 6370000000 HC RX 637 (ALT 250 FOR IP): Performed by: PHYSICIAN ASSISTANT

## 2024-01-24 PROCEDURE — 80069 RENAL FUNCTION PANEL: CPT

## 2024-01-24 PROCEDURE — 83935 ASSAY OF URINE OSMOLALITY: CPT

## 2024-01-24 PROCEDURE — 85652 RBC SED RATE AUTOMATED: CPT

## 2024-01-24 PROCEDURE — 99223 1ST HOSP IP/OBS HIGH 75: CPT | Performed by: INTERNAL MEDICINE

## 2024-01-24 PROCEDURE — 2580000003 HC RX 258: Performed by: INTERNAL MEDICINE

## 2024-01-24 PROCEDURE — 2580000003 HC RX 258: Performed by: PHYSICIAN ASSISTANT

## 2024-01-24 PROCEDURE — 84295 ASSAY OF SERUM SODIUM: CPT

## 2024-01-24 PROCEDURE — 6360000002 HC RX W HCPCS: Performed by: PHYSICIAN ASSISTANT

## 2024-01-24 PROCEDURE — 84443 ASSAY THYROID STIM HORMONE: CPT

## 2024-01-24 PROCEDURE — 84550 ASSAY OF BLOOD/URIC ACID: CPT

## 2024-01-24 PROCEDURE — 74176 CT ABD & PELVIS W/O CONTRAST: CPT

## 2024-01-24 PROCEDURE — 1200000000 HC SEMI PRIVATE

## 2024-01-24 PROCEDURE — 86140 C-REACTIVE PROTEIN: CPT

## 2024-01-24 PROCEDURE — 85025 COMPLETE CBC W/AUTO DIFF WBC: CPT

## 2024-01-24 PROCEDURE — 36415 COLL VENOUS BLD VENIPUNCTURE: CPT

## 2024-01-24 PROCEDURE — 81001 URINALYSIS AUTO W/SCOPE: CPT

## 2024-01-24 RX ORDER — DEXTROSE MONOHYDRATE 50 MG/ML
INJECTION, SOLUTION INTRAVENOUS CONTINUOUS
Status: ACTIVE | OUTPATIENT
Start: 2024-01-24 | End: 2024-01-25

## 2024-01-24 RX ADMIN — SODIUM CHLORIDE: 9 INJECTION, SOLUTION INTRAVENOUS at 01:15

## 2024-01-24 RX ADMIN — SODIUM CHLORIDE, PRESERVATIVE FREE 10 ML: 5 INJECTION INTRAVENOUS at 16:31

## 2024-01-24 RX ADMIN — HEPARIN SODIUM 5000 UNITS: 5000 INJECTION INTRAVENOUS; SUBCUTANEOUS at 07:12

## 2024-01-24 RX ADMIN — DEXTROSE MONOHYDRATE: 50 INJECTION, SOLUTION INTRAVENOUS at 12:04

## 2024-01-24 RX ADMIN — Medication 1 CAPSULE: at 08:32

## 2024-01-24 RX ADMIN — HEPARIN SODIUM 5000 UNITS: 5000 INJECTION INTRAVENOUS; SUBCUTANEOUS at 16:28

## 2024-01-24 RX ADMIN — HEPARIN SODIUM 5000 UNITS: 5000 INJECTION INTRAVENOUS; SUBCUTANEOUS at 01:08

## 2024-01-24 RX ADMIN — HEPARIN SODIUM 5000 UNITS: 5000 INJECTION INTRAVENOUS; SUBCUTANEOUS at 21:29

## 2024-01-24 RX ADMIN — Medication 1 CAPSULE: at 01:09

## 2024-01-24 RX ADMIN — Medication 1 CAPSULE: at 21:30

## 2024-01-24 ASSESSMENT — ENCOUNTER SYMPTOMS
BACK PAIN: 0
SINUS PRESSURE: 0
SINUS PAIN: 0
EYE REDNESS: 0
SORE THROAT: 0
NAUSEA: 0
COUGH: 0
CONSTIPATION: 0
SHORTNESS OF BREATH: 0
WHEEZING: 0
DIARRHEA: 0
ABDOMINAL PAIN: 0
EYE DISCHARGE: 0

## 2024-01-24 ASSESSMENT — PAIN SCALES - WONG BAKER
WONGBAKER_NUMERICALRESPONSE: 2

## 2024-01-24 NOTE — ACP (ADVANCE CARE PLANNING)
Advanced Care Planning Note.    Purpose of Encounter: Advanced care planning in light of SUSY  Parties In Attendance: Patient  Decisional Capacity: Yes  Subjective: Patient has poor appetite  Objective: Cr 1.2  Goals of Care Determination: Patient wants limited support (No x 4)  Plan:  Nephro c/s, ID c/s, monitor Na & Cr, appropriate fluids  Code Status: Limited x 4   Time spent on Advanced care Plannin minutes  Advanced Care Planning Documents: Completed advanced directives on chart, Ronit Neville ([phone]), sister, is the POA.    Arnoldo Downs MD  2024 1:31 PM

## 2024-01-24 NOTE — CARE COORDINATION
Discharge Planning:     (CM) called and spoke with Seema at [facility]. Patient was skilled at the time of discharge. Jose reported the patient did have a co-pay and was concerned about paying that to stay. Patient will need a pre-cert to return skilled. CM will speak with the patient about her discharge plan.    Electronically signed by Neo Frost on 1/24/24 at 8:54 AM EST

## 2024-01-24 NOTE — ED NOTES
Report called to JULIETH Sage at [facility]. Pt being transported via [facility].

## 2024-01-24 NOTE — CARE COORDINATION
01/24/24 1433   Safety   How often does anyone, including family and friends, physically hurt you? Never   How often does anyone, including family and friends, insult or talk down to you? Never   How often does anyone, including family and friends, threaten you with harm? Never   How often does anyone, including family and friends, scream or curse at you? Never   Safety Score 4     On admission the patient triggered after answering yes to the first question about physical harm. CM interviewed the patient and she denied any physical harm. The patient was admitted from [facility] and also asked if she was harmed in any way at the facility. The patient also denied any physical harm. No further needs at this time.  Electronically signed by Neo Frost on 1/24/24 at 2:37 PM EST

## 2024-01-24 NOTE — CONSULTS
Infectious Diseases   Consult Note        Admission Date: 1/23/2024  Hospital Day: Hospital Day: 2   Attending: Arnoldo Downs MD  Date of service: 1/24/24     Reason for admission: SUSY (acute kidney injury) (HCC) [N17.9]    Chief complaint/ Reason for consult: Intra-abdominal abscess    Microbiology:        I have reviewed allavailable micro lab data and cultures          Antibiotics and immunizations:       Current antibiotics: All antibiotics and their doses were reviewed by me    Recent Abx Admin                     piperacillin-tazobactam (ZOSYN) 3,375 mg in sodium chloride 0.9 % 100 mL IVPB (mini-bag) (mg) 3,375 mg New Bag 01/23/24 2028                      Immunization History: All immunization history was reviewed by me today.    Immunization History   Administered Date(s) Administered    Influenza Virus Vaccine 10/01/2011, 09/18/2012    Pneumococcal Conjugate 7-valent (Prevnar7) 10/01/2011       Known drug allergies:     All allergies were reviewed and updated    No Known Allergies    Social history:     Social History:  All social andepidemiologic history was reviewed and updated by me today as needed.     Tobacco use:   reports that she has never smoked. She does not have any smokeless tobacco history on file.  Alcohol use:   reports current alcohol use.  Currently lives in: [place] [id number]   reports no history of drug use.     COVID VACCINATION AND LAB RESULT RECORDS:     Internal Administration   First Dose      Second Dose           Last COVID Lab SARS-CoV-2 RNA, RT PCR (no units)   Date Value   01/23/2024 NOT DETECTED     POC Glucose (mg/dl)   Date Value   12/30/2023 144 (H)            Assessment:     The patient is a 73 y.o. old female who  has a past medical history of Arthritis. with following problems:    Acute kidney injury-improving with IV hydration  Recent perforated diverticulitis with multiple abdominal abscess, s/p exploratory laparotomy with takedown of the splenic flexure, small

## 2024-01-24 NOTE — CARE COORDINATION
Case Management Assessment  Initial Evaluation    Date/Time of Evaluation: 1/24/2024 2:18 PM  Assessment Completed by: Neo Frost    If patient is discharged prior to next notation, then this note serves as note for discharge by case management.    Patient Name: Tammy Frederick                   YOB: 1950  Diagnosis: SUSY (acute kidney injury) (HCC) [N17.9]                   Date / Time: 1/23/2024 11:04 PM    Patient Admission Status: Inpatient   Readmission Risk (Low < 19, Mod (19-27), High > 27): Readmission Risk Score: 14.8    Current PCP: No primary care provider on file.  PCP verified by CM? Yes    Chart Reviewed: Yes      History Provided by: Patient  Patient Orientation: Alert and Oriented    Patient Cognition: Alert    Hospitalization in the last 30 days (Readmission):  Yes    If yes, Readmission Assessment in CM Navigator will be completed.    Advance Directives:      Code Status: Limited   Patient's Primary Decision Maker is: Legal Next of Kin      Discharge Planning:    Patient lives with: Alone Type of Home: House  Primary Care Giver: Other (Comment) (Milla from [facility])  Patient Support Systems include: / (Milla from [facility])   Current Financial resources: Medicare  Current community resources: None  Current services prior to admission: Extended Care Facility (Milla from [facility])            Current DME: Walker, Other (Comment) (cane)            Type of Home Care services:  None    ADLS  Prior functional level: Assistance with the following:, Bathing, Dressing, Mobility, Cooking, Toileting  Current functional level: Assistance with the following:, Bathing, Dressing, Toileting, Mobility    PT AM-PAC:   /24  OT AM-PAC:   /24    Family can provide assistance at DC: No  Would you like Case Management to discuss the discharge plan with any other family members/significant others, and if so, who? No  Plans to Return

## 2024-01-24 NOTE — CARE COORDINATION
01/24/24 1358   Readmission Assessment   Number of Days since last admission? 8-30 days   Previous Disposition SNF  (Milla from [facility])   Who is being Interviewed Patient   What was the patient's/caregiver's perception as to why they think they needed to return back to the hospital? Other (Comment)  (the antibotic was hurting my kidneys)   Did you visit your Primary Care Physician after you left the hospital, before you returned this time? No   Why weren't you able to visit your PCP? Did not have an appointment   Did you see a specialist, such as Cardiac, Pulmonary, Orthopedic Physician, etc. after you left the hospital? Yes   Who advised the patient to return to the hospital? Other (Comment);Skilled Unit   Does the patient report anything that got in the way of taking their medications? No   In our efforts to provide the best possible care to you and others like you, can you think of anything that we could have done to help you after you left the hospital the first time, so that you might not have needed to return so soon? Other (Comment)  (no)     Electronically signed by Neo Frost on 1/24/24 at 2:18 PM EST
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
## 2024-01-24 NOTE — CONSULTS
[facility]  Consultation Note    Reason for Consult:  SUSY, Hyponatremia  Requesting Physician:  Dr. JOSE Downs    CHIEF COMPLAINT:  SUSY    History obtained from records and patient.    HISTORY OF PRESENT ILLNESS:                Tammy Frederick  is 73 y.o. y.o. female with significant past medical history of Arthritis who presents with crea of 3.1. Na was 124.  Initially seen at [facility] ED.  Her baseline crea is 0.5-0.9.  Vomiting x 1.  Lowest sbp in the 100's.  On Ertapenem.  Recently admitted at [facility] due to diverticulitis with perforation/abscess.  Discharged to SNF on 1/3/24 with Invanz.   Denied daily Ibuprofen use.  Denies any new skin rash nor change in urine output.     Past Medical History:     has a past medical history of Arthritis.   Past Surgical History:     has a past surgical history that includes Abdomen surgery; Total knee arthroplasty (5/16/2011); joint replacement (9/17/12); and colostomy (N/A, 12/28/2023).   Current Medications:    Current Facility-Administered Medications: nicotine polacrilex (NICORETTE) gum 2 mg, 2 mg, Oral, Q2H PRN  lactobacillus (CULTURELLE) capsule 1 capsule, 1 capsule, Oral, BID  0.9 % sodium chloride infusion, , IntraVENous, Continuous  sodium chloride flush 0.9 % injection 10 mL, 10 mL, IntraVENous, 2 times per day  sodium chloride flush 0.9 % injection 10 mL, 10 mL, IntraVENous, PRN  0.9 % sodium chloride infusion, , IntraVENous, PRN  ondansetron (ZOFRAN) injection 4 mg, 4 mg, IntraVENous, Q6H PRN  senna (SENOKOT) tablet 8.6 mg, 1 tablet, Oral, Daily PRN  acetaminophen (TYLENOL) tablet 650 mg, 650 mg, Oral, Q6H PRN **OR** acetaminophen (TYLENOL) suppository 650 mg, 650 mg, Rectal, Q6H PRN  heparin (porcine) injection 5,000 Units, 5,000 Units, SubCUTAneous, 3 times per day  Allergies:    Patient has no known allergies.   Social History:     reports that she has never smoked. She does not have any smokeless tobacco history on file. She

## 2024-01-24 NOTE — H&P
Shane Pate MD   lactobacillus (CULTURELLE) capsule Take 1 capsule by mouth in the morning and at bedtime 12/27/23 1/26/24  Shane Pate MD   nicotine polacrilex (NICORETTE) 2 MG gum Take 1 each by mouth as needed for Smoking cessation    Ivone Dockery MD   ibuprofen (ADVIL;MOTRIN) 200 MG tablet Take 1 tablet by mouth daily    Ivone Dockery MD   therapeutic multivitamin-minerals (THERAGRAN-M) tablet Take 1 tablet by mouth daily    ProviderIvone MD       Allergies:  Patient has no known allergies.    Social History:      TOBACCO:   reports that she has never smoked. She does not have any smokeless tobacco history on file.  ETOH:   reports current alcohol use.  DRUGS:  reports no history of drug use.    Family History:      Reviewed in detail positive as follows:    History reviewed. No pertinent family history.    REVIEW OF SYSTEMS:   Pertinent positives as noted in the HPI. All other systems reviewed and negative.    PHYSICAL EXAM PERFORMED:    /71   Pulse 93   Temp 97.6 °F (36.4 °C) (Oral)   Resp 18   Wt 47.7 kg (105 lb 2.6 oz)   SpO2 96%   BMI 20.54 kg/m²     General appearance:  Awake, alert, no apparent distress  HEENT:  Normocephalic, atraumatic without obvious deformity. PERRL. EOM intact. Conjunctivae/corneas clear.  Neck: Supple, with full range of motion. No JVD. Trachea midline.  Respiratory:  Clear to auscultation bilaterally without rales, wheezes, or rhonchi. Normal respiratory effort.   Cardiovascular:  Regular rate and rhythm without murmurs, rubs or gallops.   Abdomen: Soft, NT, ND, without rebound or guarding. Normal bowel sounds.  Extremities:  No clubbing, cyanosis, or edema bilaterally.  Full range of motion without deformity. +2 palpable pulses, equal bilaterally. Capillary refill brisk,< 3 seconds   Skin: No rashes or lesions. Warm/dry.  Neurologic:  Neurovascularly intact without any focal sensory/motor deficits. Cranial nerves: II-XII intact, grossly

## 2024-01-24 NOTE — PROGRESS NOTES
Admit Date: 1/23/2024  Diet: ADULT DIET; Regular  ADULT ORAL NUTRITION SUPPLEMENT; PM Snack; Standard High Calorie/High Protein Oral Supplement    CC: SUSY    Interval history:   Overnight, there were no acute events. Patient's vitals remained stable    Patient was seen this morning in bed. She endorses that she had poor appetite and came back to the hospital because her Cr was increasing. Patient denies fevers, chills, nausea, vomiting, chest pain, shortness of breath, diarrhea, constipation, dysuria, urinary frequency or urgency.     Plan:     -FeNa pending  -D5W for HypoNa in light of overcorrection of Na 124> 131  -Will await further recommendations from Nephro & ID    Assessment:   Tammy Frederick is a 73 y.o. female with PMH of Diverticulitis with perforation and multiple abscesses s/p exploratory laparotomy, takedown of splenic flexure, small bowel resection with anastomosis, appendectomy, sigmoidectomy with low pelvic anastomosis and diverting loop ileostomy by Dr Robertson on 12/28 who was admitted with SUSY    SUSY  - Cr baseline 0.5, today 1.4 (peak 3.1 on 1/22)  - Etiology unclear, likely due in part to dehydration. BUN/Cr ratio 48.  Patient also taking NSAIDs  - IVF  - Avoid nephrotoxic medications  - Renally dose medications  - Monitor for electrolyte abnormalities   - Check UA/C&S     Hyponatremia  - Na 124  - IVF with NS  - Monitor Na Q4H - Goal Na not to increase by more than 6-8 points over the next 24 hrs.  - Check urine sodium, urine creatinine, urine and serum osmolality, TSH, uric acid  - Nephrology consulted     Diverticulitis with perforation and multiple abscesses  - Invanz currently being held due to SUSY per ID recommendation  - ID consulted    Code Status: Full Code   FEN: ADULT DIET; Regular  ADULT ORAL NUTRITION SUPPLEMENT; PM Snack; Standard High Calorie/High Protein Oral Supplement   DVT PPX: [] Lovenox, [x]Heparin, [] SCDs,[] Eliquis, [] Xarelto, [] Coumadin  DISPO: [facility]    Arnoldo LAURENT  MD Yareli  1/24/2024,  1:20 PM    This note was likely completed using voice recognition technology and may contain unintended errors.     Objective:   Vitals:   T-max:  Patient Vitals for the past 8 hrs:   BP Temp Temp src Pulse Resp SpO2 Height Weight   01/24/24 1050 111/70 98.1 °F (36.7 °C) Oral 85 18 96 % -- --   01/24/24 0812 102/66 97.7 °F (36.5 °C) Oral 76 18 95 % -- --   01/24/24 0733 -- -- -- -- -- -- 1.524 m (5') 47.1 kg (103 lb 13.4 oz)       Intake/Output Summary (Last 24 hours) at 1/24/2024 1320  Last data filed at 1/24/2024 1000  Gross per 24 hour   Intake 150 ml   Output --   Net 150 ml     Physical Exam  General appearance:  Awake, alert, no apparent distress  HEENT:  Normocephalic, atraumatic without obvious deformity. PERRL. EOM intact. Conjunctivae/corneas clear.  Neck: Supple, with full range of motion. No JVD. Trachea midline.  Respiratory:  Clear to auscultation bilaterally without rales, wheezes, or rhonchi. Normal respiratory effort.   Cardiovascular:  Regular rate and rhythm without murmurs, rubs or gallops.   Abdomen: Soft, NT, ND, without rebound or guarding. Normal bowel sounds.  Extremities:  No clubbing, cyanosis, or edema bilaterally.  Full range of motion without deformity. +2 palpable pulses, equal bilaterally. Capillary refill brisk,< 3 seconds   Skin: No rashes or lesions. Warm/dry.  Neurologic:  Neurovascularly intact without any focal sensory/motor deficits. Cranial nerves: II-XII intact, grossly non-focal. Alert and oriented x 3. Normal speech.  Psychiatric:  Thought content appropriate, normal insight.    Review of Systems  - Negative except Interval History    LABS:    CBC:   Recent Labs     01/22/24  0837 01/23/24  1700 01/24/24  0439   WBC 6.2 7.1 4.7   HGB 12.9 11.7* 10.5*   HCT 37.5 34.7* 31.0*   * 360 323   MCV 87.2 89.7 88.4     Renal:    Recent Labs     01/22/24  0837 01/23/24  1700 01/24/24  0439 01/24/24  0700   * 124* 131* 134*   K 5.7* 4.0 4.2  --    CL

## 2024-01-25 PROBLEM — E43 SEVERE MALNUTRITION (HCC): Status: ACTIVE | Noted: 2024-01-25

## 2024-01-25 LAB
ALBUMIN SERPL-MCNC: 4 G/DL (ref 3.4–5)
ANION GAP SERPL CALCULATED.3IONS-SCNC: 13 MMOL/L (ref 3–16)
BASOPHILS # BLD: 0 K/UL (ref 0–0.2)
BASOPHILS NFR BLD: 0.8 %
BUN SERPL-MCNC: 34 MG/DL (ref 7–20)
CALCIUM SERPL-MCNC: 9.6 MG/DL (ref 8.3–10.6)
CHLORIDE SERPL-SCNC: 94 MMOL/L (ref 99–110)
CO2 SERPL-SCNC: 21 MMOL/L (ref 21–32)
CREAT SERPL-MCNC: 0.9 MG/DL (ref 0.6–1.2)
DEPRECATED RDW RBC AUTO: 14.2 % (ref 12.4–15.4)
EKG ATRIAL RATE: 83 BPM
EKG DIAGNOSIS: NORMAL
EKG P AXIS: 84 DEGREES
EKG P-R INTERVAL: 166 MS
EKG Q-T INTERVAL: 362 MS
EKG QRS DURATION: 86 MS
EKG QTC CALCULATION (BAZETT): 425 MS
EKG R AXIS: -24 DEGREES
EKG T AXIS: 86 DEGREES
EKG VENTRICULAR RATE: 83 BPM
EOSINOPHIL # BLD: 0.1 K/UL (ref 0–0.6)
EOSINOPHIL NFR BLD: 1.6 %
GFR SERPLBLD CREATININE-BSD FMLA CKD-EPI: >60 ML/MIN/{1.73_M2}
GLUCOSE SERPL-MCNC: 114 MG/DL (ref 70–99)
HCT VFR BLD AUTO: 30.1 % (ref 36–48)
HGB BLD-MCNC: 10.4 G/DL (ref 12–16)
LACTATE BLDV-SCNC: 1.7 MMOL/L (ref 0.4–2)
LYMPHOCYTES # BLD: 1.2 K/UL (ref 1–5.1)
LYMPHOCYTES NFR BLD: 21.1 %
MAGNESIUM SERPL-MCNC: 1.9 MG/DL (ref 1.8–2.4)
MCH RBC QN AUTO: 30.4 PG (ref 26–34)
MCHC RBC AUTO-ENTMCNC: 34.5 G/DL (ref 31–36)
MCV RBC AUTO: 88.2 FL (ref 80–100)
MONOCYTES # BLD: 0.5 K/UL (ref 0–1.3)
MONOCYTES NFR BLD: 10 %
NEUTROPHILS # BLD: 3.6 K/UL (ref 1.7–7.7)
NEUTROPHILS NFR BLD: 66.5 %
PHOSPHATE SERPL-MCNC: 2.6 MG/DL (ref 2.5–4.9)
PLATELET # BLD AUTO: 311 K/UL (ref 135–450)
PMV BLD AUTO: 7.2 FL (ref 5–10.5)
POTASSIUM SERPL-SCNC: 4.3 MMOL/L (ref 3.5–5.1)
RBC # BLD AUTO: 3.41 M/UL (ref 4–5.2)
SODIUM SERPL-SCNC: 125 MMOL/L (ref 136–145)
SODIUM SERPL-SCNC: 127 MMOL/L (ref 136–145)
SODIUM SERPL-SCNC: 128 MMOL/L (ref 136–145)
SODIUM SERPL-SCNC: 130 MMOL/L (ref 136–145)
WBC # BLD AUTO: 5.5 K/UL (ref 4–11)

## 2024-01-25 PROCEDURE — 93010 ELECTROCARDIOGRAM REPORT: CPT | Performed by: INTERNAL MEDICINE

## 2024-01-25 PROCEDURE — 93005 ELECTROCARDIOGRAM TRACING: CPT | Performed by: INTERNAL MEDICINE

## 2024-01-25 PROCEDURE — 6360000002 HC RX W HCPCS: Performed by: FAMILY MEDICINE

## 2024-01-25 PROCEDURE — 6370000000 HC RX 637 (ALT 250 FOR IP): Performed by: PHYSICIAN ASSISTANT

## 2024-01-25 PROCEDURE — 83605 ASSAY OF LACTIC ACID: CPT

## 2024-01-25 PROCEDURE — 2580000003 HC RX 258: Performed by: INTERNAL MEDICINE

## 2024-01-25 PROCEDURE — 97116 GAIT TRAINING THERAPY: CPT

## 2024-01-25 PROCEDURE — 97165 OT EVAL LOW COMPLEX 30 MIN: CPT

## 2024-01-25 PROCEDURE — 97535 SELF CARE MNGMENT TRAINING: CPT

## 2024-01-25 PROCEDURE — 97530 THERAPEUTIC ACTIVITIES: CPT

## 2024-01-25 PROCEDURE — 6370000000 HC RX 637 (ALT 250 FOR IP): Performed by: INTERNAL MEDICINE

## 2024-01-25 PROCEDURE — 80069 RENAL FUNCTION PANEL: CPT

## 2024-01-25 PROCEDURE — 84295 ASSAY OF SERUM SODIUM: CPT

## 2024-01-25 PROCEDURE — 97161 PT EVAL LOW COMPLEX 20 MIN: CPT

## 2024-01-25 PROCEDURE — 85025 COMPLETE CBC W/AUTO DIFF WBC: CPT

## 2024-01-25 PROCEDURE — 1200000000 HC SEMI PRIVATE

## 2024-01-25 PROCEDURE — 6360000002 HC RX W HCPCS: Performed by: PHYSICIAN ASSISTANT

## 2024-01-25 PROCEDURE — 99233 SBSQ HOSP IP/OBS HIGH 50: CPT | Performed by: INTERNAL MEDICINE

## 2024-01-25 PROCEDURE — 83735 ASSAY OF MAGNESIUM: CPT

## 2024-01-25 PROCEDURE — 36415 COLL VENOUS BLD VENIPUNCTURE: CPT

## 2024-01-25 PROCEDURE — 2580000003 HC RX 258: Performed by: PHYSICIAN ASSISTANT

## 2024-01-25 RX ORDER — CIPROFLOXACIN 500 MG/1
500 TABLET, FILM COATED ORAL EVERY 12 HOURS SCHEDULED
Status: DISCONTINUED | OUTPATIENT
Start: 2024-01-25 | End: 2024-01-26 | Stop reason: HOSPADM

## 2024-01-25 RX ORDER — SODIUM CHLORIDE 9 MG/ML
INJECTION, SOLUTION INTRAVENOUS CONTINUOUS
Status: ACTIVE | OUTPATIENT
Start: 2024-01-25 | End: 2024-01-25

## 2024-01-25 RX ORDER — METRONIDAZOLE 250 MG/1
500 TABLET ORAL EVERY 8 HOURS SCHEDULED
Status: DISCONTINUED | OUTPATIENT
Start: 2024-01-25 | End: 2024-01-26 | Stop reason: HOSPADM

## 2024-01-25 RX ORDER — SODIUM CHLORIDE 1 G/1
1 TABLET ORAL
Status: DISCONTINUED | OUTPATIENT
Start: 2024-01-25 | End: 2024-01-26 | Stop reason: HOSPADM

## 2024-01-25 RX ORDER — MORPHINE SULFATE 2 MG/ML
2 INJECTION, SOLUTION INTRAMUSCULAR; INTRAVENOUS ONCE
Status: COMPLETED | OUTPATIENT
Start: 2024-01-25 | End: 2024-01-25

## 2024-01-25 RX ADMIN — HEPARIN SODIUM 5000 UNITS: 5000 INJECTION INTRAVENOUS; SUBCUTANEOUS at 15:12

## 2024-01-25 RX ADMIN — Medication 1 CAPSULE: at 20:21

## 2024-01-25 RX ADMIN — SODIUM CHLORIDE: 9 INJECTION, SOLUTION INTRAVENOUS at 17:55

## 2024-01-25 RX ADMIN — Medication 1 G: at 17:17

## 2024-01-25 RX ADMIN — SODIUM CHLORIDE: 9 INJECTION, SOLUTION INTRAVENOUS at 13:28

## 2024-01-25 RX ADMIN — SODIUM CHLORIDE, PRESERVATIVE FREE 10 ML: 5 INJECTION INTRAVENOUS at 08:34

## 2024-01-25 RX ADMIN — HEPARIN SODIUM 5000 UNITS: 5000 INJECTION INTRAVENOUS; SUBCUTANEOUS at 20:21

## 2024-01-25 RX ADMIN — METRONIDAZOLE 500 MG: 250 TABLET ORAL at 20:22

## 2024-01-25 RX ADMIN — CIPROFLOXACIN HYDROCHLORIDE 500 MG: 500 TABLET, FILM COATED ORAL at 20:22

## 2024-01-25 RX ADMIN — HEPARIN SODIUM 5000 UNITS: 5000 INJECTION INTRAVENOUS; SUBCUTANEOUS at 05:15

## 2024-01-25 RX ADMIN — METRONIDAZOLE 500 MG: 250 TABLET ORAL at 15:12

## 2024-01-25 RX ADMIN — Medication 1 G: at 13:23

## 2024-01-25 RX ADMIN — MORPHINE SULFATE 2 MG: 2 INJECTION, SOLUTION INTRAMUSCULAR; INTRAVENOUS at 06:45

## 2024-01-25 RX ADMIN — Medication 1 CAPSULE: at 08:34

## 2024-01-25 RX ADMIN — SODIUM CHLORIDE, PRESERVATIVE FREE 10 ML: 5 INJECTION INTRAVENOUS at 20:22

## 2024-01-25 ASSESSMENT — PAIN SCALES - WONG BAKER
WONGBAKER_NUMERICALRESPONSE: 2

## 2024-01-25 ASSESSMENT — ENCOUNTER SYMPTOMS
BACK PAIN: 0
CONSTIPATION: 0
BLOOD IN STOOL: 0
CHEST TIGHTNESS: 0
EYE REDNESS: 0
VOMITING: 0
SORE THROAT: 0
NAUSEA: 0
SINUS PAIN: 0
WHEEZING: 0
EYE DISCHARGE: 0
ABDOMINAL PAIN: 0
SHORTNESS OF BREATH: 0
PHOTOPHOBIA: 0
FACIAL SWELLING: 0
COUGH: 0
SINUS PRESSURE: 0
DIARRHEA: 0
ABDOMINAL DISTENTION: 0
RHINORRHEA: 0

## 2024-01-25 ASSESSMENT — PAIN DESCRIPTION - LOCATION: LOCATION: BACK

## 2024-01-25 NOTE — PLAN OF CARE
Problem: Safety - Adult  Goal: Free from fall injury  Outcome: Progressing     Problem: Pain  Goal: Verbalizes/displays adequate comfort level or baseline comfort level  Outcome: Progressing     Problem: Nutrition Deficit:  Goal: Optimize nutritional status  Outcome: Progressing     Problem: ABCDS Injury Assessment  Goal: Absence of physical injury  Outcome: Progressing

## 2024-01-25 NOTE — PROGRESS NOTES
Assessment complete. VSS. Patient sitting up in bed eating breakfast. Respirations even and easy.  in to see the patient this morning. Call light in reach. Fall precautions in place. No needs expressed at this time. Will continue to monitor.

## 2024-01-25 NOTE — PROGRESS NOTES
[facility] - Inpatient Rehabilitation Department   Phone: [phone]    Occupational Therapy    [x] Initial Evaluation            [] Daily Treatment Note         [] Discharge Summary      Patient: Tammy Frederick   : 1950   MRN: [id number]   Date of Service:  2024    Admitting Diagnosis:  SUSY (acute kidney injury) (HCC)  Current Admission Summary: Tammy Frederick is a 73 y.o. female with PMH of Diverticulitis with perforation and multiple abscesses s/p exploratory laparotomy, takedown of splenic flexure, small bowel resection with anastomosis, appendectomy, sigmoidectomy with low pelvic anastomosis and diverting loop ileostomy by Dr Robertson on  who was admitted with SUSY   Past Medical History:  has a past medical history of Arthritis.  Past Surgical History:  has a past surgical history that includes Abdomen surgery; Total knee arthroplasty (2011); joint replacement (12); and colostomy (N/A, 2023).    Discharge Recommendations: Tammy Frederick scored a 22/24 on the AM-PAC ADL Inpatient form. Current research shows that an AM-PAC score of 18 or greater is typically associated with a discharge to the patient's home setting. Based on the patient's AM-PAC score, and their current ADL deficits, it is recommended that the patient have 2-3 sessions per week of Occupational Therapy at d/c to increase the patient's independence.  At this time, this patient demonstrates the endurance and safety to discharge home with home (home vs OP services) and a follow up treatment frequency of 2-3x/wk.   Please see assessment section for further patient specific details.    If patient discharges prior to next session this note will serve as a discharge summary.  Please see below for the latest assessment towards goals.      HOME HEALTH CARE: LEVEL 3 SAFETY     - Initial home health evaluation to occur within 24-48 hours, in patient home   - Therapy evaluations in home within 24-48 hours of

## 2024-01-25 NOTE — PROGRESS NOTES
[facility] Note    Patient Active Problem List   Diagnosis    OA (osteoarthritis) of knee    Right TKR    Abscess of sigmoid colon    Intra-abdominal abscess (HCC)    E coli infection    Fecal peritonitis (HCC)    Streptococcal infection group C    Diverticular disease of both small and large intestine with perforation and abscess    Complex care coordination    Receiving intravenous antibiotic treatment as outpatient    CRP elevated    Elevated erythrocyte sedimentation rate    SUSY (acute kidney injury) (HCC)    History of cholecystectomy    Peripherally inserted central catheter (PICC) in place    Severe malnutrition (HCC)       Past Medical History:   has a past medical history of Arthritis.    Past Social History:   reports that she has never smoked. She does not have any smokeless tobacco history on file. She reports current alcohol use. She reports that she does not use drugs.    Subjective:    No nausea/vomiting  No neurologic symptoms    Review of Systems   Constitutional:  Positive for fatigue. Negative for activity change, appetite change, chills, fever and unexpected weight change.   HENT:  Negative for congestion and facial swelling.    Eyes:  Negative for photophobia, discharge and redness.   Respiratory:  Negative for cough, chest tightness and shortness of breath.    Cardiovascular:  Negative for chest pain, palpitations and leg swelling.   Gastrointestinal:  Negative for abdominal distention, abdominal pain, blood in stool, constipation, diarrhea, nausea and vomiting.   Endocrine: Negative for cold intolerance, heat intolerance and polyuria.   Genitourinary:  Negative for decreased urine volume, difficulty urinating, flank pain and hematuria.   Musculoskeletal:  Negative for joint swelling and neck pain.   Neurological:  Negative for dizziness, seizures, syncope, speech difficulty, light-headedness and headaches.   Hematological:  Does not bruise/bleed easily.

## 2024-01-25 NOTE — PROGRESS NOTES
Physical Therapy    [facility] - Inpatient Rehabilitation Department   Phone: [phone]    Physical Therapy    [x] Initial Evaluation            [] Daily Treatment Note         [] Discharge Summary      Patient: Tammy Frederick   : 1950   MRN: [id number]   Date of Service:  2024  Admitting Diagnosis: SSUY (acute kidney injury) (HCC)  Current Admission Summary: Tammy Frederick is a 73 y.o. female who presented to [facility] ED for SUSY.  Patient was recently admitted to [facility] for diverticulitis with perforation/abscess.  She was discharged to SNF on 1/3/2024 on Invanz.  Over the past few days patient has developed SUSY.  Invanz was held per ID recommendation but creatinine continued to trend up.  Patient was sent to ED today for further evaluation and management.  Creatinine peaked at 3.1 yesterday.  Creatinine today 1.4, baseline 0.5.  Patient also noted with hyponatremia, Na 124 which has slowly down trended over the past week from 136.  ID service is not available at [facility] so patient was transferred to [facility] for further evaluation and management.  Invanz to be held for now per ID.  Discussed case with nephrology.  Urine labs ordered.  Will start IV fluids.  Monitor sodium q.4 hours with goal sodium not to exceed 6 to 8 mEq increase over the next 24 hours.  Ibuprofen on patient's home med list.  She believes she has been getting this every morning but she is not sure.  Updated medication list was not sent from [facility].  Patient denies any complaints or concerns at this time.   Past Medical History:  has a past medical history of Arthritis.  Past Surgical History:  has a past surgical history that includes Abdomen surgery; Total knee arthroplasty (2011); joint replacement (12); and colostomy (N/A, 2023).  Discharge Recommendations: Tammy Frederick scored a 20/24 on the AM-PAC short mobility form. Current research shows that an AM-PAC score of 18 or greater is typically    Impairments Requiring Therapeutic Intervention: decreased functional mobility, decreased endurance, decreased balance  Prognosis: good  Clinical Assessment: Patient is a 73 year old female admitted for SUSY from [facility] after recent abdominal abscess surgery. She reports that she has been getting stronger at rehab and would like to return home. Currently, patient requires SBA for short distance gait training with no device and supervision with a RW. Patient demonstrates decreased activity tolerance and would benefit from additional HHPT upon discharge to increase independence and safety with functional mobility in her home setting.   Safety Interventions: patient left in chair, chair alarm in place, call light within reach, gait belt, patient at risk for falls, and nurse notified    Plan  Frequency: 1-2 x/per week  Current Treatment Recommendations: strengthening, balance training, functional mobility training, transfer training, gait training, endurance training, safety education, and equipment evaluation/education    Goals  Patient Goals: To return home   Short Term Goals:  Time Frame: Upon discharge   Patient will complete transfers at modified independent   Patient will ambulate 300 ft with use of rolling walker at modified independent    Above goals reviewed on 1/25/2024.  All goals are ongoing at this time unless indicated above.      Therapy Session Time      Individual Group Co-treatment   Time In     1104   Time Out     1157   Minutes     53     Timed Code Treatment Minutes:  38 Minutes  Total Treatment Minutes:  53 minutes        Electronically Signed By: Modesta Diaz PT, DPT [id number]

## 2024-01-25 NOTE — PROGRESS NOTES
Infectious Diseases   Progress Note      Admission Date: 1/23/2024  Hospital Day: Hospital Day: 3   Attending: Arnoldo Downs MD  Date of service: 1/25/2024     Chief complaint/ Reason for consult:     Acute kidney injury-improving with IV hydration  Recent perforated diverticulitis with multiple abdominal abscess, s/p exploratory laparotomy with takedown of the splenic flexure, small bowel resection, appendectomy, sigmoidectomy with low pelvic anastomosis and diverting loop ileostomy surgery on 12/28/2023    Microbiology:        I have reviewed allavailable micro lab data and cultures          Antibiotics and immunizations:       Current antibiotics: All antibiotics and their doses were reviewed by me    Recent Abx Admin        No antibiotic orders with administrations found.                      Immunization History: All immunization history was reviewed by me today.    Immunization History   Administered Date(s) Administered    Influenza Virus Vaccine 10/01/2011, 09/18/2012    Pneumococcal Conjugate 7-valent (Prevnar7) 10/01/2011       Known drug allergies:     All allergies were reviewed and updated    No Known Allergies    Social history:     Social History:  All social andepidemiologic history was reviewed and updated by me today as needed.     Tobacco use:   reports that she has never smoked. She does not have any smokeless tobacco history on file.  Alcohol use:   reports current alcohol use.  Currently lives in: [place] [id number]   reports no history of drug use.     COVID VACCINATION AND LAB RESULT RECORDS:     Internal Administration   First Dose      Second Dose           Last COVID Lab SARS-CoV-2 RNA, RT PCR (no units)   Date Value   01/23/2024 NOT DETECTED     POC Glucose (mg/dl)   Date Value   12/30/2023 144 (H)            Assessment:     The patient is a 73 y.o. old female who  has a past medical history of Arthritis. with following problems:    Acute kidney injury-resolved  Recent perforated  Mood normal.         Behavior: Behavior normal.            Lines and drains: All vascular access sites are healthy with no local erythema, discharge or tenderness.      Intake and output:    I/O last 3 completed shifts:  In: 2485.3 [P.O.:540; I.V.:1945.3]  Out: -     Lab Data:   All available labs and old records have been reviewed by me.    CBC:  Recent Labs     01/23/24  1700 01/24/24  0439 01/25/24  0515   WBC 7.1 4.7 5.5   RBC 3.87* 3.51* 3.41*   HGB 11.7* 10.5* 10.4*   HCT 34.7* 31.0* 30.1*    323 311   MCV 89.7 88.4 88.2   MCH 30.3 30.1 30.4   MCHC 33.8 34.0 34.5   RDW 14.5 14.1 14.2        BMP:  Recent Labs     01/23/24  1700 01/24/24  0439 01/24/24  0700 01/25/24  0126 01/25/24  0515 01/25/24  1108   * 131*   < > 125* 128* 127*   K 4.0 4.2  --   --  4.3  --    CL 87* 96*  --   --  94*  --    CO2 22 21  --   --  21  --    BUN 68* 62*  --   --  34*  --    CREATININE 1.4* 1.2  --   --  0.9  --    CALCIUM 9.6 9.0  --   --  9.6  --    GLUCOSE 135* 114*  --   --  114*  --     < > = values in this interval not displayed.        Hepatic Function Panel:   Lab Results   Component Value Date/Time    ALKPHOS 162 01/23/2024 05:00 PM    ALT 25 01/23/2024 05:00 PM    AST 21 01/23/2024 05:00 PM    PROT 7.8 01/23/2024 05:00 PM    BILITOT 0.3 01/23/2024 05:00 PM    LABALBU 4.0 01/25/2024 05:15 AM       CPK: No results found for: \"CKTOTAL\"  ESR:   Lab Results   Component Value Date    SEDRATE 47 (H) 01/24/2024     CRP:   Lab Results   Component Value Date    CRP <3.0 01/24/2024           Imaging:    All pertinent images and reports for the current visit were reviewed by me during this visit.  I reviewed the chest x-ray/CT scan/MRI images and independently interpreted the findings and results today.    CT ABDOMEN PELVIS WO CONTRAST Additional Contrast? None   Final Result   1. Interval decrease in size of a 2.9 cm fluid collection in the mid pelvis.   The 2 lower collections in the pelvis are no longer present.

## 2024-01-25 NOTE — PROGRESS NOTES
Assessment completed, see doc flowsheets. Pt is A&O X4. Lung sounds are clear. VSS. Medication given per MAR. Patient has no needs at this time. Call light within in reach, will continue to monitor.

## 2024-01-25 NOTE — PROGRESS NOTES
Admit Date: 1/23/2024  Diet: ADULT DIET; Regular  ADULT ORAL NUTRITION SUPPLEMENT; PM Snack; Standard High Calorie/High Protein Oral Supplement    CC: SUSY    Interval history:   Overnight, there were no acute events. Patient's vitals remained stable    Patient was seen this morning in bed.  Patient claims that her appetite is better and she likes the food in the hospital as opposed to her nursing home.  She is eating and drinking well. Patient denies fevers, chills, nausea, vomiting, chest pain, shortness of breath, diarrhea, constipation, dysuria, urinary frequency or urgency.     Plan:     -Continue to monitor na and correct appropriately  -Per ID, hold off on Abx for now and will have f/u CT scan of the abdomen to see if she still has any residual abscess or not to decide on need for further antibiotics  -PT/OT    Assessment:   Tammy Frederick is a 73 y.o. female with PMH of Diverticulitis with perforation and multiple abscesses s/p exploratory laparotomy, takedown of splenic flexure, small bowel resection with anastomosis, appendectomy, sigmoidectomy with low pelvic anastomosis and diverting loop ileostomy by Dr Robertson on 12/28 who was admitted with SUSY    SUSY  Likely pre-renal  - Cr baseline 0.5, today 1.4 (peak 3.1 on 1/22)  - IVF  -- FeNA = 0.2%, pre-renal  - Avoid nephrotoxic medications  - Renally dose medications  - Monitor for electrolyte abnormalities   - Check UA/C&S     Hyponatremia  - Na 124  - IVF with NS  - Monitor Na Q4H - Goal Na not to increase by more than 6-8 points over the next 24 hrs.  - Check urine sodium, urine creatinine, urine and serum osmolality, TSH, uric acid  - Nephrology consulted     Diverticulitis with perforation and multiple abscesses  - Invanz currently being held due to SUSY per ID recommendation  - ID consulted    Code Status: Limited   FEN: ADULT DIET; Regular  ADULT ORAL NUTRITION SUPPLEMENT; PM Snack; Standard High Calorie/High Protein Oral Supplement   DVT PPX: []

## 2024-01-25 NOTE — PROGRESS NOTES
Nutrition Note    RECOMMENDATIONS  PO Diet: Low fiber as tolerated  ONS: Ensure TID  Nutrition Support: N/A     NUTRITION ASSESSMENT   Nutrition intervention triggered for wt loss & poor appetite.  Pt is s/p GI surgery with new ileostomy 12/28/23.   Pt was tolerating regular diet after surgery, but reports she has been unable to eat post d/c, for over 3 weeks d/t feeling like she will vomit at just the sight of food.  Per wt hx pt has lost 30 lb in past month, & now meets ASPEN criteria for severe malnutrition.  Encouraged low fiber foods & Ensure TID, which pt agrees to.  Will monitor for improved po & supplement intake.     Nutrition Related Findings: + output per ileostomy; no edema noted; Na 128, gluc 114  Wounds: Surgical Incision  Nutrition Education:  Education completed   Nutrition Goals: PO intake 50% or greater     MALNUTRITION ASSESSMENT   Acute Illness  Malnutrition Status: Severe malnutrition  Findings of the 6 clinical characteristics of malnutrition:  Energy Intake:  50% or less of estimated energy requirements for 5 or more days  Weight Loss:  Greater than 5% over 1 month     Body Fat Loss:  Mild body fat loss Orbital   Muscle Mass Loss:  Moderate muscle mass loss Temples (temporalis), Clavicles (pectoralis & deltoids)  Fluid Accumulation:  No significant fluid accumulation     Strength:  Not Performed      NUTRITION DIAGNOSIS   Severe malnutrition related to inadequate protein-energy intake as evidenced by poor intake prior to admission, weight loss greater than or equal to 5% in 1 month, moderate muscle loss, mild loss of subcutaneous fat    CURRENT NUTRITION THERAPIES  ADULT DIET; Regular; Low Fiber  ADULT ORAL NUTRITION SUPPLEMENT; Breakfast, Lunch, Dinner; Standard High Calorie/High Protein Oral Supplement     PO Intake: 26-50%, %   PO Supplement Intake:Unable to assess    ANTHROPOMETRICS  Current Height: 152.4 cm (5')  Current Weight - Scale: 45.1 kg (99 lb 8 oz)    Ideal Body Weight

## 2024-01-26 VITALS
HEIGHT: 60 IN | WEIGHT: 102.8 LBS | DIASTOLIC BLOOD PRESSURE: 61 MMHG | TEMPERATURE: 98.2 F | BODY MASS INDEX: 20.18 KG/M2 | OXYGEN SATURATION: 97 % | RESPIRATION RATE: 16 BRPM | HEART RATE: 90 BPM | SYSTOLIC BLOOD PRESSURE: 98 MMHG

## 2024-01-26 LAB
ALBUMIN SERPL-MCNC: 3.7 G/DL (ref 3.4–5)
ANION GAP SERPL CALCULATED.3IONS-SCNC: 12 MMOL/L (ref 3–16)
BASOPHILS # BLD: 0 K/UL (ref 0–0.2)
BASOPHILS NFR BLD: 0.9 %
BUN SERPL-MCNC: 23 MG/DL (ref 7–20)
CALCIUM SERPL-MCNC: 9.1 MG/DL (ref 8.3–10.6)
CHLORIDE SERPL-SCNC: 101 MMOL/L (ref 99–110)
CO2 SERPL-SCNC: 20 MMOL/L (ref 21–32)
CREAT SERPL-MCNC: 0.8 MG/DL (ref 0.6–1.2)
DEPRECATED RDW RBC AUTO: 14.4 % (ref 12.4–15.4)
EOSINOPHIL # BLD: 0.1 K/UL (ref 0–0.6)
EOSINOPHIL NFR BLD: 2 %
GFR SERPLBLD CREATININE-BSD FMLA CKD-EPI: >60 ML/MIN/{1.73_M2}
GLUCOSE SERPL-MCNC: 122 MG/DL (ref 70–99)
HCT VFR BLD AUTO: 27.1 % (ref 36–48)
HGB BLD-MCNC: 9.1 G/DL (ref 12–16)
LYMPHOCYTES # BLD: 0.8 K/UL (ref 1–5.1)
LYMPHOCYTES NFR BLD: 21.9 %
MAGNESIUM SERPL-MCNC: 1.8 MG/DL (ref 1.8–2.4)
MCH RBC QN AUTO: 30 PG (ref 26–34)
MCHC RBC AUTO-ENTMCNC: 33.7 G/DL (ref 31–36)
MCV RBC AUTO: 89 FL (ref 80–100)
MONOCYTES # BLD: 0.4 K/UL (ref 0–1.3)
MONOCYTES NFR BLD: 11.6 %
NEUTROPHILS # BLD: 2.5 K/UL (ref 1.7–7.7)
NEUTROPHILS NFR BLD: 63.6 %
PHOSPHATE SERPL-MCNC: 2.6 MG/DL (ref 2.5–4.9)
PLATELET # BLD AUTO: 225 K/UL (ref 135–450)
PMV BLD AUTO: 7.4 FL (ref 5–10.5)
POTASSIUM SERPL-SCNC: 4 MMOL/L (ref 3.5–5.1)
RBC # BLD AUTO: 3.04 M/UL (ref 4–5.2)
SODIUM SERPL-SCNC: 131 MMOL/L (ref 136–145)
SODIUM SERPL-SCNC: 132 MMOL/L (ref 136–145)
SODIUM SERPL-SCNC: 133 MMOL/L (ref 136–145)
WBC # BLD AUTO: 3.9 K/UL (ref 4–11)

## 2024-01-26 PROCEDURE — 6370000000 HC RX 637 (ALT 250 FOR IP): Performed by: INTERNAL MEDICINE

## 2024-01-26 PROCEDURE — 36415 COLL VENOUS BLD VENIPUNCTURE: CPT

## 2024-01-26 PROCEDURE — 80069 RENAL FUNCTION PANEL: CPT

## 2024-01-26 PROCEDURE — 84295 ASSAY OF SERUM SODIUM: CPT

## 2024-01-26 PROCEDURE — 97530 THERAPEUTIC ACTIVITIES: CPT

## 2024-01-26 PROCEDURE — 83735 ASSAY OF MAGNESIUM: CPT

## 2024-01-26 PROCEDURE — 6360000002 HC RX W HCPCS: Performed by: PHYSICIAN ASSISTANT

## 2024-01-26 PROCEDURE — 99232 SBSQ HOSP IP/OBS MODERATE 35: CPT | Performed by: INTERNAL MEDICINE

## 2024-01-26 PROCEDURE — 85025 COMPLETE CBC W/AUTO DIFF WBC: CPT

## 2024-01-26 PROCEDURE — 97116 GAIT TRAINING THERAPY: CPT

## 2024-01-26 PROCEDURE — 2580000003 HC RX 258: Performed by: PHYSICIAN ASSISTANT

## 2024-01-26 PROCEDURE — 6370000000 HC RX 637 (ALT 250 FOR IP): Performed by: PHYSICIAN ASSISTANT

## 2024-01-26 RX ORDER — METRONIDAZOLE 500 MG/1
500 TABLET ORAL EVERY 8 HOURS SCHEDULED
Qty: 45 TABLET | Refills: 0 | Status: ON HOLD | OUTPATIENT
Start: 2024-01-26 | End: 2024-02-10

## 2024-01-26 RX ORDER — CIPROFLOXACIN 500 MG/1
500 TABLET, FILM COATED ORAL EVERY 12 HOURS SCHEDULED
Qty: 30 TABLET | Refills: 0 | Status: ON HOLD | OUTPATIENT
Start: 2024-01-26 | End: 2024-02-10

## 2024-01-26 RX ORDER — SODIUM CHLORIDE 1 G/1
1 TABLET ORAL
Qty: 90 TABLET | Refills: 3 | Status: ON HOLD | OUTPATIENT
Start: 2024-01-26

## 2024-01-26 RX ORDER — LACTOBACILLUS RHAMNOSUS GG 10B CELL
1 CAPSULE ORAL 2 TIMES DAILY
Qty: 30 CAPSULE | Refills: 0 | Status: ON HOLD | OUTPATIENT
Start: 2024-01-26 | End: 2024-02-10

## 2024-01-26 RX ADMIN — METRONIDAZOLE 500 MG: 250 TABLET ORAL at 14:21

## 2024-01-26 RX ADMIN — CIPROFLOXACIN HYDROCHLORIDE 500 MG: 500 TABLET, FILM COATED ORAL at 08:56

## 2024-01-26 RX ADMIN — HEPARIN SODIUM 5000 UNITS: 5000 INJECTION INTRAVENOUS; SUBCUTANEOUS at 05:42

## 2024-01-26 RX ADMIN — Medication 1 G: at 11:23

## 2024-01-26 RX ADMIN — SODIUM CHLORIDE, PRESERVATIVE FREE 10 ML: 5 INJECTION INTRAVENOUS at 08:57

## 2024-01-26 RX ADMIN — Medication 1 G: at 08:56

## 2024-01-26 RX ADMIN — Medication 1 CAPSULE: at 08:56

## 2024-01-26 RX ADMIN — METRONIDAZOLE 500 MG: 250 TABLET ORAL at 05:42

## 2024-01-26 ASSESSMENT — ENCOUNTER SYMPTOMS
BLOOD IN STOOL: 0
FACIAL SWELLING: 0
EYE REDNESS: 0
PHOTOPHOBIA: 0
DIARRHEA: 0
ABDOMINAL DISTENTION: 0
COUGH: 0
WHEEZING: 0
ABDOMINAL PAIN: 0
RHINORRHEA: 0
CONSTIPATION: 0
CHEST TIGHTNESS: 0
SHORTNESS OF BREATH: 0
VOMITING: 0
SORE THROAT: 0
NAUSEA: 0
EYE DISCHARGE: 0
BACK PAIN: 0
SINUS PAIN: 0
SINUS PRESSURE: 0

## 2024-01-26 ASSESSMENT — PAIN SCALES - WONG BAKER
WONGBAKER_NUMERICALRESPONSE: 2

## 2024-01-26 NOTE — PROGRESS NOTES
Physical Therapy    [facility] - Inpatient Rehabilitation Department   Phone: [phone]    Physical Therapy    [] Initial Evaluation            [x] Daily Treatment Note         [] Discharge Summary      Patient: Tammy Frederick   : 1950   MRN: [id number]   Date of Service:  2024  Admitting Diagnosis: SUSY (acute kidney injury) (HCC)  Current Admission Summary: Tammy Frederick is a 73 y.o. female who presented to [facility] ED for SUSY.  Patient was recently admitted to [facility] for diverticulitis with perforation/abscess.  She was discharged to SNF on 1/3/2024 on Invanz.  Over the past few days patient has developed SUSY.  Invanz was held per ID recommendation but creatinine continued to trend up.  Patient was sent to ED today for further evaluation and management.  Creatinine peaked at 3.1 yesterday.  Creatinine today 1.4, baseline 0.5.  Patient also noted with hyponatremia, Na 124 which has slowly down trended over the past week from 136.  ID service is not available at [facility] so patient was transferred to [facility] for further evaluation and management.  Invanz to be held for now per ID.  Discussed case with nephrology.  Urine labs ordered.  Will start IV fluids.  Monitor sodium q.4 hours with goal sodium not to exceed 6 to 8 mEq increase over the next 24 hours.  Ibuprofen on patient's home med list.  She believes she has been getting this every morning but she is not sure.  Updated medication list was not sent from [facility].  Patient denies any complaints or concerns at this time.   Past Medical History:  has a past medical history of Arthritis.  Past Surgical History:  has a past surgical history that includes Abdomen surgery; Total knee arthroplasty (2011); joint replacement (12); and colostomy (N/A, 2023).    Discharge Recommendations: Tammy Frederick scored a 20/24 on the AM-PAC short mobility form. Current research shows that an AM-PAC score of 18 or greater is

## 2024-01-26 NOTE — PROGRESS NOTES
CLINICAL PHARMACY NOTE: MEDS TO BEDS    Total # of Prescriptions Filled: 4   The following medications were delivered to the patient:  METRONIDAZOLE 500MG TABS  CIPROFLOXACIN HCL 500MG  SODIUM CHLORIDE 1GM  ACIDOPHILUS/PECTIN CAPS    Additional Documentation:   Delivered to patients room = signed  Ok to be delivered per JULIETH Hayes

## 2024-01-26 NOTE — PROGRESS NOTES
Infectious Diseases   Progress Note      Admission Date: 1/23/2024  Hospital Day: Hospital Day: 4   Attending: Arnoldo Downs MD  Date of service: 1/26/2024     Chief complaint/ Reason for consult:     Acute kidney injury-improving with IV hydration  Recent perforated diverticulitis with multiple abdominal abscess, s/p exploratory laparotomy with takedown of the splenic flexure, small bowel resection, appendectomy, sigmoidectomy with low pelvic anastomosis and diverting loop ileostomy surgery on 12/28/2023    Microbiology:        I have reviewed allavailable micro lab data and cultures          Antibiotics and immunizations:       Current antibiotics: All antibiotics and their doses were reviewed by me    Recent Abx Admin                     ciprofloxacin (CIPRO) tablet 500 mg (mg) 500 mg Given 01/26/24 0856     500 mg Given 01/25/24 2022    metroNIDAZOLE (FLAGYL) tablet 500 mg (mg) 500 mg Given 01/26/24 0542     500 mg Given 01/25/24 2022     500 mg Given  1512                      Immunization History: All immunization history was reviewed by me today.    Immunization History   Administered Date(s) Administered    Influenza Virus Vaccine 10/01/2011, 09/18/2012    Pneumococcal Conjugate 7-valent (Prevnar7) 10/01/2011       Known drug allergies:     All allergies were reviewed and updated    No Known Allergies    Social history:     Social History:  All social andepidemiologic history was reviewed and updated by me today as needed.     Tobacco use:   reports that she has never smoked. She does not have any smokeless tobacco history on file.  Alcohol use:   reports current alcohol use.  Currently lives in: [place] [id number]   reports no history of drug use.     COVID VACCINATION AND LAB RESULT RECORDS:     Internal Administration   First Dose      Second Dose           Last COVID Lab SARS-CoV-2 RNA, RT PCR (no units)   Date Value   01/23/2024 NOT DETECTED     POC Glucose (mg/dl)   Date Value   12/30/2023 144 (H)    CL 96*  --  94*  --   --  101  --    CO2 21  --  21  --   --  20*  --    BUN 62*  --  34*  --   --  23*  --    CREATININE 1.2  --  0.9  --   --  0.8  --    CALCIUM 9.0  --  9.6  --   --  9.1  --    GLUCOSE 114*  --  114*  --   --  122*  --     < > = values in this interval not displayed.          Hepatic Function Panel:   Lab Results   Component Value Date/Time    ALKPHOS 162 01/23/2024 05:00 PM    ALT 25 01/23/2024 05:00 PM    AST 21 01/23/2024 05:00 PM    PROT 7.8 01/23/2024 05:00 PM    BILITOT 0.3 01/23/2024 05:00 PM    LABALBU 3.7 01/26/2024 05:20 AM       CPK: No results found for: \"CKTOTAL\"  ESR:   Lab Results   Component Value Date    SEDRATE 47 (H) 01/24/2024     CRP:   Lab Results   Component Value Date    CRP <3.0 01/24/2024           Imaging:    All pertinent images and reports for the current visit were reviewed by me during this visit.  I reviewed the chest x-ray/CT scan/MRI images and independently interpreted the findings and results today.    CT ABDOMEN PELVIS WO CONTRAST Additional Contrast? None   Final Result   1. Interval decrease in size of a 2.9 cm fluid collection in the mid pelvis.   The 2 lower collections in the pelvis are no longer present.   2. Interval right lower quadrant ileostomy creation.   3. 3 mm solid nodule in the right lower lobe for which no routine follow-up   is recommended.             Medications: All current and past medications were reviewed.     sodium chloride  1 g Oral TID     ciprofloxacin  500 mg Oral 2 times per day    metroNIDAZOLE  500 mg Oral 3 times per day    lactobacillus  1 capsule Oral BID    sodium chloride flush  10 mL IntraVENous 2 times per day    heparin (porcine)  5,000 Units SubCUTAneous 3 times per day        sodium chloride 250 mL/hr at 01/25/24 1355       nicotine polacrilex, sodium chloride flush, sodium chloride, ondansetron, senna, acetaminophen **OR** acetaminophen      Problem list:       Patient Active Problem List   Diagnosis Code    OA

## 2024-01-26 NOTE — DISCHARGE INSTRUCTIONS
Please call and make an appointment with your PCP within 1 week, if you do not have a PCP you can follow up with the [facility]  Please call and make an appointment with Infectious Disease  Please take all your medications as prescribed including any new ones on discharge

## 2024-01-26 NOTE — DISCHARGE INSTR - COC
Continuity of Care Form    Patient Name: Tammy Frederick   :  1950  MRN:  [id number]    Admit date:  2024  Discharge date:  2024    Code Status Order: Limited   Advance Directives:     Admitting Physician:  No admitting provider for patient encounter.  PCP: No primary care provider on file.    Discharging Nurse: Abigail CLANCY  Discharging Hospital Unit/Room#: 5TN-5572/5572-01  Discharging Unit Phone Number: ***    Emergency Contact:   Extended Emergency Contact Information  Primary Emergency Contact: Heidy Frederick  Mobile Phone: [phone]  Relation: Other Relative  Secondary Emergency Contact: Mary Lozada  Home Phone: [phone]  Relation: Brother/Sister    Past Surgical History:  Past Surgical History:   Procedure Laterality Date    ABDOMEN SURGERY      cholecystectomy    COLOSTOMY N/A 2023    EXPLORATORY LAPAROTOMY, SIGMOID RESECTION, SMALL BOWEL RESECTION, APPENDECTOMY, DIVERTING LOOP ILEOSTOMY performed by Alban Robertson MD at [facility] OR    JOINT REPLACEMENT  12    right knee    TOTAL KNEE ARTHROPLASTY  2011    Left knee       Immunization History:   Immunization History   Administered Date(s) Administered    Influenza Virus Vaccine 10/01/2011, 2012    Pneumococcal Conjugate 7-valent (Prevnar7) 10/01/2011       Active Problems:  Patient Active Problem List   Diagnosis Code    OA (osteoarthritis) of knee M17.9    Right TKR Z96.659    Abscess of sigmoid colon K63.0    Intra-abdominal abscess (HCC) K65.1    E coli infection A49.8    Fecal peritonitis (HCC) K65.8    Streptococcal infection group C A49.1    Diverticular disease of both small and large intestine with perforation and abscess K57.40    Encounter for medication counseling Z71.89    Receiving intravenous antibiotic treatment as outpatient Z79.2    CRP elevated R79.82    Elevated erythrocyte sedimentation rate R70.0    SUSY (acute kidney injury) (HCC) N17.9    History of cholecystectomy Z90.49    Peripherally inserted  Intact  Ileostomy/Jejunostomy RLQ Ileostomy-Peristomal Assessment: Clean, dry & intact  Ileostomy/Jejunostomy RLQ Ileostomy-Stool Appearance: Watery  Ileostomy/Jejunostomy RLQ Ileostomy-Stool Color: Brown  Ileostomy/Jejunostomy RLQ Ileostomy-Stool Amount: Medium  Ileostomy/Jejunostomy RLQ Ileostomy-Output (mL):  (Pt emptied directly into toilet.)    Date of Last BM: 1/26/24    Intake/Output Summary (Last 24 hours) at 1/26/2024 1034  Last data filed at 1/26/2024 0341  Gross per 24 hour   Intake 60 ml   Output 450 ml   Net -390 ml     I/O last 3 completed shifts:  In: 2275.3 [P.O.:330; I.V.:1945.3]  Out: 450 [Urine:450]    Safety Concerns:     None    Impairments/Disabilities:      None    Nutrition Therapy:  Current Nutrition Therapy:   - Oral Diet:  General    Routes of Feeding: Oral  Liquids: Thin Liquids  Daily Fluid Restriction: no  Last Modified Barium Swallow with Video (Video Swallowing Test): not done    Treatments at the Time of Hospital Discharge:   Respiratory Treatments: na  Oxygen Therapy:  is not on home oxygen therapy.  Ventilator:    - No ventilator support    Rehab Therapies: Physical Therapy and Occupational Therapy  Weight Bearing Status/Restrictions: No weight bearing restrictions  Other Medical Equipment (for information only, NOT a DME order):  walker  Other Treatments: na    Patient's personal belongings (please select all that are sent with patient):  None    RN SIGNATURE:  Electronically signed by Patti Combs RN on 1/26/24 at 4:16 PM EST    CASE MANAGEMENT/SOCIAL WORK SECTION    Inpatient Status Date: 01/23/2024    Readmission Risk Assessment Score:  Readmission Risk              Risk of Unplanned Readmission:  10           Discharging to Facility/ Agency     [facility]   [address]  [place] [address]      Phone: [phone]  Fax: [phone]    / signature: Electronically signed by Yvon Reyes Jr, RN on 1/26/24 at 3:47

## 2024-01-26 NOTE — CARE COORDINATION
Discharge Planning Note:    PT/OT recommends HHC    Met with the patient. HHC options were reviewed and the following referral was placed with:    [facility] - ACCEPTED  [address]  [place] [address]      Phone: [phone]  Fax: [phone]     - Faxed the requested document to [facility].    Will continue to follow.    CECILIA Wilkerson RN    [facility]  Phone: [phone]

## 2024-01-26 NOTE — PLAN OF CARE
Problem: Safety - Adult  Goal: Free from fall injury  1/26/2024 0143 by Soumya Dexter, RN  Outcome: Progressing       Problem: Pain  Goal: Verbalizes/displays adequate comfort level or baseline comfort level  1/26/2024 0143 by Soumya Dexter, RN  Outcome: Progressing       Problem: Nutrition Deficit:  Goal: Optimize nutritional status  1/26/2024 0143 by Soumya Dexter, RN  Outcome: Progressing       Problem: ABCDS Injury Assessment  Goal: Absence of physical injury  1/26/2024 0143 by Soumya Dexter, RN  Outcome: Progressing

## 2024-01-26 NOTE — PLAN OF CARE
Problem: Safety - Adult  Goal: Free from fall injury  Outcome: Adequate for Discharge     Problem: Pain  Goal: Verbalizes/displays adequate comfort level or baseline comfort level  Outcome: Adequate for Discharge     Problem: Nutrition Deficit:  Goal: Optimize nutritional status  Outcome: Adequate for Discharge     Problem: ABCDS Injury Assessment  Goal: Absence of physical injury  Outcome: Adequate for Discharge

## 2024-01-26 NOTE — PROGRESS NOTES
[facility] Note    Patient Active Problem List   Diagnosis    OA (osteoarthritis) of knee    Right TKR    Abscess of sigmoid colon    Intra-abdominal abscess (HCC)    E coli infection    Fecal peritonitis (HCC)    Streptococcal infection group C    Diverticular disease of both small and large intestine with perforation and abscess    Encounter for medication counseling    Receiving intravenous antibiotic treatment as outpatient    CRP elevated    Elevated erythrocyte sedimentation rate    SUSY (acute kidney injury) (HCC)    History of cholecystectomy    Peripherally inserted central catheter (PICC) in place    Severe malnutrition (HCC)       Past Medical History:   has a past medical history of Arthritis.    Past Social History:   reports that she has never smoked. She does not have any smokeless tobacco history on file. She reports current alcohol use. She reports that she does not use drugs.    Subjective:    No nausea/vomiting  No neurologic symptoms    Review of Systems   Constitutional:  Positive for fatigue. Negative for activity change, appetite change, chills, fever and unexpected weight change.   HENT:  Negative for congestion and facial swelling.    Eyes:  Negative for photophobia, discharge and redness.   Respiratory:  Negative for cough, chest tightness and shortness of breath.    Cardiovascular:  Negative for chest pain, palpitations and leg swelling.   Gastrointestinal:  Negative for abdominal distention, abdominal pain, blood in stool, constipation, diarrhea, nausea and vomiting.   Endocrine: Negative for cold intolerance, heat intolerance and polyuria.   Genitourinary:  Negative for decreased urine volume, difficulty urinating, flank pain and hematuria.   Musculoskeletal:  Negative for joint swelling and neck pain.   Neurological:  Negative for dizziness, seizures, syncope, speech difficulty, light-headedness and headaches.   Hematological:  Does not bruise/bleed easily.

## 2024-01-27 NOTE — PROGRESS NOTES
Physician Progress Note      PATIENT:               SURYA SYKES  CSN #:                  [id number]  :                       1950  ADMIT DATE:       2024 11:04 PM  DISCH DATE:        2024 4:33 PM  RESPONDING  PROVIDER #:        KRYSTYNA BROWN MD          QUERY TEXT:    Pt admitted with SUSY. Noted documentation of Severe Malnutrition on 2024   by ordered Dietary consultant. If possible, please document in progress notes   and/or discharge summary whether:    The medical record reflects the following:  Risk Factors: 73 yr old  Clinical Indicators: Weight 102 lbs., BMO 20.08, Per Dietary consult: Severe   malnutrition:  Energy Intake:  50% or less of estimated energy requirements for 5 or more   days  Weight Loss:  Greater than 5% over 1 month  Body Fat Loss:  Mild body fat loss Orbital  Muscle Mass Loss:  Moderate muscle mass loss Temples (temporalis), Clavicles   (pectoralis & deltoids)  Treatment: Dietary consult, High Calorie/High Protein oral supplement TID  Options provided:  -- Severe malnutrition present on admission  -- Severe malnutrition was ruled out  -- Other - I will add my own diagnosis  -- Disagree - Not applicable / Not valid  -- Disagree - Clinically unable to determine / Unknown  -- Refer to Clinical Documentation Reviewer    PROVIDER RESPONSE TEXT:    The diagnosis of Severe malnutrition was present on admission.    Query created by: Eugenia Aguilera on 2024 9:37 AM      Electronically signed by:  KRYSTYNA BROWN MD 2024 12:44 PM

## 2024-01-29 LAB
FUNGUS SPEC CULT: NORMAL
LOEFFLER MB STN SPEC: NORMAL

## 2024-01-29 NOTE — DISCHARGE SUMMARY
V2.0  Discharge Summary    Name:  Tammy Frederick /Age/Sex: 1950 (73 y.o. female)   Admit Date: 2024  Discharging Provider: Arnoldo Downs MD   MRN & CSN:  [id number] & [id number] Attending:  No att. providers found       Brief HPI: Tammy Frederick is a 73 y.o. female with PMH of Diverticulitis with perforation and multiple abscesses s/p exploratory laparotomy, takedown of splenic flexure, small bowel resection with anastomosis, appendectomy, sigmoidectomy with low pelvic anastomosis and diverting loop ileostomy by Dr Robertson on  who was admitted with SUSY.  Patient's SUSY improved with IV fluid hydration.  She had hyponatremia which also improved.  She was also seen by nephrology.  Patient had a repeat CT abdomen pelvis which showed that her abscess was improving.  I did review the case and patient was discharged home with oral Cipro + Flagyl.    Brief Problem Focused Hospital Course:     SUSY  Likely pre-renal  - Cr baseline 0.5, today 1.4 (peak 3.1 on )  - IVF  -- FeNA = 0.2%, pre-renal  - Avoid nephrotoxic medications  - Renally dose medications  - Monitor for electrolyte abnormalities   - Check UA/C&S     Hyponatremia  - Na 124  - IVF with NS  - Monitor Na Q4H - Goal Na not to increase by more than 6-8 points over the next 24 hrs.  - Check urine sodium, urine creatinine, urine and serum osmolality, TSH, uric acid  - Nephrology consulted     Diverticulitis with perforation and multiple abscesses  - Invanz currently being held due to SUSY per ID recommendation  - ID consulted    The patient expressed appropriate understanding of, and agreement with the discharge recommendations, medications, and plan.     Consults this admission:  IP CONSULT TO NEPHROLOGY  IP CONSULT TO INFECTIOUS DISEASES  IP CONSULT TO HOME CARE NEEDS    Discharge Diagnosis:   SUSY (acute kidney injury) (HCC)    Discharge Instruction:   Primary care physician: No primary care provider on file. within 2 weeks  Activity:

## 2024-02-01 ENCOUNTER — HOSPITAL ENCOUNTER (INPATIENT)
Age: 74
LOS: 10 days | Discharge: HOME HEALTH | DRG: 682 | End: 2024-02-11
Attending: INTERNAL MEDICINE | Admitting: STUDENT IN AN ORGANIZED HEALTH CARE EDUCATION/TRAINING PROGRAM
Payer: MEDICARE

## 2024-02-01 ENCOUNTER — APPOINTMENT (OUTPATIENT)
Dept: CT IMAGING | Age: 74
DRG: 682 | End: 2024-02-01
Attending: INTERNAL MEDICINE
Payer: MEDICARE

## 2024-02-01 ENCOUNTER — APPOINTMENT (OUTPATIENT)
Dept: GENERAL RADIOLOGY | Age: 74
DRG: 682 | End: 2024-02-01
Attending: INTERNAL MEDICINE
Payer: MEDICARE

## 2024-02-01 ENCOUNTER — APPOINTMENT (OUTPATIENT)
Dept: ULTRASOUND IMAGING | Age: 74
DRG: 682 | End: 2024-02-01
Attending: INTERNAL MEDICINE
Payer: MEDICARE

## 2024-02-01 DIAGNOSIS — R11.2 NAUSEA AND VOMITING, UNSPECIFIED VOMITING TYPE: ICD-10-CM

## 2024-02-01 PROBLEM — R53.1 WEAKNESS: Status: ACTIVE | Noted: 2024-02-01

## 2024-02-01 LAB
ALBUMIN SERPL-MCNC: 3.6 G/DL (ref 3.4–5)
ALP SERPL-CCNC: 89 U/L (ref 40–129)
ALT SERPL-CCNC: 9 U/L (ref 10–40)
ANION GAP SERPL CALCULATED.3IONS-SCNC: 18 MMOL/L (ref 3–16)
AST SERPL-CCNC: 10 U/L (ref 15–37)
BACTERIA URNS QL MICRO: ABNORMAL /HPF
BASOPHILS # BLD: 0 K/UL (ref 0–0.2)
BASOPHILS NFR BLD: 0.7 %
BILIRUB DIRECT SERPL-MCNC: <0.2 MG/DL (ref 0–0.3)
BILIRUB INDIRECT SERPL-MCNC: ABNORMAL MG/DL (ref 0–1)
BILIRUB SERPL-MCNC: 0.3 MG/DL (ref 0–1)
BILIRUB UR QL STRIP.AUTO: NEGATIVE
BUN SERPL-MCNC: 91 MG/DL (ref 7–20)
CALCIUM SERPL-MCNC: 8.1 MG/DL (ref 8.3–10.6)
CHLORIDE SERPL-SCNC: 100 MMOL/L (ref 99–110)
CLARITY UR: ABNORMAL
CO2 SERPL-SCNC: 13 MMOL/L (ref 21–32)
COLOR UR: YELLOW
CREAT SERPL-MCNC: 5.5 MG/DL (ref 0.6–1.2)
CRYSTALS URNS MICRO: ABNORMAL /HPF
DEPRECATED RDW RBC AUTO: 15.1 % (ref 12.4–15.4)
EOSINOPHIL # BLD: 0 K/UL (ref 0–0.6)
EOSINOPHIL NFR BLD: 0.5 %
EPI CELLS #/AREA URNS HPF: ABNORMAL /HPF (ref 0–5)
GFR SERPLBLD CREATININE-BSD FMLA CKD-EPI: 8 ML/MIN/{1.73_M2}
GLUCOSE SERPL-MCNC: 96 MG/DL (ref 70–99)
GLUCOSE UR STRIP.AUTO-MCNC: NEGATIVE MG/DL
HCT VFR BLD AUTO: 29 % (ref 36–48)
HGB BLD-MCNC: 9.7 G/DL (ref 12–16)
HGB UR QL STRIP.AUTO: NEGATIVE
HYALINE CASTS #/AREA URNS LPF: ABNORMAL /LPF (ref 0–2)
KETONES UR STRIP.AUTO-MCNC: NEGATIVE MG/DL
LACTATE BLDV-SCNC: 0.9 MMOL/L (ref 0.4–2)
LEUKOCYTE ESTERASE UR QL STRIP.AUTO: NEGATIVE
LIPASE SERPL-CCNC: 61 U/L (ref 13–60)
LYMPHOCYTES # BLD: 0.8 K/UL (ref 1–5.1)
LYMPHOCYTES NFR BLD: 11 %
MCH RBC QN AUTO: 29.7 PG (ref 26–34)
MCHC RBC AUTO-ENTMCNC: 33.3 G/DL (ref 31–36)
MCV RBC AUTO: 89.1 FL (ref 80–100)
MONOCYTES # BLD: 0.7 K/UL (ref 0–1.3)
MONOCYTES NFR BLD: 9.3 %
NEUTROPHILS # BLD: 5.6 K/UL (ref 1.7–7.7)
NEUTROPHILS NFR BLD: 78.5 %
NITRITE UR QL STRIP.AUTO: NEGATIVE
PH UR STRIP.AUTO: 5 [PH] (ref 5–8)
PLATELET # BLD AUTO: 274 K/UL (ref 135–450)
PMV BLD AUTO: 7.4 FL (ref 5–10.5)
POTASSIUM SERPL-SCNC: 4.8 MMOL/L (ref 3.5–5.1)
PROT SERPL-MCNC: 6 G/DL (ref 6.4–8.2)
PROT UR STRIP.AUTO-MCNC: ABNORMAL MG/DL
RBC # BLD AUTO: 3.26 M/UL (ref 4–5.2)
RBC #/AREA URNS HPF: ABNORMAL /HPF (ref 0–4)
REASON FOR REJECTION: NORMAL
REASON FOR REJECTION: NORMAL
REJECTED TEST: NORMAL
REJECTED TEST: NORMAL
SODIUM SERPL-SCNC: 131 MMOL/L (ref 136–145)
SP GR UR STRIP.AUTO: 1.01 (ref 1–1.03)
TROPONIN, HIGH SENSITIVITY: 48 NG/L (ref 0–14)
TROPONIN, HIGH SENSITIVITY: 50 NG/L (ref 0–14)
UA COMPLETE W REFLEX CULTURE PNL UR: ABNORMAL
UA DIPSTICK W REFLEX MICRO PNL UR: YES
URN SPEC COLLECT METH UR: ABNORMAL
UROBILINOGEN UR STRIP-ACNC: 0.2 E.U./DL
WBC # BLD AUTO: 7.1 K/UL (ref 4–11)
WBC #/AREA URNS HPF: ABNORMAL /HPF (ref 0–5)

## 2024-02-01 PROCEDURE — 80069 RENAL FUNCTION PANEL: CPT

## 2024-02-01 PROCEDURE — 2580000003 HC RX 258: Performed by: STUDENT IN AN ORGANIZED HEALTH CARE EDUCATION/TRAINING PROGRAM

## 2024-02-01 PROCEDURE — 84484 ASSAY OF TROPONIN QUANT: CPT

## 2024-02-01 PROCEDURE — 83605 ASSAY OF LACTIC ACID: CPT

## 2024-02-01 PROCEDURE — 74176 CT ABD & PELVIS W/O CONTRAST: CPT

## 2024-02-01 PROCEDURE — 71045 X-RAY EXAM CHEST 1 VIEW: CPT

## 2024-02-01 PROCEDURE — 84300 ASSAY OF URINE SODIUM: CPT

## 2024-02-01 PROCEDURE — 84550 ASSAY OF BLOOD/URIC ACID: CPT

## 2024-02-01 PROCEDURE — 36415 COLL VENOUS BLD VENIPUNCTURE: CPT

## 2024-02-01 PROCEDURE — 85025 COMPLETE CBC W/AUTO DIFF WBC: CPT

## 2024-02-01 PROCEDURE — 6360000002 HC RX W HCPCS: Performed by: STUDENT IN AN ORGANIZED HEALTH CARE EDUCATION/TRAINING PROGRAM

## 2024-02-01 PROCEDURE — 76770 US EXAM ABDO BACK WALL COMP: CPT

## 2024-02-01 PROCEDURE — 1200000000 HC SEMI PRIVATE

## 2024-02-01 PROCEDURE — 51798 US URINE CAPACITY MEASURE: CPT

## 2024-02-01 PROCEDURE — 2580000003 HC RX 258: Performed by: INTERNAL MEDICINE

## 2024-02-01 PROCEDURE — 82436 ASSAY OF URINE CHLORIDE: CPT

## 2024-02-01 PROCEDURE — 80076 HEPATIC FUNCTION PANEL: CPT

## 2024-02-01 PROCEDURE — 2500000003 HC RX 250 WO HCPCS: Performed by: INTERNAL MEDICINE

## 2024-02-01 PROCEDURE — 83690 ASSAY OF LIPASE: CPT

## 2024-02-01 PROCEDURE — 84133 ASSAY OF URINE POTASSIUM: CPT

## 2024-02-01 PROCEDURE — 81001 URINALYSIS AUTO W/SCOPE: CPT

## 2024-02-01 RX ORDER — SODIUM CHLORIDE 9 MG/ML
INJECTION, SOLUTION INTRAVENOUS PRN
Status: DISCONTINUED | OUTPATIENT
Start: 2024-02-01 | End: 2024-02-11 | Stop reason: HOSPADM

## 2024-02-01 RX ORDER — HEPARIN SODIUM 5000 [USP'U]/ML
5000 INJECTION, SOLUTION INTRAVENOUS; SUBCUTANEOUS EVERY 8 HOURS SCHEDULED
Status: DISCONTINUED | OUTPATIENT
Start: 2024-02-01 | End: 2024-02-11 | Stop reason: HOSPADM

## 2024-02-01 RX ORDER — CIPROFLOXACIN 2 MG/ML
400 INJECTION, SOLUTION INTRAVENOUS EVERY 12 HOURS
Status: DISCONTINUED | OUTPATIENT
Start: 2024-02-01 | End: 2024-02-05 | Stop reason: SDUPTHER

## 2024-02-01 RX ORDER — ONDANSETRON 2 MG/ML
4 INJECTION INTRAMUSCULAR; INTRAVENOUS EVERY 6 HOURS PRN
Status: DISCONTINUED | OUTPATIENT
Start: 2024-02-01 | End: 2024-02-11 | Stop reason: HOSPADM

## 2024-02-01 RX ORDER — SODIUM CHLORIDE, SODIUM LACTATE, POTASSIUM CHLORIDE, CALCIUM CHLORIDE 600; 310; 30; 20 MG/100ML; MG/100ML; MG/100ML; MG/100ML
INJECTION, SOLUTION INTRAVENOUS CONTINUOUS
Status: DISCONTINUED | OUTPATIENT
Start: 2024-02-01 | End: 2024-02-01

## 2024-02-01 RX ORDER — MAGNESIUM SULFATE IN WATER 40 MG/ML
2000 INJECTION, SOLUTION INTRAVENOUS PRN
Status: DISCONTINUED | OUTPATIENT
Start: 2024-02-01 | End: 2024-02-11 | Stop reason: HOSPADM

## 2024-02-01 RX ORDER — METRONIDAZOLE 250 MG/1
500 TABLET ORAL EVERY 8 HOURS SCHEDULED
Status: DISCONTINUED | OUTPATIENT
Start: 2024-02-01 | End: 2024-02-01

## 2024-02-01 RX ORDER — POTASSIUM CHLORIDE 20 MEQ/1
40 TABLET, EXTENDED RELEASE ORAL PRN
Status: DISCONTINUED | OUTPATIENT
Start: 2024-02-01 | End: 2024-02-11 | Stop reason: HOSPADM

## 2024-02-01 RX ORDER — POLYETHYLENE GLYCOL 3350 17 G/17G
17 POWDER, FOR SOLUTION ORAL DAILY PRN
Status: DISCONTINUED | OUTPATIENT
Start: 2024-02-01 | End: 2024-02-11 | Stop reason: HOSPADM

## 2024-02-01 RX ORDER — ACETAMINOPHEN 325 MG/1
650 TABLET ORAL EVERY 6 HOURS PRN
Status: DISCONTINUED | OUTPATIENT
Start: 2024-02-01 | End: 2024-02-11 | Stop reason: HOSPADM

## 2024-02-01 RX ORDER — SODIUM CHLORIDE 0.9 % (FLUSH) 0.9 %
5-40 SYRINGE (ML) INJECTION EVERY 12 HOURS SCHEDULED
Status: DISCONTINUED | OUTPATIENT
Start: 2024-02-01 | End: 2024-02-11 | Stop reason: HOSPADM

## 2024-02-01 RX ORDER — SODIUM CHLORIDE 0.9 % (FLUSH) 0.9 %
5-40 SYRINGE (ML) INJECTION PRN
Status: DISCONTINUED | OUTPATIENT
Start: 2024-02-01 | End: 2024-02-11 | Stop reason: HOSPADM

## 2024-02-01 RX ORDER — ACETAMINOPHEN 650 MG/1
650 SUPPOSITORY RECTAL EVERY 6 HOURS PRN
Status: DISCONTINUED | OUTPATIENT
Start: 2024-02-01 | End: 2024-02-11 | Stop reason: HOSPADM

## 2024-02-01 RX ORDER — POTASSIUM CHLORIDE 7.45 MG/ML
10 INJECTION INTRAVENOUS PRN
Status: DISCONTINUED | OUTPATIENT
Start: 2024-02-01 | End: 2024-02-11 | Stop reason: HOSPADM

## 2024-02-01 RX ORDER — CIPROFLOXACIN 500 MG/1
500 TABLET, FILM COATED ORAL EVERY 12 HOURS SCHEDULED
Status: DISCONTINUED | OUTPATIENT
Start: 2024-02-01 | End: 2024-02-01

## 2024-02-01 RX ORDER — METRONIDAZOLE 500 MG/100ML
500 INJECTION, SOLUTION INTRAVENOUS EVERY 8 HOURS
Status: COMPLETED | OUTPATIENT
Start: 2024-02-01 | End: 2024-02-09

## 2024-02-01 RX ORDER — ONDANSETRON 4 MG/1
4 TABLET, ORALLY DISINTEGRATING ORAL EVERY 8 HOURS PRN
Status: DISCONTINUED | OUTPATIENT
Start: 2024-02-01 | End: 2024-02-11 | Stop reason: HOSPADM

## 2024-02-01 RX ORDER — SODIUM CHLORIDE 9 MG/ML
INJECTION, SOLUTION INTRAVENOUS CONTINUOUS
Status: ACTIVE | OUTPATIENT
Start: 2024-02-01 | End: 2024-02-01

## 2024-02-01 RX ADMIN — SODIUM BICARBONATE: 84 INJECTION, SOLUTION INTRAVENOUS at 23:11

## 2024-02-01 RX ADMIN — SODIUM CHLORIDE, PRESERVATIVE FREE 10 ML: 5 INJECTION INTRAVENOUS at 21:12

## 2024-02-01 RX ADMIN — CIPROFLOXACIN 400 MG: 2 INJECTION, SOLUTION INTRAVENOUS at 19:28

## 2024-02-01 RX ADMIN — METRONIDAZOLE 500 MG: 500 INJECTION, SOLUTION INTRAVENOUS at 17:59

## 2024-02-01 RX ADMIN — SODIUM CHLORIDE: 9 INJECTION, SOLUTION INTRAVENOUS at 17:56

## 2024-02-01 RX ADMIN — HEPARIN SODIUM 5000 UNITS: 5000 INJECTION INTRAVENOUS; SUBCUTANEOUS at 21:12

## 2024-02-01 NOTE — H&P
PRN   Or  ondansetron, 4 mg, Q6H PRN  polyethylene glycol, 17 g, Daily PRN  acetaminophen, 650 mg, Q6H PRN   Or  acetaminophen, 650 mg, Q6H PRN        Labs      CBC: No results for input(s): \"WBC\", \"HGB\", \"PLT\" in the last 72 hours.  BMP:  No results for input(s): \"NA\", \"K\", \"CL\", \"CO2\", \"BUN\", \"CREATININE\", \"GLUCOSE\" in the last 72 hours.  Hepatic: No results for input(s): \"AST\", \"ALT\", \"ALB\", \"BILITOT\", \"ALKPHOS\" in the last 72 hours.  Lipids: No results found for: \"CHOL\", \"HDL\", \"TRIG\"  Hemoglobin A1C:   Lab Results   Component Value Date/Time    LABA1C 6.1 09/18/2012 04:40 AM     TSH: No results found for: \"TSH\"  Troponin: No results found for: \"TROPONINT\"  Lactic Acid: No results for input(s): \"LACTA\" in the last 72 hours.  BNP: No results for input(s): \"PROBNP\" in the last 72 hours.  UA:  Lab Results   Component Value Date/Time    NITRU Negative 01/24/2024 12:55 AM    COLORU Yellow 01/24/2024 12:55 AM    PHUR 5.0 01/24/2024 12:55 AM    WBCUA 1 01/24/2024 12:55 AM    RBCUA 1 01/24/2024 12:55 AM    BACTERIA None Seen 01/24/2024 12:55 AM    CLARITYU CLOUDY 01/24/2024 12:55 AM    SPECGRAV 1.020 01/24/2024 12:55 AM    LEUKOCYTESUR Negative 01/24/2024 12:55 AM    UROBILINOGEN 0.2 01/24/2024 12:55 AM    BILIRUBINUR Negative 01/24/2024 12:55 AM    BLOODU Negative 01/24/2024 12:55 AM    GLUCOSEU Negative 01/24/2024 12:55 AM    KETUA Negative 01/24/2024 12:55 AM     Urine Cultures: No results found for: \"LABURIN\"  Blood Cultures:   Lab Results   Component Value Date/Time    BC No Growth after 4 days of incubation. 12/21/2023 10:49 PM     Lab Results   Component Value Date/Time    BLOODCULT2 No Growth after 4 days of incubation. 12/21/2023 10:49 PM     Organism:   Lab Results   Component Value Date/Time    ORG Diphtheroids 12/28/2023 11:03 AM       Imaging/Diagnostics Last 24 Hours   No results found.      This note was likely completed using voice recognition technology and may contain unintended errors.

## 2024-02-01 NOTE — CARE COORDINATION
02/01/24 1359   Readmission Assessment   Number of Days since last admission? 8-30 days   Previous Disposition Home with Home Health   Who is being Interviewed Patient   What was the patient's/caregiver's perception as to why they think they needed to return back to the hospital? Other (Comment)  (SOB)   Did you visit your Primary Care Physician after you left the hospital, before you returned this time? Yes  (per pt)   Did you see a specialist, such as Cardiac, Pulmonary, Orthopedic Physician, etc. after you left the hospital? No   Who advised the patient to return to the hospital? Physician's Nurse/Office staff   Does the patient report anything that got in the way of taking their medications? No   In our efforts to provide the best possible care to you and others like you, can you think of anything that we could have done to help you after you left the hospital the first time, so that you might not have needed to return so soon? Other (Comment)  (nothing)         Case Management Assessment  Initial Evaluation    Date/Time of Evaluation: 2/1/2024 2:00 PM  Assessment Completed by: KRISTIN WHITE    If patient is discharged prior to next notation, then this note serves as note for discharge by case management.    Patient Name: Tammy Frederick                   YOB: 1950  Diagnosis: Weakness [R53.1]                   Date / Time: 2/1/2024 12:49 PM    Patient Admission Status: Inpatient   Readmission Risk (Low < 19, Mod (19-27), High > 27): Readmission Risk Score: 14.4    Current PCP: No primary care provider on file.  PCP verified by CM? Yes    Chart Reviewed: Yes      History Provided by: Patient  Patient Orientation: Alert and Oriented    Patient Cognition: Alert    Hospitalization in the last 30 days (Readmission):  Yes    If yes, Readmission Assessment in CM Navigator will be completed.    Advance Directives:      Code Status: Full Code   Patient's Primary Decision Maker is: Legal Next of

## 2024-02-02 PROBLEM — K92.2 GI BLEED: Status: ACTIVE | Noted: 2024-02-02

## 2024-02-02 LAB
ALBUMIN SERPL-MCNC: 3 G/DL (ref 3.4–5)
ANION GAP SERPL CALCULATED.3IONS-SCNC: 14 MMOL/L (ref 3–16)
ANION GAP SERPL CALCULATED.3IONS-SCNC: 15 MMOL/L (ref 3–16)
BASOPHILS # BLD: 0 K/UL (ref 0–0.2)
BASOPHILS NFR BLD: 0.4 %
BUN SERPL-MCNC: 75 MG/DL (ref 7–20)
BUN SERPL-MCNC: 86 MG/DL (ref 7–20)
CALCIUM SERPL-MCNC: 7.5 MG/DL (ref 8.3–10.6)
CALCIUM SERPL-MCNC: 7.6 MG/DL (ref 8.3–10.6)
CHLORIDE SERPL-SCNC: 102 MMOL/L (ref 99–110)
CHLORIDE SERPL-SCNC: 104 MMOL/L (ref 99–110)
CHLORIDE UR-SCNC: 31 MMOL/L
CO2 SERPL-SCNC: 13 MMOL/L (ref 21–32)
CO2 SERPL-SCNC: 20 MMOL/L (ref 21–32)
CREAT SERPL-MCNC: 4.2 MG/DL (ref 0.6–1.2)
CREAT SERPL-MCNC: 4.5 MG/DL (ref 0.6–1.2)
DEPRECATED RDW RBC AUTO: 15.4 % (ref 12.4–15.4)
EOSINOPHIL # BLD: 0 K/UL (ref 0–0.6)
EOSINOPHIL NFR BLD: 0.7 %
FERRITIN SERPL IA-MCNC: 803.3 NG/ML (ref 15–150)
GFR SERPLBLD CREATININE-BSD FMLA CKD-EPI: 10 ML/MIN/{1.73_M2}
GFR SERPLBLD CREATININE-BSD FMLA CKD-EPI: 11 ML/MIN/{1.73_M2}
GLUCOSE SERPL-MCNC: 122 MG/DL (ref 70–99)
GLUCOSE SERPL-MCNC: 98 MG/DL (ref 70–99)
HCT VFR BLD AUTO: 23.5 % (ref 36–48)
HGB BLD-MCNC: 7.9 G/DL (ref 12–16)
IRON SATN MFR SERPL: 23 % (ref 15–50)
IRON SERPL-MCNC: 70 UG/DL (ref 37–145)
LYMPHOCYTES # BLD: 0.8 K/UL (ref 1–5.1)
LYMPHOCYTES NFR BLD: 19.7 %
MCH RBC QN AUTO: 29.8 PG (ref 26–34)
MCHC RBC AUTO-ENTMCNC: 33.5 G/DL (ref 31–36)
MCV RBC AUTO: 88.8 FL (ref 80–100)
MONOCYTES # BLD: 0.4 K/UL (ref 0–1.3)
MONOCYTES NFR BLD: 10.7 %
NEUTROPHILS # BLD: 2.7 K/UL (ref 1.7–7.7)
NEUTROPHILS NFR BLD: 68.5 %
PHOSPHATE SERPL-MCNC: 5.7 MG/DL (ref 2.5–4.9)
PLATELET # BLD AUTO: 257 K/UL (ref 135–450)
PMV BLD AUTO: 7.2 FL (ref 5–10.5)
POTASSIUM SERPL-SCNC: 4.1 MMOL/L (ref 3.5–5.1)
POTASSIUM SERPL-SCNC: 4.8 MMOL/L (ref 3.5–5.1)
POTASSIUM UR-SCNC: 33.5 MMOL/L
RBC # BLD AUTO: 2.64 M/UL (ref 4–5.2)
SODIUM SERPL-SCNC: 132 MMOL/L (ref 136–145)
SODIUM SERPL-SCNC: 136 MMOL/L (ref 136–145)
SODIUM UR-SCNC: 42 MMOL/L
TIBC SERPL-MCNC: 301 UG/DL (ref 260–445)
TRANSFERRIN SERPL-MCNC: 248 MG/DL (ref 200–360)
URATE SERPL-MCNC: 10.2 MG/DL (ref 2.6–6)
WBC # BLD AUTO: 4 K/UL (ref 4–11)

## 2024-02-02 PROCEDURE — 1200000000 HC SEMI PRIVATE

## 2024-02-02 PROCEDURE — 6360000002 HC RX W HCPCS: Performed by: STUDENT IN AN ORGANIZED HEALTH CARE EDUCATION/TRAINING PROGRAM

## 2024-02-02 PROCEDURE — 2580000003 HC RX 258: Performed by: INTERNAL MEDICINE

## 2024-02-02 PROCEDURE — 2500000003 HC RX 250 WO HCPCS: Performed by: INTERNAL MEDICINE

## 2024-02-02 PROCEDURE — 83540 ASSAY OF IRON: CPT

## 2024-02-02 PROCEDURE — 2580000003 HC RX 258: Performed by: STUDENT IN AN ORGANIZED HEALTH CARE EDUCATION/TRAINING PROGRAM

## 2024-02-02 PROCEDURE — 36415 COLL VENOUS BLD VENIPUNCTURE: CPT

## 2024-02-02 PROCEDURE — 84466 ASSAY OF TRANSFERRIN: CPT

## 2024-02-02 PROCEDURE — 85025 COMPLETE CBC W/AUTO DIFF WBC: CPT

## 2024-02-02 PROCEDURE — 80048 BASIC METABOLIC PNL TOTAL CA: CPT

## 2024-02-02 PROCEDURE — 51798 US URINE CAPACITY MEASURE: CPT

## 2024-02-02 PROCEDURE — 82728 ASSAY OF FERRITIN: CPT

## 2024-02-02 RX ADMIN — METRONIDAZOLE 500 MG: 500 INJECTION, SOLUTION INTRAVENOUS at 08:40

## 2024-02-02 RX ADMIN — METRONIDAZOLE 500 MG: 500 INJECTION, SOLUTION INTRAVENOUS at 21:27

## 2024-02-02 RX ADMIN — HEPARIN SODIUM 5000 UNITS: 5000 INJECTION INTRAVENOUS; SUBCUTANEOUS at 13:56

## 2024-02-02 RX ADMIN — CIPROFLOXACIN 400 MG: 2 INJECTION, SOLUTION INTRAVENOUS at 04:30

## 2024-02-02 RX ADMIN — SODIUM BICARBONATE: 84 INJECTION, SOLUTION INTRAVENOUS at 23:57

## 2024-02-02 RX ADMIN — HEPARIN SODIUM 5000 UNITS: 5000 INJECTION INTRAVENOUS; SUBCUTANEOUS at 21:18

## 2024-02-02 RX ADMIN — HEPARIN SODIUM 5000 UNITS: 5000 INJECTION INTRAVENOUS; SUBCUTANEOUS at 04:48

## 2024-02-02 RX ADMIN — METRONIDAZOLE 500 MG: 500 INJECTION, SOLUTION INTRAVENOUS at 01:11

## 2024-02-02 RX ADMIN — SODIUM BICARBONATE: 84 INJECTION, SOLUTION INTRAVENOUS at 15:41

## 2024-02-02 RX ADMIN — SODIUM CHLORIDE, PRESERVATIVE FREE 10 ML: 5 INJECTION INTRAVENOUS at 21:18

## 2024-02-02 RX ADMIN — CIPROFLOXACIN 400 MG: 2 INJECTION, SOLUTION INTRAVENOUS at 17:18

## 2024-02-03 LAB
ANION GAP SERPL CALCULATED.3IONS-SCNC: 12 MMOL/L (ref 3–16)
ANION GAP SERPL CALCULATED.3IONS-SCNC: 14 MMOL/L (ref 3–16)
BASOPHILS # BLD: 0 K/UL (ref 0–0.2)
BASOPHILS NFR BLD: 0.8 %
BUN SERPL-MCNC: 32 MG/DL (ref 7–20)
BUN SERPL-MCNC: 46 MG/DL (ref 7–20)
CALCIUM SERPL-MCNC: 7.4 MG/DL (ref 8.3–10.6)
CALCIUM SERPL-MCNC: 7.8 MG/DL (ref 8.3–10.6)
CHLORIDE SERPL-SCNC: 89 MMOL/L (ref 99–110)
CHLORIDE SERPL-SCNC: 91 MMOL/L (ref 99–110)
CO2 SERPL-SCNC: 31 MMOL/L (ref 21–32)
CO2 SERPL-SCNC: 32 MMOL/L (ref 21–32)
CREAT SERPL-MCNC: 2.5 MG/DL (ref 0.6–1.2)
DEPRECATED RDW RBC AUTO: 15.3 % (ref 12.4–15.4)
EOSINOPHIL # BLD: 0 K/UL (ref 0–0.6)
EOSINOPHIL NFR BLD: 0.8 %
GFR SERPLBLD CREATININE-BSD FMLA CKD-EPI: 15 ML/MIN/{1.73_M2}
GFR SERPLBLD CREATININE-BSD FMLA CKD-EPI: 20 ML/MIN/{1.73_M2}
GLUCOSE SERPL-MCNC: 129 MG/DL (ref 70–99)
GLUCOSE SERPL-MCNC: 138 MG/DL (ref 70–99)
HCT VFR BLD AUTO: 23.4 % (ref 36–48)
HGB BLD-MCNC: 7.9 G/DL (ref 12–16)
LYMPHOCYTES # BLD: 0.9 K/UL (ref 1–5.1)
LYMPHOCYTES NFR BLD: 22.7 %
MAGNESIUM SERPL-MCNC: 1.2 MG/DL (ref 1.8–2.4)
MCH RBC QN AUTO: 29.8 PG (ref 26–34)
MCHC RBC AUTO-ENTMCNC: 33.7 G/DL (ref 31–36)
MCV RBC AUTO: 88.3 FL (ref 80–100)
MONOCYTES # BLD: 0.6 K/UL (ref 0–1.3)
MONOCYTES NFR BLD: 14.5 %
NEUTROPHILS # BLD: 2.4 K/UL (ref 1.7–7.7)
NEUTROPHILS NFR BLD: 61.2 %
PLATELET # BLD AUTO: 247 K/UL (ref 135–450)
PMV BLD AUTO: 7.1 FL (ref 5–10.5)
POTASSIUM SERPL-SCNC: 2.9 MMOL/L (ref 3.5–5.1)
POTASSIUM SERPL-SCNC: 3.5 MMOL/L (ref 3.5–5.1)
RBC # BLD AUTO: 2.65 M/UL (ref 4–5.2)
SODIUM SERPL-SCNC: 133 MMOL/L (ref 136–145)
SODIUM SERPL-SCNC: 136 MMOL/L (ref 136–145)
WBC # BLD AUTO: 4 K/UL (ref 4–11)

## 2024-02-03 PROCEDURE — 2580000003 HC RX 258: Performed by: INTERNAL MEDICINE

## 2024-02-03 PROCEDURE — 36415 COLL VENOUS BLD VENIPUNCTURE: CPT

## 2024-02-03 PROCEDURE — 85025 COMPLETE CBC W/AUTO DIFF WBC: CPT

## 2024-02-03 PROCEDURE — 6360000002 HC RX W HCPCS: Performed by: INTERNAL MEDICINE

## 2024-02-03 PROCEDURE — 6360000002 HC RX W HCPCS: Performed by: STUDENT IN AN ORGANIZED HEALTH CARE EDUCATION/TRAINING PROGRAM

## 2024-02-03 PROCEDURE — 6370000000 HC RX 637 (ALT 250 FOR IP): Performed by: STUDENT IN AN ORGANIZED HEALTH CARE EDUCATION/TRAINING PROGRAM

## 2024-02-03 PROCEDURE — 83735 ASSAY OF MAGNESIUM: CPT

## 2024-02-03 PROCEDURE — 1200000000 HC SEMI PRIVATE

## 2024-02-03 PROCEDURE — 80048 BASIC METABOLIC PNL TOTAL CA: CPT

## 2024-02-03 RX ORDER — POTASSIUM CHLORIDE 7.45 MG/ML
10 INJECTION INTRAVENOUS
Status: ACTIVE | OUTPATIENT
Start: 2024-02-03 | End: 2024-02-03

## 2024-02-03 RX ORDER — SODIUM CHLORIDE 9 MG/ML
INJECTION, SOLUTION INTRAVENOUS CONTINUOUS
Status: DISCONTINUED | OUTPATIENT
Start: 2024-02-03 | End: 2024-02-07

## 2024-02-03 RX ORDER — MAGNESIUM SULFATE IN WATER 40 MG/ML
4000 INJECTION, SOLUTION INTRAVENOUS ONCE
Status: COMPLETED | OUTPATIENT
Start: 2024-02-03 | End: 2024-02-03

## 2024-02-03 RX ADMIN — METRONIDAZOLE 500 MG: 500 INJECTION, SOLUTION INTRAVENOUS at 21:44

## 2024-02-03 RX ADMIN — POTASSIUM CHLORIDE 10 MEQ: 7.46 INJECTION, SOLUTION INTRAVENOUS at 12:00

## 2024-02-03 RX ADMIN — POTASSIUM CHLORIDE 10 MEQ: 7.46 INJECTION, SOLUTION INTRAVENOUS at 14:00

## 2024-02-03 RX ADMIN — ONDANSETRON 4 MG: 4 TABLET, ORALLY DISINTEGRATING ORAL at 08:28

## 2024-02-03 RX ADMIN — CIPROFLOXACIN 400 MG: 2 INJECTION, SOLUTION INTRAVENOUS at 06:19

## 2024-02-03 RX ADMIN — SODIUM CHLORIDE: 9 INJECTION, SOLUTION INTRAVENOUS at 12:01

## 2024-02-03 RX ADMIN — METRONIDAZOLE 500 MG: 500 INJECTION, SOLUTION INTRAVENOUS at 05:04

## 2024-02-03 RX ADMIN — ACETAMINOPHEN 650 MG: 325 TABLET ORAL at 02:31

## 2024-02-03 RX ADMIN — CIPROFLOXACIN 400 MG: 2 INJECTION, SOLUTION INTRAVENOUS at 18:21

## 2024-02-03 RX ADMIN — HEPARIN SODIUM 5000 UNITS: 5000 INJECTION INTRAVENOUS; SUBCUTANEOUS at 21:41

## 2024-02-03 RX ADMIN — MAGNESIUM SULFATE HEPTAHYDRATE 4000 MG: 40 INJECTION, SOLUTION INTRAVENOUS at 11:52

## 2024-02-03 RX ADMIN — POTASSIUM CHLORIDE 10 MEQ: 7.46 INJECTION, SOLUTION INTRAVENOUS at 08:32

## 2024-02-03 RX ADMIN — METRONIDAZOLE 500 MG: 500 INJECTION, SOLUTION INTRAVENOUS at 12:06

## 2024-02-03 RX ADMIN — POTASSIUM CHLORIDE 10 MEQ: 7.46 INJECTION, SOLUTION INTRAVENOUS at 13:25

## 2024-02-03 RX ADMIN — HEPARIN SODIUM 5000 UNITS: 5000 INJECTION INTRAVENOUS; SUBCUTANEOUS at 05:02

## 2024-02-03 NOTE — PLAN OF CARE
Problem: Discharge Planning  Goal: Discharge to home or other facility with appropriate resources  2/2/2024 2226 by Bob Bentley, RN  Outcome: Progressing  2/2/2024 2225 by Bob Bentley, RN  Outcome: Progressing     Problem: Safety - Adult  Goal: Free from fall injury  Outcome: Progressing     Problem: Pain  Goal: Verbalizes/displays adequate comfort level or baseline comfort level  Outcome: Progressing     Problem: Chronic Conditions and Co-morbidities  Goal: Patient's chronic conditions and co-morbidity symptoms are monitored and maintained or improved  Outcome: Progressing     Problem: Gastrointestinal - Adult  Goal: Establish and maintain optimal ostomy function  Outcome: Progressing

## 2024-02-03 NOTE — PLAN OF CARE
Problem: Discharge Planning  Goal: Discharge to home or other facility with appropriate resources  2/3/2024 0854 by Liv Blanco RN  Outcome: Progressing  2/2/2024 2226 by Bob Bentley RN  Outcome: Progressing  2/2/2024 2225 by Bob Bentley RN  Outcome: Progressing     Problem: Safety - Adult  Goal: Free from fall injury  2/3/2024 0854 by Liv Blanco RN  Outcome: Progressing  2/2/2024 2226 by Bob Bentley RN  Outcome: Progressing     Problem: Pain  Goal: Verbalizes/displays adequate comfort level or baseline comfort level  2/3/2024 0854 by Liv Blanco RN  Outcome: Progressing  2/2/2024 2226 by Bob Bentley RN  Outcome: Progressing     Problem: Chronic Conditions and Co-morbidities  Goal: Patient's chronic conditions and co-morbidity symptoms are monitored and maintained or improved  2/2/2024 2226 by Bob Bentley RN  Outcome: Progressing

## 2024-02-04 LAB
ANION GAP SERPL CALCULATED.3IONS-SCNC: 10 MMOL/L (ref 3–16)
BASOPHILS # BLD: 0 K/UL (ref 0–0.2)
BASOPHILS NFR BLD: 0.4 %
BUN SERPL-MCNC: 21 MG/DL (ref 7–20)
CALCIUM SERPL-MCNC: 7.7 MG/DL (ref 8.3–10.6)
CHLORIDE SERPL-SCNC: 93 MMOL/L (ref 99–110)
CO2 SERPL-SCNC: 31 MMOL/L (ref 21–32)
CREAT SERPL-MCNC: 2.2 MG/DL (ref 0.6–1.2)
CRP SERPL-MCNC: <3 MG/L (ref 0–5.1)
DEPRECATED RDW RBC AUTO: 15 % (ref 12.4–15.4)
EOSINOPHIL # BLD: 0 K/UL (ref 0–0.6)
EOSINOPHIL NFR BLD: 1 %
ERYTHROCYTE [SEDIMENTATION RATE] IN BLOOD BY WESTERGREN METHOD: 40 MM/HR (ref 0–30)
GFR SERPLBLD CREATININE-BSD FMLA CKD-EPI: 23 ML/MIN/{1.73_M2}
GLUCOSE SERPL-MCNC: 133 MG/DL (ref 70–99)
HCT VFR BLD AUTO: 28 % (ref 36–48)
HGB BLD-MCNC: 9.4 G/DL (ref 12–16)
LACTATE BLDV-SCNC: 1.9 MMOL/L (ref 0.4–2)
LYMPHOCYTES # BLD: 0.5 K/UL (ref 1–5.1)
LYMPHOCYTES NFR BLD: 13.8 %
MAGNESIUM SERPL-MCNC: 1.9 MG/DL (ref 1.8–2.4)
MCH RBC QN AUTO: 29.8 PG (ref 26–34)
MCHC RBC AUTO-ENTMCNC: 33.4 G/DL (ref 31–36)
MCV RBC AUTO: 89.3 FL (ref 80–100)
MONOCYTES # BLD: 0.5 K/UL (ref 0–1.3)
MONOCYTES NFR BLD: 13.7 %
NEUTROPHILS # BLD: 2.5 K/UL (ref 1.7–7.7)
NEUTROPHILS NFR BLD: 71.1 %
PLATELET # BLD AUTO: 260 K/UL (ref 135–450)
PMV BLD AUTO: 6.9 FL (ref 5–10.5)
POTASSIUM SERPL-SCNC: 3.8 MMOL/L (ref 3.5–5.1)
RBC # BLD AUTO: 3.14 M/UL (ref 4–5.2)
SODIUM SERPL-SCNC: 134 MMOL/L (ref 136–145)
WBC # BLD AUTO: 3.5 K/UL (ref 4–11)

## 2024-02-04 PROCEDURE — 80048 BASIC METABOLIC PNL TOTAL CA: CPT

## 2024-02-04 PROCEDURE — 36415 COLL VENOUS BLD VENIPUNCTURE: CPT

## 2024-02-04 PROCEDURE — 83605 ASSAY OF LACTIC ACID: CPT

## 2024-02-04 PROCEDURE — 97161 PT EVAL LOW COMPLEX 20 MIN: CPT

## 2024-02-04 PROCEDURE — 6370000000 HC RX 637 (ALT 250 FOR IP): Performed by: STUDENT IN AN ORGANIZED HEALTH CARE EDUCATION/TRAINING PROGRAM

## 2024-02-04 PROCEDURE — 85652 RBC SED RATE AUTOMATED: CPT

## 2024-02-04 PROCEDURE — 86140 C-REACTIVE PROTEIN: CPT

## 2024-02-04 PROCEDURE — 97116 GAIT TRAINING THERAPY: CPT

## 2024-02-04 PROCEDURE — 97165 OT EVAL LOW COMPLEX 30 MIN: CPT

## 2024-02-04 PROCEDURE — 85025 COMPLETE CBC W/AUTO DIFF WBC: CPT

## 2024-02-04 PROCEDURE — 2580000003 HC RX 258: Performed by: INTERNAL MEDICINE

## 2024-02-04 PROCEDURE — 83735 ASSAY OF MAGNESIUM: CPT

## 2024-02-04 PROCEDURE — 6360000002 HC RX W HCPCS: Performed by: STUDENT IN AN ORGANIZED HEALTH CARE EDUCATION/TRAINING PROGRAM

## 2024-02-04 PROCEDURE — 97530 THERAPEUTIC ACTIVITIES: CPT

## 2024-02-04 PROCEDURE — 1200000000 HC SEMI PRIVATE

## 2024-02-04 RX ADMIN — SODIUM CHLORIDE: 9 INJECTION, SOLUTION INTRAVENOUS at 19:17

## 2024-02-04 RX ADMIN — CIPROFLOXACIN 400 MG: 2 INJECTION, SOLUTION INTRAVENOUS at 05:35

## 2024-02-04 RX ADMIN — METRONIDAZOLE 500 MG: 500 INJECTION, SOLUTION INTRAVENOUS at 19:51

## 2024-02-04 RX ADMIN — HEPARIN SODIUM 5000 UNITS: 5000 INJECTION INTRAVENOUS; SUBCUTANEOUS at 05:32

## 2024-02-04 RX ADMIN — HEPARIN SODIUM 5000 UNITS: 5000 INJECTION INTRAVENOUS; SUBCUTANEOUS at 13:38

## 2024-02-04 RX ADMIN — CIPROFLOXACIN 400 MG: 2 INJECTION, SOLUTION INTRAVENOUS at 17:27

## 2024-02-04 RX ADMIN — METRONIDAZOLE 500 MG: 500 INJECTION, SOLUTION INTRAVENOUS at 12:52

## 2024-02-04 RX ADMIN — METRONIDAZOLE 500 MG: 500 INJECTION, SOLUTION INTRAVENOUS at 04:19

## 2024-02-04 RX ADMIN — ONDANSETRON 4 MG: 4 TABLET, ORALLY DISINTEGRATING ORAL at 04:45

## 2024-02-04 RX ADMIN — SODIUM CHLORIDE: 9 INJECTION, SOLUTION INTRAVENOUS at 12:51

## 2024-02-04 RX ADMIN — HEPARIN SODIUM 5000 UNITS: 5000 INJECTION INTRAVENOUS; SUBCUTANEOUS at 19:52

## 2024-02-04 NOTE — PLAN OF CARE
Problem: Discharge Planning  Goal: Discharge to home or other facility with appropriate resources  2/3/2024 2223 by Alona Garcia RN  Outcome: Progressing  2/3/2024 0854 by Liv Blanco RN  Outcome: Progressing     Problem: Safety - Adult  Goal: Free from fall injury  2/3/2024 2223 by Alona Garcia RN  Outcome: Progressing  2/3/2024 0854 by Liv Blanco RN  Outcome: Progressing     Problem: Pain  Goal: Verbalizes/displays adequate comfort level or baseline comfort level  2/3/2024 2223 by Alona Garcia RN  Outcome: Progressing  2/3/2024 0854 by Liv Blanco RN  Outcome: Progressing     Problem: Nutrition Deficit:  Goal: Optimize nutritional status  Outcome: Progressing     Problem: Chronic Conditions and Co-morbidities  Goal: Patient's chronic conditions and co-morbidity symptoms are monitored and maintained or improved  Outcome: Progressing     Problem: Gastrointestinal - Adult  Goal: Establish and maintain optimal ostomy function  2/3/2024 2223 by Alona Garcia RN  Outcome: Progressing  2/3/2024 0854 by Liv Blanco RN  Outcome: Progressing

## 2024-02-04 NOTE — PLAN OF CARE
Problem: Safety - Adult  Goal: Free from fall injury  2/4/2024 1135 by Sintia Toth RN  Outcome: Progressing  2/3/2024 2223 by Alona Garcia RN  Outcome: Progressing     Problem: Pain  Goal: Verbalizes/displays adequate comfort level or baseline comfort level  2/4/2024 1135 by Sintia Toth RN  Outcome: Progressing  2/3/2024 2223 by Alona Garcia RN  Outcome: Progressing     Problem: Nutrition Deficit:  Goal: Optimize nutritional status  2/4/2024 1135 by Sintia Toth RN  Outcome: Progressing  2/3/2024 2223 by Alona Garcia RN  Outcome: Progressing     Problem: Chronic Conditions and Co-morbidities  Goal: Patient's chronic conditions and co-morbidity symptoms are monitored and maintained or improved  2/4/2024 1135 by Sintia Toth RN  Outcome: Progressing  2/3/2024 2223 by Alona Garcia RN  Outcome: Progressing

## 2024-02-05 LAB
ANION GAP SERPL CALCULATED.3IONS-SCNC: 9 MMOL/L (ref 3–16)
BASOPHILS # BLD: 0 K/UL (ref 0–0.2)
BASOPHILS NFR BLD: 0.5 %
BUN SERPL-MCNC: 11 MG/DL (ref 7–20)
CALCIUM SERPL-MCNC: 7.1 MG/DL (ref 8.3–10.6)
CHLORIDE SERPL-SCNC: 102 MMOL/L (ref 99–110)
CO2 SERPL-SCNC: 25 MMOL/L (ref 21–32)
CREAT SERPL-MCNC: 1.6 MG/DL (ref 0.6–1.2)
DEPRECATED RDW RBC AUTO: 15 % (ref 12.4–15.4)
EOSINOPHIL # BLD: 0.1 K/UL (ref 0–0.6)
EOSINOPHIL NFR BLD: 2.3 %
GFR SERPLBLD CREATININE-BSD FMLA CKD-EPI: 34 ML/MIN/{1.73_M2}
GLUCOSE SERPL-MCNC: 101 MG/DL (ref 70–99)
HCT VFR BLD AUTO: 22.6 % (ref 36–48)
HGB BLD-MCNC: 7.5 G/DL (ref 12–16)
LYMPHOCYTES # BLD: 0.6 K/UL (ref 1–5.1)
LYMPHOCYTES NFR BLD: 21.5 %
MAGNESIUM SERPL-MCNC: 1.3 MG/DL (ref 1.8–2.4)
MCH RBC QN AUTO: 29.9 PG (ref 26–34)
MCHC RBC AUTO-ENTMCNC: 33.1 G/DL (ref 31–36)
MCV RBC AUTO: 90.3 FL (ref 80–100)
MONOCYTES # BLD: 0.4 K/UL (ref 0–1.3)
MONOCYTES NFR BLD: 15.3 %
NEUTROPHILS # BLD: 1.7 K/UL (ref 1.7–7.7)
NEUTROPHILS NFR BLD: 60.4 %
PLATELET # BLD AUTO: 213 K/UL (ref 135–450)
PMV BLD AUTO: 7.2 FL (ref 5–10.5)
POTASSIUM SERPL-SCNC: 3.4 MMOL/L (ref 3.5–5.1)
RBC # BLD AUTO: 2.5 M/UL (ref 4–5.2)
SODIUM SERPL-SCNC: 136 MMOL/L (ref 136–145)
WBC # BLD AUTO: 2.9 K/UL (ref 4–11)

## 2024-02-05 PROCEDURE — 2580000003 HC RX 258: Performed by: STUDENT IN AN ORGANIZED HEALTH CARE EDUCATION/TRAINING PROGRAM

## 2024-02-05 PROCEDURE — 2580000003 HC RX 258: Performed by: INTERNAL MEDICINE

## 2024-02-05 PROCEDURE — 83735 ASSAY OF MAGNESIUM: CPT

## 2024-02-05 PROCEDURE — 80048 BASIC METABOLIC PNL TOTAL CA: CPT

## 2024-02-05 PROCEDURE — 97116 GAIT TRAINING THERAPY: CPT

## 2024-02-05 PROCEDURE — 1200000000 HC SEMI PRIVATE

## 2024-02-05 PROCEDURE — 36415 COLL VENOUS BLD VENIPUNCTURE: CPT

## 2024-02-05 PROCEDURE — C9113 INJ PANTOPRAZOLE SODIUM, VIA: HCPCS | Performed by: NURSE PRACTITIONER

## 2024-02-05 PROCEDURE — 97110 THERAPEUTIC EXERCISES: CPT

## 2024-02-05 PROCEDURE — 6360000002 HC RX W HCPCS: Performed by: STUDENT IN AN ORGANIZED HEALTH CARE EDUCATION/TRAINING PROGRAM

## 2024-02-05 PROCEDURE — 97530 THERAPEUTIC ACTIVITIES: CPT

## 2024-02-05 PROCEDURE — 6360000002 HC RX W HCPCS: Performed by: NURSE PRACTITIONER

## 2024-02-05 PROCEDURE — 85025 COMPLETE CBC W/AUTO DIFF WBC: CPT

## 2024-02-05 PROCEDURE — 6370000000 HC RX 637 (ALT 250 FOR IP): Performed by: STUDENT IN AN ORGANIZED HEALTH CARE EDUCATION/TRAINING PROGRAM

## 2024-02-05 RX ORDER — PANTOPRAZOLE SODIUM 40 MG/10ML
40 INJECTION, POWDER, LYOPHILIZED, FOR SOLUTION INTRAVENOUS DAILY
Status: DISCONTINUED | OUTPATIENT
Start: 2024-02-05 | End: 2024-02-11 | Stop reason: HOSPADM

## 2024-02-05 RX ORDER — CIPROFLOXACIN 2 MG/ML
400 INJECTION, SOLUTION INTRAVENOUS EVERY 12 HOURS
Status: DISPENSED | OUTPATIENT
Start: 2024-02-06 | End: 2024-02-10

## 2024-02-05 RX ORDER — MAGNESIUM SULFATE IN WATER 40 MG/ML
4000 INJECTION, SOLUTION INTRAVENOUS ONCE
Status: COMPLETED | OUTPATIENT
Start: 2024-02-05 | End: 2024-02-05

## 2024-02-05 RX ORDER — POTASSIUM CHLORIDE 20 MEQ/1
40 TABLET, EXTENDED RELEASE ORAL ONCE
Status: COMPLETED | OUTPATIENT
Start: 2024-02-05 | End: 2024-02-05

## 2024-02-05 RX ADMIN — SODIUM CHLORIDE: 9 INJECTION, SOLUTION INTRAVENOUS at 08:51

## 2024-02-05 RX ADMIN — CIPROFLOXACIN 400 MG: 2 INJECTION, SOLUTION INTRAVENOUS at 06:18

## 2024-02-05 RX ADMIN — METRONIDAZOLE 500 MG: 500 INJECTION, SOLUTION INTRAVENOUS at 13:03

## 2024-02-05 RX ADMIN — METRONIDAZOLE 500 MG: 500 INJECTION, SOLUTION INTRAVENOUS at 22:14

## 2024-02-05 RX ADMIN — CIPROFLOXACIN 400 MG: 2 INJECTION, SOLUTION INTRAVENOUS at 18:07

## 2024-02-05 RX ADMIN — METRONIDAZOLE 500 MG: 500 INJECTION, SOLUTION INTRAVENOUS at 04:32

## 2024-02-05 RX ADMIN — HEPARIN SODIUM 5000 UNITS: 5000 INJECTION INTRAVENOUS; SUBCUTANEOUS at 06:24

## 2024-02-05 RX ADMIN — PANTOPRAZOLE SODIUM 40 MG: 40 INJECTION, POWDER, FOR SOLUTION INTRAVENOUS at 13:56

## 2024-02-05 RX ADMIN — SODIUM CHLORIDE, PRESERVATIVE FREE 10 ML: 5 INJECTION INTRAVENOUS at 22:15

## 2024-02-05 RX ADMIN — SODIUM CHLORIDE: 9 INJECTION, SOLUTION INTRAVENOUS at 17:15

## 2024-02-05 RX ADMIN — SODIUM CHLORIDE: 9 INJECTION, SOLUTION INTRAVENOUS at 02:04

## 2024-02-05 RX ADMIN — HEPARIN SODIUM 5000 UNITS: 5000 INJECTION INTRAVENOUS; SUBCUTANEOUS at 13:49

## 2024-02-05 RX ADMIN — SODIUM CHLORIDE, PRESERVATIVE FREE 10 ML: 5 INJECTION INTRAVENOUS at 12:52

## 2024-02-05 RX ADMIN — MAGNESIUM SULFATE HEPTAHYDRATE 4000 MG: 40 INJECTION, SOLUTION INTRAVENOUS at 08:56

## 2024-02-05 RX ADMIN — POTASSIUM CHLORIDE 40 MEQ: 1500 TABLET, EXTENDED RELEASE ORAL at 12:48

## 2024-02-05 RX ADMIN — HEPARIN SODIUM 5000 UNITS: 5000 INJECTION INTRAVENOUS; SUBCUTANEOUS at 22:14

## 2024-02-05 NOTE — PLAN OF CARE
Problem: Discharge Planning  Goal: Discharge to home or other facility with appropriate resources  Outcome: Progressing     Problem: Safety - Adult  Goal: Free from fall injury  2/4/2024 2126 by Bob Bentley RN  Outcome: Progressing  2/4/2024 1135 by Sintia Toth RN  Outcome: Progressing     Problem: Pain  Goal: Verbalizes/displays adequate comfort level or baseline comfort level  2/4/2024 2126 by Bob Bentley RN  Outcome: Progressing  2/4/2024 1135 by Sintia Toth RN  Outcome: Progressing     Problem: Nutrition Deficit:  Goal: Optimize nutritional status  2/4/2024 2126 by Bob Bentley RN  Outcome: Progressing  2/4/2024 1135 by Sintia Toth RN  Outcome: Progressing     Problem: Chronic Conditions and Co-morbidities  Goal: Patient's chronic conditions and co-morbidity symptoms are monitored and maintained or improved  2/4/2024 2126 by Bob Bentley RN  Outcome: Progressing  2/4/2024 1135 by Sintia Toth RN  Outcome: Progressing     Problem: Gastrointestinal - Adult  Goal: Establish and maintain optimal ostomy function  Outcome: Progressing

## 2024-02-06 ENCOUNTER — ANESTHESIA (OUTPATIENT)
Dept: ENDOSCOPY | Age: 74
End: 2024-02-06
Payer: MEDICARE

## 2024-02-06 ENCOUNTER — ANESTHESIA EVENT (OUTPATIENT)
Dept: ENDOSCOPY | Age: 74
End: 2024-02-06
Payer: MEDICARE

## 2024-02-06 LAB
ALBUMIN SERPL-MCNC: 2.7 G/DL (ref 3.4–5)
ANION GAP SERPL CALCULATED.3IONS-SCNC: 9 MMOL/L (ref 3–16)
BASOPHILS # BLD: 0 K/UL (ref 0–0.2)
BASOPHILS NFR BLD: 0.7 %
BUN SERPL-MCNC: 7 MG/DL (ref 7–20)
CALCIUM SERPL-MCNC: 7.4 MG/DL (ref 8.3–10.6)
CHLORIDE SERPL-SCNC: 103 MMOL/L (ref 99–110)
CO2 SERPL-SCNC: 22 MMOL/L (ref 21–32)
CREAT SERPL-MCNC: 1.2 MG/DL (ref 0.6–1.2)
DEPRECATED RDW RBC AUTO: 15.1 % (ref 12.4–15.4)
EOSINOPHIL # BLD: 0.1 K/UL (ref 0–0.6)
EOSINOPHIL NFR BLD: 2.8 %
GFR SERPLBLD CREATININE-BSD FMLA CKD-EPI: 48 ML/MIN/{1.73_M2}
GLUCOSE SERPL-MCNC: 104 MG/DL (ref 70–99)
HCT VFR BLD AUTO: 22.6 % (ref 36–48)
HGB BLD-MCNC: 7.6 G/DL (ref 12–16)
LYMPHOCYTES # BLD: 0.8 K/UL (ref 1–5.1)
LYMPHOCYTES NFR BLD: 22.1 %
MAGNESIUM SERPL-MCNC: 2 MG/DL (ref 1.8–2.4)
MCH RBC QN AUTO: 30.4 PG (ref 26–34)
MCHC RBC AUTO-ENTMCNC: 33.6 G/DL (ref 31–36)
MCV RBC AUTO: 90.4 FL (ref 80–100)
MONOCYTES # BLD: 0.5 K/UL (ref 0–1.3)
MONOCYTES NFR BLD: 13 %
NEUTROPHILS # BLD: 2.2 K/UL (ref 1.7–7.7)
NEUTROPHILS NFR BLD: 61.4 %
PHOSPHATE SERPL-MCNC: 2.2 MG/DL (ref 2.5–4.9)
PLATELET # BLD AUTO: 216 K/UL (ref 135–450)
PMV BLD AUTO: 7.2 FL (ref 5–10.5)
POTASSIUM SERPL-SCNC: 3 MMOL/L (ref 3.5–5.1)
RBC # BLD AUTO: 2.5 M/UL (ref 4–5.2)
SODIUM SERPL-SCNC: 134 MMOL/L (ref 136–145)
WBC # BLD AUTO: 3.6 K/UL (ref 4–11)

## 2024-02-06 PROCEDURE — 99024 POSTOP FOLLOW-UP VISIT: CPT | Performed by: SURGERY

## 2024-02-06 PROCEDURE — 3609012400 HC EGD TRANSORAL BIOPSY SINGLE/MULTIPLE: Performed by: INTERNAL MEDICINE

## 2024-02-06 PROCEDURE — 2500000003 HC RX 250 WO HCPCS: Performed by: NURSE ANESTHETIST, CERTIFIED REGISTERED

## 2024-02-06 PROCEDURE — C9113 INJ PANTOPRAZOLE SODIUM, VIA: HCPCS | Performed by: NURSE PRACTITIONER

## 2024-02-06 PROCEDURE — 7100000000 HC PACU RECOVERY - FIRST 15 MIN: Performed by: INTERNAL MEDICINE

## 2024-02-06 PROCEDURE — 6360000002 HC RX W HCPCS: Performed by: STUDENT IN AN ORGANIZED HEALTH CARE EDUCATION/TRAINING PROGRAM

## 2024-02-06 PROCEDURE — 2580000003 HC RX 258: Performed by: NURSE ANESTHETIST, CERTIFIED REGISTERED

## 2024-02-06 PROCEDURE — 3700000000 HC ANESTHESIA ATTENDED CARE: Performed by: INTERNAL MEDICINE

## 2024-02-06 PROCEDURE — 1200000000 HC SEMI PRIVATE

## 2024-02-06 PROCEDURE — 87506 IADNA-DNA/RNA PROBE TQ 6-11: CPT

## 2024-02-06 PROCEDURE — 88342 IMHCHEM/IMCYTCHM 1ST ANTB: CPT

## 2024-02-06 PROCEDURE — 80069 RENAL FUNCTION PANEL: CPT

## 2024-02-06 PROCEDURE — 2580000003 HC RX 258: Performed by: INTERNAL MEDICINE

## 2024-02-06 PROCEDURE — 83735 ASSAY OF MAGNESIUM: CPT

## 2024-02-06 PROCEDURE — APPSS30 APP SPLIT SHARED TIME 16-30 MINUTES: Performed by: NURSE PRACTITIONER

## 2024-02-06 PROCEDURE — 6360000002 HC RX W HCPCS: Performed by: NURSE ANESTHETIST, CERTIFIED REGISTERED

## 2024-02-06 PROCEDURE — 6360000002 HC RX W HCPCS: Performed by: INTERNAL MEDICINE

## 2024-02-06 PROCEDURE — 87328 CRYPTOSPORIDIUM AG IA: CPT

## 2024-02-06 PROCEDURE — 85025 COMPLETE CBC W/AUTO DIFF WBC: CPT

## 2024-02-06 PROCEDURE — 87336 ENTAMOEB HIST DISPR AG IA: CPT

## 2024-02-06 PROCEDURE — 2709999900 HC NON-CHARGEABLE SUPPLY: Performed by: INTERNAL MEDICINE

## 2024-02-06 PROCEDURE — 36415 COLL VENOUS BLD VENIPUNCTURE: CPT

## 2024-02-06 PROCEDURE — 2580000003 HC RX 258: Performed by: STUDENT IN AN ORGANIZED HEALTH CARE EDUCATION/TRAINING PROGRAM

## 2024-02-06 PROCEDURE — 7100000001 HC PACU RECOVERY - ADDTL 15 MIN: Performed by: INTERNAL MEDICINE

## 2024-02-06 PROCEDURE — 6360000002 HC RX W HCPCS: Performed by: NURSE PRACTITIONER

## 2024-02-06 PROCEDURE — 88305 TISSUE EXAM BY PATHOLOGIST: CPT

## 2024-02-06 PROCEDURE — APPNB30 APP NON BILLABLE TIME 0-30 MINS: Performed by: NURSE PRACTITIONER

## 2024-02-06 RX ORDER — LACTOBACILLUS RHAMNOSUS GG 10B CELL
2 CAPSULE ORAL
Status: DISCONTINUED | OUTPATIENT
Start: 2024-02-07 | End: 2024-02-11 | Stop reason: HOSPADM

## 2024-02-06 RX ORDER — SODIUM CHLORIDE 9 MG/ML
INJECTION, SOLUTION INTRAVENOUS CONTINUOUS
Status: DISCONTINUED | OUTPATIENT
Start: 2024-02-06 | End: 2024-02-06

## 2024-02-06 RX ORDER — SODIUM CHLORIDE 9 MG/ML
INJECTION, SOLUTION INTRAVENOUS CONTINUOUS PRN
Status: DISCONTINUED | OUTPATIENT
Start: 2024-02-06 | End: 2024-02-06 | Stop reason: SDUPTHER

## 2024-02-06 RX ORDER — PROPOFOL 10 MG/ML
INJECTION, EMULSION INTRAVENOUS PRN
Status: DISCONTINUED | OUTPATIENT
Start: 2024-02-06 | End: 2024-02-06 | Stop reason: SDUPTHER

## 2024-02-06 RX ORDER — LIDOCAINE HYDROCHLORIDE 20 MG/ML
INJECTION, SOLUTION EPIDURAL; INFILTRATION; INTRACAUDAL; PERINEURAL PRN
Status: DISCONTINUED | OUTPATIENT
Start: 2024-02-06 | End: 2024-02-06 | Stop reason: SDUPTHER

## 2024-02-06 RX ORDER — POTASSIUM CHLORIDE 7.45 MG/ML
10 INJECTION INTRAVENOUS
Status: DISPENSED | OUTPATIENT
Start: 2024-02-06 | End: 2024-02-06

## 2024-02-06 RX ADMIN — PROPOFOL 100 MCG/KG/MIN: 10 INJECTION, EMULSION INTRAVENOUS at 13:49

## 2024-02-06 RX ADMIN — HEPARIN SODIUM 5000 UNITS: 5000 INJECTION INTRAVENOUS; SUBCUTANEOUS at 14:49

## 2024-02-06 RX ADMIN — SODIUM CHLORIDE, PRESERVATIVE FREE 10 ML: 5 INJECTION INTRAVENOUS at 21:19

## 2024-02-06 RX ADMIN — PROPOFOL 50 MG: 10 INJECTION, EMULSION INTRAVENOUS at 13:48

## 2024-02-06 RX ADMIN — CIPROFLOXACIN 400 MG: 200 INJECTION, SOLUTION INTRAVENOUS at 03:53

## 2024-02-06 RX ADMIN — METRONIDAZOLE 500 MG: 500 INJECTION, SOLUTION INTRAVENOUS at 21:30

## 2024-02-06 RX ADMIN — SODIUM CHLORIDE: 9 INJECTION, SOLUTION INTRAVENOUS at 12:16

## 2024-02-06 RX ADMIN — SODIUM CHLORIDE: 9 INJECTION, SOLUTION INTRAVENOUS at 13:34

## 2024-02-06 RX ADMIN — PANTOPRAZOLE SODIUM 40 MG: 40 INJECTION, POWDER, FOR SOLUTION INTRAVENOUS at 08:14

## 2024-02-06 RX ADMIN — HEPARIN SODIUM 5000 UNITS: 5000 INJECTION INTRAVENOUS; SUBCUTANEOUS at 21:19

## 2024-02-06 RX ADMIN — METRONIDAZOLE 500 MG: 500 INJECTION, SOLUTION INTRAVENOUS at 14:52

## 2024-02-06 RX ADMIN — SODIUM CHLORIDE: 9 INJECTION, SOLUTION INTRAVENOUS at 21:35

## 2024-02-06 RX ADMIN — POTASSIUM CHLORIDE 10 MEQ: 7.46 INJECTION, SOLUTION INTRAVENOUS at 08:16

## 2024-02-06 RX ADMIN — METRONIDAZOLE 500 MG: 500 INJECTION, SOLUTION INTRAVENOUS at 03:50

## 2024-02-06 RX ADMIN — CIPROFLOXACIN 400 MG: 200 INJECTION, SOLUTION INTRAVENOUS at 17:56

## 2024-02-06 RX ADMIN — LIDOCAINE HYDROCHLORIDE 100 MG: 20 INJECTION, SOLUTION EPIDURAL; INFILTRATION; INTRACAUDAL; PERINEURAL at 13:48

## 2024-02-06 ASSESSMENT — ENCOUNTER SYMPTOMS: SHORTNESS OF BREATH: 0

## 2024-02-06 NOTE — ANESTHESIA POSTPROCEDURE EVALUATION
Department of Anesthesiology  Postprocedure Note    Patient: Tammy Frederick  MRN: [id number]  YOB: 1950  Date of evaluation: 2/6/2024    Procedure Summary       Date: 02/06/24 Room / Location: [place] [address] / [facility]    Anesthesia Start: 1344 Anesthesia Stop: 1357    Procedure: EGD BIOPSY (Abdomen) Diagnosis:       Nausea and vomiting, unspecified vomiting type      (Nausea and vomiting, unspecified vomiting type [R11.2])    Surgeons: Chidi Morris MD Responsible Provider: Reese Ernandez MD    Anesthesia Type: MAC ASA Status: 3            Anesthesia Type: No value filed.    Edgard Phase I: Edgard Score: 10    Edgard Phase II:      Anesthesia Post Evaluation    Patient location during evaluation: PACU  Level of consciousness: awake  Airway patency: patent  Cardiovascular status: hemodynamically stable  Respiratory status: acceptable  There was medical reason for not using a multimodal analgesia pain management approach.    No notable events documented.

## 2024-02-06 NOTE — PLAN OF CARE
Problem: Discharge Planning  Goal: Discharge to home or other facility with appropriate resources  Outcome: Progressing     Problem: Pain  Goal: Verbalizes/displays adequate comfort level or baseline comfort level  Outcome: Progressing     Problem: Safety - Adult  Goal: Free from fall injury  Outcome: Progressing     Problem: Nutrition Deficit:  Goal: Optimize nutritional status  Outcome: Not Progressing     Problem: Chronic Conditions and Co-morbidities  Goal: Patient's chronic conditions and co-morbidity symptoms are monitored and maintained or improved  Outcome: Progressing     Problem: Gastrointestinal - Adult  Goal: Establish and maintain optimal ostomy function  Outcome: Not Progressing     Problem: Skin/Tissue Integrity  Goal: Absence of new skin breakdown  Description: 1.  Monitor for areas of redness and/or skin breakdown  2.  Assess vascular access sites hourly  3.  Every 4-6 hours minimum:  Change oxygen saturation probe site  4.  Every 4-6 hours:  If on nasal continuous positive airway pressure, respiratory therapy assess nares and determine need for appliance change or resting period.  Outcome: Progressing     Problem: Nutrition Deficit:  Goal: Optimize nutritional status  Outcome: Not Progressing     Problem: Gastrointestinal - Adult  Goal: Establish and maintain optimal ostomy function  Outcome: Not Progressing

## 2024-02-06 NOTE — BRIEF OP NOTE
Brief Postoperative Note      Patient: Tammy Frederick  YOB: 1950  MRN: [id number]    Date of Procedure: 2/6/2024    Pre-Op Diagnosis Codes:     * Nausea and vomiting, unspecified vomiting type [R11.2]         Procedure(s):  EGD BIOPSY    Surgeon(s):  Chidi Morris MD    Anesthesia: Monitor Anesthesia Care    Estimated Blood Loss (mL): Minimal    Complications: None    Specimens:   ID Type Source Tests Collected by Time Destination   A : A) duodenum bx r/o celiac Tissue Duodenum SURGICAL PATHOLOGY Chidi Morris MD 2/6/2024 4217    B : B) gastric bx r/o h.pylori Tissue Stomach SURGICAL PATHOLOGY Chidi Morris MD 2/6/2024 8005        Findings:   Mild antral erosions and gastritis, biopsied.  Normal duodenum, biopsied.    Plans:  Await biopsies.  Continue PPI and antinausea medications as needed.  If biopsies negative, we can order a solid-phase gastric emptying study to rule out gastroparesis      Electronically signed by Chidi Morris MD on 2/6/2024 at 2:04 PM

## 2024-02-06 NOTE — PLAN OF CARE
Problem: Discharge Planning  Goal: Discharge to home or other facility with appropriate resources  2/6/2024 0735 by Janusz Gonzalez RN  Outcome: Progressing  2/6/2024 0144 by Julee Moreno RN  Outcome: Progressing     Problem: Safety - Adult  Goal: Free from fall injury  2/6/2024 0735 by Janusz Gonzalez RN  Outcome: Progressing  2/6/2024 0144 by Julee Moreno RN  Outcome: Progressing     Problem: Pain  Goal: Verbalizes/displays adequate comfort level or baseline comfort level  2/6/2024 0735 by Janusz Gonzalez RN  Outcome: Progressing  2/6/2024 0144 by Julee Moreno RN  Outcome: Progressing     Problem: Nutrition Deficit:  Goal: Optimize nutritional status  2/6/2024 0735 by Janusz Gonzalez RN  Outcome: Progressing  2/6/2024 0144 by Julee Moreno RN  Outcome: Not Progressing     Problem: Chronic Conditions and Co-morbidities  Goal: Patient's chronic conditions and co-morbidity symptoms are monitored and maintained or improved  2/6/2024 0144 by Julee Moreno RN  Outcome: Progressing     Problem: Gastrointestinal - Adult  Goal: Establish and maintain optimal ostomy function  2/6/2024 0735 by Janusz Gonzalez RN  Outcome: Progressing  2/6/2024 0144 by Julee Moreno RN  Outcome: Not Progressing     Problem: Skin/Tissue Integrity  Goal: Absence of new skin breakdown  Description: 1.  Monitor for areas of redness and/or skin breakdown  2.  Assess vascular access sites hourly  3.  Every 4-6 hours minimum:  Change oxygen saturation probe site  4.  Every 4-6 hours:  If on nasal continuous positive airway pressure, respiratory therapy assess nares and determine need for appliance change or resting period.  2/6/2024 0735 by Janusz Gonzalez RN  Outcome: Progressing  2/6/2024 0144 by Julee Moreno RN  Outcome: Progressing     Problem: Nutrition Deficit:  Goal: Optimize nutritional status  2/6/2024 0735 by Janusz Gonzalez RN  Outcome: Progressing  2/6/2024 0144 by Julee Moreno RN  Outcome: Not Progressing

## 2024-02-06 NOTE — DISCHARGE INSTRUCTIONS
Please call and make an appointment with your PCP within 1 week  Please call and make an appointment with GI, Hematology  Please take all your medications as prescribed including any new ones on discharge  Please follow gastroparesis diet: low fat, low fiber with 5-6 smaller meals daily    ILEOSTOMY CARE   Empty pouch when it is 1/3 to 1/2 full of gas or/and stool, this will be approximately 5 to 8 times daily.   Change ileostomy dressing every 3 to 4 days.    Change ileostomy dressing whenever it leaks to prevent skin damage.   Measure stoma with every other dressing change (20 mm high by 35 mm wide on 2-6-24) until 2-22-24.  Measure stoma with every 3rd dressing change from 2-22-24 until 3-21-24, then measure once a month.  ILEOSTOMY DRESSING CHANGE   1. Gather all supplies for dressing change, make sure new drainage pouch end is closed; scissors, convex wafer #[id number], pouch #[id number], stoma ring #[id number].   2. Empty pouch, Remove old dressing.    3. Clean stoma and surrounding skin with warm water only- NO SOAP OR BATH WIPES!   4. Dry stoma and surrounding skin & leave dry cloth on stoma to contain any new stool.   5. Measure stoma, Cut new dressing to fit stoma size. (20 mm high by 35 mm wide on 2-6-24.)   6. Remove wafer backing, Stretch stoma ring and apply to back of wafer bordering new hole for stoma, re-apply wafer backing.   7. Warm new dressing for 30 seconds by rubbing between hands or placing in warm blanket.   8. Re-clean & dry stoma & surrounding skin, if needed.   9. Apply new wafer/ dressing. (Save back of dressing for template for next stoma measurement.)   10. Line white adhesive of drainage pouch up with blue [place] on dressing Landing Pad, apply & check for secure attachment with fingers like checking ziplock bags or tupperware.  11. Place warm blanket over new dressing on abdomen for 3 to 5 minutes to promote adherence.

## 2024-02-06 NOTE — ANESTHESIA PRE PROCEDURE
Department of Anesthesiology  Preprocedure Note       Name:  Tammy Frederick   Age:  73 y.o.  :  1950                                          MRN:  [id number]         Date:  2024      Surgeon: Surgeon(s):  Chidi Morris MD    Procedure: Procedure(s):  EGD DIAGNOSTIC ONLY    Medications prior to admission:   Prior to Admission medications    Medication Sig Start Date End Date Taking? Authorizing Provider   ciprofloxacin (CIPRO) 500 MG tablet Take 1 tablet by mouth every 12 hours for 15 days 1/26/24 2/10/24  Shane Pate MD   metroNIDAZOLE (FLAGYL) 500 MG tablet Take 1 tablet by mouth every 8 hours for 15 days 1/26/24 2/10/24  Shane Pate MD   lactobacillus (CULTURELLE) capsule Take 1 capsule by mouth in the morning and at bedtime for 15 days 1/26/24 2/10/24  Arnoldo Downs MD   sodium chloride 1 g tablet Take 1 tablet by mouth 3 times daily (with meals) 24   Arnoldo Downs MD   therapeutic multivitamin-minerals (THERAGRAN-M) tablet Take 1 tablet by mouth daily    Provider, MD Ivone       Current medications:    Current Facility-Administered Medications   Medication Dose Route Frequency Provider Last Rate Last Admin    [START ON 2024] lactobacillus (CULTURELLE) capsule 2 capsule  2 capsule Oral Daily with breakfast Arnoldo Downs MD        0.9 % sodium chloride infusion   IntraVENous Continuous Tatiana oMn  mL/hr at 24 1216 New Bag at 24 1216    pantoprazole (PROTONIX) injection 40 mg  40 mg IntraVENous Daily Naif Viera APRN - CNP   40 mg at 24 0814    ciprofloxacin (CIPRO) IVPB 400 mg  400 mg IntraVENous Q12H Arnoldo Downs MD   Stopped at 24 0544    0.9 % sodium chloride infusion   IntraVENous Continuous Nader Penn MD 75 mL/hr at 24 1753 Rate Verify at 24 1753    sodium chloride flush 0.9 % injection 5-40 mL  5-40 mL IntraVENous 2 times per day Rajeev Galan MD   10 mL at 24 2215    sodium chloride flush 0.9 %

## 2024-02-07 LAB
ALBUMIN SERPL-MCNC: 3 G/DL (ref 3.4–5)
ANION GAP SERPL CALCULATED.3IONS-SCNC: 9 MMOL/L (ref 3–16)
BASOPHILS # BLD: 0 K/UL (ref 0–0.2)
BASOPHILS NFR BLD: 0.6 %
BUN SERPL-MCNC: 5 MG/DL (ref 7–20)
CALCIUM SERPL-MCNC: 7.9 MG/DL (ref 8.3–10.6)
CHLORIDE SERPL-SCNC: 103 MMOL/L (ref 99–110)
CO2 SERPL-SCNC: 22 MMOL/L (ref 21–32)
CREAT SERPL-MCNC: 1.1 MG/DL (ref 0.6–1.2)
CRYPTOSP AG STL QL IA: NORMAL
DEPRECATED RDW RBC AUTO: 15.1 % (ref 12.4–15.4)
E HISTOLYT AG STL QL IA: NORMAL
EOSINOPHIL # BLD: 0.1 K/UL (ref 0–0.6)
EOSINOPHIL NFR BLD: 1.5 %
G LAMBLIA AG STL QL IA: NORMAL
GFR SERPLBLD CREATININE-BSD FMLA CKD-EPI: 53 ML/MIN/{1.73_M2}
GLUCOSE SERPL-MCNC: 134 MG/DL (ref 70–99)
HCT VFR BLD AUTO: 24.1 % (ref 36–48)
HGB BLD-MCNC: 8 G/DL (ref 12–16)
LYMPHOCYTES # BLD: 0.7 K/UL (ref 1–5.1)
LYMPHOCYTES NFR BLD: 18.3 %
MAGNESIUM SERPL-MCNC: 1.4 MG/DL (ref 1.8–2.4)
MCH RBC QN AUTO: 29.7 PG (ref 26–34)
MCHC RBC AUTO-ENTMCNC: 33.1 G/DL (ref 31–36)
MCV RBC AUTO: 89.8 FL (ref 80–100)
MONOCYTES # BLD: 0.4 K/UL (ref 0–1.3)
MONOCYTES NFR BLD: 10.6 %
NEUTROPHILS # BLD: 2.7 K/UL (ref 1.7–7.7)
NEUTROPHILS NFR BLD: 69 %
PHOSPHATE SERPL-MCNC: 2.1 MG/DL (ref 2.5–4.9)
PLATELET # BLD AUTO: 230 K/UL (ref 135–450)
PMV BLD AUTO: 7 FL (ref 5–10.5)
POTASSIUM SERPL-SCNC: 4 MMOL/L (ref 3.5–5.1)
RBC # BLD AUTO: 2.68 M/UL (ref 4–5.2)
SODIUM SERPL-SCNC: 134 MMOL/L (ref 136–145)
WBC # BLD AUTO: 3.9 K/UL (ref 4–11)

## 2024-02-07 PROCEDURE — 2580000003 HC RX 258: Performed by: INTERNAL MEDICINE

## 2024-02-07 PROCEDURE — 99024 POSTOP FOLLOW-UP VISIT: CPT | Performed by: SURGERY

## 2024-02-07 PROCEDURE — 36415 COLL VENOUS BLD VENIPUNCTURE: CPT

## 2024-02-07 PROCEDURE — APPSS15 APP SPLIT SHARED TIME 0-15 MINUTES: Performed by: NURSE PRACTITIONER

## 2024-02-07 PROCEDURE — 6360000002 HC RX W HCPCS: Performed by: INTERNAL MEDICINE

## 2024-02-07 PROCEDURE — 85025 COMPLETE CBC W/AUTO DIFF WBC: CPT

## 2024-02-07 PROCEDURE — 2500000003 HC RX 250 WO HCPCS: Performed by: STUDENT IN AN ORGANIZED HEALTH CARE EDUCATION/TRAINING PROGRAM

## 2024-02-07 PROCEDURE — 6370000000 HC RX 637 (ALT 250 FOR IP): Performed by: INTERNAL MEDICINE

## 2024-02-07 PROCEDURE — 2580000003 HC RX 258: Performed by: STUDENT IN AN ORGANIZED HEALTH CARE EDUCATION/TRAINING PROGRAM

## 2024-02-07 PROCEDURE — 83735 ASSAY OF MAGNESIUM: CPT

## 2024-02-07 PROCEDURE — 80069 RENAL FUNCTION PANEL: CPT

## 2024-02-07 PROCEDURE — APPNB30 APP NON BILLABLE TIME 0-30 MINS: Performed by: NURSE PRACTITIONER

## 2024-02-07 PROCEDURE — C9113 INJ PANTOPRAZOLE SODIUM, VIA: HCPCS | Performed by: INTERNAL MEDICINE

## 2024-02-07 PROCEDURE — 6370000000 HC RX 637 (ALT 250 FOR IP): Performed by: NURSE PRACTITIONER

## 2024-02-07 PROCEDURE — 6360000002 HC RX W HCPCS: Performed by: STUDENT IN AN ORGANIZED HEALTH CARE EDUCATION/TRAINING PROGRAM

## 2024-02-07 PROCEDURE — 1200000000 HC SEMI PRIVATE

## 2024-02-07 RX ORDER — MAGNESIUM SULFATE IN WATER 40 MG/ML
4000 INJECTION, SOLUTION INTRAVENOUS ONCE
Status: COMPLETED | OUTPATIENT
Start: 2024-02-07 | End: 2024-02-07

## 2024-02-07 RX ORDER — LOPERAMIDE HYDROCHLORIDE 2 MG/1
4 CAPSULE ORAL 3 TIMES DAILY
Status: DISCONTINUED | OUTPATIENT
Start: 2024-02-07 | End: 2024-02-08

## 2024-02-07 RX ADMIN — PANTOPRAZOLE SODIUM 40 MG: 40 INJECTION, POWDER, FOR SOLUTION INTRAVENOUS at 10:05

## 2024-02-07 RX ADMIN — LOPERAMIDE HYDROCHLORIDE 4 MG: 2 CAPSULE ORAL at 19:47

## 2024-02-07 RX ADMIN — HEPARIN SODIUM 5000 UNITS: 5000 INJECTION INTRAVENOUS; SUBCUTANEOUS at 20:46

## 2024-02-07 RX ADMIN — HEPARIN SODIUM 5000 UNITS: 5000 INJECTION INTRAVENOUS; SUBCUTANEOUS at 14:34

## 2024-02-07 RX ADMIN — SODIUM PHOSPHATE, MONOBASIC, MONOHYDRATE AND SODIUM PHOSPHATE, DIBASIC, ANHYDROUS 20 MMOL: 142; 276 INJECTION, SOLUTION INTRAVENOUS at 10:10

## 2024-02-07 RX ADMIN — Medication 2 CAPSULE: at 10:05

## 2024-02-07 RX ADMIN — METRONIDAZOLE 500 MG: 500 INJECTION, SOLUTION INTRAVENOUS at 03:49

## 2024-02-07 RX ADMIN — CIPROFLOXACIN 400 MG: 200 INJECTION, SOLUTION INTRAVENOUS at 21:35

## 2024-02-07 RX ADMIN — CIPROFLOXACIN 400 MG: 200 INJECTION, SOLUTION INTRAVENOUS at 05:09

## 2024-02-07 RX ADMIN — METRONIDAZOLE 500 MG: 500 INJECTION, SOLUTION INTRAVENOUS at 14:35

## 2024-02-07 RX ADMIN — HEPARIN SODIUM 5000 UNITS: 5000 INJECTION INTRAVENOUS; SUBCUTANEOUS at 05:10

## 2024-02-07 RX ADMIN — SODIUM CHLORIDE, PRESERVATIVE FREE 10 ML: 5 INJECTION INTRAVENOUS at 10:11

## 2024-02-07 RX ADMIN — METRONIDAZOLE 500 MG: 500 INJECTION, SOLUTION INTRAVENOUS at 19:43

## 2024-02-07 RX ADMIN — SODIUM CHLORIDE, PRESERVATIVE FREE 10 ML: 5 INJECTION INTRAVENOUS at 19:48

## 2024-02-07 RX ADMIN — LOPERAMIDE HYDROCHLORIDE 4 MG: 2 CAPSULE ORAL at 14:34

## 2024-02-07 RX ADMIN — MAGNESIUM SULFATE HEPTAHYDRATE 4000 MG: 40 INJECTION, SOLUTION INTRAVENOUS at 10:08

## 2024-02-07 NOTE — PLAN OF CARE
Problem: Discharge Planning  Goal: Discharge to home or other facility with appropriate resources  2/7/2024 0724 by Janusz Gonzalez RN  Outcome: Progressing  2/6/2024 2155 by Bob Bentley RN  Outcome: Progressing     Problem: Safety - Adult  Goal: Free from fall injury  2/7/2024 0724 by Janusz Gonzalez RN  Outcome: Progressing  2/6/2024 2155 by Bob Bentley RN  Outcome: Progressing     Problem: Pain  Goal: Verbalizes/displays adequate comfort level or baseline comfort level  2/7/2024 0724 by Janusz Gonzalez RN  Outcome: Progressing  2/6/2024 2155 by Bob Bentley RN  Outcome: Progressing     Problem: Nutrition Deficit:  Goal: Optimize nutritional status  2/7/2024 0724 by Janusz Gonzalez RN  Outcome: Progressing  2/6/2024 2155 by Bob Bentley RN  Outcome: Progressing     Problem: Chronic Conditions and Co-morbidities  Goal: Patient's chronic conditions and co-morbidity symptoms are monitored and maintained or improved  2/6/2024 2155 by Bob Bentley RN  Outcome: Progressing     Problem: Gastrointestinal - Adult  Goal: Establish and maintain optimal ostomy function  2/7/2024 0724 by Janusz Gonzalez RN  Outcome: Progressing  2/6/2024 2155 by Bob Bentley RN  Outcome: Progressing     Problem: Skin/Tissue Integrity  Goal: Absence of new skin breakdown  Description: 1.  Monitor for areas of redness and/or skin breakdown  2.  Assess vascular access sites hourly  3.  Every 4-6 hours minimum:  Change oxygen saturation probe site  4.  Every 4-6 hours:  If on nasal continuous positive airway pressure, respiratory therapy assess nares and determine need for appliance change or resting period.  2/7/2024 0724 by Janusz Gonzalez RN  Outcome: Progressing  2/6/2024 2155 by Bob Bentley RN  Outcome: Progressing

## 2024-02-07 NOTE — PLAN OF CARE
Problem: Discharge Planning  Goal: Discharge to home or other facility with appropriate resources  Outcome: Progressing     Problem: Safety - Adult  Goal: Free from fall injury  Outcome: Progressing     Problem: Pain  Goal: Verbalizes/displays adequate comfort level or baseline comfort level  Outcome: Progressing     Problem: Nutrition Deficit:  Goal: Optimize nutritional status  Outcome: Progressing     Problem: Chronic Conditions and Co-morbidities  Goal: Patient's chronic conditions and co-morbidity symptoms are monitored and maintained or improved  Outcome: Progressing     Problem: Gastrointestinal - Adult  Goal: Establish and maintain optimal ostomy function  Outcome: Progressing     Problem: Skin/Tissue Integrity  Goal: Absence of new skin breakdown  Description: 1.  Monitor for areas of redness and/or skin breakdown  2.  Assess vascular access sites hourly  3.  Every 4-6 hours minimum:  Change oxygen saturation probe site  4.  Every 4-6 hours:  If on nasal continuous positive airway pressure, respiratory therapy assess nares and determine need for appliance change or resting period.  Outcome: Progressing

## 2024-02-08 LAB
ALBUMIN SERPL-MCNC: 3.1 G/DL (ref 3.4–5)
ANION GAP SERPL CALCULATED.3IONS-SCNC: 12 MMOL/L (ref 3–16)
BASOPHILS # BLD: 0 K/UL (ref 0–0.2)
BASOPHILS NFR BLD: 0.5 %
BUN SERPL-MCNC: 4 MG/DL (ref 7–20)
CALCIUM SERPL-MCNC: 7.7 MG/DL (ref 8.3–10.6)
CHLORIDE SERPL-SCNC: 99 MMOL/L (ref 99–110)
CO2 SERPL-SCNC: 21 MMOL/L (ref 21–32)
CREAT SERPL-MCNC: 0.9 MG/DL (ref 0.6–1.2)
DEPRECATED RDW RBC AUTO: 15.1 % (ref 12.4–15.4)
EOSINOPHIL # BLD: 0 K/UL (ref 0–0.6)
EOSINOPHIL NFR BLD: 1.2 %
GFR SERPLBLD CREATININE-BSD FMLA CKD-EPI: >60 ML/MIN/{1.73_M2}
GI PATHOGENS PNL STL NAA+PROBE: NORMAL
GLUCOSE SERPL-MCNC: 142 MG/DL (ref 70–99)
HCT VFR BLD AUTO: 23.8 % (ref 36–48)
HGB BLD-MCNC: 8 G/DL (ref 12–16)
LYMPHOCYTES # BLD: 0.6 K/UL (ref 1–5.1)
LYMPHOCYTES NFR BLD: 16.7 %
MAGNESIUM SERPL-MCNC: 1.6 MG/DL (ref 1.8–2.4)
MCH RBC QN AUTO: 30 PG (ref 26–34)
MCHC RBC AUTO-ENTMCNC: 33.6 G/DL (ref 31–36)
MCV RBC AUTO: 89.4 FL (ref 80–100)
MONOCYTES # BLD: 0.3 K/UL (ref 0–1.3)
MONOCYTES NFR BLD: 9.6 %
NEUTROPHILS # BLD: 2.4 K/UL (ref 1.7–7.7)
NEUTROPHILS NFR BLD: 72 %
PHOSPHATE SERPL-MCNC: 2.3 MG/DL (ref 2.5–4.9)
PLATELET # BLD AUTO: 244 K/UL (ref 135–450)
PMV BLD AUTO: 7 FL (ref 5–10.5)
POTASSIUM SERPL-SCNC: 2.8 MMOL/L (ref 3.5–5.1)
POTASSIUM SERPL-SCNC: 3.6 MMOL/L (ref 3.5–5.1)
RBC # BLD AUTO: 2.66 M/UL (ref 4–5.2)
SODIUM SERPL-SCNC: 132 MMOL/L (ref 136–145)
WBC # BLD AUTO: 3.4 K/UL (ref 4–11)

## 2024-02-08 PROCEDURE — APPNB30 APP NON BILLABLE TIME 0-30 MINS: Performed by: NURSE PRACTITIONER

## 2024-02-08 PROCEDURE — 83735 ASSAY OF MAGNESIUM: CPT

## 2024-02-08 PROCEDURE — 80069 RENAL FUNCTION PANEL: CPT

## 2024-02-08 PROCEDURE — 85025 COMPLETE CBC W/AUTO DIFF WBC: CPT

## 2024-02-08 PROCEDURE — 36415 COLL VENOUS BLD VENIPUNCTURE: CPT

## 2024-02-08 PROCEDURE — 83036 HEMOGLOBIN GLYCOSYLATED A1C: CPT

## 2024-02-08 PROCEDURE — 6360000002 HC RX W HCPCS: Performed by: INTERNAL MEDICINE

## 2024-02-08 PROCEDURE — 2500000003 HC RX 250 WO HCPCS: Performed by: STUDENT IN AN ORGANIZED HEALTH CARE EDUCATION/TRAINING PROGRAM

## 2024-02-08 PROCEDURE — 6370000000 HC RX 637 (ALT 250 FOR IP): Performed by: INTERNAL MEDICINE

## 2024-02-08 PROCEDURE — 99024 POSTOP FOLLOW-UP VISIT: CPT | Performed by: SURGERY

## 2024-02-08 PROCEDURE — 82746 ASSAY OF FOLIC ACID SERUM: CPT

## 2024-02-08 PROCEDURE — 84132 ASSAY OF SERUM POTASSIUM: CPT

## 2024-02-08 PROCEDURE — 84155 ASSAY OF PROTEIN SERUM: CPT

## 2024-02-08 PROCEDURE — 6360000002 HC RX W HCPCS: Performed by: STUDENT IN AN ORGANIZED HEALTH CARE EDUCATION/TRAINING PROGRAM

## 2024-02-08 PROCEDURE — APPSS15 APP SPLIT SHARED TIME 0-15 MINUTES: Performed by: NURSE PRACTITIONER

## 2024-02-08 PROCEDURE — 82607 VITAMIN B-12: CPT

## 2024-02-08 PROCEDURE — 1200000000 HC SEMI PRIVATE

## 2024-02-08 PROCEDURE — 2580000003 HC RX 258: Performed by: STUDENT IN AN ORGANIZED HEALTH CARE EDUCATION/TRAINING PROGRAM

## 2024-02-08 PROCEDURE — 6370000000 HC RX 637 (ALT 250 FOR IP): Performed by: STUDENT IN AN ORGANIZED HEALTH CARE EDUCATION/TRAINING PROGRAM

## 2024-02-08 PROCEDURE — 84165 PROTEIN E-PHORESIS SERUM: CPT

## 2024-02-08 PROCEDURE — C9113 INJ PANTOPRAZOLE SODIUM, VIA: HCPCS | Performed by: INTERNAL MEDICINE

## 2024-02-08 PROCEDURE — 6370000000 HC RX 637 (ALT 250 FOR IP): Performed by: NURSE PRACTITIONER

## 2024-02-08 RX ORDER — MAGNESIUM SULFATE IN WATER 40 MG/ML
2000 INJECTION, SOLUTION INTRAVENOUS ONCE
Status: COMPLETED | OUTPATIENT
Start: 2024-02-08 | End: 2024-02-08

## 2024-02-08 RX ORDER — CHOLESTYRAMINE LIGHT 4 G/5.7G
1 POWDER, FOR SUSPENSION ORAL 4 TIMES DAILY
Status: DISCONTINUED | OUTPATIENT
Start: 2024-02-08 | End: 2024-02-11 | Stop reason: HOSPADM

## 2024-02-08 RX ORDER — APREPITANT 40 MG/1
40 CAPSULE ORAL DAILY
Status: DISCONTINUED | OUTPATIENT
Start: 2024-02-08 | End: 2024-02-11 | Stop reason: HOSPADM

## 2024-02-08 RX ORDER — POTASSIUM CHLORIDE 7.45 MG/ML
10 INJECTION INTRAVENOUS
Status: COMPLETED | OUTPATIENT
Start: 2024-02-08 | End: 2024-02-08

## 2024-02-08 RX ORDER — POTASSIUM CHLORIDE 7.45 MG/ML
10 INJECTION INTRAVENOUS
Status: DISCONTINUED | OUTPATIENT
Start: 2024-02-08 | End: 2024-02-08

## 2024-02-08 RX ORDER — POTASSIUM CHLORIDE 20 MEQ/1
40 TABLET, EXTENDED RELEASE ORAL 2 TIMES DAILY WITH MEALS
Status: COMPLETED | OUTPATIENT
Start: 2024-02-08 | End: 2024-02-08

## 2024-02-08 RX ADMIN — ONDANSETRON 4 MG: 4 TABLET, ORALLY DISINTEGRATING ORAL at 16:55

## 2024-02-08 RX ADMIN — CHOLESTYRAMINE 4 G: 4 POWDER, FOR SUSPENSION ORAL at 13:24

## 2024-02-08 RX ADMIN — POTASSIUM CHLORIDE 40 MEQ: 1500 TABLET, EXTENDED RELEASE ORAL at 10:29

## 2024-02-08 RX ADMIN — METRONIDAZOLE 500 MG: 500 INJECTION, SOLUTION INTRAVENOUS at 13:03

## 2024-02-08 RX ADMIN — POTASSIUM BICARBONATE 50 MEQ: 978 TABLET, EFFERVESCENT ORAL at 08:30

## 2024-02-08 RX ADMIN — LOPERAMIDE HYDROCHLORIDE 4 MG: 2 CAPSULE ORAL at 08:26

## 2024-02-08 RX ADMIN — METRONIDAZOLE 500 MG: 500 INJECTION, SOLUTION INTRAVENOUS at 03:39

## 2024-02-08 RX ADMIN — Medication 2 CAPSULE: at 08:26

## 2024-02-08 RX ADMIN — PANTOPRAZOLE SODIUM 40 MG: 40 INJECTION, POWDER, FOR SOLUTION INTRAVENOUS at 08:26

## 2024-02-08 RX ADMIN — HEPARIN SODIUM 5000 UNITS: 5000 INJECTION INTRAVENOUS; SUBCUTANEOUS at 13:23

## 2024-02-08 RX ADMIN — APREPITANT 40 MG: 40 CAPSULE ORAL at 14:51

## 2024-02-08 RX ADMIN — MAGNESIUM SULFATE HEPTAHYDRATE 2000 MG: 40 INJECTION, SOLUTION INTRAVENOUS at 08:23

## 2024-02-08 RX ADMIN — CIPROFLOXACIN 400 MG: 200 INJECTION, SOLUTION INTRAVENOUS at 16:56

## 2024-02-08 RX ADMIN — POTASSIUM CHLORIDE 10 MEQ: 7.46 INJECTION, SOLUTION INTRAVENOUS at 10:32

## 2024-02-08 RX ADMIN — CIPROFLOXACIN 400 MG: 200 INJECTION, SOLUTION INTRAVENOUS at 04:38

## 2024-02-08 RX ADMIN — CHOLESTYRAMINE 4 G: 4 POWDER, FOR SUSPENSION ORAL at 16:55

## 2024-02-08 RX ADMIN — HEPARIN SODIUM 5000 UNITS: 5000 INJECTION INTRAVENOUS; SUBCUTANEOUS at 05:04

## 2024-02-08 RX ADMIN — POTASSIUM CHLORIDE 10 MEQ: 7.46 INJECTION, SOLUTION INTRAVENOUS at 06:31

## 2024-02-08 RX ADMIN — POTASSIUM CHLORIDE 40 MEQ: 1500 TABLET, EXTENDED RELEASE ORAL at 16:55

## 2024-02-08 RX ADMIN — POTASSIUM CHLORIDE 10 MEQ: 7.46 INJECTION, SOLUTION INTRAVENOUS at 08:24

## 2024-02-08 RX ADMIN — POTASSIUM CHLORIDE 10 MEQ: 7.46 INJECTION, SOLUTION INTRAVENOUS at 14:16

## 2024-02-08 RX ADMIN — SODIUM PHOSPHATE, MONOBASIC, MONOHYDRATE AND SODIUM PHOSPHATE, DIBASIC, ANHYDROUS 20 MMOL: 142; 276 INJECTION, SOLUTION INTRAVENOUS at 09:44

## 2024-02-08 NOTE — PLAN OF CARE
Problem: Discharge Planning  Goal: Discharge to home or other facility with appropriate resources  2/7/2024 2056 by Mandy Perez RN  Outcome: Progressing  2/7/2024 0724 by Janusz Gonzalez RN  Outcome: Progressing     Problem: Safety - Adult  Goal: Free from fall injury  2/7/2024 2056 by Mandy Perez RN  Outcome: Progressing  2/7/2024 0724 by Janusz Gonzalez RN  Outcome: Progressing     Problem: Pain  Goal: Verbalizes/displays adequate comfort level or baseline comfort level  2/7/2024 2056 by Mandy Perez RN  Outcome: Progressing  2/7/2024 0724 by Janusz Gonzalez RN  Outcome: Progressing     Problem: Nutrition Deficit:  Goal: Optimize nutritional status  2/7/2024 2056 by Mandy Perez RN  Outcome: Progressing  2/7/2024 0724 by Janusz Gonzalez RN  Outcome: Progressing     Problem: Chronic Conditions and Co-morbidities  Goal: Patient's chronic conditions and co-morbidity symptoms are monitored and maintained or improved  Outcome: Progressing     Problem: Gastrointestinal - Adult  Goal: Establish and maintain optimal ostomy function  2/7/2024 2056 by Mandy Perez RN  Outcome: Progressing  2/7/2024 0724 by Janusz Gonzalez RN  Outcome: Progressing     Problem: Skin/Tissue Integrity  Goal: Absence of new skin breakdown  Description: 1.  Monitor for areas of redness and/or skin breakdown  2.  Assess vascular access sites hourly  3.  Every 4-6 hours minimum:  Change oxygen saturation probe site  4.  Every 4-6 hours:  If on nasal continuous positive airway pressure, respiratory therapy assess nares and determine need for appliance change or resting period.  2/7/2024 2056 by Mandy Perez RN  Outcome: Progressing  2/7/2024 0724 by Janusz Gonzalez RN  Outcome: Progressing

## 2024-02-08 NOTE — ACP (ADVANCE CARE PLANNING)
Advanced Care Planning Note    Purpose of Encounter: Advanced care planning in light of N/V  Parties In Attendance: Patient  Decisional Capacity: Yes  Subjective: Patient has n/v  Objective: Cr 0.9  Goals of Care Determination: Patient wants full support  Plan:  GI c/s, s/p EGD, NM gastric emptying study, anti-emetics, Abx  Code Status: Full   Time spent on Advanced care Plannin minutes  Advanced Care Planning Documents: Completed advanced directives on chart, Miss, sister-in-law, is the POA.    Arnoldo Downs MD  2024 9:42 AM

## 2024-02-08 NOTE — PLAN OF CARE
Problem: Safety - Adult  Goal: Free from fall injury  2/8/2024 1005 by Sintia Toth RN  Outcome: Progressing  2/7/2024 2056 by Mandy Perez RN  Outcome: Progressing     Problem: Pain  Goal: Verbalizes/displays adequate comfort level or baseline comfort level  2/8/2024 1005 by Sintia Toth RN  Outcome: Progressing  2/7/2024 2056 by Mandy Perez RN  Outcome: Progressing     Problem: Nutrition Deficit:  Goal: Optimize nutritional status  2/8/2024 1005 by Sintia Toth RN  Outcome: Progressing  2/7/2024 2056 by Mandy Perez RN  Outcome: Progressing     Problem: Chronic Conditions and Co-morbidities  Goal: Patient's chronic conditions and co-morbidity symptoms are monitored and maintained or improved  2/8/2024 1005 by Sintia Toth RN  Outcome: Progressing  2/7/2024 2056 by Mandy Perez RN  Outcome: Progressing

## 2024-02-08 NOTE — ACP (ADVANCE CARE PLANNING)
Advance Care Planning     Advance Care Planning Inpatient Note  Spiritual Care Department    Today's Date: 2/8/2024  Unit: [facility] 3A NURSING    Received request from Other: unit clerk .  Upon review of chart and communication with care team, patient's decision making abilities are not in question.. Patient was/were present in the room during visit.    Goals of ACP Conversation:  Discuss advance care planning documents    Health Care Decision Makers:       Primary Decision Maker: Mary Lozada - Brother/Sister - [phone]  Summary:  No Decision Maker named by patient at this time    Advance Care Planning Documents (Patient Wishes):  None     Assessment:  Patient wishes to discuss with preferred [facility] prior to making decisions. Pt states she has no spouse or children     Interventions:  Provided education on documents for clarity and greater understanding  Discussed and provided education on state decision maker hierarchy  Encouraged ongoing ACP conversation with future decision makers and loved ones  Blank documents provided to patient for review.    Care Preferences Communicated:   No    Outcomes/Plan:  Patient will notify Spiritual Care when ready to further discuss or complete.    Electronically signed by Chaplain Williams on 2/8/2024 at 4:47 PM

## 2024-02-09 ENCOUNTER — APPOINTMENT (OUTPATIENT)
Dept: NUCLEAR MEDICINE | Age: 74
DRG: 682 | End: 2024-02-09
Attending: INTERNAL MEDICINE
Payer: MEDICARE

## 2024-02-09 ENCOUNTER — APPOINTMENT (OUTPATIENT)
Dept: CT IMAGING | Age: 74
DRG: 682 | End: 2024-02-09
Attending: INTERNAL MEDICINE
Payer: MEDICARE

## 2024-02-09 LAB
ALBUMIN SERPL-MCNC: 3.1 G/DL (ref 3.4–5)
ANION GAP SERPL CALCULATED.3IONS-SCNC: 14 MMOL/L (ref 3–16)
BASOPHILS # BLD: 0 K/UL (ref 0–0.2)
BASOPHILS NFR BLD: 0.7 %
BUN SERPL-MCNC: 3 MG/DL (ref 7–20)
CALCIUM SERPL-MCNC: 8 MG/DL (ref 8.3–10.6)
CHLORIDE SERPL-SCNC: 100 MMOL/L (ref 99–110)
CO2 SERPL-SCNC: 22 MMOL/L (ref 21–32)
CREAT SERPL-MCNC: 0.8 MG/DL (ref 0.6–1.2)
DEPRECATED RDW RBC AUTO: 15.2 % (ref 12.4–15.4)
EOSINOPHIL # BLD: 0.1 K/UL (ref 0–0.6)
EOSINOPHIL NFR BLD: 2.4 %
EST. AVERAGE GLUCOSE BLD GHB EST-MCNC: 125.5 MG/DL
FOLATE SERPL-MCNC: 14.86 NG/ML (ref 4.78–24.2)
GFR SERPLBLD CREATININE-BSD FMLA CKD-EPI: >60 ML/MIN/{1.73_M2}
GLUCOSE SERPL-MCNC: 95 MG/DL (ref 70–99)
HBA1C MFR BLD: 6 %
HCT VFR BLD AUTO: 22.2 % (ref 36–48)
HGB BLD-MCNC: 7.5 G/DL (ref 12–16)
LYMPHOCYTES # BLD: 0.9 K/UL (ref 1–5.1)
LYMPHOCYTES NFR BLD: 25.4 %
MAGNESIUM SERPL-MCNC: 1.4 MG/DL (ref 1.8–2.4)
MCH RBC QN AUTO: 30.1 PG (ref 26–34)
MCHC RBC AUTO-ENTMCNC: 33.6 G/DL (ref 31–36)
MCV RBC AUTO: 89.7 FL (ref 80–100)
MONOCYTES # BLD: 0.4 K/UL (ref 0–1.3)
MONOCYTES NFR BLD: 11.2 %
NEUTROPHILS # BLD: 2.2 K/UL (ref 1.7–7.7)
NEUTROPHILS NFR BLD: 60.3 %
PHOSPHATE SERPL-MCNC: 2.3 MG/DL (ref 2.5–4.9)
PLATELET # BLD AUTO: 234 K/UL (ref 135–450)
PMV BLD AUTO: 7.2 FL (ref 5–10.5)
POTASSIUM SERPL-SCNC: 3.9 MMOL/L (ref 3.5–5.1)
RBC # BLD AUTO: 2.48 M/UL (ref 4–5.2)
SODIUM SERPL-SCNC: 136 MMOL/L (ref 136–145)
VIT B12 SERPL-MCNC: 419 PG/ML (ref 211–911)
WBC # BLD AUTO: 3.7 K/UL (ref 4–11)

## 2024-02-09 PROCEDURE — 88185 FLOWCYTOMETRY/TC ADD-ON: CPT

## 2024-02-09 PROCEDURE — 6360000002 HC RX W HCPCS: Performed by: INTERNAL MEDICINE

## 2024-02-09 PROCEDURE — 07DR3ZX EXTRACTION OF ILIAC BONE MARROW, PERCUTANEOUS APPROACH, DIAGNOSTIC: ICD-10-PCS | Performed by: PSYCHIATRY & NEUROLOGY

## 2024-02-09 PROCEDURE — 2500000003 HC RX 250 WO HCPCS: Performed by: NURSE PRACTITIONER

## 2024-02-09 PROCEDURE — 2580000003 HC RX 258: Performed by: INTERNAL MEDICINE

## 2024-02-09 PROCEDURE — 88311 DECALCIFY TISSUE: CPT

## 2024-02-09 PROCEDURE — 6370000000 HC RX 637 (ALT 250 FOR IP): Performed by: NURSE PRACTITIONER

## 2024-02-09 PROCEDURE — 3430000000 HC RX DIAGNOSTIC RADIOPHARMACEUTICAL: Performed by: INTERNAL MEDICINE

## 2024-02-09 PROCEDURE — APPSS15 APP SPLIT SHARED TIME 0-15 MINUTES: Performed by: NURSE PRACTITIONER

## 2024-02-09 PROCEDURE — APPNB30 APP NON BILLABLE TIME 0-30 MINS: Performed by: NURSE PRACTITIONER

## 2024-02-09 PROCEDURE — 88313 SPECIAL STAINS GROUP 2: CPT

## 2024-02-09 PROCEDURE — 0DB98ZX EXCISION OF DUODENUM, VIA NATURAL OR ARTIFICIAL OPENING ENDOSCOPIC, DIAGNOSTIC: ICD-10-PCS | Performed by: INTERNAL MEDICINE

## 2024-02-09 PROCEDURE — 88184 FLOWCYTOMETRY/ TC 1 MARKER: CPT

## 2024-02-09 PROCEDURE — 2580000003 HC RX 258: Performed by: NURSE PRACTITIONER

## 2024-02-09 PROCEDURE — 6360000002 HC RX W HCPCS: Performed by: STUDENT IN AN ORGANIZED HEALTH CARE EDUCATION/TRAINING PROGRAM

## 2024-02-09 PROCEDURE — 0DB68ZX EXCISION OF STOMACH, VIA NATURAL OR ARTIFICIAL OPENING ENDOSCOPIC, DIAGNOSTIC: ICD-10-PCS | Performed by: INTERNAL MEDICINE

## 2024-02-09 PROCEDURE — 99024 POSTOP FOLLOW-UP VISIT: CPT | Performed by: SURGERY

## 2024-02-09 PROCEDURE — 6360000002 HC RX W HCPCS: Performed by: RADIOLOGY

## 2024-02-09 PROCEDURE — 1200000000 HC SEMI PRIVATE

## 2024-02-09 PROCEDURE — 88305 TISSUE EXAM BY PATHOLOGIST: CPT

## 2024-02-09 PROCEDURE — 80069 RENAL FUNCTION PANEL: CPT

## 2024-02-09 PROCEDURE — 38220 DX BONE MARROW ASPIRATIONS: CPT

## 2024-02-09 PROCEDURE — 36415 COLL VENOUS BLD VENIPUNCTURE: CPT

## 2024-02-09 PROCEDURE — 83735 ASSAY OF MAGNESIUM: CPT

## 2024-02-09 PROCEDURE — 85025 COMPLETE CBC W/AUTO DIFF WBC: CPT

## 2024-02-09 PROCEDURE — 78264 GASTRIC EMPTYING IMG STUDY: CPT

## 2024-02-09 PROCEDURE — A9541 TC99M SULFUR COLLOID: HCPCS | Performed by: INTERNAL MEDICINE

## 2024-02-09 PROCEDURE — C9113 INJ PANTOPRAZOLE SODIUM, VIA: HCPCS | Performed by: INTERNAL MEDICINE

## 2024-02-09 RX ORDER — FENTANYL CITRATE 50 UG/ML
INJECTION, SOLUTION INTRAMUSCULAR; INTRAVENOUS PRN
Status: COMPLETED | OUTPATIENT
Start: 2024-02-09 | End: 2024-02-09

## 2024-02-09 RX ORDER — MIDAZOLAM HYDROCHLORIDE 2 MG/2ML
INJECTION, SOLUTION INTRAMUSCULAR; INTRAVENOUS PRN
Status: COMPLETED | OUTPATIENT
Start: 2024-02-09 | End: 2024-02-09

## 2024-02-09 RX ORDER — THIAMINE HYDROCHLORIDE 100 MG/ML
100 INJECTION, SOLUTION INTRAMUSCULAR; INTRAVENOUS DAILY
Status: DISCONTINUED | OUTPATIENT
Start: 2024-02-09 | End: 2024-02-11 | Stop reason: HOSPADM

## 2024-02-09 RX ORDER — MAGNESIUM SULFATE IN WATER 40 MG/ML
4000 INJECTION, SOLUTION INTRAVENOUS ONCE
Status: COMPLETED | OUTPATIENT
Start: 2024-02-09 | End: 2024-02-09

## 2024-02-09 RX ORDER — LOPERAMIDE HYDROCHLORIDE 2 MG/1
4 CAPSULE ORAL 3 TIMES DAILY
Status: DISCONTINUED | OUTPATIENT
Start: 2024-02-09 | End: 2024-02-11 | Stop reason: HOSPADM

## 2024-02-09 RX ADMIN — FENTANYL CITRATE 50 MCG: 50 INJECTION, SOLUTION INTRAMUSCULAR; INTRAVENOUS at 13:22

## 2024-02-09 RX ADMIN — CIPROFLOXACIN 400 MG: 200 INJECTION, SOLUTION INTRAVENOUS at 18:12

## 2024-02-09 RX ADMIN — APREPITANT 40 MG: 40 CAPSULE ORAL at 14:05

## 2024-02-09 RX ADMIN — TECHNETIUM TC 99M SULFUR COLLOID 0.9 MILLICURIE: KIT at 07:58

## 2024-02-09 RX ADMIN — SODIUM CHLORIDE, PRESERVATIVE FREE 10 ML: 5 INJECTION INTRAVENOUS at 14:13

## 2024-02-09 RX ADMIN — CHOLESTYRAMINE 4 G: 4 POWDER, FOR SUSPENSION ORAL at 19:59

## 2024-02-09 RX ADMIN — METRONIDAZOLE 500 MG: 500 INJECTION, SOLUTION INTRAVENOUS at 04:45

## 2024-02-09 RX ADMIN — THIAMINE HYDROCHLORIDE 100 MG: 100 INJECTION, SOLUTION INTRAMUSCULAR; INTRAVENOUS at 14:07

## 2024-02-09 RX ADMIN — CHOLESTYRAMINE 4 G: 4 POWDER, FOR SUSPENSION ORAL at 14:06

## 2024-02-09 RX ADMIN — HEPARIN SODIUM 5000 UNITS: 5000 INJECTION INTRAVENOUS; SUBCUTANEOUS at 14:07

## 2024-02-09 RX ADMIN — METRONIDAZOLE 500 MG: 500 INJECTION, SOLUTION INTRAVENOUS at 20:08

## 2024-02-09 RX ADMIN — SODIUM PHOSPHATE, MONOBASIC, MONOHYDRATE AND SODIUM PHOSPHATE, DIBASIC, ANHYDROUS 20 MMOL: 142; 276 INJECTION, SOLUTION INTRAVENOUS at 18:04

## 2024-02-09 RX ADMIN — HEPARIN SODIUM 5000 UNITS: 5000 INJECTION INTRAVENOUS; SUBCUTANEOUS at 00:29

## 2024-02-09 RX ADMIN — HEPARIN SODIUM 5000 UNITS: 5000 INJECTION INTRAVENOUS; SUBCUTANEOUS at 04:50

## 2024-02-09 RX ADMIN — LOPERAMIDE HYDROCHLORIDE 4 MG: 2 CAPSULE ORAL at 14:06

## 2024-02-09 RX ADMIN — METRONIDAZOLE 500 MG: 500 INJECTION, SOLUTION INTRAVENOUS at 00:30

## 2024-02-09 RX ADMIN — MAGNESIUM SULFATE HEPTAHYDRATE 4000 MG: 40 INJECTION, SOLUTION INTRAVENOUS at 14:01

## 2024-02-09 RX ADMIN — LOPERAMIDE HYDROCHLORIDE 4 MG: 2 CAPSULE ORAL at 19:59

## 2024-02-09 RX ADMIN — HEPARIN SODIUM 5000 UNITS: 5000 INJECTION INTRAVENOUS; SUBCUTANEOUS at 20:00

## 2024-02-09 RX ADMIN — SODIUM CHLORIDE, PRESERVATIVE FREE 10 ML: 5 INJECTION INTRAVENOUS at 00:30

## 2024-02-09 RX ADMIN — MIDAZOLAM HYDROCHLORIDE 1 MG: 1 INJECTION, SOLUTION INTRAMUSCULAR; INTRAVENOUS at 13:20

## 2024-02-09 RX ADMIN — MIDAZOLAM HYDROCHLORIDE 1 MG: 1 INJECTION, SOLUTION INTRAMUSCULAR; INTRAVENOUS at 13:22

## 2024-02-09 RX ADMIN — CHOLESTYRAMINE 4 G: 4 POWDER, FOR SUSPENSION ORAL at 00:28

## 2024-02-09 RX ADMIN — METRONIDAZOLE 500 MG: 500 INJECTION, SOLUTION INTRAVENOUS at 15:52

## 2024-02-09 RX ADMIN — PANTOPRAZOLE SODIUM 40 MG: 40 INJECTION, POWDER, FOR SOLUTION INTRAVENOUS at 14:07

## 2024-02-09 NOTE — PRE SEDATION
Sedation Pre-Procedure Note    Patient Name: Tammy Frederick   YOB: 1950  Room/Bed: 3AN-3318/3318-01  Medical Record Number: [id number]  Date: 2/9/2024   Time: 1:07 PM       Indication:  Anemia, leukopenia    Consent: I have discussed with the patient and/or the patient representative the indication, alternatives, and the possible risks and/or complications of the planned procedure and the anesthesia methods. The patient and/or patient representative appear to understand and agree to proceed.    Vital Signs:   Vitals:    02/09/24 0715   BP: 110/65   Pulse: 70   Resp: 16   Temp: 97.7 °F (36.5 °C)   SpO2: 96%       Past Medical History:   has a past medical history of Arthritis.    Past Surgical History:   has a past surgical history that includes Abdomen surgery; Total knee arthroplasty (Left, 05/16/2011); joint replacement (Right, 09/17/2012); colostomy (N/A, 12/28/2023); joint replacement (Left); and Upper gastrointestinal endoscopy (N/A, 2/6/2024).    Medications:   Scheduled Meds:    magnesium sulfate  4,000 mg IntraVENous Once    sodium phosphate IVPB (PERIPHERAL line)  20 mmol IntraVENous Once    loperamide  4 mg Oral TID    thiamine  100 mg IntraVENous Daily    cholestyramine light  1 packet Oral 4x daily    aprepitant  40 mg Oral Daily    lactobacillus  2 capsule Oral Daily with breakfast    pantoprazole  40 mg IntraVENous Daily    ciprofloxacin  400 mg IntraVENous Q12H    sodium chloride flush  5-40 mL IntraVENous 2 times per day    heparin (porcine)  5,000 Units SubCUTAneous 3 times per day    metroNIDAZOLE  500 mg IntraVENous Q8H     Continuous Infusions:    sodium chloride       PRN Meds: sodium chloride flush, sodium chloride, potassium chloride **OR** potassium alternative oral replacement **OR** potassium chloride, magnesium sulfate, ondansetron **OR** ondansetron, polyethylene glycol, acetaminophen **OR** acetaminophen  Home Meds:   Prior to Admission medications    Medication Sig Start

## 2024-02-09 NOTE — PLAN OF CARE
Problem: Discharge Planning  Goal: Discharge to home or other facility with appropriate resources  Outcome: Progressing     Problem: Safety - Adult  Goal: Free from fall injury  Outcome: Progressing     Problem: Pain  Goal: Verbalizes/displays adequate comfort level or baseline comfort level  Outcome: Progressing     Problem: Nutrition Deficit:  Goal: Optimize nutritional status  Outcome: Progressing     Problem: Chronic Conditions and Co-morbidities  Goal: Patient's chronic conditions and co-morbidity symptoms are monitored and maintained or improved  Outcome: Progressing     Problem: Gastrointestinal - Adult  Goal: Establish and maintain optimal ostomy function  Outcome: Progressing     Problem: Skin/Tissue Integrity  Goal: Absence of new skin breakdown  Description: 1.  Monitor for areas of redness and/or skin breakdown  2.  Assess vascular access sites hourly  3.  Every 4-6 hours minimum:  Change oxygen saturation probe site  4.  Every 4-6 hours:  If on nasal continuous positive airway pressure, respiratory therapy assess nares and determine need for appliance change or resting period.  Outcome: Progressing     Problem: Discharge Planning  Goal: Discharge to home or other facility with appropriate resources  2/9/2024 0606 by Angie Puga RN  Outcome: Progressing  2/9/2024 0605 by Angie Puga RN  Outcome: Progressing     Problem: Safety - Adult  Goal: Free from fall injury  2/9/2024 0606 by Angie Puga RN  Outcome: Progressing  2/9/2024 0605 by Angie Puga RN  Outcome: Progressing     Problem: Pain  Goal: Verbalizes/displays adequate comfort level or baseline comfort level  2/9/2024 0606 by Angie Puga RN  Outcome: Progressing  2/9/2024 0605 by Angie Puga RN  Outcome: Progressing     Problem: Nutrition Deficit:  Goal: Optimize nutritional status  2/9/2024 0606 by Angie Puga RN  Outcome: Progressing  2/9/2024 0605 by Angie Puga RN  Outcome: Progressing     Problem: Chronic Conditions and

## 2024-02-10 PROBLEM — R11.2 NAUSEA & VOMITING: Status: ACTIVE | Noted: 2024-02-10

## 2024-02-10 LAB
ALBUMIN SERPL-MCNC: 3.1 G/DL (ref 3.4–5)
ANION GAP SERPL CALCULATED.3IONS-SCNC: 15 MMOL/L (ref 3–16)
BASOPHILS # BLD: 0 K/UL (ref 0–0.2)
BASOPHILS NFR BLD: 0.8 %
BUN SERPL-MCNC: 5 MG/DL (ref 7–20)
CALCIUM SERPL-MCNC: 7.7 MG/DL (ref 8.3–10.6)
CHLORIDE SERPL-SCNC: 102 MMOL/L (ref 99–110)
CO2 SERPL-SCNC: 21 MMOL/L (ref 21–32)
CREAT SERPL-MCNC: 0.7 MG/DL (ref 0.6–1.2)
DEPRECATED RDW RBC AUTO: 15.8 % (ref 12.4–15.4)
EOSINOPHIL # BLD: 0.1 K/UL (ref 0–0.6)
EOSINOPHIL NFR BLD: 2.2 %
GFR SERPLBLD CREATININE-BSD FMLA CKD-EPI: >60 ML/MIN/{1.73_M2}
GLUCOSE SERPL-MCNC: 90 MG/DL (ref 70–99)
HCT VFR BLD AUTO: 21.9 % (ref 36–48)
HGB BLD-MCNC: 7.5 G/DL (ref 12–16)
LYMPHOCYTES # BLD: 0.7 K/UL (ref 1–5.1)
LYMPHOCYTES NFR BLD: 24.2 %
MAGNESIUM SERPL-MCNC: 1.9 MG/DL (ref 1.8–2.4)
MCH RBC QN AUTO: 30.7 PG (ref 26–34)
MCHC RBC AUTO-ENTMCNC: 34.4 G/DL (ref 31–36)
MCV RBC AUTO: 89 FL (ref 80–100)
MONOCYTES # BLD: 0.4 K/UL (ref 0–1.3)
MONOCYTES NFR BLD: 11.4 %
NEUTROPHILS # BLD: 1.9 K/UL (ref 1.7–7.7)
NEUTROPHILS NFR BLD: 61.4 %
PHOSPHATE SERPL-MCNC: 2.9 MG/DL (ref 2.5–4.9)
PLATELET # BLD AUTO: 240 K/UL (ref 135–450)
PMV BLD AUTO: 6.9 FL (ref 5–10.5)
POTASSIUM SERPL-SCNC: 3 MMOL/L (ref 3.5–5.1)
RBC # BLD AUTO: 2.46 M/UL (ref 4–5.2)
SODIUM SERPL-SCNC: 138 MMOL/L (ref 136–145)
WBC # BLD AUTO: 3.1 K/UL (ref 4–11)

## 2024-02-10 PROCEDURE — 6360000002 HC RX W HCPCS: Performed by: INTERNAL MEDICINE

## 2024-02-10 PROCEDURE — 6370000000 HC RX 637 (ALT 250 FOR IP): Performed by: INTERNAL MEDICINE

## 2024-02-10 PROCEDURE — 80069 RENAL FUNCTION PANEL: CPT

## 2024-02-10 PROCEDURE — 85025 COMPLETE CBC W/AUTO DIFF WBC: CPT

## 2024-02-10 PROCEDURE — 6370000000 HC RX 637 (ALT 250 FOR IP): Performed by: NURSE PRACTITIONER

## 2024-02-10 PROCEDURE — C9113 INJ PANTOPRAZOLE SODIUM, VIA: HCPCS | Performed by: INTERNAL MEDICINE

## 2024-02-10 PROCEDURE — 99232 SBSQ HOSP IP/OBS MODERATE 35: CPT | Performed by: SURGERY

## 2024-02-10 PROCEDURE — 2580000003 HC RX 258: Performed by: INTERNAL MEDICINE

## 2024-02-10 PROCEDURE — 36415 COLL VENOUS BLD VENIPUNCTURE: CPT

## 2024-02-10 PROCEDURE — 6360000002 HC RX W HCPCS: Performed by: STUDENT IN AN ORGANIZED HEALTH CARE EDUCATION/TRAINING PROGRAM

## 2024-02-10 PROCEDURE — 6370000000 HC RX 637 (ALT 250 FOR IP): Performed by: STUDENT IN AN ORGANIZED HEALTH CARE EDUCATION/TRAINING PROGRAM

## 2024-02-10 PROCEDURE — 83735 ASSAY OF MAGNESIUM: CPT

## 2024-02-10 PROCEDURE — 1200000000 HC SEMI PRIVATE

## 2024-02-10 RX ORDER — POTASSIUM CHLORIDE 20 MEQ/1
40 TABLET, EXTENDED RELEASE ORAL 2 TIMES DAILY WITH MEALS
Status: COMPLETED | OUTPATIENT
Start: 2024-02-10 | End: 2024-02-10

## 2024-02-10 RX ORDER — LOPERAMIDE HYDROCHLORIDE 2 MG/1
4 CAPSULE ORAL 3 TIMES DAILY
Qty: 60 CAPSULE | Refills: 0 | Status: SHIPPED | OUTPATIENT
Start: 2024-02-10 | End: 2024-02-20

## 2024-02-10 RX ORDER — CHOLESTYRAMINE LIGHT 4 G/5.7G
1 POWDER, FOR SUSPENSION ORAL 4 TIMES DAILY
Qty: 60 PACKET | Refills: 3 | Status: SHIPPED | OUTPATIENT
Start: 2024-02-10

## 2024-02-10 RX ADMIN — PANTOPRAZOLE SODIUM 40 MG: 40 INJECTION, POWDER, FOR SOLUTION INTRAVENOUS at 08:45

## 2024-02-10 RX ADMIN — CHOLESTYRAMINE 4 G: 4 POWDER, FOR SUSPENSION ORAL at 21:27

## 2024-02-10 RX ADMIN — CHOLESTYRAMINE 4 G: 4 POWDER, FOR SUSPENSION ORAL at 14:28

## 2024-02-10 RX ADMIN — THIAMINE HYDROCHLORIDE 100 MG: 100 INJECTION, SOLUTION INTRAMUSCULAR; INTRAVENOUS at 08:45

## 2024-02-10 RX ADMIN — LOPERAMIDE HYDROCHLORIDE 4 MG: 2 CAPSULE ORAL at 21:27

## 2024-02-10 RX ADMIN — HEPARIN SODIUM 5000 UNITS: 5000 INJECTION INTRAVENOUS; SUBCUTANEOUS at 21:34

## 2024-02-10 RX ADMIN — POTASSIUM CHLORIDE 40 MEQ: 1500 TABLET, EXTENDED RELEASE ORAL at 08:45

## 2024-02-10 RX ADMIN — APREPITANT 40 MG: 40 CAPSULE ORAL at 08:55

## 2024-02-10 RX ADMIN — CHOLESTYRAMINE 4 G: 4 POWDER, FOR SUSPENSION ORAL at 17:25

## 2024-02-10 RX ADMIN — CHOLESTYRAMINE 4 G: 4 POWDER, FOR SUSPENSION ORAL at 08:45

## 2024-02-10 RX ADMIN — SODIUM CHLORIDE, PRESERVATIVE FREE 10 ML: 5 INJECTION INTRAVENOUS at 21:35

## 2024-02-10 RX ADMIN — LOPERAMIDE HYDROCHLORIDE 4 MG: 2 CAPSULE ORAL at 14:28

## 2024-02-10 RX ADMIN — POTASSIUM CHLORIDE 40 MEQ: 1500 TABLET, EXTENDED RELEASE ORAL at 17:25

## 2024-02-10 RX ADMIN — HEPARIN SODIUM 5000 UNITS: 5000 INJECTION INTRAVENOUS; SUBCUTANEOUS at 05:21

## 2024-02-10 RX ADMIN — HEPARIN SODIUM 5000 UNITS: 5000 INJECTION INTRAVENOUS; SUBCUTANEOUS at 14:28

## 2024-02-10 RX ADMIN — SODIUM CHLORIDE, PRESERVATIVE FREE 10 ML: 5 INJECTION INTRAVENOUS at 08:45

## 2024-02-10 RX ADMIN — Medication 2 CAPSULE: at 08:45

## 2024-02-10 RX ADMIN — LOPERAMIDE HYDROCHLORIDE 4 MG: 2 CAPSULE ORAL at 08:45

## 2024-02-10 NOTE — CONSULTS
Gastroenterology Consult Note        Patient: Tammy Frederick  : 1950  Acct#:      Date:  2024    Subjective:       History of Present Illness  Patient is a 73 y.o.  female admitted with Weakness [R53.1]  GI bleed [K92.2] who is seen in consult for nausea. pmh of  Diverticulitis with perforation and multiple abscesses s/p exploratory laparotomy, takedown of splenic flexure, small bowel resection with anastomosis, appendectomy, sigmoidectomy with low pelvic anastomosis and diverting loop ileostomy by Dr Robertson on .   Presented to the ED with weakness, found to have SUSY with metabolic acidosis.   This admission she reports epigastric abdominal pain, worse after eating. Along with nausea and non-bloody emesis. She also reports an increase in ostomy output having to empty every few hours. No blood per ostomy.   She does take intermittent NSAID 2-3/week for headaches and back pain.       Past Medical History:   Diagnosis Date    Arthritis       Past Surgical History:   Procedure Laterality Date    ABDOMEN SURGERY      cholecystectomy    COLOSTOMY N/A 2023    EXPLORATORY LAPAROTOMY, SIGMOID RESECTION, SMALL BOWEL RESECTION, APPENDECTOMY, DIVERTING LOOP ILEOSTOMY performed by Alban Robertson MD at [facility] OR    JOINT REPLACEMENT  12    right knee    TOTAL KNEE ARTHROPLASTY  2011    Left knee      Past Endoscopic History    Admission Meds  No current facility-administered medications on file prior to encounter.     Current Outpatient Medications on File Prior to Encounter   Medication Sig Dispense Refill    ciprofloxacin (CIPRO) 500 MG tablet Take 1 tablet by mouth every 12 hours for 15 days 30 tablet 0    metroNIDAZOLE (FLAGYL) 500 MG tablet Take 1 tablet by mouth every 8 hours for 15 days 45 tablet 0    lactobacillus (CULTURELLE) capsule Take 1 capsule by mouth in the morning and at bedtime for 15 days 30 capsule 0    sodium chloride 1 g tablet Take 1 tablet by mouth 3 times 
doing well, and then follow in office if reassuring to discuss and plan reversal    Alban Robertson MD, FACS  2/6/2024  1:02 PM    
Organs: The liver, spleen, adrenal glands and pancreas are unremarkable. There has been a cholecystectomy.  Kidneys appear normal.  There is no ureteral stone or hydronephrosis. GI/Bowel: No bowel dilatation or bowel wall thickening is identified.  Prior partial colectomy and a diverting ileostomy are again noted.  The stomach is unremarkable. Pelvis: The bladder is unremarkable.  There is no free fluid. Peritoneum/Retroperitoneum: No evidence of lymphadenopathy.  Aorta is normal in caliber.  The fluid collection in the pelvis has resolved in the interval. There is bilateral perinephric fat stranding.  No free fluid, free air or focal fluid collection is identified. Bones/Soft Tissues: No fracture or destructive bone lesion is identified.     1. Resolution of the fluid collection in the pelvis. 2. Bilateral perinephric fat stranding which is a nonspecific finding and can be normal, but a urinary tract infection is possible. 3. No acute findings elsewhere in the abdomen or pelvis.     XR CHEST PORTABLE    Result Date: 2/1/2024  EXAMINATION: ONE XRAY VIEW OF THE CHEST 2/1/2024 2:49 pm COMPARISON: None. HISTORY: ORDERING SYSTEM PROVIDED HISTORY: SOB TECHNOLOGIST PROVIDED HISTORY: Reason for exam:->SOB FINDINGS: The lungs appear clear. The heart and mediastinal structures are unremarkable. Bony thorax appears normal. Visualized upper abdomen is unremarkable.     No radiographic evidence of acute cardiopulmonary process.     CT ABDOMEN PELVIS WO CONTRAST Additional Contrast? None    Result Date: 1/24/2024  EXAMINATION: CT OF THE ABDOMEN AND PELVIS WITHOUT CONTRAST 1/24/2024 5:28 pm TECHNIQUE: CT of the abdomen and pelvis was performed without the administration of intravenous contrast. Multiplanar reformatted images are provided for review. Automated exposure control, iterative reconstruction, and/or weight based adjustment of the mA/kV was utilized to reduce the radiation dose to as low as reasonably achievable. 
and abdomen were obtained at  multiple time points in both anterior and posterior projections.  Regions of  interest were drawn around the stomach and geometric means were calculated.    All medications capable of altering motility were held.    COMPARISON:  No prior for comparison.    HISTORY:  ORDERING SYSTEM PROVIDED HISTORY: suspected gastroparesis  TECHNOLOGIST PROVIDED HISTORY:  Reason for exam:->suspected gastroparesis  Reason for Exam: Suspected Gastroparesis    FINDINGS:  Gastric retention was calculated as follows:    At 30 min, there is 89% gastric retention.    At 60 min, there is 72% gastric retention.    At 120 min, there is 51% gastric retention.    At 180 min, there is 22% gastric retention.    At 240 min, there is 14% gastric retention.    Esophageal activity is seen on the initial image which clears by subsequent  images.  Impression: Gastric emptying is mildly delayed.      Problem List  Patient Active Problem List   Diagnosis    OA (osteoarthritis) of knee    Right TKR    Abscess of sigmoid colon    Intra-abdominal abscess (HCC)    E coli infection    Fecal peritonitis (HCC)    Streptococcal infection group C    Diverticular disease of both small and large intestine with perforation and abscess    Encounter for medication counseling    Receiving intravenous antibiotic treatment as outpatient    CRP elevated    Elevated erythrocyte sedimentation rate    SUSY (acute kidney injury) (HCC)    History of cholecystectomy    Peripherally inserted central catheter (PICC) in place    Severe malnutrition (HCC)    Weakness    GI bleed    Nausea & vomiting       IMPRESSION/RECOMMENDATIONS:  Normocytic normochromic anemia.  No obvious B12, folate, or iron deficiency.  Leukopenia  Diverticulitis.  Status post exploratory laparotomy, takedown of splenic flexure, small bowel resection with anastomosis, appendectomy, sigmoidectomy with low pelvic anastomosis and diverting loop ileostomy for perforated sigmoid

## 2024-02-10 NOTE — PLAN OF CARE
Problem: Discharge Planning  Goal: Discharge to home or other facility with appropriate resources  Outcome: Progressing     Problem: Safety - Adult  Goal: Free from fall injury  Outcome: Progressing     Problem: Pain  Goal: Verbalizes/displays adequate comfort level or baseline comfort level  Outcome: Progressing     Problem: Nutrition Deficit:  Goal: Optimize nutritional status  Outcome: Progressing  Flowsheets (Taken 2/9/2024 1207 by Nayana Oliver, RD, LD)  Nutrient intake appropriate for improving, restoring, or maintaining nutritional needs:   Assess nutritional status and recommend course of action   Monitor oral intake, labs, and treatment plans   Recommend, monitor, and adjust tube feedings and TPN/PPN based on assessed needs     Problem: Chronic Conditions and Co-morbidities  Goal: Patient's chronic conditions and co-morbidity symptoms are monitored and maintained or improved  Outcome: Progressing     Problem: Gastrointestinal - Adult  Goal: Establish and maintain optimal ostomy function  Outcome: Progressing     Problem: Skin/Tissue Integrity  Goal: Absence of new skin breakdown  Description: 1.  Monitor for areas of redness and/or skin breakdown  2.  Assess vascular access sites hourly  3.  Every 4-6 hours minimum:  Change oxygen saturation probe site  4.  Every 4-6 hours:  If on nasal continuous positive airway pressure, respiratory therapy assess nares and determine need for appliance change or resting period.  Outcome: Progressing

## 2024-02-10 NOTE — PLAN OF CARE
Problem: Safety - Adult  Goal: Free from fall injury  2/10/2024 0920 by Sintia Toth RN  Outcome: Progressing  2/10/2024 0013 by Alona Garcia RN  Outcome: Progressing     Problem: Pain  Goal: Verbalizes/displays adequate comfort level or baseline comfort level  2/10/2024 0920 by Sintia Toth RN  Outcome: Progressing  2/10/2024 0013 by Alona Garcia RN  Outcome: Progressing     Problem: Nutrition Deficit:  Goal: Optimize nutritional status  2/10/2024 0920 by Sintia Toth RN  Outcome: Progressing  2/10/2024 0013 by Alona Garcia RN  Outcome: Progressing  Flowsheets (Taken 2/9/2024 1207 by Nayana Oliver, RD, LD)  Nutrient intake appropriate for improving, restoring, or maintaining nutritional needs:   Assess nutritional status and recommend course of action   Monitor oral intake, labs, and treatment plans   Recommend, monitor, and adjust tube feedings and TPN/PPN based on assessed needs     Problem: Chronic Conditions and Co-morbidities  Goal: Patient's chronic conditions and co-morbidity symptoms are monitored and maintained or improved  2/10/2024 0920 by Sintia Toth RN  Outcome: Progressing  2/10/2024 0013 by Alona Garcia RN  Outcome: Progressing

## 2024-02-10 NOTE — DISCHARGE INSTR - COC
Assessment Intact 02/09/24 2000   Number of days: 44       Incision 12/28/23 Abdomen Right;Upper (Active)   Dressing Status Clean;Intact;Dry 02/09/24 2000   Incision Assessment Other (Comment) 12/31/23 2100   Drainage Amount None (dry) 01/26/24 0852   Number of days: 44        Elimination:  Continence:   Bowel: Yes  Bladder: Yes  Urinary Catheter:    Colostomy/Ileostomy/Ileal Conduit:   Ileostomy/Jejunostomy RLQ Ileostomy-Stomal Appliance: 2 piece  Ileostomy/Jejunostomy RLQ Ileostomy-Flange Size (inches): 2.25 Inches  Ileostomy/Jejunostomy RLQ Ileostomy-Stoma  Assessment: Pink  Ileostomy/Jejunostomy RLQ Ileostomy-Mucocutaneous Junction: Intact  Ileostomy/Jejunostomy RLQ Ileostomy-Peristomal Assessment: Clean, dry & intact  Ileostomy/Jejunostomy RLQ Ileostomy-Treatment: Site care, Pouch change, Barrier ring, Heat applied  Ileostomy/Jejunostomy RLQ Ileostomy-Stool Appearance: Watery  Ileostomy/Jejunostomy RLQ Ileostomy-Stool Color: Green  Ileostomy/Jejunostomy RLQ Ileostomy-Stool Amount: Small  Ileostomy/Jejunostomy RLQ Ileostomy-Output (mL): 150 ml    Date of Last BM:     Intake/Output Summary (Last 24 hours) at 2/10/2024 1635  Last data filed at 2/10/2024 1330  Gross per 24 hour   Intake 340 ml   Output --   Net 340 ml     I/O last 3 completed shifts:  In: 840 [P.O.:840]  Out: 150 [Stool:150]    Safety Concerns:     None    Impairments/Disabilities:      None    Nutrition Therapy:  Current Nutrition Therapy:   - Oral Diet:  General    Routes of Feeding: Oral  Liquids: Thin Liquids  Daily Fluid Restriction: no  Last Modified Barium Swallow with Video (Video Swallowing Test):     Treatments at the Time of Hospital Discharge:   Respiratory Treatments:   Oxygen Therapy:    Ventilator:        Rehab Therapies: Physical Therapy and Occupational Therapy  Weight Bearing Status/Restrictions: No weight bearing restrictions  Other Medical Equipment (for information only, NOT a DME order):    Other Treatments:     Patient's

## 2024-02-11 VITALS
SYSTOLIC BLOOD PRESSURE: 121 MMHG | HEART RATE: 70 BPM | TEMPERATURE: 97.7 F | DIASTOLIC BLOOD PRESSURE: 71 MMHG | WEIGHT: 101.5 LBS | HEIGHT: 60 IN | RESPIRATION RATE: 16 BRPM | OXYGEN SATURATION: 96 % | BODY MASS INDEX: 19.93 KG/M2

## 2024-02-11 LAB
ALBUMIN SERPL-MCNC: 3.2 G/DL (ref 3.4–5)
ANION GAP SERPL CALCULATED.3IONS-SCNC: 11 MMOL/L (ref 3–16)
BASOPHILS # BLD: 0 K/UL (ref 0–0.2)
BASOPHILS NFR BLD: 1.1 %
BUN SERPL-MCNC: 4 MG/DL (ref 7–20)
CALCIUM SERPL-MCNC: 8.3 MG/DL (ref 8.3–10.6)
CHLORIDE SERPL-SCNC: 100 MMOL/L (ref 99–110)
CO2 SERPL-SCNC: 25 MMOL/L (ref 21–32)
CREAT SERPL-MCNC: 0.7 MG/DL (ref 0.6–1.2)
DEPRECATED RDW RBC AUTO: 15.9 % (ref 12.4–15.4)
EOSINOPHIL # BLD: 0.1 K/UL (ref 0–0.6)
EOSINOPHIL NFR BLD: 2.5 %
GFR SERPLBLD CREATININE-BSD FMLA CKD-EPI: >60 ML/MIN/{1.73_M2}
GLUCOSE SERPL-MCNC: 100 MG/DL (ref 70–99)
HCT VFR BLD AUTO: 24.7 % (ref 36–48)
HGB BLD-MCNC: 8.1 G/DL (ref 12–16)
LYMPHOCYTES # BLD: 0.8 K/UL (ref 1–5.1)
LYMPHOCYTES NFR BLD: 28.8 %
MAGNESIUM SERPL-MCNC: 1.4 MG/DL (ref 1.8–2.4)
MCH RBC QN AUTO: 29.4 PG (ref 26–34)
MCHC RBC AUTO-ENTMCNC: 32.7 G/DL (ref 31–36)
MCV RBC AUTO: 89.9 FL (ref 80–100)
MONOCYTES # BLD: 0.3 K/UL (ref 0–1.3)
MONOCYTES NFR BLD: 11.4 %
NEUTROPHILS # BLD: 1.6 K/UL (ref 1.7–7.7)
NEUTROPHILS NFR BLD: 56.2 %
PHOSPHATE SERPL-MCNC: 2.4 MG/DL (ref 2.5–4.9)
PLATELET # BLD AUTO: 261 K/UL (ref 135–450)
PMV BLD AUTO: 7.1 FL (ref 5–10.5)
POTASSIUM SERPL-SCNC: 3.6 MMOL/L (ref 3.5–5.1)
RBC # BLD AUTO: 2.75 M/UL (ref 4–5.2)
SODIUM SERPL-SCNC: 136 MMOL/L (ref 136–145)
WBC # BLD AUTO: 2.8 K/UL (ref 4–11)

## 2024-02-11 PROCEDURE — 36415 COLL VENOUS BLD VENIPUNCTURE: CPT

## 2024-02-11 PROCEDURE — 2580000003 HC RX 258: Performed by: INTERNAL MEDICINE

## 2024-02-11 PROCEDURE — 6360000002 HC RX W HCPCS: Performed by: INTERNAL MEDICINE

## 2024-02-11 PROCEDURE — 80069 RENAL FUNCTION PANEL: CPT

## 2024-02-11 PROCEDURE — 6370000000 HC RX 637 (ALT 250 FOR IP): Performed by: INTERNAL MEDICINE

## 2024-02-11 PROCEDURE — 6370000000 HC RX 637 (ALT 250 FOR IP): Performed by: NURSE PRACTITIONER

## 2024-02-11 PROCEDURE — 83735 ASSAY OF MAGNESIUM: CPT

## 2024-02-11 PROCEDURE — 85025 COMPLETE CBC W/AUTO DIFF WBC: CPT

## 2024-02-11 PROCEDURE — 6360000002 HC RX W HCPCS: Performed by: STUDENT IN AN ORGANIZED HEALTH CARE EDUCATION/TRAINING PROGRAM

## 2024-02-11 PROCEDURE — C9113 INJ PANTOPRAZOLE SODIUM, VIA: HCPCS | Performed by: INTERNAL MEDICINE

## 2024-02-11 PROCEDURE — 99232 SBSQ HOSP IP/OBS MODERATE 35: CPT | Performed by: SURGERY

## 2024-02-11 RX ADMIN — APREPITANT 40 MG: 40 CAPSULE ORAL at 07:35

## 2024-02-11 RX ADMIN — LOPERAMIDE HYDROCHLORIDE 4 MG: 2 CAPSULE ORAL at 07:35

## 2024-02-11 RX ADMIN — Medication 2 CAPSULE: at 07:35

## 2024-02-11 RX ADMIN — HEPARIN SODIUM 5000 UNITS: 5000 INJECTION INTRAVENOUS; SUBCUTANEOUS at 06:27

## 2024-02-11 RX ADMIN — SODIUM CHLORIDE, PRESERVATIVE FREE 10 ML: 5 INJECTION INTRAVENOUS at 07:35

## 2024-02-11 RX ADMIN — PANTOPRAZOLE SODIUM 40 MG: 40 INJECTION, POWDER, FOR SOLUTION INTRAVENOUS at 07:34

## 2024-02-11 RX ADMIN — CHOLESTYRAMINE 4 G: 4 POWDER, FOR SUSPENSION ORAL at 07:35

## 2024-02-11 RX ADMIN — THIAMINE HYDROCHLORIDE 100 MG: 100 INJECTION, SOLUTION INTRAMUSCULAR; INTRAVENOUS at 07:34

## 2024-02-11 RX ADMIN — MAGNESIUM SULFATE HEPTAHYDRATE 2000 MG: 40 INJECTION, SOLUTION INTRAVENOUS at 07:46

## 2024-02-11 NOTE — CARE COORDINATION
Case Management -  Discharge Note      Patient Name: Tammy Frederick                   YOB: 1950            Readmission Risk (Low < 19, Mod (19-27), High > 27): Readmission Risk Score: 21.4    Current PCP: No primary care provider on file.    (IMM) Important Message from Medicare:    Date: 2/11/2024    PT AM-PAC: 18 /24  OT AM-PAC: 23 /24    Patient/patient representative has been educated on the benefits of HHC as well as the possible risks of declining recommended services. Patient/patient representative has acknowledged the information provided and decided on the following discharge plan. Patient/ patient representative has been provided freedom of choice regarding service provider, supported by basic dialogue that supports the patient's individualized plan of care/goals.    Home Care Information:   Is patient resuming current home health care services: Yes - [facility]  [address]  [facility] [address] [phone]    Services: PT/OT/Nursing  Home Health Order Obtained: Yes and CM faxed the order to [phone] and it went through successfully.     Home health agency notified of discharge- CM called the main office line at [phone] and it advised the office is closed. CM faxed over new orders and included pt is D/C home today.     Financial    Payor: [facility] MEDICARE / Plan: JUNIOR MEDIBLUE ESSENTIAL/PLUS / Product Type: *No Product type* /     Pharmacy:  Potential assistance Purchasing Medications: No  Meds-to-Beds request: Yes      [facility] - [place], [place] - [address] [place] [address] [phone] -  [phone]  [address] [place] [id number]  Phone: [phone] Fax: [phone]      Notes:    Additional Case Management Notes: Patient advised her daughter in law is on her way to bring her home.     All CM needs met.         Electronically signed by TESSY Salinas on 2/11/2024 at 8:33 AM

## 2024-02-11 NOTE — PLAN OF CARE
Problem: Discharge Planning  Goal: Discharge to home or other facility with appropriate resources  2/11/2024 0822 by Patti Howell RN  Outcome: Progressing     Problem: Safety - Adult  Goal: Free from fall injury  2/11/2024 0822 by Patti Howell RN  Outcome: Progressing     Problem: Pain  Goal: Verbalizes/displays adequate comfort level or baseline comfort level  2/11/2024 0822 by Patti Howell RN  Outcome: Progressing     Problem: Nutrition Deficit:  Goal: Optimize nutritional status  2/11/2024 0822 by Patti Howell RN  Outcome: Progressing     Problem: Chronic Conditions and Co-morbidities  Goal: Patient's chronic conditions and co-morbidity symptoms are monitored and maintained or improved  2/11/2024 0822 by Patti Howell RN  Outcome: Progressing     Problem: Gastrointestinal - Adult  Goal: Establish and maintain optimal ostomy function  2/11/2024 0822 by Patti Howell RN  Outcome: Progressing     Problem: Skin/Tissue Integrity  Goal: Absence of new skin breakdown  Description: 1.  Monitor for areas of redness and/or skin breakdown  2.  Assess vascular access sites hourly  3.  Every 4-6 hours minimum:  Change oxygen saturation probe site  4.  Every 4-6 hours:  If on nasal continuous positive airway pressure, respiratory therapy assess nares and determine need for appliance change or resting period.  2/11/2024 0822 by Patti Howell RN  Outcome: Progressing

## 2024-02-11 NOTE — PLAN OF CARE
Problem: Discharge Planning  Goal: Discharge to home or other facility with appropriate resources  2/11/2024 1005 by Patti Howell, RN  Outcome: Completed     Problem: Safety - Adult  Goal: Free from fall injury  2/11/2024 1005 by Patti Howell, RN  Outcome: Completed     Problem: Pain  Goal: Verbalizes/displays adequate comfort level or baseline comfort level  2/11/2024 1005 by Patti Howell, RN  Outcome: Completed     Problem: Gastrointestinal - Adult  Goal: Establish and maintain optimal ostomy function  2/11/2024 1005 by Patti Howell, RN  Outcome: Completed     Problem: Skin/Tissue Integrity  Goal: Absence of new skin breakdown  Description: 1.  Monitor for areas of redness and/or skin breakdown  2.  Assess vascular access sites hourly  3.  Every 4-6 hours minimum:  Change oxygen saturation probe site  4.  Every 4-6 hours:  If on nasal continuous positive airway pressure, respiratory therapy assess nares and determine need for appliance change or resting period.  2/11/2024 1005 by Patti Howell, RN  Outcome: Completed

## 2024-02-11 NOTE — DISCHARGE SUMMARY
modulation, iterative reconstruction, and/or weight based adjustment of the mA/kV was utilized to reduce the radiation dose to as low as reasonably achievable. Total exam DLP: 103.81 mGy-cm     Successful CT guided bone marrow aspiration and core biopsy of the right iliac bone.     NM GASTRIC EMPTYING    Result Date: 2/9/2024  EXAMINATION: NUCLEAR MEDICINE GASTRIC EMPTYING STUDY 2/9/2024 7:45 am TECHNIQUE: Following ingestion of a standard solid meal labeled with 0.899 mCi Tc 99m sulfur colloid, planar images of the chest and abdomen were obtained at multiple time points in both anterior and posterior projections.  Regions of interest were drawn around the stomach and geometric means were calculated. All medications capable of altering motility were held. COMPARISON: No prior for comparison. HISTORY: ORDERING SYSTEM PROVIDED HISTORY: suspected gastroparesis TECHNOLOGIST PROVIDED HISTORY: Reason for exam:->suspected gastroparesis Reason for Exam: Suspected Gastroparesis FINDINGS: Gastric retention was calculated as follows: At 30 min, there is 89% gastric retention. At 60 min, there is 72% gastric retention. At 120 min, there is 51% gastric retention. At 180 min, there is 22% gastric retention. At 240 min, there is 14% gastric retention. Esophageal activity is seen on the initial image which clears by subsequent images.     Gastric emptying is mildly delayed.       CBC:   Recent Labs     02/09/24  0425 02/10/24  0515 02/11/24  0536   WBC 3.7* 3.1* 2.8*   HGB 7.5* 7.5* 8.1*    240 261     BMP:    Recent Labs     02/09/24  0425 02/10/24  0515 02/11/24  0536    138 136   K 3.9 3.0* 3.6    102 100   CO2 22 21 25   BUN 3* 5* 4*   CREATININE 0.8 0.7 0.7   GLUCOSE 95 90 100*     Hepatic: No results for input(s): \"AST\", \"ALT\", \"ALB\", \"BILITOT\", \"ALKPHOS\" in the last 72 hours.  Lipids: No results found for: \"CHOL\", \"HDL\", \"TRIG\"  Hemoglobin A1C:   Lab Results   Component Value Date/Time    LABA1C 6.0

## 2024-02-11 NOTE — CARE COORDINATION
02/11/24 0829   IMM Letter   IMM Letter given to Patient/Family/Significant other/Guardian/POA/by: IMM Letter given to Patient by MARCELINA. Patient signed IMM Letter and is in agreement with D/C home even with IMM given with less than 4 hours notice.   IMM Letter date given: 02/11/24   IMM Letter time given: 0826         Electronically signed by TESSY Salinas on 2/11/2024 at 8:30 AM

## 2024-02-11 NOTE — PROGRESS NOTES
[facility]        Progress Note    Patient Name: Tammy Frederick  MRN: [id number]  YOB: 1950  Date of Evaluation: 2024    Chief Complaint: Weakness    Subjective:  No acute events overnight  Denies pain  Having nausea without vomiting, poor appetite  Watery stool per ileostomy  Resting in bed at this time      Vital Signs:  Patient Vitals for the past 24 hrs:   BP Temp Temp src Pulse Resp SpO2 Weight   24 1015 114/66 98.1 °F (36.7 °C) Oral 78 18 96 % --   24 0400 -- -- -- -- -- -- 44.7 kg (98 lb 9.6 oz)   24 2117 121/74 98.2 °F (36.8 °C) Oral 69 18 96 % --   24 1434 126/78 98 °F (36.7 °C) Oral 71 18 97 % --   24 1415 112/66 -- -- 68 15 97 % --   24 1410 115/70 -- -- 70 23 98 % --   24 1405 114/83 -- -- 68 18 99 % --   24 1400 (!) 109/59 -- -- -- 17 -- --   24 1359 107/67 97.3 °F (36.3 °C) Temporal 70 20 100 % --        TEMPERATURE HISTORY 24H: Temp (24hrs), Av.9 °F (36.6 °C), Min:97.3 °F (36.3 °C), Max:98.2 °F (36.8 °C)    BLOOD PRESSURE HISTORY: Systolic (36hrs), Av , Min:107 , Max:130    Diastolic (36hrs), Av, Min:59, Max:83      Intake/Output:  I/O last 3 completed shifts:  In: 2199 [P.O.:120; I.V.:1590.1; IV Piggyback:488.9]  Out: 75 [Stool:75]  I/O this shift:  In: 250 [P.O.:240; I.V.:10]  Out: -   Drain/tube Output:       Physical Exam:  General: awake, alert, oriented to  person, place, time  Cardiovascular:  regular rate and rhythm and no murmur noted  Lungs: clear to auscultation  Abdomen: soft, non-distended, non-tender, bowel sounds present, stoma is pink/viable with stool in pouch, stoma bar in place, watery green stool    Labs:  CBC:    Recent Labs     24  0558 24  0531 24  0615   WBC 2.9* 3.6* 3.9*   HGB 7.5* 7.6* 8.0*   HCT 22.6* 22.6* 24.1*    216 230       BMP:    Recent Labs     24  0558 24  0531 24  0615    134* 134*   K 
               [facility]        Progress Note    Patient Name: Tammy Frederick  MRN: [id number]  YOB: 1950  Date of Evaluation: 2024    Chief Complaint: Weakness    Subjective:  No acute events overnight  Fair appetite, minimal nausea  Ileostomy functioning, appears a little thicker  Resting in bed at this time      Vital Signs:  Patient Vitals for the past 24 hrs:   BP Temp Temp src Pulse Resp SpO2 Weight   24 0730 121/71 97.7 °F (36.5 °C) Oral 70 16 96 % --   24 0454 -- -- -- -- -- -- 46 kg (101 lb 8 oz)   02/10/24 2020 100/63 98.4 °F (36.9 °C) Oral 82 16 95 % --        TEMPERATURE HISTORY 24H: Temp (24hrs), Av.1 °F (36.7 °C), Min:97.7 °F (36.5 °C), Max:98.4 °F (36.9 °C)    BLOOD PRESSURE HISTORY: Systolic (36hrs), Av , Min:100 , Max:121    Diastolic (36hrs), Av, Min:63, Max:74      Intake/Output:  I/O last 3 completed shifts:  In: 340 [P.O.:340]  Out: 50 [Stool:50]  No intake/output data recorded.  Drain/tube Output:       Physical Exam:  General: awake, alert, oriented to  person, place, time  Cardiovascular:  regular rate and rhythm and no murmur noted  Lungs: clear to auscultation  Abdomen: soft, non-distended, non-tender, bowel sounds present, stoma is pink/viable with stool in pouch, watery green stool, new stoma appliance placed, no skin irritation    Labs:  CBC:    Recent Labs     24  0425 02/10/24  0515 24  0536   WBC 3.7* 3.1* 2.8*   HGB 7.5* 7.5* 8.1*   HCT 22.2* 21.9* 24.7*    240 261       BMP:    Recent Labs     24  0425 02/10/24  0515 24  0536    138 136   K 3.9 3.0* 3.6    102 100   CO2 22 21 25   BUN 3* 5* 4*   CREATININE 0.8 0.7 0.7   GLUCOSE 95 90 100*       Hepatic:  No results for input(s): \"AST\", \"ALT\", \"ALB\", \"BILITOT\", \"ALKPHOS\" in the last 72 hours.  Amylase:  No results found for: \"AMYLASE\"  Lipase:    Lab Results   Component Value Date/Time    LIPASE 61.0 2024 05:53 
               [facility]        Progress Note    Patient Name: Tammy Frederick  MRN: [id number]  YOB: 1950  Date of Evaluation: 2024    Chief Complaint: Weakness    Subjective:  No acute events overnight  Denies pain  Having nausea and vomiting, poor appetite  Watery stool per ileostomy despite Imodium  Resting in bed at this time      Vital Signs:  Patient Vitals for the past 24 hrs:   BP Temp Temp src Pulse Resp SpO2 Weight   24 0800 118/65 97.9 °F (36.6 °C) Oral 73 16 96 % --   24 0503 -- -- -- -- -- -- 43.7 kg (96 lb 4.8 oz)   24 1930 128/72 97.8 °F (36.6 °C) Oral 72 18 97 % --   24 1612 127/75 97.9 °F (36.6 °C) Oral 66 18 91 % --        TEMPERATURE HISTORY 24H: Temp (24hrs), Av.9 °F (36.6 °C), Min:97.8 °F (36.6 °C), Max:97.9 °F (36.6 °C)    BLOOD PRESSURE HISTORY: Systolic (36hrs), Av , Min:114 , Max:128    Diastolic (36hrs), Av, Min:65, Max:75      Intake/Output:  I/O last 3 completed shifts:  In: 2439 [P.O.:450; I.V.:1500.1; IV Piggyback:488.9]  Out: 175 [Stool:175]  No intake/output data recorded.  Drain/tube Output:       Physical Exam:  General: awake, alert, oriented to  person, place, time  Cardiovascular:  regular rate and rhythm and no murmur noted  Lungs: clear to auscultation  Abdomen: soft, non-distended, non-tender, bowel sounds present, stoma is pink/viable with stool in pouch, stoma bar in place, watery green stool    Labs:  CBC:    Recent Labs     24  0531 24  0615 24  0518   WBC 3.6* 3.9* 3.4*   HGB 7.6* 8.0* 8.0*   HCT 22.6* 24.1* 23.8*    230 244       BMP:    Recent Labs     24  0531 24  0615 24  0518   * 134* 132*   K 3.0* 4.0 2.8*    103 99   CO2 22 22 21   BUN 7 5* 4*   CREATININE 1.2 1.1 0.9   GLUCOSE 104* 134* 142*       Hepatic:  No results for input(s): \"AST\", \"ALT\", \"ALB\", \"BILITOT\", \"ALKPHOS\" in the last 72 hours.  Amylase:  No results found 
               [facility]        Progress Note    Patient Name: Tammy Frederick  MRN: [id number]  YOB: 1950  Date of Evaluation: 2/10/2024    Chief Complaint: Weakness    Subjective:  No acute events overnight  Fair appetite  Watery stool per ileostomy, output still high  Resting in bed at this time      Vital Signs:  Patient Vitals for the past 24 hrs:   BP Temp Temp src Pulse Resp SpO2 Height Weight   02/10/24 0830 118/74 97.8 °F (36.6 °C) Oral 69 16 -- -- --   02/10/24 0603 -- -- -- -- -- -- -- 44.2 kg (97 lb 6.4 oz)   24 (!) 100/59 97.7 °F (36.5 °C) Oral 73 16 98 % -- --   24 1330 104/60 98 °F (36.7 °C) Oral 73 16 97 % -- --   24 1326 (!) 91/53 -- -- 80 16 100 % -- --   24 1324 108/72 -- -- 93 13 100 % -- --   24 1321 107/68 -- -- 82 21 100 % -- --   24 1319 120/65 -- -- 76 15 100 % -- --   24 1207 -- -- -- -- -- -- 1.524 m (5') --        TEMPERATURE HISTORY 24H: Temp (24hrs), Av.8 °F (36.6 °C), Min:97.7 °F (36.5 °C), Max:98 °F (36.7 °C)    BLOOD PRESSURE HISTORY: Systolic (36hrs), Av , Min:91 , Max:120    Diastolic (36hrs), Av, Min:53, Max:74      Intake/Output:  I/O last 3 completed shifts:  In: 840 [P.O.:840]  Out: 150 [Stool:150]  No intake/output data recorded.  Drain/tube Output:       Physical Exam:  General: awake, alert, oriented to  person, place, time  Cardiovascular:  regular rate and rhythm and no murmur noted  Lungs: clear to auscultation  Abdomen: soft, non-distended, non-tender, bowel sounds present, stoma is pink/viable with stool in pouch, stoma bar in place, watery green stool    Labs:  CBC:    Recent Labs     24  0518 24  0425 02/10/24  0515   WBC 3.4* 3.7* 3.1*   HGB 8.0* 7.5* 7.5*   HCT 23.8* 22.2* 21.9*    234 240       BMP:    Recent Labs     24  0518 24  1443 24  0425 02/10/24  0515   *  --  136 138   K 2.8* 3.6 3.9 3.0*   CL 99  --  100 102 
      Admit Date: 2/1/2024  Diet: ADULT DIET; Regular    CC: Weakness    Interval history:   Overnight, there were no acute events. Patient's vitals remained stable    Patient was seen this morning in bed. She endorses she is still having nausea and had emesis in the morning which was mostly mucous. She still has dark green output in her ostomy. Patient denies fevers, chills, chest pain, shortness of breath, diarrhea, constipation, dysuria, urinary frequency or urgency.     Plan:     -EGD with mild antral erosions and gastritis, biopsied  -Solid-phase gastric emptying study to rule out gastroparesis today  -Continue IV antibiotics, Cipro + Flagyl, till 2/9/2024 per ID for perforated diverticulitis  -Probiotics  -Continue IV fluids  -GI pathogens pending    Assessment:   Tammy Frederick is a 73 y.o. female with PMH of Diverticulitis with perforation and multiple abscesses s/p exploratory laparotomy, takedown of splenic flexure, small bowel resection with anastomosis, appendectomy, sigmoidectomy with low pelvic anastomosis and diverting loop ileostomy by Dr Robertson on 12/28 who was admitted with weakness/SUSY    SUSY:  Anion gap metabolic acidosis:  -Patient presented with chief complaint of generalized weakness and abdominal pain.  -Labs were done and showed creatinine of 5.5 and BUN 91.  -Anion gap 18.  -IV fluid  -Bladder scan  - Renal ultrasound showed Unremarkable ultrasound of the kidneys  -UA negative for LE or nitrite  -Anion gap metabolic acidosis resolved  -Nephrology consult: Normal saline      Abdominal pain:  Recent perforated diverticulitis   -Patient endorses generalized abdominal pain.  -Patient has history of recent perforated diverticulitis with multiple abdominal abscesses, status post exploratory laparotomy with takedown of the splenic fracture, and small bowel resection, appendectomy, sigmoidoscopy with low pelvic anastomosis and diverting loop ileostomy surgery on December 28, 2023.  -Patient 
      Admit Date: 2/1/2024  Diet: ADULT DIET; Regular    CC: Weakness    Interval history:   Overnight, there were no acute events. Patient's vitals remained stable    Patient was seen this morning in bed. She had an episode of emesis during out discussion. She understands that she will be getting a study to make sure she does not have gastroparesis. Patient denies fevers, chills, chest pain, shortness of breath, diarrhea, constipation, dysuria, urinary frequency or urgency.     Plan:     -Solid-phase gastric emptying study to rule out gastroparesis today  -Continue IV antibiotics, Cipro + Flagyl, till 2/9/2024 per ID for perforated diverticulitis  -Probiotics  -Continue IV fluids  -Check HgbA1c    Assessment:   Tammy Frederick is a 73 y.o. female with PMH of Diverticulitis with perforation and multiple abscesses s/p exploratory laparotomy, takedown of splenic flexure, small bowel resection with anastomosis, appendectomy, sigmoidectomy with low pelvic anastomosis and diverting loop ileostomy by Dr Robertson on 12/28 who was admitted with weakness/SUSY    SUSY:  Anion gap metabolic acidosis:  -Patient presented with chief complaint of generalized weakness and abdominal pain.  -Labs were done and showed creatinine of 5.5 and BUN 91.  -Anion gap 18.  -IV fluid  -Bladder scan  - Renal ultrasound showed Unremarkable ultrasound of the kidneys  -UA negative for LE or nitrite  -Anion gap metabolic acidosis resolved  -Nephrology consult: Normal saline      Abdominal pain:  Recent perforated diverticulitis   -Patient endorses generalized abdominal pain.  -Patient has history of recent perforated diverticulitis with multiple abdominal abscesses, status post exploratory laparotomy with takedown of the splenic fracture, and small bowel resection, appendectomy, sigmoidoscopy with low pelvic anastomosis and diverting loop ileostomy surgery on December 28, 2023.  -Patient endorses nausea and decreased oral intake.  Patient also endorses 
      Admit Date: 2/1/2024  Diet: ADULT DIET; Regular; Low Fat (less than or equal to 50 gm/day); Low Fiber  ADULT ORAL NUTRITION SUPPLEMENT; Breakfast, Lunch, Dinner; Standard High Calorie/High Protein Oral Supplement    CC: Weakness    Interval history:   Overnight, there were no acute events. Patient's vitals remained stable    Patient was seen this morning in bed. She endorses she is doing very well. She endorses that she will discuss with her sister if she can come and stay with her for a bit. Patient denies fevers, chills, chest pain, shortness of breath, diarrhea, constipation, dysuria, urinary frequency or urgency    Plan:     -Patient will follow gastroparesis diet: low fat, low fiber with 5-6 smaller meals daily  -Anticipate discharge tomorrow    Assessment:   Tammy Frederick is a 73 y.o. female with PMH of Diverticulitis with perforation and multiple abscesses s/p exploratory laparotomy, takedown of splenic flexure, small bowel resection with anastomosis, appendectomy, sigmoidectomy with low pelvic anastomosis and diverting loop ileostomy by Dr Robertson on 12/28 who was admitted with weakness/SUSY    SUSY:  Anion gap metabolic acidosis:  -Patient presented with chief complaint of generalized weakness and abdominal pain.  -Labs were done and showed creatinine of 5.5 and BUN 91.  -Anion gap 18.  -IV fluid  -Bladder scan  - Renal ultrasound showed Unremarkable ultrasound of the kidneys  -UA negative for LE or nitrite  -Anion gap metabolic acidosis resolved  -Nephrology consult: Normal saline      Abdominal pain:  Recent perforated diverticulitis   -Patient endorses generalized abdominal pain.  -Patient has history of recent perforated diverticulitis with multiple abdominal abscesses, status post exploratory laparotomy with takedown of the splenic fracture, and small bowel resection, appendectomy, sigmoidoscopy with low pelvic anastomosis and diverting loop ileostomy surgery on December 28, 2023.  -Patient 
      Admit Date: 2/1/2024  Diet: Diet NPO    CC: Weakness    Interval history:   Overnight, there were no acute events. Patient's vitals remained stable    Patient was seen this morning in bed.  Patient endorsed that she does have some mild abdominal pain with some nausea that she had previously.  She endorsed that she will be undergoing endoscopy today. Patient denies fevers, chills, chest pain, shortness of breath, diarrhea, constipation, dysuria, urinary frequency or urgency.     Plan:     -EGD today with GI  -Continue IV antibiotics, Cipro + Flagyl, till 2/9/2024 per ID for perforated diverticulitis  -Probiotics  -Continue IV fluids  -GI pathogens/O&P pending  -C. Diff pending    Assessment:   Tammy Frederick is a 73 y.o. female with PMH of Diverticulitis with perforation and multiple abscesses s/p exploratory laparotomy, takedown of splenic flexure, small bowel resection with anastomosis, appendectomy, sigmoidectomy with low pelvic anastomosis and diverting loop ileostomy by Dr Robertson on 12/28 who was admitted with weakness/SUSY    SUSY:  Anion gap metabolic acidosis:  -Patient presented with chief complaint of generalized weakness and abdominal pain.  -Labs were done and showed creatinine of 5.5 and BUN 91.  -Anion gap 18.  -IV fluid  -Bladder scan  - Renal ultrasound showed Unremarkable ultrasound of the kidneys  -UA negative for LE or nitrite  -Anion gap metabolic acidosis resolved  -Nephrology consult: Normal saline      Abdominal pain:  Recent perforated diverticulitis   -Patient endorses generalized abdominal pain.  -Patient has history of recent perforated diverticulitis with multiple abdominal abscesses, status post exploratory laparotomy with takedown of the splenic fracture, and small bowel resection, appendectomy, sigmoidoscopy with low pelvic anastomosis and diverting loop ileostomy surgery on December 28, 2023.  -Patient endorses nausea and decreased oral intake.  Patient also endorses increased 
     MD Chris Murcia MD Samir Brahmbhatt, MD                                  Office: [phone]                 Fax: [phone]          [employer]                     NEPHROLOGY INPATIENT PROGRESS NOTE:     PATIENT NAME: Tammy Frederick  : 1950  MRN: [id number]  Name:  Tammy Frederick Date/Time of Admission: 2024 12:49 PM    CSN: [id number] Attending Provider: Arnoldo Downs MD   Room/Bed: [facility] ENDO/NONE : 1950 Age: 73 y.o.          PLAN   Continue IV fluids, as n.p.o. for EGD  After EGD if able to have a diet, okay to stop IV fluids from renal standpoint  Normal saline, decreased rate to 75 cc/h      GI follow-up  Potassium repletion and monitoring still needed  Magnesium repletion monitoring needed  Correct hypocalcemia for hypoalbuminemia  Monitor phosphorus level also-repletion needed.  -Okay to use as needed as needed electrolytes protocol for electrolytes  Antibiotics per primary team    Discharge plan-from renal standpoint  - Improving,-follow-up with other teams.        Medical decision making- high complexity. Multiple complex health problems.   Discussed with patient and treatment team. Arnoldo Downs MD   Thank you for allowing me to participate in this patient's care. Please do not hesitate to contact me for any questions/concerns. We will follow along with you.     Nader Penn MD  [facility]   Phone: [phone] or [employer]  Fax: [phone]    Severally ill, at risk of impending organ failure needing  higher level of care/monitoring.   Time spent that included face-to-face meeting/discussion with patient, patient's family -as available, and treatment team (including primary/referring team and other consultants; included coordination of care with the treatment team; and review of patient's electronic medical records and ordering appropriates tests.       Subjective / interval history / nephrology update / 
     MD Chris Murcia MD Samir Brahmbhatt, MD                                  Office: [phone]                 Fax: [phone]          [employer]                     NEPHROLOGY INPATIENT PROGRESS NOTE:     PATIENT NAME: Tammy Frederick  : 1950  MRN: [id number]  Name:  Tammy Frederick Date/Time of Admission: 2024 12:49 PM    CSN: [id number] Attending Provider: Arnoldo Downs MD   Room[facility]Bed: [facility]3318/3318-01 : 1950 Age: 73 y.o.          PLAN   Stopped IV fluids of Normal saline, decreased rate to 75 cc/h    GI follow-up  Potassium repletion and monitoring still needed currently   Magnesium repletion monitoring needed  Correct hypocalcemia for hypoalbuminemia  Monitor phosphorus level also-repletion needed.  -Okay to use as needed as needed electrolytes protocol for electrolytes  Antibiotics per primary team      Discharge plan-from renal standpoint- Improving,-follow-up with other teams.  Stable from kidney standpoint so we will follow peripherally      Medical decision making- high complexity. Multiple complex health problems.   Discussed with patient and treatment team. Arnoldo Downs MD   Thank you for allowing me to participate in this patient's care. Please do not hesitate to contact me for any questions/concerns. We will follow along with you.       Nader Penn MD  [facility]   Phone: [phone] or [employer]  Fax: [phone]    Severally ill, at risk of impending organ failure needing  higher level of care/monitoring.   Time spent that included face-to-face meeting/discussion with patient, patient's family -as available, and treatment team (including primary/referring team and other consultants; included coordination of care with the treatment team; and review of patient's electronic medical records and ordering appropriates tests.         Subjective / interval history / nephrology update / medical decision making: 
    Gastroenterology Progress Note    Tammy Frederick is a 73 y.o. female patient.  1. Nausea and vomiting, unspecified vomiting type        Admission Date: 2024  Hospital Day: Hospital Day: 7  Attending: Arnoldo Downs MD  Date of service: 24    SUBJECTIVE:    Still feels nauseous and mild abdominal pain     ROS:  Cardiovascular ROS: no chest pain or dyspnea on exertion  Gastrointestinal ROS: see above  Respiratory ROS: no cough, shortness of breath, or wheezing    Physical    VITALS:  /66   Pulse 78   Temp 98.1 °F (36.7 °C) (Oral)   Resp 18   Ht 1.524 m (5')   Wt 44.7 kg (98 lb 9.6 oz)   SpO2 96%   BMI 19.26 kg/m²   TEMPERATURE:  Current - Temp: 98.1 °F (36.7 °C); Max - Temp  Av.9 °F (36.6 °C)  Min: 97.3 °F (36.3 °C)  Max: 98.2 °F (36.8 °C)    NAD  RRR, Nl s1s2  Lungs CTA Bilaterally, normal effort  Abdomen soft, ND, NT, no HSM, Bowel sounds normal, + stoma  AAOx3, No asterixis     Data      CBC:   Recent Labs     24  0558 24  0531 24  0615   WBC 2.9* 3.6* 3.9*   RBC 2.50* 2.50* 2.68*   HGB 7.5* 7.6* 8.0*   HCT 22.6* 22.6* 24.1*    216 230   MCV 90.3 90.4 89.8   MCH 29.9 30.4 29.7   MCHC 33.1 33.6 33.1   RDW 15.0 15.1 15.1        BMP:  Recent Labs     24  0558 24  0531 24  0615    134* 134*   K 3.4* 3.0* 4.0    103 103   CO2 25 22 22   BUN 11 7 5*   CREATININE 1.6* 1.2 1.1   CALCIUM 7.1* 7.4* 7.9*   GLUCOSE 101* 104* 134*        Hepatic Function Panel:   No results for input(s): \"AST\", \"ALT\", \"BILIDIR\", \"BILITOT\", \"ALKPHOS\" in the last 72 hours.    No results for input(s): \"LIPASE\", \"AMYLASE\" in the last 72 hours.  No results for input(s): \"PROTIME\", \"INR\" in the last 72 hours.  No results for input(s): \"PTT\" in the last 72 hours.  No results for input(s): \"OCCULTBLD\" in the last 72 hours.      Radiology Review:    US RENAL COMPLETE   Final Result   Unremarkable ultrasound of the kidneys.      Limited evaluation of the decompressed 
    Gastroenterology Progress Note    Tammy Frederick is a 73 y.o. female patient.  1. Nausea and vomiting, unspecified vomiting type        Admission Date: 2024  Hospital Day: Hospital Day: 8  Attending: Arnoldo Downs MD  Date of service: 24    SUBJECTIVE:    Still feels nauseous and some abdominal pain in the RLQ.     ROS:  Cardiovascular ROS: no chest pain or dyspnea on exertion  Gastrointestinal ROS: see above  Respiratory ROS: no cough, shortness of breath, or wheezing    Physical    VITALS:  /65   Pulse 73   Temp 97.9 °F (36.6 °C) (Oral)   Resp 16   Ht 1.524 m (5')   Wt 43.7 kg (96 lb 4.8 oz)   SpO2 96%   BMI 18.81 kg/m²   TEMPERATURE:  Current - Temp: 97.9 °F (36.6 °C); Max - Temp  Av.9 °F (36.6 °C)  Min: 97.8 °F (36.6 °C)  Max: 97.9 °F (36.6 °C)    NAD  RRR, Nl s1s2  Lungs CTA Bilaterally, normal effort  Abdomen soft, ND, NT, no HSM, Bowel sounds normal, + stoma  AAOx3, No asterixis     Data      CBC:   Recent Labs     24  0531 24  0615 24  0518   WBC 3.6* 3.9* 3.4*   RBC 2.50* 2.68* 2.66*   HGB 7.6* 8.0* 8.0*   HCT 22.6* 24.1* 23.8*    230 244   MCV 90.4 89.8 89.4   MCH 30.4 29.7 30.0   MCHC 33.6 33.1 33.6   RDW 15.1 15.1 15.1          BMP:  Recent Labs     24  0531 24  0615 24  0518   * 134* 132*   K 3.0* 4.0 2.8*    103 99   CO2 22 22 21   BUN 7 5* 4*   CREATININE 1.2 1.1 0.9   CALCIUM 7.4* 7.9* 7.7*   GLUCOSE 104* 134* 142*          Hepatic Function Panel:   No results for input(s): \"AST\", \"ALT\", \"BILIDIR\", \"BILITOT\", \"ALKPHOS\" in the last 72 hours.    No results for input(s): \"LIPASE\", \"AMYLASE\" in the last 72 hours.  No results for input(s): \"PROTIME\", \"INR\" in the last 72 hours.  No results for input(s): \"PTT\" in the last 72 hours.  No results for input(s): \"OCCULTBLD\" in the last 72 hours.      Radiology Review:    US RENAL COMPLETE   Final Result   Unremarkable ultrasound of the kidneys.      Limited evaluation of the 
    V2.0    [facility] Progress Note      Name:  Tammy Frederick /Age/Sex: 1950  (73 y.o. female)   MRN & CSN:  [id number] & [id number] Encounter Date/Time: 2024 1:54 PM EST   Location:  [facility]3318/3318-01 PCP: No primary care provider on file.     Attending:Rajeev Galan MD       Hospital Day: 4    Assessment and Recommendations   Tammy Frederick is a 73 y.o. female with pmh of  Diverticulitis with perforation and multiple abscesses s/p exploratory laparotomy, takedown of splenic flexure, small bowel resection with anastomosis, appendectomy, sigmoidectomy with low pelvic anastomosis and diverting loop ileostomy by Dr Robertson on  who presents with Weakness      Plan:   # SUSY:  # Anion gap metabolic acidosis:  -Patient presented with chief complaint of generalized weakness and abdominal pain.  -Labs were done and showed creatinine of 5.5 and BUN 91.  -Anion gap 18.  -IV fluid  -Bladder scan  - Renal ultrasound showed Unremarkable ultrasound of the kidneys  -UA negative for LE or nitrite  -Anion gap metabolic acidosis resolved  -Nephrology consult: Normal saline       # Abdominal pain:  # Recent perforated diverticulitis   -Patient endorses generalized abdominal pain.  -Patient has history of recent perforated diverticulitis with multiple abdominal abscesses, status post exploratory laparotomy with takedown of the splenic fracture, and small bowel resection, appendectomy, sigmoidoscopy with low pelvic anastomosis and diverting loop ileostomy surgery on 2023.  -LFT within normal limit  -Continue Cipro and Flagyl  -CT of abdomen and pelvis showed Resolution of the fluid collection in the pelvis. Bilateral perinephric fat stranding which is a nonspecific finding and can be normal, but a urinary tract infection is possible.     # Elevated troponin:  -Secondary to demand ischemia and SUSY  -Continue to monitor      Diet ADULT ORAL NUTRITION SUPPLEMENT; Breakfast, Lunch, Dinner; Standard High 
   24 1612   Encounter Summary   Encounter Overview/Reason  Advance Care Planning;Follow-up   Service Provided For: Patient   Referral/Consult From: Other (comment)  (Unit Clerk for 3A requested that I stop by to discuss ACP.)   Support System Unknown   Last Encounter  24   Complexity of Encounter Moderate   Begin Time 1540   End Time  1620   Total Time Calculated 40 min   Spiritual/Emotional needs   Type Spiritual Support   Rituals, Rites and Sacraments   Type Blessings   Advance Care Planning   Type ACP conversation  (ACP documents with withpatient at bedside.  is  and she has no children. She has two individuals that she wishes to speak with first about being her representatives.)   Assessment/Intervention/Outcome   Assessment Calm;Passive;Peaceful   Intervention Active listening;Discussed belief system/Tenriism practices/andrade;Discussed death, afterlife;Discussed relationship with God;Prayer (assurance of)/[place];Sustaining Presence/Ministry of presence   Outcome Comfort;Encouraged;Engaged in conversation;Peace;Receptive   Plan and Referrals   Plan/Referrals Continue to visit, (comment)  (Another  will need to follow up to assist with completion of ACP documentation.)     This afternoon, I had the pleasure of meeting the patient at her bedside. She stated that she is not part of any kind of andrade community, but endorses going to Sikhism as a child. She doesn't have a specific belief system about God or the afterlife. We discussed ACP and the importance of having such documents. She expressed concern that the forms said \"State of [place],\" but she lives in [place]. Provided assurance that this is fine since she is receiving care in an [place] facility. Recommend another  visit tomorrow (after she has talked to her representative designees) to complete paperwork.  
  Physician Progress Note      PATIENT:               SURYA SYKES  [facility] #:                  [id number]  :                       1950  ADMIT DATE:       2024 12:49 PM  DISCH DATE:  RESPONDING  PROVIDER #:        Rajeev Galan MD          QUERY TEXT:    Patient admitted with SUSY. Noted to have documentation of dietician assessment   with malnutrition diagnosis on 24. If possible, please document in   progress notes and discharge summary if you are evaluating and /or treating   any of the following:    The medical record reflects the following:  Risk Factors: SUSY approximately a month ago after small bowel surgery. Patient   reports large volume output from the ileostomy bag.  Clinical Indicators: Acute Illness Malnutrition Status: Severe malnutrition   Findings of the 6 clinical characteristics of malnutrition:  Energy Intake:  50% or less of estimated energy requirements for 5 or more   days Weight Loss:  Greater than 7.5% over 3 months (22% wt loss in 2 months)   Body Fat Loss:  Mild body fat loss Orbital Muscle Mass Loss:  Moderate muscle   mass loss  Treatment: Dietitian consult and dietary supplements TID    ASPEN Criteria:    https://aspenjournals.onlinelibrary.mart.com/doi/full/10.1177/717662163144273  5  Options provided:  -- Protein calorie malnutrition severe  -- Other - I will add my own diagnosis  -- Disagree - Not applicable / Not valid  -- Disagree - Clinically unable to determine / Unknown  -- Refer to Clinical Documentation Reviewer    PROVIDER RESPONSE TEXT:    This patient has severe protein calorie malnutrition.    Query created by: Raine Hopkins on 2/3/2024 10:02 AM      Electronically signed by:  Rajeev Galan MD 2024 3:07 PM          
  [facility] - Inpatient Rehabilitation Department   Phone: [phone]    Physical Therapy    [] Initial Evaluation            [x] Daily Treatment Note         [] Discharge Summary      Patient: Tammy Frederick   : 1950   MRN: [id number]   Date of Service:  2024  Admitting Diagnosis: Weakness  Current Admission Summary: Sylvain Frederick is a 73 y.o. female with pmh of Diverticulitis with perforation and multiple abscesses s/p exploratory laparotomy, takedown of splenic flexure, small bowel resection with anastomosis, appendectomy, sigmoidectomy with low pelvic anastomosis and diverting loop ileostomy by Dr Robertson on  who presents with weakness.      Patient mentioned that she started feeling weak and more short of breath the day before yesterday.  She went to her primary care office.  However, her weakness and shortness of breath continued.  She also endorses generalized abdominal pain.  Reports decreased oral intake.  Denies having urinary urgency/frequency, dysuria.  She denies having fever, chills, lower extremity edema, headache.  Since her shortness of breath and fatigue was getting worse, patient decided to present to ED.  Past Medical History:  has a past medical history of Arthritis.  Past Surgical History:  has a past surgical history that includes Abdomen surgery; Total knee arthroplasty (2011); joint replacement (12); and colostomy (N/A, 2023).  Discharge Recommendations: Tammy Frederick scored a 18/24 on the AM-PAC short mobility form. Current research shows that an AM-PAC score of 18 or greater is typically associated with a discharge to the patient's home setting. Based on the patient's AM-PAC score and their current functional mobility deficits, it is recommended that the patient have 2-3 sessions per week of Physical Therapy at d/c to increase the patient's independence.  At this time, this patient demonstrates the endurance and safety to 
Assessment completed. VSS. Patient awake, alert, and in bed. Medications given per MAR. Fluids continued. Patient able to do own care on ostomy. No complaints of pain or discomfort at this time. Safety precautions in place. Call light within reach. Will continue to monitor.    
Assessment completed. VSS. Patient awake, alert, and in bed. Medications given per MAR. No complaints of pain or discomfort at this time. Safety precautions in place. Call light within reach. Will continue to monitor.    
Attempted to call patient down for Gastric Emptying Study. Per RN patient is receiving potassium and cannot come down until this is finished. Informed RN to call as soon as the drip is done, as we will have to work it between outpatient schedule. Gastric Emptying must start before 1030 today, as it is a 4 hour test.    Carmel Huang, SUMMER, RT(MR)  
Gastric emptying study and bone marrow biopsy completed.  Pt resting in bed, VSS.    
Karen Eden M.D. notified via secure message. Awaiting reply.  
Mrs. Frederick's Gastric Emptying will occur tomorrow morning bright and early.  Please keep her NPO and NO narcotics after midnight.  Floor RN made aware.    Bryce WHEELER, RT(MR)  
Nephrology consult received.    73-year-old lady admitted with generalized weakness and renal failure.    Patient has ileostomy with large volume output for several days.    On admission BUN is 92 and a creatinine of 5.3.    Sodium is 131 and a CO2 of 13.    Severe renal failure likely related to volume depletion.    Give normal saline 200 mL/h for 5 hours.    Then switch to sodium bicarbonate infusion at 150 mL/h.    Urine output to be closely monitored.    No immediate indications for dialysis.    Patient is critically ill and will need to be closely monitored.    Discussed with Dr. Galan   
Nutrition Education    Pt triggered for education consult on gastroparesis. Provided handout on gastroparesis nutrition therapy and reviewed with pt. This RD answered any questions. Left name and phone number should pt have any further questions regarding materials provided.     Educated on 2/10  Learners: Patient  Readiness: Acceptance  Method: Explanation and Handout  Response: Verbalizes Understanding  Contact name and number provided.    Hollie Noriega MS, RD, LD  Weekend Contact: [phone]. Please leave name and callback number.      
Nutrition Note    RECOMMENDATIONS  PO Diet: Low fiber as tolerated  ONS: Ensure TID  Nutrition Support: N/A     NUTRITION ASSESSMENT   Nutrition intervention triggered for wt loss & poor appetite.  Pt is s/p GI surgery with new ileostomy 12/28/23.  Pt had been tolerating regular diet after surgery, but once sent home, was not able to eat for at least 3 weeks d/t feeling like she would vomit.  Wt has been stable since last assessed x 1 week ago, but has lost 30 lb overall in about 2 months, which is considered significant per ASPEN guidelines.  See malnutrition assessment for details regarding meeting critteria for severe malnutrition.  Will resume Ensure TID, change diet to low fiber (as it may be easier tolerated with ileostomy), & monitor for additional needs.     Nutrition Related Findings:  Na, k+ WNL; phos 5.7; gluc 122  Wounds: Surgical Incision  Nutrition Education:  Education not indicated   Nutrition Goals: PO intake 50% or greater     MALNUTRITION ASSESSMENT   Acute Illness  Malnutrition Status: Severe malnutrition  Findings of the 6 clinical characteristics of malnutrition:  Energy Intake:  50% or less of estimated energy requirements for 5 or more days  Weight Loss:  Greater than 7.5% over 3 months (22% wt loss in 2 months)     Body Fat Loss:  Mild body fat loss Orbital   Muscle Mass Loss:  Moderate muscle mass loss Temples (temporalis), Clavicles (pectoralis & deltoids)  Fluid Accumulation:  No significant fluid accumulation     Strength:  Not Performed      NUTRITION DIAGNOSIS   Severe malnutrition related to inadequate protein-energy intake as evidenced by poor intake prior to admission, weight loss 7.5% in 3 months, Criteria as identified in malnutrition assessment      CURRENT NUTRITION THERAPIES  ADULT DIET; Regular     PO Intake: % (x 2 servings of peaches per RN)   PO Supplement Intake:None Ordered    ANTHROPOMETRICS  Current Height: 152.4 cm (5')  Current Weight - Scale: 46.1 kg (101 
Nutrition Note    RECOMMENDATIONS  PO Diet: Low fiber as tolerated (small, frequent meals)  ONS: Ensure TID  Nutrition Support: N/A at this time.     NUTRITION ASSESSMENT   Nutrition status remains compromised AEB poor tolerance of po intake.  Pt ate % x 2 meals yesterday, consumed bkf & Ensure this am, but vomited after.  Pt became very upset; staff offered encouragement/support.  Suggested smaller, more frequent meals for better tolerance of po.  If unable to toelrate po, may need to consider alternative means of nutrition.     Nutrition Related Findings: +Output per ileostomy, k+ 3.4, Mg 1.3; no edema noted  Wounds: Surgical Incision  Nutrition Education:  Education not indicated   Nutrition Goals: PO intake 50% or greater     MALNUTRITION ASSESSMENT   Acute Illness  Malnutrition Status: Severe malnutrition    NUTRITION DIAGNOSIS   Severe malnutrition related to inadequate protein-energy intake as evidenced by vomiting, poor intake prior to admission, weight loss 7.5% in 3 months, Criteria as identified in malnutrition assessment      CURRENT NUTRITION THERAPIES  ADULT ORAL NUTRITION SUPPLEMENT; Breakfast, Lunch, Dinner; Standard High Calorie/High Protein Oral Supplement  ADULT DIET; Regular; Low Fiber     PO Intake: % (x 2 meals on 2/4 (unsure of portions provided))   PO Supplement Intake:% (usually, per pt)    ANTHROPOMETRICS  Current Height: 152.4 cm (5')  Current Weight - Scale: 47.5 kg (104 lb 11.5 oz)    Ideal Body Weight (IBW): 100 lbs  (45 kg)    Usual Bodyweight 59 kg (130 lb) (taken 12/31/23)       BMI: 20.3      COMPARATIVE STANDARDS  Total Energy Requirements (kcals/day): 1150- 1380     Protein (g):  55- 92g       Fluid (mL/day):  1 ml/kcal    The patient will be monitored per nutrition standards of care. Consult dietitian if additional nutrition interventions are needed prior to RD reassessment.     Nayana Oliver, FREDERIC, LD    Contact: [phone]      
Nutrition Note    RECOMMENDATIONS  PO Diet: NPO; advance to low fiber as tolerated  ONS: resume strawberry Glucerna TID  Nutrition Support: consider TF vs TPN since pt is severely malnourished & at high risk for refeeding syndrome.  RD available for rec's upon consult.      NUTRITION ASSESSMENT   Pt remains nutritionally compromised AEB nausea with inadequate po intake vs NPO status.  Pt NPO for SBFT today.  Was able to tolerate an Ensure, but nothing else at [facility] this am.  Pt would benefit from a feeding tube at this point d/t prolonged time period with inadequate nutrition intake.  Will monitor for diet tolerance as advanced vs consideration for nutrition support.     Nutrition Related Findings: high volume output per ileostomy; k+ 3.9, Mg 1.4, phos 2.3  Wounds: Surgical Incision  Nutrition Education:  Education not indicated   Nutrition Goals: Initiate nutrition support, Initiate PO diet     MALNUTRITION ASSESSMENT   Acute Illness  Malnutrition Status: Severe malnutrition    NUTRITION DIAGNOSIS   Severe malnutrition related to inadequate protein-energy intake as evidenced by nausea, NPO or clear liquid status due to medical condition, poor intake prior to admission, weight loss 7.5% in 3 months, Criteria as identified in malnutrition assessment      CURRENT NUTRITION THERAPIES  Diet NPO     PO Intake: NPO   PO Supplement Intake:NPO    ANTHROPOMETRICS  Current Height: 152.4 cm (5')  Current Weight - Scale: 44.1 kg (97 lb 3.2 oz)    Ideal Body Weight (IBW): 100 lbs  (45 kg)    Usual Bodyweight 59 kg (130 lb) (taken 12/31/23)       BMI: 18.9      COMPARATIVE STANDARDS  Total Energy Requirements (kcals/day): 1150- 1380     Protein (g):  55- 92g       Fluid (mL/day):  1 ml/kcal    The patient will be monitored per nutrition standards of care. Consult dietitian if additional nutrition interventions are needed prior to RD reassessment.     Nayana Oliver, FREDERIC, LD    Contact: [phone]      
Occupational Therapy    [facility] - Inpatient Rehabilitation Department   Phone: [phone]    Occupational Therapy    [x] Initial Evaluation            [] Daily Treatment Note         [x] Discharge Summary      Patient: Tammy Frederick   : 1950   MRN: [id number]   Date of Service:  2024    Admitting Diagnosis:  Weakness  Current Admission Summary: Sylvain Frederick is a 73 y.o. female with pmh of Diverticulitis with perforation and multiple abscesses s/p exploratory laparotomy, takedown of splenic flexure, small bowel resection with anastomosis, appendectomy, sigmoidectomy with low pelvic anastomosis and diverting loop ileostomy by Dr Robertson on  who presents with weakness.      Patient mentioned that she started feeling weak and more short of breath the day before yesterday.  She went to her primary care office.  However, her weakness and shortness of breath continued.  She also endorses generalized abdominal pain.  Reports decreased oral intake.  Denies having urinary urgency/frequency, dysuria.  She denies having fever, chills, lower extremity edema, headache.  Since her shortness of breath and fatigue was getting worse, patient decided to present to ED.  Past Medical History:  has a past medical history of Arthritis.  Past Surgical History:  has a past surgical history that includes Abdomen surgery; Total knee arthroplasty (2011); joint replacement (12); and colostomy (N/A, 2023).    Discharge Recommendations: Tammy Frederick scored a 23/24 on the AM-[facility] ADL Inpatient form.  At this time, no further OT is recommended upon discharge due to patient at independent level.  Recommend patient returns to prior setting with prior services.      DME Required For Discharge: No DME required    Precautions/Restrictions: no restrictions  Positional Restrictions:no positional restrictions    Pre-Admission Information   Lives With: alone                     Type of Home: 
PM assessment complete and documented. Meds given per MAR.  Pt resting in bed. Denies needs or concerns at present. Call light in reach. Will continue to monitor.  
Patient discharged from 3A. Patient's ileostomy appliance changed, Dr. Can at bedside and evaluated stoma. Looks healthy, stoma pink.  IV removed, Tele removed and Patient left with all belongings. Discharge paperwork discussed with patient.   
Patient off floor for EGD.  
Patient resting comfortably in bed.  Endorses no needs at this time.  
Patient states potassium infusion causing significant pain.  MD notified.    
Physical Therapy  Attempted to see pt for PT around 1325 but pt off the floor for testing. At 1445 after return to floor, pt politely declining, requesting to rest after her tests. Discussed with pt importance of OOB mobility with staff later today or tomorrow when pt feeling better. Pt verbalized understanding. Will follow up as time and schedule allows.    Modesta Gilman, PT DPT [id number]    
Physical Therapy  Pt kindly declined therapy at this time. Endorses she has been ambulating within the room today. Will follow-up another time as schedule permits.   Thanks, Randee Haley PT, DPT [id number]      
Pt from endo on l side with 3lo2 in stable condition.  
Pt taken to nuc med for gastric emptying study.  VSS.    
Pt. Complains of burning at IV site that potassium is infusing.  Rate slowed down to 30cc/hour.  Pt. Tolerating better at this time. Nuc med called for transport for gastric emptying study. Pt. Receiving Potassium IV at this time and unable to go. K 2.77. Nuc med stated they would work her in when completed.  
Received bedside report at shift change. Pt. Without IV access at shift change. 22 gauge IV inserted into left hand without difficulty.Flushed with normal saline. Good blood return noted. IV connected and Antibiotics given. Pt. Denies any complaints. Safety precautions maintained. Plan of care reviewed and patient verbalizes understanding.   
Received call from RN to see if we were getting patient down for Gastric Emptying Study. Informed RN that when we tried to this morning we were told we had to wait on potassium to be finished by night shift, but we never got a call saying it was done. It is now too late to start this test as our dose for the test has now . Plan for tomorrow morning. Please return to NPO diet at midnight and continue to hold narcotics.    Carmel Huang, SUMMER, RT(MR)  
Report called to floor RN. Pt transferred via stretcher.  
Shift assessment completed. Routine vitals stable. Scheduled medications given. Patient is awake, alert and oriented. Respirations are easy and unlabored. Patient does not appear to be in distress, resting comfortably at this time. Call light within reach.   
Spoke with Mindy from lab who reported a potassium of 2.77. Will replace per protocol.   
Teaching / education initiated regarding perioperative experience, expectations, and pain management during stay. Patient verbalized understanding.    
Went to Nuclear Medicine to assess ileostomy, emptied ileostomy and checked on pt.  Pt took 1 ensure at 0745 for study, has not had any other food or fluids today.    
1/26/24 2/10/24  Shane Pate MD   lactobacillus (CULTURELLE) capsule Take 1 capsule by mouth in the morning and at bedtime for 15 days 1/26/24 2/10/24  Arnoldo Downs MD   sodium chloride 1 g tablet Take 1 tablet by mouth 3 times daily (with meals) 1/26/24   Arnoldo Downs MD   therapeutic multivitamin-minerals (THERAGRAN-M) tablet Take 1 tablet by mouth daily    Provider, MD Ivone     Current Facility-Administered Medications: magnesium sulfate 4000 mg in 100 mL IVPB premix, 4,000 mg, IntraVENous, Once  sodium chloride flush 0.9 % injection 5-40 mL, 5-40 mL, IntraVENous, 2 times per day  sodium chloride flush 0.9 % injection 5-40 mL, 5-40 mL, IntraVENous, PRN  0.9 % sodium chloride infusion, , IntraVENous, PRN  potassium chloride (KLOR-CON M) extended release tablet 40 mEq, 40 mEq, Oral, PRN **OR** potassium bicarb-citric acid (EFFER-K) effervescent tablet 40 mEq, 40 mEq, Oral, PRN **OR** potassium chloride 10 mEq/100 mL IVPB (Peripheral Line), 10 mEq, IntraVENous, PRN  magnesium sulfate 2000 mg in 50 mL IVPB premix, 2,000 mg, IntraVENous, PRN  ondansetron (ZOFRAN-ODT) disintegrating tablet 4 mg, 4 mg, Oral, Q8H PRN **OR** ondansetron (ZOFRAN) injection 4 mg, 4 mg, IntraVENous, Q6H PRN  polyethylene glycol (GLYCOLAX) packet 17 g, 17 g, Oral, Daily PRN  acetaminophen (TYLENOL) tablet 650 mg, 650 mg, Oral, Q6H PRN **OR** acetaminophen (TYLENOL) suppository 650 mg, 650 mg, Rectal, Q6H PRN  heparin (porcine) injection 5,000 Units, 5,000 Units, SubCUTAneous, 3 times per day  ciprofloxacin (CIPRO) IVPB 400 mg, 400 mg, IntraVENous, Q12H  metroNIDAZOLE (FLAGYL) 500 mg in 0.9% NaCl 100 mL IVPB premix, 500 mg, IntraVENous, Q8H  sodium bicarbonate 150 mEq in dextrose 5 % 1,000 mL infusion, , IntraVENous, Continuous   sodium chloride      sodium bicarbonate 150 mEq in dextrose 5 % 1,000 mL infusion 150 mL/hr at 02/03/24 0506         Allergies.  No Known Allergies    Social History.  Social History     Socioeconomic 
Review:    CT BONE MARROW ASPIRATION   Final Result   Successful CT guided bone marrow aspiration and core biopsy of the right   iliac bone.         NM GASTRIC EMPTYING   Final Result   Gastric emptying is mildly delayed.         US RENAL COMPLETE   Final Result   Unremarkable ultrasound of the kidneys.      Limited evaluation of the decompressed bladder.         CT ABDOMEN PELVIS WO CONTRAST Additional Contrast? None   Final Result   1. Resolution of the fluid collection in the pelvis.   2. Bilateral perinephric fat stranding which is a nonspecific finding and can   be normal, but a urinary tract infection is possible.   3. No acute findings elsewhere in the abdomen or pelvis.         XR CHEST PORTABLE   Final Result   No radiographic evidence of acute cardiopulmonary process.             Assessment/Plan:      Abdominal pain, nausea, vomiting: No signs of GIB. Symptoms ongoing since she had a recent perforated diverticulitis requring colectomy with end ileostomy dec 2023. CT as above with no acute findings. S/p remote anatoliy. She does take nsaids at home.   EGD 2/6 mild antral erosions and gastritis.  Normal duodenum. Bx nonspecific gastritis (no HP) and no celiac disease  GES 2/9 c/w mild gastroparesis. Pt refused Reglan due to h/o tremors     Diarrhea -  Stool output is as expected for an ileostomy.  Stool neg GI bacterial pathogens and EIA for parasites.      Anemia with leukopenia: Acute on chronic, baseline hgb ~10 no down to 7.5. No signs of GIB or blood per ostomy. Iron studies 2/2 were normal. WBC also low. Hematology consulted. BM bx ordered 2/9     SUSY: Creat 5.5. normalized since. Nephrology following      Plan:  - PPI and antiemetics as needed  - Emend while inpatient  - gastroparesis diet: low fat, low fiber with 5-6 smaller meals daily.  Dietitian consulted for education.   - GI will sign off. Please call if you have questions.    Chidi Morris MD, MSc  [facility]  02/10/24    
hypotension-severe.  - Most likely related to large volume output from the ileostomy.  - Patient received IV bolus on admission.     Needed IV fluids, with normal saline, then bicarb infusion also.    Potassium levels improved after replacement.    Acidosis improving after volume correction.    Hyponatremia stable.    Severe metabolic acidosis due to excessive GI loss.           HPI-  Tammy Frederick 73 y.o.  Has been admitted with multiple complaints of generalized weakness.  Patient has an ileostomy bag placed approximately a month ago after small bowel surgery.  Patient reports large volume output from the ileostomy bag.    Reports decreased urine output.  No hypotension.    Nephrology consult for critical renal failure and multiple electrolyte imbalance.    BUN is 91 and a creatinine of 5.5.  Sodium is 131 and a bicarbonate of 13 with an anion gap of 18.    No previous history of chronic kidney disease.    Patient was given fluid resuscitation with some improvement in the urine output.    Noted to have borderline hypotension.  - Not on blood pressure medications.  -    Medications reviewed.  Medical records reviewed.          Past Medical History :         Past Medical History:   Diagnosis Date    Arthritis        Medications  Which i have  Reviewed, also have reviewed home medications  Prior to Admission medications    Medication Sig Start Date End Date Taking? Authorizing Provider   ciprofloxacin (CIPRO) 500 MG tablet Take 1 tablet by mouth every 12 hours for 15 days 1/26/24 2/10/24  Shane Pate MD   metroNIDAZOLE (FLAGYL) 500 MG tablet Take 1 tablet by mouth every 8 hours for 15 days 1/26/24 2/10/24  Shane Pate MD   lactobacillus (CULTURELLE) capsule Take 1 capsule by mouth in the morning and at bedtime for 15 days 1/26/24 2/10/24  Arnoldo Downs MD   sodium chloride 1 g tablet Take 1 tablet by mouth 3 times daily (with meals) 1/26/24   Arnoldo Downs MD   therapeutic multivitamin-minerals 
urinary tract infection is possible.  -GI consult     Elevated troponin:  -Secondary to demand ischemia and SUSY  -Continue to monitor     Code Status: Full Code   FEN: Diet NPO   DVT PPX: [] Lovenox, [x]Heparin, [] SCDs,[] Eliquis, [] Xarelto, [] Coumadin  DISPO: [x] GMF, [] ICU, [] CVU    Arnoldo Downs MD  2/9/2024,  12:39 PM    This note was likely completed using voice recognition technology and may contain unintended errors.     Objective:   Vitals:   T-max:  Patient Vitals for the past 8 hrs:   BP Temp Temp src Pulse Resp SpO2 Height Weight   02/09/24 1207 -- -- -- -- -- -- 1.524 m (5') --   02/09/24 0715 110/65 97.7 °F (36.5 °C) Oral 70 16 96 % -- --   02/09/24 0603 -- -- -- -- -- -- -- 44.1 kg (97 lb 3.2 oz)         Intake/Output Summary (Last 24 hours) at 2/9/2024 1239  Last data filed at 2/9/2024 0945  Gross per 24 hour   Intake 840 ml   Output 150 ml   Net 690 ml         Physical Exam  Unable to assess    Review of Systems  Unable to assess    LABS:    CBC:   Recent Labs     02/07/24  0615 02/08/24  0518 02/09/24  0425   WBC 3.9* 3.4* 3.7*   HGB 8.0* 8.0* 7.5*   HCT 24.1* 23.8* 22.2*    244 234   MCV 89.8 89.4 89.7       Renal:    Recent Labs     02/07/24  0615 02/08/24  0518 02/08/24  1443 02/09/24  0425   * 132*  --  136   K 4.0 2.8* 3.6 3.9    99  --  100   CO2 22 21  --  22   BUN 5* 4*  --  3*   CREATININE 1.1 0.9  --  0.8   GLUCOSE 134* 142*  --  95   CALCIUM 7.9* 7.7*  --  8.0*   MG 1.40* 1.60*  --  1.40*   PHOS 2.1* 2.3*  --  2.3*   ANIONGAP 9 12  --  14       Hepatic:   Recent Labs     02/07/24  0615 02/08/24  0518 02/09/24  0425   LABALBU 3.0* 3.1* 3.1*       Troponin: No results for input(s): \"TROPONINI\" in the last 72 hours.  BNP: No results for input(s): \"BNP\" in the last 72 hours.  Lipids: No results for input(s): \"CHOL\", \"HDL\" in the last 72 hours.    Invalid input(s): \"LDLCALCU\", \"TRIGLYCERIDE\"  ABGs:  No results for input(s): \"PHART\", \"FTM7SNH\", \"PO2ART\", \"PYZ5RMG\", 
\"LABURIN\"    Pathology:  \"Relevant pathology documented under results section     Imaging:  I have personally reviewed the following films:    No results found.    Scheduled Meds:   magnesium sulfate  4,000 mg IntraVENous Once    sodium phosphate IVPB (PERIPHERAL line)  20 mmol IntraVENous Once    cholestyramine light  1 packet Oral 4x daily    aprepitant  40 mg Oral Daily    lactobacillus  2 capsule Oral Daily with breakfast    pantoprazole  40 mg IntraVENous Daily    ciprofloxacin  400 mg IntraVENous Q12H    sodium chloride flush  5-40 mL IntraVENous 2 times per day    heparin (porcine)  5,000 Units SubCUTAneous 3 times per day    metroNIDAZOLE  500 mg IntraVENous Q8H     Continuous Infusions:   sodium chloride       PRN Meds:.sodium chloride flush, sodium chloride, potassium chloride **OR** potassium alternative oral replacement **OR** potassium chloride, magnesium sulfate, ondansetron **OR** ondansetron, polyethylene glycol, acetaminophen **OR** acetaminophen      Assessment:  73 y.o. female admitted with    Abdominal pain, nausea, vomiting  Acute kidney injury  Elevated troponin  OR Date 12/28/2023, exploratory laparotomy, takedown of splenic flexure, small bowel resection with anastomosis, appendectomy,sigmoidectomy with low pelvic anastomosis, and diverting loop ileostomy for perforated sigmoid diverticulitis with multiple intra-abdominal abscesses       Plan:  1. Continues to have nausea without further vomiting, poor appetite--was able to tolerated Ensure but not eggs for testing, watery stool per ileostomy, output still high, vitals/labs stable; continued supportive care, continue Imodium and add scheduled cholestyramine, gastric emptying study per GI in process  2. NPO; monitor ileostomy output  3. IV hydration and electrolyte correction per nephrology  4. Antibiotics per ID  5. Activity as tolerated--PT/OT following  6. PRN analgesics and antiemetics--minimizing narcotics as tolerated  7. DVT prophylaxis 
dry  Neuro: Alert.  Psych: Mood appropriate.         Medications:   Medications:    magnesium sulfate  4,000 mg IntraVENous Once    sodium chloride flush  5-40 mL IntraVENous 2 times per day    heparin (porcine)  5,000 Units SubCUTAneous 3 times per day    ciprofloxacin  400 mg IntraVENous Q12H    metroNIDAZOLE  500 mg IntraVENous Q8H      Infusions:    sodium chloride 150 mL/hr at 02/05/24 0204    sodium chloride       PRN Meds: sodium chloride flush, 5-40 mL, PRN  sodium chloride, , PRN  potassium chloride, 40 mEq, PRN   Or  potassium alternative oral replacement, 40 mEq, PRN   Or  potassium chloride, 10 mEq, PRN  magnesium sulfate, 2,000 mg, PRN  ondansetron, 4 mg, Q8H PRN   Or  ondansetron, 4 mg, Q6H PRN  polyethylene glycol, 17 g, Daily PRN  acetaminophen, 650 mg, Q6H PRN   Or  acetaminophen, 650 mg, Q6H PRN        Labs and Imaging   US RENAL COMPLETE    Result Date: 2/1/2024  EXAMINATION: RETROPERITONEAL ULTRASOUND OF THE KIDNEYS AND URINARY BLADDER 2/1/2024 COMPARISON: Same day CT abdomen and pelvis HISTORY: ORDERING SYSTEM PROVIDED HISTORY: severe SUSY TECHNOLOGIST PROVIDED HISTORY: Reason for exam:->severe SUSY FINDINGS: Kidneys: The right kidney measures 10.6 x 6.9 x 5.2 cm in length and the left kidney measures 9.5 x 4.3 x 4.7 cm in length.  The bilateral renal cortices measure 1.5 cm. Kidneys demonstrate normal cortical echogenicity.  No evidence of hydronephrosis or intrarenal stones. Bladder: The bladder is decompressed, limiting its evaluation.     Unremarkable ultrasound of the kidneys. Limited evaluation of the decompressed bladder.     CT ABDOMEN PELVIS WO CONTRAST Additional Contrast? None    Result Date: 2/1/2024  EXAMINATION: CT OF THE ABDOMEN AND PELVIS WITHOUT CONTRAST 2/1/2024 4:17 pm TECHNIQUE: CT of the abdomen and pelvis was performed without the administration of intravenous contrast. Multiplanar reformatted images are provided for review. Automated exposure control, iterative 
have personally performed a face to face diagnostic evaluation on this patient. I have spent more than 50% of the total clinical encounter in interviewing/examining the patient, reviewing patient chart (including but not limited to notes, labs, imaging and other testing), documentation of findings and subsequent follow up of ordered medication and testing, placing referrals and communication with patient care providers, coordinating future care, as well as documentation in the EHR. I agree with the findings and recommended plan of care, as documented by the physician assistant/nurse practitioner.  In summary, my findings and plan are the following:     Patient's nausea and vomiting is improved.  Her solid-phase gastric emptying study revealed mildly delayed gastric emptying (14% at 4 hours).  Her biopsies showed chronic gastritis with no H. pylori and the duodenal biopsies were negative for celiac disease.  Continue PPI. Emend appears to help with her N/V.  We discussed starting Reglan but patient is afraid because of tremors that she has at baseline.  Will instead consult dietitian for gastroparesis diet (low-fat low fiber and small frequent meals).  Hematology was consulted for leukopenia and anemia.  They ordered a bone marrow biopsy.    Chidi Morris MD, MSc  [facility]  02/09/24    
ORDERING SYSTEM PROVIDED HISTORY: abdominal pain TECHNOLOGIST PROVIDED HISTORY: Reason for exam:->abdominal pain Additional Contrast?->None Reason for Exam: abd pain FINDINGS: Lower Chest: There is no confluent airspace consolidation or pleural effusion. Organs: The liver, spleen, adrenal glands and pancreas are unremarkable. There has been a cholecystectomy.  Kidneys appear normal.  There is no ureteral stone or hydronephrosis. GI/Bowel: No bowel dilatation or bowel wall thickening is identified.  Prior partial colectomy and a diverting ileostomy are again noted.  The stomach is unremarkable. Pelvis: The bladder is unremarkable.  There is no free fluid. Peritoneum/Retroperitoneum: No evidence of lymphadenopathy.  Aorta is normal in caliber.  The fluid collection in the pelvis has resolved in the interval. There is bilateral perinephric fat stranding.  No free fluid, free air or focal fluid collection is identified. Bones/Soft Tissues: No fracture or destructive bone lesion is identified.     1. Resolution of the fluid collection in the pelvis. 2. Bilateral perinephric fat stranding which is a nonspecific finding and can be normal, but a urinary tract infection is possible. 3. No acute findings elsewhere in the abdomen or pelvis.     XR CHEST PORTABLE    Result Date: 2/1/2024  EXAMINATION: ONE XRAY VIEW OF THE CHEST 2/1/2024 2:49 pm COMPARISON: None. HISTORY: ORDERING SYSTEM PROVIDED HISTORY: SOB TECHNOLOGIST PROVIDED HISTORY: Reason for exam:->SOB FINDINGS: The lungs appear clear. The heart and mediastinal structures are unremarkable. Bony thorax appears normal. Visualized upper abdomen is unremarkable.     No radiographic evidence of acute cardiopulmonary process.       CBC:   Recent Labs     02/01/24  1338 02/02/24  0522   WBC 7.1 4.0   HGB 9.7* 7.9*    257     BMP:    Recent Labs     02/01/24  1523 02/01/24  2325 02/02/24  0522   * 132* 136   K 4.8 4.8 4.1    104 102   CO2 13* 13* 20*   BUN 
Oral, PRN **OR** potassium bicarb-citric acid (EFFER-K) effervescent tablet 40 mEq, 40 mEq, Oral, PRN **OR** potassium chloride 10 mEq/100 mL IVPB (Peripheral Line), 10 mEq, IntraVENous, PRN  magnesium sulfate 2000 mg in 50 mL IVPB premix, 2,000 mg, IntraVENous, PRN  ondansetron (ZOFRAN-ODT) disintegrating tablet 4 mg, 4 mg, Oral, Q8H PRN **OR** ondansetron (ZOFRAN) injection 4 mg, 4 mg, IntraVENous, Q6H PRN  polyethylene glycol (GLYCOLAX) packet 17 g, 17 g, Oral, Daily PRN  acetaminophen (TYLENOL) tablet 650 mg, 650 mg, Oral, Q6H PRN **OR** acetaminophen (TYLENOL) suppository 650 mg, 650 mg, Rectal, Q6H PRN  heparin (porcine) injection 5,000 Units, 5,000 Units, SubCUTAneous, 3 times per day  metroNIDAZOLE (FLAGYL) 500 mg in 0.9% NaCl 100 mL IVPB premix, 500 mg, IntraVENous, Q8H   sodium chloride             PHYSICAL EXAMINATION.         /65   Pulse 73   Temp 97.9 °F (36.6 °C) (Oral)   Resp 16   Ht 1.524 m (5')   Wt 43.7 kg (96 lb 4.8 oz)   SpO2 96%   BMI 18.81 kg/m²     Intake/Output Summary (Last 24 hours) at 2/8/2024 1230  Last data filed at 2/8/2024 0507  Gross per 24 hour   Intake 210 ml   Output 175 ml   Net 35 ml         INTAKE/OUTPUT:  I/O last 3 completed shifts:  In: 2439 [P.O.:450; I.V.:1500.1; IV Piggyback:488.9]  Out: 175 [Stool:175]  No intake/output data recorded.      General: No acute distress   CVS:  Heart sounds S1 S2 +     RS: Normal respiratory effort, Breat sound: diminished at bases.     Abd: bowel sounds +,  distension .   Extremities/MSK:  Edema,          Labs reviewed by me       CBC:   Recent Labs     02/06/24  0531 02/07/24  0615 02/08/24  0518   WBC 3.6* 3.9* 3.4*   HGB 7.6* 8.0* 8.0*   HCT 22.6* 24.1* 23.8*   MCV 90.4 89.8 89.4    230 244       BMP:   Recent Labs     02/06/24  0531 02/07/24  0615 02/08/24  0518   * 134* 132*   K 3.0* 4.0 2.8*    103 99   CO2 22 22 21   PHOS 2.2* 2.1* 2.3*   BUN 7 5* 4*   CREATININE 1.2 1.1 0.9       Magnesium:   Lab 
achievable. COMPARISON: 01/24/2024 and 12/25/2023. HISTORY: ORDERING SYSTEM PROVIDED HISTORY: abdominal pain TECHNOLOGIST PROVIDED HISTORY: Reason for exam:->abdominal pain Additional Contrast?->None Reason for Exam: abd pain FINDINGS: Lower Chest: There is no confluent airspace consolidation or pleural effusion. Organs: The liver, spleen, adrenal glands and pancreas are unremarkable. There has been a cholecystectomy.  Kidneys appear normal.  There is no ureteral stone or hydronephrosis. GI/Bowel: No bowel dilatation or bowel wall thickening is identified.  Prior partial colectomy and a diverting ileostomy are again noted.  The stomach is unremarkable. Pelvis: The bladder is unremarkable.  There is no free fluid. Peritoneum/Retroperitoneum: No evidence of lymphadenopathy.  Aorta is normal in caliber.  The fluid collection in the pelvis has resolved in the interval. There is bilateral perinephric fat stranding.  No free fluid, free air or focal fluid collection is identified. Bones/Soft Tissues: No fracture or destructive bone lesion is identified.     1. Resolution of the fluid collection in the pelvis. 2. Bilateral perinephric fat stranding which is a nonspecific finding and can be normal, but a urinary tract infection is possible. 3. No acute findings elsewhere in the abdomen or pelvis.     XR CHEST PORTABLE    Result Date: 2/1/2024  EXAMINATION: ONE XRAY VIEW OF THE CHEST 2/1/2024 2:49 pm COMPARISON: None. HISTORY: ORDERING SYSTEM PROVIDED HISTORY: SOB TECHNOLOGIST PROVIDED HISTORY: Reason for exam:->SOB FINDINGS: The lungs appear clear. The heart and mediastinal structures are unremarkable. Bony thorax appears normal. Visualized upper abdomen is unremarkable.     No radiographic evidence of acute cardiopulmonary process.       CBC:   Recent Labs     02/01/24  1338 02/02/24  0522 02/03/24  0550   WBC 7.1 4.0 4.0   HGB 9.7* 7.9* 7.9*    257 247       BMP:    Recent Labs     02/01/24  2325 02/02/24  0522 
measurement.)   10. Line white adhesive of drainage pouch up with blue [place] on dressing Landing Pad, apply & check for secure attachment with fingers like checking ziplock bags or tupperware.  11. Place warm blanket over new dressing on abdomen for 3 to 5 minutes to promote adherence.             Ostomy Plan of Care  [x] Supplies/Instructions left in room  [] Patient using home supplies  [x] Brand/supplies at bedside; scissors, 3 each; coloplast convex wafer #[id number], pouch #[id number], ring #[id number] (7 barrier strips #[id number] if needed)  [x] Current pouching system; coloplast #[id number], #[id number], #[id number]     Current Diet: ADULT DIET; Regular  Dietician consult:  N/A    Discharge Plan:  Placement for patient upon discharge: home with support    Outpatient visit plan No  Supplies given Yes   Samples requested Yes    Referrals:  [x]   [x] Home Health Care  [x] Supplies  [] Other      Patient/Caregiver Teaching:  Written Instructions given to patient/family/ staff  Teaching provided:  [] Reviewed GI and A&P        [x] Supplies  [] Pouch emptying      [] Manipulate closure  [x] Routine Care         [x] Comment  [x] Pouch maintenance    Explained all actions and their purpose during ileostomy dressing change.           Level of patient/caregiver understanding able to:  [] Indicates understanding       [x] Needs reinforcement  [] Unsuccessful      [x] Verbal Understanding  [] Demonstrated understanding       [] No evidence of learning  [] Refused teaching         [] N/A    Electronically signed by MARYAM BARRON RN, BSN, OMS on 2/6/2024 at 3:56 PM              
supervision-SBA for mobility. Pt needs con't skilled PT to prevent falls and meet goals for return to PLOF.   Safety Interventions: patient left in chair, call light within reach, gait belt, patient at risk for falls, and nurse notified    Plan  Frequency: 3-5 x/per week  Current Treatment Recommendations: strengthening, balance training, functional mobility training, transfer training, gait training, and endurance training    Goals  Patient Goals: Feel better.   Short Term Goals:  Time Frame: by discharge  Patient will complete bed mobility at Independent   Patient will complete transfers at Independent   Patient will ambulate 200 ft with use of rolling walker at modified independent    Above goals reviewed on 2/4/2024.  All goals are ongoing at this time unless indicated above.      Therapy Session Time      Individual Group Co-treatment   Time In     0909   Time Out     0933   Minutes     24     Timed Code Treatment Minutes:  9 Minutes  Total Treatment Minutes:  24       Electronically Signed By: TIFFANY BRAR, PT, DPT [id number]         
\"TRICHOMONAS\", \"YEAST\", \"BACTERIA\", \"CLARITYU\", \"SPECGRAV\", \"LEUKOCYTESUR\", \"UROBILINOGEN\", \"BILIRUBINUR\", \"BLOODU\", \"GLUCOSEU\", \"AMORPHOUS\" in the last 72 hours.    Invalid input(s): \"KETONESU\"      LIVER PROFILE:   No results for input(s): \"AST\", \"ALT\", \"LIPASE\", \"AMYLASE\", \"ALB\", \"BILIDIR\", \"BILITOT\", \"ALKPHOS\" in the last 72 hours.    PT/INR:    Lab Results   Component Value Date/Time    PROTIME 15.7 12/23/2023 05:40 AM    PROTIME 16.1 12/22/2023 10:26 AM    INR 1.25 12/23/2023 05:40 AM    INR 1.29 12/22/2023 10:26 AM     PTT:    Lab Results   Component Value Date/Time    APTT 35.3 12/23/2023 05:40 AM     BENITEZ:  No results found for: \"ANATITER\", \"BENITEZ\"        RADIOLOGY:    US RENAL COMPLETE    Result Date: 2/1/2024  EXAMINATION: RETROPERITONEAL ULTRASOUND OF THE KIDNEYS AND URINARY BLADDER 2/1/2024 COMPARISON: Same day CT abdomen and pelvis HISTORY: ORDERING SYSTEM PROVIDED HISTORY: severe SUSY TECHNOLOGIST PROVIDED HISTORY: Reason for exam:->severe SUSY FINDINGS: Kidneys: The right kidney measures 10.6 x 6.9 x 5.2 cm in length and the left kidney measures 9.5 x 4.3 x 4.7 cm in length.  The bilateral renal cortices measure 1.5 cm. Kidneys demonstrate normal cortical echogenicity.  No evidence of hydronephrosis or intrarenal stones. Bladder: The bladder is decompressed, limiting its evaluation.     Unremarkable ultrasound of the kidneys. Limited evaluation of the decompressed bladder.     CT ABDOMEN PELVIS WO CONTRAST Additional Contrast? None    Result Date: 2/1/2024  EXAMINATION: CT OF THE ABDOMEN AND PELVIS WITHOUT CONTRAST 2/1/2024 4:17 pm TECHNIQUE: CT of the abdomen and pelvis was performed without the administration of intravenous contrast. Multiplanar reformatted images are provided for review. Automated exposure control, iterative reconstruction, and/or weight based adjustment of the mA/kV was utilized to reduce the radiation dose to as low as reasonably achievable. COMPARISON: 01/24/2024 and 12/25/2023. 
AND PELVIS WITHOUT CONTRAST 1/24/2024 5:28 pm TECHNIQUE: CT of the abdomen and pelvis was performed without the administration of intravenous contrast. Multiplanar reformatted images are provided for review. Automated exposure control, iterative reconstruction, and/or weight based adjustment of the mA/kV was utilized to reduce the radiation dose to as low as reasonably achievable. COMPARISON: CT abdomen pelvis with contrast 12/25/2023 HISTORY: ORDERING SYSTEM PROVIDED HISTORY: f/u on abdominal abscess TECHNOLOGIST PROVIDED HISTORY: Reason for exam:->f/u on abdominal abscess Additional Contrast?->None Reason for Exam: f/u on abdominal abscess FINDINGS: Lower Chest: The lung bases are clear without pleural effusion.  There is a 3 mm solid nodule in the right lower lobe (series 2, image 10).  There are calcified mediastinal lymph nodes likely related to prior granulomatous disease. Organs: The liver is normal in size with smooth contours and there are scattered calcified granulomas throughout the liver.  There is no biliary ductal dilatation and the gallbladder is surgically absent.  The spleen is normal in size with calcified granulomas.  The adrenal glands are normal. The kidneys are normal in size without hydronephrosis, contour deforming mass, or perinephric fat stranding.  There is no radiopaque calculus.  The pancreas is normal in size without dilatation of the main pancreatic duct. GI/Bowel: The stomach is under distended.  There has been interval right lower quadrant ileostomy creation.  There are postsurgical changes of the upper rectum and cecum with suture material.  The small and large bowel are normal in caliber without wall thickening or inflammation. Pelvis: The bladder is distended without wall thickening.  The uterus is normal in size.  Evaluation of the adnexa is limited by streak artifact. Peritoneum/Retroperitoneum: There is no free air.  There is a fluid collection in the mid pelvis measuring 2.9 x 
cardiopulmonary process.     CT ABDOMEN PELVIS WO CONTRAST Additional Contrast? None    Result Date: 1/24/2024  EXAMINATION: CT OF THE ABDOMEN AND PELVIS WITHOUT CONTRAST 1/24/2024 5:28 pm TECHNIQUE: CT of the abdomen and pelvis was performed without the administration of intravenous contrast. Multiplanar reformatted images are provided for review. Automated exposure control, iterative reconstruction, and/or weight based adjustment of the mA/kV was utilized to reduce the radiation dose to as low as reasonably achievable. COMPARISON: CT abdomen pelvis with contrast 12/25/2023 HISTORY: ORDERING SYSTEM PROVIDED HISTORY: f/u on abdominal abscess TECHNOLOGIST PROVIDED HISTORY: Reason for exam:->f/u on abdominal abscess Additional Contrast?->None Reason for Exam: f/u on abdominal abscess FINDINGS: Lower Chest: The lung bases are clear without pleural effusion.  There is a 3 mm solid nodule in the right lower lobe (series 2, image 10).  There are calcified mediastinal lymph nodes likely related to prior granulomatous disease. Organs: The liver is normal in size with smooth contours and there are scattered calcified granulomas throughout the liver.  There is no biliary ductal dilatation and the gallbladder is surgically absent.  The spleen is normal in size with calcified granulomas.  The adrenal glands are normal. The kidneys are normal in size without hydronephrosis, contour deforming mass, or perinephric fat stranding.  There is no radiopaque calculus.  The pancreas is normal in size without dilatation of the main pancreatic duct. GI/Bowel: The stomach is under distended.  There has been interval right lower quadrant ileostomy creation.  There are postsurgical changes of the upper rectum and cecum with suture material.  The small and large bowel are normal in caliber without wall thickening or inflammation. Pelvis: The bladder is distended without wall thickening.  The uterus is normal in size.  Evaluation of the adnexa 
f/u on abdominal abscess FINDINGS: Lower Chest: The lung bases are clear without pleural effusion.  There is a 3 mm solid nodule in the right lower lobe (series 2, image 10).  There are calcified mediastinal lymph nodes likely related to prior granulomatous disease. Organs: The liver is normal in size with smooth contours and there are scattered calcified granulomas throughout the liver.  There is no biliary ductal dilatation and the gallbladder is surgically absent.  The spleen is normal in size with calcified granulomas.  The adrenal glands are normal. The kidneys are normal in size without hydronephrosis, contour deforming mass, or perinephric fat stranding.  There is no radiopaque calculus.  The pancreas is normal in size without dilatation of the main pancreatic duct. GI/Bowel: The stomach is under distended.  There has been interval right lower quadrant ileostomy creation.  There are postsurgical changes of the upper rectum and cecum with suture material.  The small and large bowel are normal in caliber without wall thickening or inflammation. Pelvis: The bladder is distended without wall thickening.  The uterus is normal in size.  Evaluation of the adnexa is limited by streak artifact. Peritoneum/Retroperitoneum: There is no free air.  There is a fluid collection in the mid pelvis measuring 2.9 x 2.5 cm which has intervally decreased in size (series 2, image 100).  The 2 lower collections in the pelvis are no longer present.  No lymphadenopathy is present.  The abdominal aorta is normal in caliber. Bones/Soft Tissues: There are postsurgical changes of the anterior abdominal wall.  Patient is status post left total hip arthroplasty.  There are degenerative changes of the spine.  There is no acute osseous abnormality.     1. Interval decrease in size of a 2.9 cm fluid collection in the mid pelvis. The 2 lower collections in the pelvis are no longer present. 2. Interval right lower quadrant ileostomy creation.

## 2024-02-12 LAB
ALBUMIN SERPL ELPH-MCNC: 2.5 G/DL (ref 3.1–4.9)
ALPHA1 GLOB SERPL ELPH-MCNC: 0.2 G/DL (ref 0.2–0.4)
ALPHA2 GLOB SERPL ELPH-MCNC: 0.7 G/DL (ref 0.4–1.1)
B-GLOBULIN SERPL ELPH-MCNC: 0.6 G/DL (ref 0.9–1.6)
GAMMA GLOB SERPL ELPH-MCNC: 0.6 G/DL (ref 0.6–1.8)
PROT SERPL-MCNC: 4.7 G/DL (ref 6.4–8.2)

## 2024-02-13 LAB — SPE/IFE INTERPRETATION: NORMAL

## 2024-02-22 ENCOUNTER — APPOINTMENT (OUTPATIENT)
Dept: CT IMAGING | Age: 74
DRG: 683 | End: 2024-02-22
Payer: MEDICARE

## 2024-02-22 ENCOUNTER — HOSPITAL ENCOUNTER (INPATIENT)
Age: 74
LOS: 6 days | Discharge: HOME HEALTH | DRG: 683 | End: 2024-02-28
Attending: EMERGENCY MEDICINE | Admitting: INTERNAL MEDICINE
Payer: MEDICARE

## 2024-02-22 ENCOUNTER — OFFICE VISIT (OUTPATIENT)
Dept: INTERNAL MEDICINE CLINIC | Age: 74
End: 2024-02-22
Payer: MEDICARE

## 2024-02-22 VITALS
DIASTOLIC BLOOD PRESSURE: 73 MMHG | TEMPERATURE: 97.2 F | OXYGEN SATURATION: 98 % | HEIGHT: 60 IN | HEART RATE: 110 BPM | RESPIRATION RATE: 20 BRPM | WEIGHT: 94.1 LBS | BODY MASS INDEX: 18.47 KG/M2 | SYSTOLIC BLOOD PRESSURE: 102 MMHG

## 2024-02-22 DIAGNOSIS — R11.2 NAUSEA AND VOMITING, UNSPECIFIED VOMITING TYPE: ICD-10-CM

## 2024-02-22 DIAGNOSIS — N17.9 AKI (ACUTE KIDNEY INJURY) (HCC): Primary | ICD-10-CM

## 2024-02-22 DIAGNOSIS — K31.84 GASTROPARESIS: ICD-10-CM

## 2024-02-22 PROBLEM — R63.4 WEIGHT LOSS: Status: ACTIVE | Noted: 2024-02-22

## 2024-02-22 PROBLEM — R10.9 INTRACTABLE ABDOMINAL PAIN: Status: ACTIVE | Noted: 2024-02-22

## 2024-02-22 PROBLEM — E87.1 HYPONATREMIA: Status: ACTIVE | Noted: 2024-02-22

## 2024-02-22 LAB
ALBUMIN SERPL-MCNC: 4.6 G/DL (ref 3.4–5)
ALP SERPL-CCNC: 80 U/L (ref 40–129)
ALT SERPL-CCNC: 14 U/L (ref 10–40)
ANION GAP SERPL CALCULATED.3IONS-SCNC: 17 MMOL/L (ref 3–16)
AST SERPL-CCNC: 24 U/L (ref 15–37)
BASE EXCESS BLDV CALC-SCNC: -3.3 MMOL/L (ref -2–3)
BASOPHILS # BLD: 0 K/UL (ref 0–0.2)
BASOPHILS NFR BLD: 0.5 %
BILIRUB DIRECT SERPL-MCNC: <0.2 MG/DL (ref 0–0.3)
BILIRUB INDIRECT SERPL-MCNC: NORMAL MG/DL (ref 0–1)
BILIRUB SERPL-MCNC: 0.4 MG/DL (ref 0–1)
BUN SERPL-MCNC: 27 MG/DL (ref 7–20)
CALCIUM SERPL-MCNC: 10.6 MG/DL (ref 8.3–10.6)
CHLORIDE SERPL-SCNC: 89 MMOL/L (ref 99–110)
CO2 BLDV-SCNC: 24 MMOL/L
CO2 SERPL-SCNC: 19 MMOL/L (ref 21–32)
COHGB MFR BLDV: 0.9 % (ref 0–1.5)
CREAT SERPL-MCNC: 1.8 MG/DL (ref 0.6–1.2)
DEPRECATED RDW RBC AUTO: 16.9 % (ref 12.4–15.4)
DO-HGB MFR BLDV: 80.2 %
EOSINOPHIL # BLD: 0 K/UL (ref 0–0.6)
EOSINOPHIL NFR BLD: 0.2 %
GFR SERPLBLD CREATININE-BSD FMLA CKD-EPI: 29 ML/MIN/{1.73_M2}
GLUCOSE SERPL-MCNC: 138 MG/DL (ref 70–99)
HCO3 BLDV-SCNC: 22.4 MMOL/L (ref 24–28)
HCT VFR BLD AUTO: 34.2 % (ref 36–48)
HGB BLD-MCNC: 11.5 G/DL (ref 12–16)
LACTATE BLDV-SCNC: 1.2 MMOL/L (ref 0.4–2)
LIPASE SERPL-CCNC: 28 U/L (ref 13–60)
LYMPHOCYTES # BLD: 0.7 K/UL (ref 1–5.1)
LYMPHOCYTES NFR BLD: 13.2 %
MAGNESIUM SERPL-MCNC: 1.3 MG/DL (ref 1.8–2.4)
MCH RBC QN AUTO: 30.6 PG (ref 26–34)
MCHC RBC AUTO-ENTMCNC: 33.6 G/DL (ref 31–36)
MCV RBC AUTO: 91 FL (ref 80–100)
METHGB MFR BLDV: 0.4 % (ref 0–1.5)
MONOCYTES # BLD: 0.5 K/UL (ref 0–1.3)
MONOCYTES NFR BLD: 9.2 %
NEUTROPHILS # BLD: 4.2 K/UL (ref 1.7–7.7)
NEUTROPHILS NFR BLD: 76.9 %
PCO2 BLDV: 41.9 MMHG (ref 41–51)
PH BLDV: 7.34 [PH] (ref 7.35–7.45)
PHOSPHATE SERPL-MCNC: 3.8 MG/DL (ref 2.5–4.9)
PLATELET # BLD AUTO: 424 K/UL (ref 135–450)
PMV BLD AUTO: 7.6 FL (ref 5–10.5)
PO2 BLDV: <30 MMHG (ref 25–40)
POTASSIUM SERPL-SCNC: 5 MMOL/L (ref 3.5–5.1)
PROT SERPL-MCNC: 8.1 G/DL (ref 6.4–8.2)
RBC # BLD AUTO: 3.76 M/UL (ref 4–5.2)
SAO2 % BLDV: 19 %
SODIUM SERPL-SCNC: 125 MMOL/L (ref 136–145)
WBC # BLD AUTO: 5.5 K/UL (ref 4–11)

## 2024-02-22 PROCEDURE — 99285 EMERGENCY DEPT VISIT HI MDM: CPT

## 2024-02-22 PROCEDURE — 80048 BASIC METABOLIC PNL TOTAL CA: CPT

## 2024-02-22 PROCEDURE — 6370000000 HC RX 637 (ALT 250 FOR IP): Performed by: EMERGENCY MEDICINE

## 2024-02-22 PROCEDURE — 74176 CT ABD & PELVIS W/O CONTRAST: CPT

## 2024-02-22 PROCEDURE — 6360000002 HC RX W HCPCS: Performed by: EMERGENCY MEDICINE

## 2024-02-22 PROCEDURE — 2580000003 HC RX 258: Performed by: EMERGENCY MEDICINE

## 2024-02-22 PROCEDURE — 1200000000 HC SEMI PRIVATE

## 2024-02-22 PROCEDURE — 82803 BLOOD GASES ANY COMBINATION: CPT

## 2024-02-22 PROCEDURE — 36415 COLL VENOUS BLD VENIPUNCTURE: CPT

## 2024-02-22 PROCEDURE — 80076 HEPATIC FUNCTION PANEL: CPT

## 2024-02-22 PROCEDURE — 85025 COMPLETE CBC W/AUTO DIFF WBC: CPT

## 2024-02-22 PROCEDURE — 96374 THER/PROPH/DIAG INJ IV PUSH: CPT

## 2024-02-22 PROCEDURE — 2580000003 HC RX 258: Performed by: INTERNAL MEDICINE

## 2024-02-22 PROCEDURE — 83605 ASSAY OF LACTIC ACID: CPT

## 2024-02-22 PROCEDURE — 83735 ASSAY OF MAGNESIUM: CPT

## 2024-02-22 PROCEDURE — 6360000002 HC RX W HCPCS: Performed by: INTERNAL MEDICINE

## 2024-02-22 PROCEDURE — 99213 OFFICE O/P EST LOW 20 MIN: CPT

## 2024-02-22 PROCEDURE — 6370000000 HC RX 637 (ALT 250 FOR IP): Performed by: INTERNAL MEDICINE

## 2024-02-22 PROCEDURE — 83690 ASSAY OF LIPASE: CPT

## 2024-02-22 PROCEDURE — 84100 ASSAY OF PHOSPHORUS: CPT

## 2024-02-22 RX ORDER — MORPHINE SULFATE 2 MG/ML
2 INJECTION, SOLUTION INTRAMUSCULAR; INTRAVENOUS
Status: ACTIVE | OUTPATIENT
Start: 2024-02-22 | End: 2024-02-24

## 2024-02-22 RX ORDER — PROCHLORPERAZINE MALEATE 10 MG
10 TABLET ORAL EVERY 6 HOURS PRN
Qty: 120 TABLET | Refills: 3 | Status: SHIPPED | OUTPATIENT
Start: 2024-02-22 | End: 2024-02-22 | Stop reason: SINTOL

## 2024-02-22 RX ORDER — ACETAMINOPHEN 325 MG/1
650 TABLET ORAL EVERY 6 HOURS PRN
Status: DISCONTINUED | OUTPATIENT
Start: 2024-02-22 | End: 2024-02-28 | Stop reason: HOSPADM

## 2024-02-22 RX ORDER — LOPERAMIDE HYDROCHLORIDE 2 MG/1
2 CAPSULE ORAL 3 TIMES DAILY
Status: DISCONTINUED | OUTPATIENT
Start: 2024-02-22 | End: 2024-02-28 | Stop reason: HOSPADM

## 2024-02-22 RX ORDER — MORPHINE SULFATE 4 MG/ML
4 INJECTION INTRAVENOUS
Status: DISPENSED | OUTPATIENT
Start: 2024-02-22 | End: 2024-02-24

## 2024-02-22 RX ORDER — SODIUM CHLORIDE 0.9 % (FLUSH) 0.9 %
5-40 SYRINGE (ML) INJECTION PRN
Status: DISCONTINUED | OUTPATIENT
Start: 2024-02-22 | End: 2024-02-28 | Stop reason: HOSPADM

## 2024-02-22 RX ORDER — CHOLESTYRAMINE LIGHT 4 G/5.7G
1 POWDER, FOR SUSPENSION ORAL 4 TIMES DAILY
Status: DISCONTINUED | OUTPATIENT
Start: 2024-02-22 | End: 2024-02-28 | Stop reason: HOSPADM

## 2024-02-22 RX ORDER — METRONIDAZOLE 500 MG/1
500 TABLET ORAL EVERY 8 HOURS
Status: ON HOLD | COMMUNITY

## 2024-02-22 RX ORDER — DIPHENHYDRAMINE HCL 25 MG
25 CAPSULE ORAL EVERY 4 HOURS PRN
Qty: 1 CAPSULE | Refills: 0 | Status: ON HOLD | OUTPATIENT
Start: 2024-02-22 | End: 2024-03-03

## 2024-02-22 RX ORDER — DEXTROSE AND SODIUM CHLORIDE 5; .9 G/100ML; G/100ML
INJECTION, SOLUTION INTRAVENOUS CONTINUOUS
Status: DISCONTINUED | OUTPATIENT
Start: 2024-02-22 | End: 2024-02-23

## 2024-02-22 RX ORDER — FAMOTIDINE 20 MG/1
20 TABLET, FILM COATED ORAL DAILY
Status: ON HOLD | COMMUNITY

## 2024-02-22 RX ORDER — METOCLOPRAMIDE HYDROCHLORIDE 5 MG/ML
10 INJECTION INTRAMUSCULAR; INTRAVENOUS ONCE
Status: COMPLETED | OUTPATIENT
Start: 2024-02-22 | End: 2024-02-22

## 2024-02-22 RX ORDER — PROMETHAZINE HYDROCHLORIDE 25 MG/1
25 TABLET ORAL ONCE
Status: COMPLETED | OUTPATIENT
Start: 2024-02-22 | End: 2024-02-22

## 2024-02-22 RX ORDER — SODIUM CHLORIDE 0.9 % (FLUSH) 0.9 %
5-40 SYRINGE (ML) INJECTION EVERY 12 HOURS SCHEDULED
Status: DISCONTINUED | OUTPATIENT
Start: 2024-02-22 | End: 2024-02-28 | Stop reason: HOSPADM

## 2024-02-22 RX ORDER — PROMETHAZINE HYDROCHLORIDE 25 MG/1
12.5 TABLET ORAL EVERY 4 HOURS PRN
Status: DISCONTINUED | OUTPATIENT
Start: 2024-02-22 | End: 2024-02-28 | Stop reason: HOSPADM

## 2024-02-22 RX ORDER — LOPERAMIDE HYDROCHLORIDE 2 MG/1
2 CAPSULE ORAL 3 TIMES DAILY
Status: ON HOLD | COMMUNITY

## 2024-02-22 RX ORDER — ONDANSETRON 4 MG/1
4 TABLET, FILM COATED ORAL EVERY 8 HOURS PRN
Qty: 30 TABLET | Refills: 0 | Status: ON HOLD | OUTPATIENT
Start: 2024-02-22

## 2024-02-22 RX ORDER — NAPROXEN 250 MG/1
220 TABLET ORAL 2 TIMES DAILY PRN
Status: ON HOLD | COMMUNITY

## 2024-02-22 RX ORDER — ACETAMINOPHEN 650 MG/1
650 SUPPOSITORY RECTAL EVERY 6 HOURS PRN
Status: DISCONTINUED | OUTPATIENT
Start: 2024-02-22 | End: 2024-02-28 | Stop reason: HOSPADM

## 2024-02-22 RX ORDER — ONDANSETRON 2 MG/ML
4 INJECTION INTRAMUSCULAR; INTRAVENOUS EVERY 6 HOURS PRN
Status: DISCONTINUED | OUTPATIENT
Start: 2024-02-22 | End: 2024-02-28 | Stop reason: HOSPADM

## 2024-02-22 RX ORDER — SODIUM CHLORIDE 9 MG/ML
INJECTION, SOLUTION INTRAVENOUS PRN
Status: DISCONTINUED | OUTPATIENT
Start: 2024-02-22 | End: 2024-02-28 | Stop reason: HOSPADM

## 2024-02-22 RX ORDER — SODIUM CHLORIDE 9 MG/ML
1000 INJECTION, SOLUTION INTRAVENOUS CONTINUOUS
Status: DISCONTINUED | OUTPATIENT
Start: 2024-02-22 | End: 2024-02-22

## 2024-02-22 RX ORDER — FAMOTIDINE 20 MG/1
20 TABLET, FILM COATED ORAL DAILY
Status: DISCONTINUED | OUTPATIENT
Start: 2024-02-23 | End: 2024-02-23

## 2024-02-22 RX ORDER — DIPHENHYDRAMINE HCL 25 MG
25 TABLET ORAL EVERY 4 HOURS PRN
Status: DISCONTINUED | OUTPATIENT
Start: 2024-02-22 | End: 2024-02-28 | Stop reason: HOSPADM

## 2024-02-22 RX ORDER — HEPARIN SODIUM 5000 [USP'U]/ML
5000 INJECTION, SOLUTION INTRAVENOUS; SUBCUTANEOUS 2 TIMES DAILY
Status: DISCONTINUED | OUTPATIENT
Start: 2024-02-22 | End: 2024-02-28 | Stop reason: HOSPADM

## 2024-02-22 RX ADMIN — LOPERAMIDE HYDROCHLORIDE 2 MG: 2 CAPSULE ORAL at 21:38

## 2024-02-22 RX ADMIN — SODIUM CHLORIDE 1000 ML: 9 INJECTION, SOLUTION INTRAVENOUS at 17:07

## 2024-02-22 RX ADMIN — HEPARIN SODIUM 5000 UNITS: 5000 INJECTION INTRAVENOUS; SUBCUTANEOUS at 21:39

## 2024-02-22 RX ADMIN — PROMETHAZINE HYDROCHLORIDE 25 MG: 25 TABLET ORAL at 16:32

## 2024-02-22 RX ADMIN — DEXTROSE AND SODIUM CHLORIDE: 5; 900 INJECTION, SOLUTION INTRAVENOUS at 20:19

## 2024-02-22 RX ADMIN — METOCLOPRAMIDE HYDROCHLORIDE 10 MG: 5 INJECTION INTRAMUSCULAR; INTRAVENOUS at 17:03

## 2024-02-22 ASSESSMENT — ENCOUNTER SYMPTOMS
ABDOMINAL DISTENTION: 0
VOMITING: 1
SHORTNESS OF BREATH: 0
WHEEZING: 0
BLOOD IN STOOL: 0
COUGH: 0
ABDOMINAL PAIN: 1
NAUSEA: 1
CONSTIPATION: 0
CHEST TIGHTNESS: 0

## 2024-02-22 ASSESSMENT — PAIN - FUNCTIONAL ASSESSMENT: PAIN_FUNCTIONAL_ASSESSMENT: NONE - DENIES PAIN

## 2024-02-22 ASSESSMENT — PAIN SCALES - GENERAL: PAINLEVEL_OUTOF10: 0

## 2024-02-22 NOTE — PATIENT INSTRUCTIONS
-Follow up with Dr Mendieta, your Primary Care doctor within one week for continuation of care.    -Call and make an appointment to establish care with Dr Morgan on  within one week       Go to Emergency room at [facility] now.    [address]     [id number]

## 2024-02-22 NOTE — ED PROVIDER NOTES
[facility] Emergency Department  MEDICAL SCREENING EXAM    Date of Service: 2/22/2024    Reason for Visit: Emesis        Patient History, Brief Exam, and Initial Assessment     Abbreviated HPI: Tammy Frederick is a 73 y.o. female presenting with nausea and vomiting, no abdominal pain.   PMH of Diverticulitis with perforation and multiple abscesses s/p exploratory laparotomy, takedown of splenic flexure, small bowel resection with anastomosis, appendectomy, sigmoidectomy with low pelvic anastomosis and diverting loop ileostomy by Dr. Robertson on 12/28 who was admitted with weakness/SUSY  on 2/1/2024 -2/11/2024     INITIAL VITALS: BP: 126/84, Temp: 98.2 °F (36.8 °C), Pulse: (!) 106, Respirations: 17, SpO2: 99 %    Plan     Patient was evaluated in the REU for a medical screening exam.  To further evaluate the presenting complaints, the following orders have been placed:  Orders Placed This Encounter   Procedures    CBC with Auto Differential    BMP w/ Reflex to MG    Hepatic Function Panel    Lipase    Blood gas, venous (Lab)    Urinalysis with Microscopic        See primary provider's note for full details and final disposition.     Current Facility-Administered Medications:   Orders Placed This Encounter   Medications    promethazine (PHENERGAN) tablet 25 mg         REU Dispo     Stable for lobby while awaiting ED bed        Relevant Medical History     Past Medical History:   Diagnosis Date    Arthritis      Past Surgical History:   Procedure Laterality Date    ABDOMEN SURGERY      cholecystectomy    COLOSTOMY N/A 12/28/2023    EXPLORATORY LAPAROTOMY, SIGMOID RESECTION, SMALL BOWEL RESECTION, APPENDECTOMY, DIVERTING LOOP ILEOSTOMY performed by Alban Robertson MD at [facility] OR    CT BONE MARROW ASPIRATION  2/9/2024    CT BONE MARROW ASPIRATION 2/9/2024 [facility] CT SCAN    JOINT REPLACEMENT Right 09/17/2012    right knee    JOINT REPLACEMENT Left     hip    TOTAL KNEE ARTHROPLASTY Left 05/16/2011    Left knee    UPPER  GASTROINTESTINAL ENDOSCOPY N/A 2/6/2024    EGD BIOPSY performed by Chidi Morris MD at [facility] ENDOSCOPY     No Known Allergies        Fernando Medrano MD  02/22/24 0517

## 2024-02-22 NOTE — PROGRESS NOTES
decreased PO intake, last meal was yesterday which she threw up. Takes sublingual PO zofran which makes her sick. Differentials include gastroenteritis, N/V 2/2 known gastroparesis. Concern that patient is dehydrated and will need labs, IV fluids and further work up. Will send patient to the ED. She is agreeable to the plan.     2. Gastroparesis  -Recently had a GES 2/9/2024 Mild gastroparesis   -Patient refused reglan during hospitalization due to history of tremors. Will refer to Dr Morgan in [place] for continuation of care when patient returns home   -     External Referral To Gastroenterology      Return if symptoms worsen or fail to improve, for She will follow up with her Primary care physician, Dr aHrtley in [facility].    The patient was staffed with teaching attending: Dr. Angelito Hamlin.    An electronic signature was used to authenticate this note.    --Negra Sharma MD

## 2024-02-22 NOTE — ED NOTES
02/22/24 1830   BP: 117/81 111/81 123/77 115/75   Pulse: 93 99 95 95   Resp: 16 16 14 13   Temp:       TempSrc:       SpO2: 99% 97% 98% 99%   Weight:       Height:         FiO2 (%):   O2 Flow Rate: O2 Device: None (Room air)    Cardiac Rhythm:    Pain Assessment:  [] Verbal [] Dykes Baker Scale  Pain Scale: Pain Assessment  Pain Assessment: None - Denies Pain  Last documented pain score (0-10 scale)    Last documented pain medication administered:   Mental Status: oriented and alert  NIH Score:    C-SSRS: Risk of Suicide: No Risk  Bedside swallow:    Shawn Coma Scale (GCS):    Active LDA's:   Peripheral IV 02/22/24 Right Antecubital (Active)     PO Status:   Pertinent or High Risk Medications/Drips:    If Yes, please provide details:   Pending Blood Product Administration:      You may also review the ED PT Care Timeline found under the Summary Nursing Index tab.    Recommendation    Pending orders   Plan for Discharge (if known):   Additional Comments:    If any further questions, please call Sending RN at [phone]    Electronically signed by: Electronically signed by Eliana Romeo RN on 2/22/2024 at 6:51 PM       Eliana Romeo RN  02/22/24 2165

## 2024-02-22 NOTE — ED PROVIDER NOTES
THE [facility]  EMERGENCY DEPARTMENT ENCOUNTER          ATTENDING PHYSICIAN NOTE       Date of evaluation: 2/22/2024    Chief Complaint     Emesis      History of Present Illness     Tammy Frederick is a 73 y.o. female who presents presenting with nausea and vomiting, no abdominal pain.   PMH of Diverticulitis with perforation and multiple abscesses s/p exploratory laparotomy, takedown of splenic flexure, small bowel resection with anastomosis, appendectomy, sigmoidectomy with low pelvic anastomosis and diverting loop ileostomy by Dr. Robertson on 12/28 who was admitted with weakness/SUSY  on 2/1/2024 -2/11/2024.  She endorsed that she has been continued to have nausea and vomiting since her discharge.  She is taking Zofran occasionally but she thinks she has a reaction to that medication, so has stopped.     ASSESSMENT / PLAN  (MEDICAL DECISION MAKING)     INITIAL VITALS: BP: 126/84, Temp: 98.2 °F (36.8 °C), Pulse: (!) 106, Respirations: 17, SpO2: 99 %      Tammy Frederick is a 73 y.o. female who presents with vomiting dehydration but no abdominal pain.  Had emesis at GI office and referred here.  Here she has a benign abdominal examination.  However creatinine is twice double elevated with a baseline of 0.7 and creatinine is 1.8.  Given that I believe she warrants admission for IV fluids and control of emesis that seems to be due to gastroparesis.     Medical Decision Making  Amount and/or Complexity of Data Reviewed  Labs: ordered.    Risk  Prescription drug management.  Decision regarding hospitalization.        Critical Care:  Due to the immediate potential for life-threatening deterioration due to , I spent  minutes providing critical care.  This time excludes time spent performing procedures but includes time spent on direct patient care, history retrieval, review of the chart, and discussions with patient, family, and consultant(s).    Clinical Impression     No diagnosis found.    Disposition     PATIENT   Take 1 tablet by mouth in the morning and at bedtime    LOPERAMIDE (IMODIUM) 2 MG CAPSULE    Take 1 capsule by mouth 3 times daily    METRONIDAZOLE (FLAGYL) 500 MG TABLET    Take 1 tablet by mouth in the morning and 1 tablet at noon and 1 tablet in the evening.    NAPROXEN (NAPROSYN) 250 MG TABLET    Take 220 mg by mouth 2 times daily as needed for Pain otc    ONDANSETRON (ZOFRAN) 4 MG TABLET    Take 1 tablet by mouth every 8 hours as needed for Nausea or Vomiting    ONDANSETRON HCL (ZOFRAN PO)    Place 1 tablet under the tongue    SODIUM CHLORIDE 1 G TABLET    Take 1 tablet by mouth 3 times daily (with meals)    THERAPEUTIC MULTIVITAMIN-MINERALS (THERAGRAN-M) TABLET    Take 1 tablet by mouth daily       Allergies     She has No Known Allergies.    Physical Exam     INITIAL VITALS: BP: 126/84, Temp: 98.2 °F (36.8 °C), Pulse: (!) 106, Respirations: 17, SpO2: 99 %   General: Well appearing in NAD  HEENT:  head is atraumatic, sclera are clear, oropharynx is nonerythematous, mucus membranes are moist  Neck: Trachea midline  Chest: Nonlabored respirations, clear to auscultation bilaterally  Cardiovascular: Regular rate and rhythm, 2+ radial pulses bilaterally  Abdominal: Nondistended abdomen, soft, nontender without rebound or guarding  Skin: Warm, dry well perfused, no rashes  Extremities: no obvious deformities, no tenderness to palpation diffusely  Neurologic:  Alert and oriented, speech is clear and intact without dysarthria, gait is intact  Psychologic: appropriate mood and affect                Fernando Medrano MD  02/22/24 0844

## 2024-02-22 NOTE — H&P
detail and negative for DM, Early CAD, Cancer, CVA. Positive as follows:    History reviewed. No pertinent family history.    REVIEW OF SYSTEMS:   Positive for intractable abdominal pain and intractable nausea and vomiting with weight loss and as noted in the HPI. All other systems reviewed and negative.    PHYSICAL EXAM:  /75   Pulse 95   Temp 98.2 °F (36.8 °C) (Oral)   Resp 13   Ht 1.524 m (5')   Wt 42.6 kg (93 lb 14.7 oz)   SpO2 99%   BMI 18.34 kg/m²     General appearance:  Alert and oriented, No apparent distress and cooperative.  Cachectic  HEENT Normal cephalic, atraumatic without obvious deformity.  Conjunctivae/corneas clear.  Dry mucous membranes  Neck: Supple, No jugular venous distention/bruits.    Lungs: Clear to auscultation, bilaterally without Rales/Wheezes/Rhonchi with good respiratory effort.  Heart: Regular rate and rhythm with Normal S1/S2 without murmurs, rubs or gallops  Abdomen: Soft, left periumbilical tenderness, non-distended without rigidity or guarding and positive bowel sounds  Extremities: No clubbing, cyanosis, or edema bilaterally.    Skin: Skin color, texture, turgor normal.  No rashes or lesions.  Neurologic:, neurovascularly intact with sensory/motor intact upper extremities/lower extremities, bilaterally.  Cranial nerves: II-XII intact, grossly non-focal. Pupils equal, round, and reactive to light.  Extra ocular muscles intact.        CBC   Recent Labs     02/22/24  1641   WBC 5.5   HGB 11.5*   HCT 34.2*         RENAL  Recent Labs     02/22/24  1641   *   K 5.0   CL 89*   CO2 19*   BUN 27*   CREATININE 1.8*     LFT'S  Recent Labs     02/22/24  1641   AST 24   ALT 14   BILIDIR <0.2   BILITOT 0.4   ALKPHOS 80       Active Hospital Problems    Diagnosis Date Noted    Intractable nausea and vomiting [R11.2] 02/22/2024    Intractable abdominal pain [R10.9] 02/22/2024    Weight loss [R63.4] 02/22/2024    Hyponatremia [E87.1] 02/22/2024    SUSY (acute kidney  injury) ([facility]) [N17.9] 01/23/2024         ASSESSMENT/PLAN:   73 y.o. female with history of arthritis and a history of diverticulitis in December complicated by perforation with intra-abdominal abscess status post exploratory laparotomy and splenic flexure resection with small bowel resection and anastomosis and sigmoidectomy with colostomy admitted for intractable abdominal pain with intractable nausea and vomiting, acute kidney injury and hyponatremia likely due to dehydration    Plan:  - Continue antiemetics  - IV fluids for hydration with D5 NS  - Pain management with as needed IV morphine  - Continue loperamide for diarrhea will stoma collection  - General surgery and GI consult  - Nutrition consult      DVT Prophylaxis: Subcut enoxaparin   Diet: Clears for now until seen by GI and will advance diet as per GI  Code Status: DNR-CCA       Jonah Spann MD    Thank you No primary care provider on file. for the opportunity to be involved in this patient's care. If you have any questions or concerns please feel free to contact me at [phone].

## 2024-02-23 LAB
ALBUMIN SERPL-MCNC: 3.6 G/DL (ref 3.4–5)
ALBUMIN/GLOB SERPL: 1.4 {RATIO} (ref 1.1–2.2)
ALP SERPL-CCNC: 61 U/L (ref 40–129)
ALT SERPL-CCNC: 11 U/L (ref 10–40)
ANION GAP SERPL CALCULATED.3IONS-SCNC: 12 MMOL/L (ref 3–16)
AST SERPL-CCNC: 18 U/L (ref 15–37)
BACTERIA URNS QL MICRO: ABNORMAL /HPF
BASOPHILS # BLD: 0 K/UL (ref 0–0.2)
BASOPHILS NFR BLD: 1.1 %
BILIRUB SERPL-MCNC: 0.3 MG/DL (ref 0–1)
BILIRUB UR QL STRIP.AUTO: NEGATIVE
BUN SERPL-MCNC: 21 MG/DL (ref 7–20)
C DIFF TOX A+B STL QL IA: NORMAL
CALCIUM SERPL-MCNC: 9.7 MG/DL (ref 8.3–10.6)
CHLORIDE SERPL-SCNC: 102 MMOL/L (ref 99–110)
CLARITY UR: CLEAR
CO2 SERPL-SCNC: 20 MMOL/L (ref 21–32)
COLOR UR: YELLOW
CREAT SERPL-MCNC: 1.5 MG/DL (ref 0.6–1.2)
DEPRECATED RDW RBC AUTO: 17.1 % (ref 12.4–15.4)
EOSINOPHIL # BLD: 0 K/UL (ref 0–0.6)
EOSINOPHIL NFR BLD: 1.4 %
EPI CELLS #/AREA URNS HPF: ABNORMAL /HPF (ref 0–5)
FERRITIN SERPL IA-MCNC: 760.6 NG/ML (ref 15–150)
GFR SERPLBLD CREATININE-BSD FMLA CKD-EPI: 37 ML/MIN/{1.73_M2}
GLUCOSE BLD-MCNC: 117 MG/DL (ref 70–99)
GLUCOSE BLD-MCNC: 152 MG/DL (ref 70–99)
GLUCOSE SERPL-MCNC: 122 MG/DL (ref 70–99)
GLUCOSE UR STRIP.AUTO-MCNC: NEGATIVE MG/DL
HCT VFR BLD AUTO: 28 % (ref 36–48)
HGB BLD-MCNC: 9.2 G/DL (ref 12–16)
HGB UR QL STRIP.AUTO: NEGATIVE
KETONES UR STRIP.AUTO-MCNC: NEGATIVE MG/DL
LEUKOCYTE ESTERASE UR QL STRIP.AUTO: ABNORMAL
LYMPHOCYTES # BLD: 0.7 K/UL (ref 1–5.1)
LYMPHOCYTES NFR BLD: 26.6 %
MAGNESIUM SERPL-MCNC: 1.4 MG/DL (ref 1.8–2.4)
MCH RBC QN AUTO: 30.5 PG (ref 26–34)
MCHC RBC AUTO-ENTMCNC: 32.9 G/DL (ref 31–36)
MCV RBC AUTO: 92.9 FL (ref 80–100)
MONOCYTES # BLD: 0.4 K/UL (ref 0–1.3)
MONOCYTES NFR BLD: 15.2 %
NEUTROPHILS # BLD: 1.5 K/UL (ref 1.7–7.7)
NEUTROPHILS NFR BLD: 55.7 %
NITRITE UR QL STRIP.AUTO: NEGATIVE
PERFORMED ON: ABNORMAL
PERFORMED ON: ABNORMAL
PH UR STRIP.AUTO: 6 [PH] (ref 5–8)
PHOSPHATE SERPL-MCNC: 3.7 MG/DL (ref 2.5–4.9)
PLATELET # BLD AUTO: 301 K/UL (ref 135–450)
PMV BLD AUTO: 7.4 FL (ref 5–10.5)
POTASSIUM SERPL-SCNC: 5.3 MMOL/L (ref 3.5–5.1)
PROT SERPL-MCNC: 6.1 G/DL (ref 6.4–8.2)
PROT UR STRIP.AUTO-MCNC: NEGATIVE MG/DL
RBC # BLD AUTO: 3.02 M/UL (ref 4–5.2)
RBC #/AREA URNS HPF: ABNORMAL /HPF (ref 0–4)
RENAL EPI CELLS #/AREA UR COMP ASSIST: ABNORMAL /HPF (ref 0–1)
SODIUM SERPL-SCNC: 134 MMOL/L (ref 136–145)
SODIUM UR-SCNC: <20 MMOL/L
SP GR UR STRIP.AUTO: >=1.03 (ref 1–1.03)
UA DIPSTICK W REFLEX MICRO PNL UR: YES
URN SPEC COLLECT METH UR: ABNORMAL
UROBILINOGEN UR STRIP-ACNC: 0.2 E.U./DL
WBC # BLD AUTO: 2.6 K/UL (ref 4–11)
WBC #/AREA URNS HPF: ABNORMAL /HPF (ref 0–5)

## 2024-02-23 PROCEDURE — C9113 INJ PANTOPRAZOLE SODIUM, VIA: HCPCS | Performed by: NURSE PRACTITIONER

## 2024-02-23 PROCEDURE — A4216 STERILE WATER/SALINE, 10 ML: HCPCS | Performed by: NURSE PRACTITIONER

## 2024-02-23 PROCEDURE — 83540 ASSAY OF IRON: CPT

## 2024-02-23 PROCEDURE — 6370000000 HC RX 637 (ALT 250 FOR IP): Performed by: INTERNAL MEDICINE

## 2024-02-23 PROCEDURE — 81001 URINALYSIS AUTO W/SCOPE: CPT

## 2024-02-23 PROCEDURE — 82570 ASSAY OF URINE CREATININE: CPT

## 2024-02-23 PROCEDURE — 99222 1ST HOSP IP/OBS MODERATE 55: CPT | Performed by: INTERNAL MEDICINE

## 2024-02-23 PROCEDURE — 87324 CLOSTRIDIUM AG IA: CPT

## 2024-02-23 PROCEDURE — 6360000002 HC RX W HCPCS: Performed by: INTERNAL MEDICINE

## 2024-02-23 PROCEDURE — 6360000002 HC RX W HCPCS

## 2024-02-23 PROCEDURE — 97165 OT EVAL LOW COMPLEX 30 MIN: CPT

## 2024-02-23 PROCEDURE — 80053 COMPREHEN METABOLIC PANEL: CPT

## 2024-02-23 PROCEDURE — 83550 IRON BINDING TEST: CPT

## 2024-02-23 PROCEDURE — 2580000003 HC RX 258: Performed by: NURSE PRACTITIONER

## 2024-02-23 PROCEDURE — 97161 PT EVAL LOW COMPLEX 20 MIN: CPT

## 2024-02-23 PROCEDURE — 05HY33Z INSERTION OF INFUSION DEVICE INTO UPPER VEIN, PERCUTANEOUS APPROACH: ICD-10-PCS | Performed by: INTERNAL MEDICINE

## 2024-02-23 PROCEDURE — 2500000003 HC RX 250 WO HCPCS

## 2024-02-23 PROCEDURE — 82728 ASSAY OF FERRITIN: CPT

## 2024-02-23 PROCEDURE — 97116 GAIT TRAINING THERAPY: CPT

## 2024-02-23 PROCEDURE — 83735 ASSAY OF MAGNESIUM: CPT

## 2024-02-23 PROCEDURE — 87449 NOS EACH ORGANISM AG IA: CPT

## 2024-02-23 PROCEDURE — 84100 ASSAY OF PHOSPHORUS: CPT

## 2024-02-23 PROCEDURE — 2580000003 HC RX 258: Performed by: INTERNAL MEDICINE

## 2024-02-23 PROCEDURE — 84156 ASSAY OF PROTEIN URINE: CPT

## 2024-02-23 PROCEDURE — 97535 SELF CARE MNGMENT TRAINING: CPT

## 2024-02-23 PROCEDURE — 2580000003 HC RX 258

## 2024-02-23 PROCEDURE — 6360000002 HC RX W HCPCS: Performed by: NURSE PRACTITIONER

## 2024-02-23 PROCEDURE — 36415 COLL VENOUS BLD VENIPUNCTURE: CPT

## 2024-02-23 PROCEDURE — 36569 INSJ PICC 5 YR+ W/O IMAGING: CPT

## 2024-02-23 PROCEDURE — 1200000000 HC SEMI PRIVATE

## 2024-02-23 PROCEDURE — 84300 ASSAY OF URINE SODIUM: CPT

## 2024-02-23 PROCEDURE — C1751 CATH, INF, PER/CENT/MIDLINE: HCPCS

## 2024-02-23 PROCEDURE — 2500000003 HC RX 250 WO HCPCS: Performed by: INTERNAL MEDICINE

## 2024-02-23 PROCEDURE — 85025 COMPLETE CBC W/AUTO DIFF WBC: CPT

## 2024-02-23 RX ORDER — SODIUM CHLORIDE 0.9 % (FLUSH) 0.9 %
5-40 SYRINGE (ML) INJECTION PRN
Status: DISCONTINUED | OUTPATIENT
Start: 2024-02-23 | End: 2024-02-28 | Stop reason: HOSPADM

## 2024-02-23 RX ORDER — LIDOCAINE HYDROCHLORIDE 10 MG/ML
5 INJECTION, SOLUTION EPIDURAL; INFILTRATION; INTRACAUDAL; PERINEURAL ONCE
Status: COMPLETED | OUTPATIENT
Start: 2024-02-23 | End: 2024-02-23

## 2024-02-23 RX ORDER — SODIUM CHLORIDE 9 MG/ML
25 INJECTION, SOLUTION INTRAVENOUS PRN
Status: DISCONTINUED | OUTPATIENT
Start: 2024-02-23 | End: 2024-02-28 | Stop reason: HOSPADM

## 2024-02-23 RX ORDER — MAGNESIUM SULFATE IN WATER 40 MG/ML
4000 INJECTION, SOLUTION INTRAVENOUS ONCE
Status: COMPLETED | OUTPATIENT
Start: 2024-02-23 | End: 2024-02-23

## 2024-02-23 RX ORDER — SODIUM CHLORIDE 0.9 % (FLUSH) 0.9 %
5-40 SYRINGE (ML) INJECTION EVERY 12 HOURS SCHEDULED
Status: DISCONTINUED | OUTPATIENT
Start: 2024-02-23 | End: 2024-02-28 | Stop reason: HOSPADM

## 2024-02-23 RX ADMIN — CHOLESTYRAMINE 4 G: 4 POWDER, FOR SUSPENSION ORAL at 20:33

## 2024-02-23 RX ADMIN — PANTOPRAZOLE SODIUM 40 MG: 40 INJECTION, POWDER, FOR SOLUTION INTRAVENOUS at 08:49

## 2024-02-23 RX ADMIN — SODIUM ACETATE: 164 INJECTION, SOLUTION, CONCENTRATE INTRAVENOUS at 18:06

## 2024-02-23 RX ADMIN — LIDOCAINE HYDROCHLORIDE ANHYDROUS 5 ML: 10 INJECTION, SOLUTION INFILTRATION at 16:24

## 2024-02-23 RX ADMIN — LOPERAMIDE HYDROCHLORIDE 2 MG: 2 CAPSULE ORAL at 08:49

## 2024-02-23 RX ADMIN — PROMETHAZINE HYDROCHLORIDE 12.5 MG: 25 TABLET ORAL at 13:22

## 2024-02-23 RX ADMIN — SODIUM CHLORIDE, PRESERVATIVE FREE 10 ML: 5 INJECTION INTRAVENOUS at 09:03

## 2024-02-23 RX ADMIN — HEPARIN SODIUM 5000 UNITS: 5000 INJECTION INTRAVENOUS; SUBCUTANEOUS at 20:33

## 2024-02-23 RX ADMIN — MAGNESIUM SULFATE HEPTAHYDRATE 4000 MG: 40 INJECTION, SOLUTION INTRAVENOUS at 08:59

## 2024-02-23 RX ADMIN — PROMETHAZINE HYDROCHLORIDE 12.5 MG: 25 TABLET ORAL at 22:47

## 2024-02-23 RX ADMIN — SODIUM BICARBONATE: 84 INJECTION, SOLUTION INTRAVENOUS at 13:15

## 2024-02-23 RX ADMIN — HEPARIN SODIUM 5000 UNITS: 5000 INJECTION INTRAVENOUS; SUBCUTANEOUS at 08:49

## 2024-02-23 RX ADMIN — LOPERAMIDE HYDROCHLORIDE 2 MG: 2 CAPSULE ORAL at 13:12

## 2024-02-23 ASSESSMENT — PAIN SCALES - GENERAL
PAINLEVEL_OUTOF10: 0

## 2024-02-23 NOTE — PROGRESS NOTES
Advanced Care Planning Note.     Purpose of Encounter: Advanced care planning in light of intractable abdominal pain, intractable nausea and vomiting, SUSY  Parties In Attendance: Patient, sister-in-law  Decisional Capacity: Yes  Subjective: Patient with Abdominal pain with nausea and vomiting and intractable abdominal pain, intractable nausea and vomiting, SUSY  Objective: Serum creatinine 1.8  Goals of Care Determination: Patient wishes to focus on treatment and return to home  Plan: IV fluids for hydration, general surgery and GI consult, pain management  Code Status: Full code            Time spent on Advanced care Plannin minutes  Advanced Care Planning Documents: Completed advanced directives on chart, sister-in-law is the POA.     Jonah Spann MD, MD  2024 7:02 PM

## 2024-02-23 NOTE — PROGRESS NOTES
Occupational Therapy  Facility/Department: [facility] ORTHO/NEURO  Occupational Therapy Initial Assessment, Treatment, and Discharge    Name: Tammy Frederick  : 1950  MRN: [id number]  Date of Service: 2024    Discharge Recommendations:  Home with Home health OT, Home with assist PRN  OT Equipment Recommendations  Equipment Needed: No  Other: rec use owned shower chair     Patient Diagnosis(es): The primary encounter diagnosis was SUSY (acute kidney injury) (HCC). A diagnosis of Nausea and vomiting, unspecified vomiting type was also pertinent to this visit.  Past Medical History:  has a past medical history of Arthritis.  Past Surgical History:  has a past surgical history that includes Abdomen surgery; Total knee arthroplasty (Left, 2011); joint replacement (Right, 2012); colostomy (N/A, 2023); joint replacement (Left); Upper gastrointestinal endoscopy (N/A, 2024); and CT BONE MARROW ASPIRATION (2024).    Treatment Diagnosis: decreased ability to perform ADL 2/2 n/v, SUSY    Assessment   Performance deficits / Impairments: Decreased functional mobility ;Decreased ADL status;Decreased high-level IADLs  After evaluation and treatment, pt found to be presenting with the above mentioned deficits. Pt would benefit from continued skilled occupational therapy to address these deficits, increasing safety and independence with ADL and functional mobility. Pt is normally independent and living alone, but has recently experienced a decline in activity tolerance due n/v, needing cane or RW for functional mobility. Pt provided with information regarding appropriate/safe return to daily activity and light exercise. She is currently SBA/SPV level with RW. Will continue to assess for discharge needs.     Treatment Diagnosis: decreased ability to perform ADL 2/2 n/v, SUSY  Prognosis: Good  Decision Making: Low Complexity  REQUIRES OT FOLLOW-UP: Yes  Activity Tolerance  Activity Tolerance: Patient  using toilet, bedpan, or urinal)?: A Little  How much help is needed for putting on and taking off regular upper body clothing?: None  How much help is needed for taking care of personal grooming?: None  How much help for eating meals?: None  AM-PAC Inpatient Daily Activity Raw Score: 21  AM-PAC Inpatient ADL T-Scale Score : 44.27  ADL Inpatient CMS 0-100% Score: 32.79  ADL Inpatient CMS G-Code Modifier : CJ      Goals  Short Term Goals  Time Frame for Short Term Goals: 1 week  Short Term Goal 1: Pt will complete all ADL and functional mobility/transfers at SBA level or higher. GOAL MET.  Short Term Goal 2: Pt will state understanding of DME recs, d/c recs, grading activity, HEP, energy conservation after education. GOAL MET.  Patient Goals   Patient goals : \"I want to figure out why I keep having this nausea and vomiting. I want answers.\"       Therapy Time   Individual Concurrent Group Co-treatment   Time In 1410         Time Out 1435         Minutes 25         Timed Code Treatment Minutes: 10 Minutes       If patient is discharged prior to next treatment session, this note will serve as the discharge summary.  Francesca Upton, OTR/L #[id number]

## 2024-02-23 NOTE — PROGRESS NOTES
General Surgery   Daily Progress Note  Patient: Tammy Frederick      CC: nausea and vomiting    SUBJECTIVE:   Patient denies nausea or vomiting overnight. Denies abdominal pain. Ostomy functioning.     ROS:   A 14 point review of systems was conducted, significant findings as noted above. All other systems negative.    OBJECTIVE:    PHYSICAL EXAM:    Vitals:    02/22/24 1830 02/22/24 1930 02/23/24 0000 02/23/24 0350   BP: 115/75 122/80 110/82 114/76   Pulse: 95 96 94 88   Resp: 13 16 12 16   Temp:  98 °F (36.7 °C) 97.2 °F (36.2 °C) 97.4 °F (36.3 °C)   TempSrc:  Oral Oral Oral   SpO2: 99% 99% 98% 99%   Weight:       Height:           General appearance: Alert, no acute distress  Eyes: No scleral icterus, EOM grossly intact  Neck: Trachea midline, no JVD  Chest/Lungs: Normal effort with no accessory muscle use on RA  Cardiovascular: RRR, well-perfused  Abdomen:  soft, non tender, ostomy with liquid output  Skin: Warm and dry, no rashes  Extremities: No edema, no cyanosis  Neuro: A&Ox3, no focal deficits, sensation intact    LABS:   Recent Labs     02/22/24  1641   WBC 5.5   HGB 11.5*   HCT 34.2*   MCV 91.0           Recent Labs     02/22/24  1641 02/22/24  2125   *  --    K 5.0  --    CL 89*  --    CO2 19*  --    PHOS  --  3.8   BUN 27*  --    CREATININE 1.8*  --         Recent Labs     02/22/24  1641   AST 24   ALT 14   BILIDIR <0.2   BILITOT 0.4   ALKPHOS 80        Recent Labs     02/22/24  1641   LIPASE 28.0        Recent Labs     02/22/24  1641   PROT 8.1      No results for input(s): \"CKTOTAL\", \"CKMB\", \"CKMBINDEX\", \"TROPONINI\" in the last 72 hours.      ASSESSMENT & PLAN:   This is a 73 y.o. female with Hx of arthritis and perforated sigmoid diverticulitis in Dec 2023. Surgical history significant for cholecystectomy in 2004 and an exploratory laparotomy, splenic flexure, small bowel resection, appendectomy, sigmoidectomy, Low pelivic anastomyosis, diverting loop ileostomy 12/28/2023. General  surgery consulted for evaluation of ongoing NV in the setting of recent abdominal surgery.     -continue fluid resuscitation per primary   -recommend TPN   -repeat CT with IV and PO contrast when kidney function allows  -GI consult and evaluation   -may have clear liquid diet with supplements      Mariah Kramer MD  PGY1, General Surgery  02/23/24   7:29 AM   Perfect[employer]  [phone]

## 2024-02-23 NOTE — PLAN OF CARE
Problem: Gastrointestinal - Adult  Goal: Minimal or absence of nausea and vomiting  2/23/2024 0013 by Sharron Valderrama RN  Outcome: Progressing  Flowsheets (Taken 2/23/2024 0013)  Minimal or absence of nausea and vomiting:   Administer IV fluids as ordered to ensure adequate hydration   Maintain NPO status until nausea and vomiting are resolved   Administer ordered antiemetic medications as needed   Provide nonpharmacologic comfort measures as appropriate    Goal: Maintains or returns to baseline bowel function  2/23/2024 0013 by Sharron Valderrama RN  Outcome: Progressing  Flowsheets (Taken 2/23/2024 0013)  Maintains or returns to baseline bowel function:   Assess bowel function   Encourage oral fluids to ensure adequate hydration   Administer IV fluids as ordered to ensure adequate hydration   Administer ordered medications as needed   Encourage mobilization and activity    Goal: Maintains adequate nutritional intake  2/23/2024 0013 by Sharron Valderrama RN  Outcome: Progressing  Flowsheets (Taken 2/23/2024 0013)  Maintains adequate nutritional intake: Monitor intake and output, weight and lab values    Goal: Establish and maintain optimal ostomy function  2/23/2024 0013 by Sharron Valderrama RN  Outcome: Progressing  Flowsheets (Taken 2/23/2024 0013)  Establish and maintain optimal ostomy function: Monitor output from ostomies    Problem: Metabolic/Fluid and Electrolytes - Adult  Goal: Electrolytes maintained within normal limits  2/23/2024 0013 by Sharron Valderrama RN  Outcome: Progressing  Flowsheets (Taken 2/23/2024 0013)  Electrolytes maintained within normal limits:   Monitor labs and assess patient for signs and symptoms of electrolyte imbalances   Administer electrolyte replacement as ordered   Monitor response to electrolyte replacements, including repeat lab results as appropriate    Goal: Hemodynamic stability and optimal renal function maintained  2/23/2024 0013 by Sharron Valderrama RN  Outcome:

## 2024-02-23 NOTE — PLAN OF CARE
Problem: Gastrointestinal - Adult  Goal: Minimal or absence of nausea and vomiting  2/23/2024 1454 by Sowmya Worthington RN  Outcome: Progressing  No c/o nausea or vomiting, PRN meds given     Problem: Metabolic/Fluid and Electrolytes - Adult  Goal: Electrolytes maintained within normal limits  Outcome: Progressing     Problem: Safety - Adult  Goal: Free from fall injury  2/23/2024 1454 by Sowmya Worthington RN  Outcome: Progressing  2/23/2024 0957 by Sowmya Worthington RN  Flowsheets (Taken 2/23/2024 0957)  Free From Fall Injury: Instruct family/caregiver on patient safety  2/23/2024 0956 by Sowmya Worthington RN  Outcome: Progressing  Non-skid socks, call light within reach. Bed/chair alarm in use     Problem: Pain  Goal: Verbalizes/displays adequate comfort level or baseline comfort level  2/23/2024 1454 by Sowmya Worthington RN  Outcome: Progressing  2/23/2024 0957 by Sowmya Worthington RN  Flowsheets (Taken 2/23/2024 0957)  Verbalizes/displays adequate comfort level or baseline comfort level:   Encourage patient to monitor pain and request assistance   Assess pain using appropriate pain scale   Implement non-pharmacological measures as appropriate and evaluate response   Administer analgesics based on type and severity of pain and evaluate response  2/23/2024 0956 by Sowmya Worthington RN  Outcome: Progressing   No c/o pain at this time. PRN's avail if needed

## 2024-02-23 NOTE — CONSULTS
Comprehensive Nutrition Assessment    RECOMMENDATIONS:  PO Diet: Clear liquids  ONS: Ensure Plus HP TID  Nutrition Education: Education not indicated     Nutrition Support:  Patient at HIGH RISK of Refeeding. Start daily MV + 100 mg Thiamin + 5mg of Folate for 5-7 days (start 2/23 - end 2/28).   Add daily PO MV Tues/Thurs/Sat/Sun to supplement what pt will not receive via PN d/t  shortage.   Day 1: Start PN Clinimix 5/15 at 41.7 ml/hr.     Day 2: If no signs of refeeding, advance PN Clinimix 5/20 at 55 ml/hr.    HOLD LIPIDS first 10 days d/t  shortage; if still on PN, start 250 ml of 20% lipids twice weekly on 3/4 .  Obtain Labs: Daily Phos,Mg,K+. Weekly TG recommended.   Pharmacy to adjust electrolytes, MVI and Trace Elements as appropriate.      NUTRITION ASSESSMENT:   Nutritional summary & status: Positive screen for wt loss and poor po. Patient admitted with ongoin N/V following abdominal surgery one month ago including bowel resection, appendectomy, sigmoidectomy and diverting ileostomy. Patient has had significant wt loss of 8% over the past month, likely decreased PO related to surgery. Patient is on a clear liquid diet with ONS however has had minimalpo intake. RD consulted this morning for TPN recommendations, will provide and monitor tolerance.   Admission // PMH: SUSY \\ arthritis    MALNUTRITION ASSESSMENT  Context of Malnutrition: Acute Illness   Malnutrition Status: Severe malnutrition  Findings of the 6 clinical characteristics of malnutrition (Minimum of 2 out of 6 clinical characteristics is required to make the diagnosis of moderate or severe Protein Calorie Malnutrition based on AND/ASPEN Guidelines):  Energy Intake:  75% or less of estimated energy requirements for 7 or more days  Weight Loss:  5% over 1 month     Body Fat Loss:  Mild body fat loss Orbital   Muscle Mass Loss:  Moderate muscle mass loss Temples (temporalis), Clavicles (pectoralis & deltoids)  Fluid  Accumulation:  No significant fluid accumulation     Strength:  Not Performed    NUTRITION DIAGNOSIS   Severe malnutrition related to altered GI function, catabolic illness as evidenced by intake 0-25%, weight loss, moderate muscle loss, mild loss of subcutaneous fat    Nutrition Monitoring and Evaluation:   Food/Nutrient Intake Outcomes:  Diet Advancement/Tolerance, Food and Nutrient Intake, Parenteral Nutrition Intake/Tolerance  Physical Signs/Symptoms Outcomes:  Biochemical Data, GI Status, Nausea or Vomiting, Nutrition Focused Physical Findings     OBJECTIVE DATA: Significant to nutrition assessment  Nutrition Related Findings: 200 ml ostomy output, Na+ 134, K+ 5.3, Mg 1.3, , D5 w/NS @100 ml/hr  Wounds: Multiple, Surgical Incision  Nutrition Goals: Meet at least 75% of estimated needs, prior to discharge, Initiate nutrition support     CURRENT NUTRITION THERAPIES  ADULT DIET; Clear Liquid  ADULT ORAL NUTRITION SUPPLEMENT; Breakfast, Lunch, Dinner; Standard High Calorie/High Protein Oral Supplement  PO Intake: 1-25%   PO Supplement Intake:0%  Additional Sources of Calories/IVF:  D5 with NS @100 ml/hr    COMPARATIVE STANDARDS  Energy (kcal):  1530-7226 (7049-4629)     Protein (g):  64-76 (1.5-1.8)       Fluid (ml/day):  1500 ml min    ANTHROPOMETRICS  Current Height: 152.4 cm (5')  Current Weight - Scale: 42.6 kg (93 lb 14.7 oz)    Admission weight: 42.6 kg (93 lb 14.7 oz)    The patient will be monitored per nutrition standards of care. Consult dietitian if additional nutrition interventions are needed prior to RD reassessment.     Meri Salas RD  Jun:  [phone]  Office:  [phone]

## 2024-02-23 NOTE — PROGRESS NOTES
4 Eyes Skin Assessment     NAME:  Tammy Frederick  YOB: 1950  MEDICAL RECORD NUMBER:  [id number]    The patient is being assessed for  Admission    I agree that at least one RN has performed a thorough Head to Toe Skin Assessment on the patient. ALL assessment sites listed below have been assessed.      Areas assessed by both nurses:    Head, Face, Ears, Shoulders, Back, Chest, Arms, Elbows, Hands, Sacrum. Buttock, Coccyx, Ischium, Legs. Feet and Heels, and Under Medical Devices         Does the Patient have a Wound? No noted wound(s)       Rodriguez Prevention initiated by RN: Yes  Wound Care Orders initiated by RN: No    Pressure Injury (Stage 3,4, Unstageable, DTI, NWPT, and Complex wounds) if present, place Wound referral order by RN under : Yes    New Ostomies, if present place, Ostomy referral order under : No     Nurse 1 eSignature: Electronically signed by Sharron Valderrama RN on 2/23/24 at 2:47 AM EST    **SHARE this note so that the co-signing nurse can place an eSignature**    Nurse 2 eSignature: Electronically signed by Roseline Medina RN on 2/23/24 at 2:49 AM EST

## 2024-02-23 NOTE — CONSULTS
Nephrology Consult Note                                                                                                                                                                                                                                                                                                                                                               Office : [phone]     Fax :[phone]    Patient's Name: Tammy Frederick  8:23 AM  2/23/2024    Reason for Consult:  SUSY   Requesting Physician:  No primary care provider on file.  Chief Complaint:    Chief Complaint   Patient presents with    Emesis       Assessment/Plan     # SUSY  Likely hypovolemic  Less likely ATN   On d5w/ cc hr   Cr improved   Plan:  Agree with fluids current rate switch to d5w 1/2 NS , BC 75 meq   Recheck BMP in PM   Strict I/O   BMP daily   UA urine lyts ordered       # Anemia  Normocytic anemia   No role for SILAS  Check iron studies    # Acid- base/ Electrolyte imbalance   Hypr- K mild - IVF recheck BMP in PM   Met acidosis BC 20 - mild 2/2 SUSY/ stoma- replete with fluids, may add BC later     # ileostomy   Now w/ N/V  Per suregry     History of Present Ilness:    The patient is a 73 y.o. female with PMH of  diverticulitis in December complicated by perforation with intra-abdominal abscess status post exploratory laparotomy and splenic flexure resection with small bowel resection and anastomosis and sigmoidectomy with colostomy.  Since surgery.  Patient was discharged to rehab but readmitted early February for renal failure and intractable nausea vomiting.  An EGD done 2/6/2024 was noted for mild antral erosion and gastritis with normal duodenum.  Gastric emptying study done at that time showed mildly delayed emptying.  She had a follow-up with GI this afternoon and at the office had 3 episodes of nausea vomiting and associated intermittent abdominal pain.  Further interview of the patient reports daily nausea  \"UROBILINOGEN\", \"NITRU\", \"LEUKOCYTESUR\", \"URINETYPE\" in the last 72 hours.    Invalid input(s): \"LABMICR\"   Urine Microscopic: No results for input(s): \"LABCAST\", \"BACTERIA\", \"COMU\", \"HYALCAST\", \"WBCUA\", \"RBCUA\", \"EPIU\" in the last 72 hours.  Urine Culture: No results for input(s): \"LABURIN\" in the last 72 hours.  Urine Chemistry: No results for input(s): \"CLUR\", \"LABCREA\", \"PROTEINUR\", \"NAUR\" in the last 72 hours.      IMAGING:  CT ABDOMEN PELVIS WO CONTRAST Additional Contrast? Radiologist Recommendation   Final Result      1.  No acute CT abnormality in the abdomen or pelvis.   2.  Prior partial colectomy with right lower quadrant ostomy. No evidence of bowel obstruction.   3.  4 mm noncalcified left lower lobe pulmonary nodule. Single Solid lung nodule < 6 mm:  In a low-risk patient, no routine follow-up imaging is recommended.  In a high-risk patient, a non-contrast Chest CT at 12 months is optional. If performed and the    nodule is stable at 12 months, no further follow-up is recommended.        These guidelines do not apply to patients younger than 35 years, immunocompromised patients, and patients with cancer. F/u in patients with significant comorbidities as clinically warranted. For lung cancer screening, adhere to Lung-RADS guidelines.     Reference:  Radiology. 2017 Jul; 284(1):228-73            Electronically signed by Chinmay Foster MD            Medical Decision Making:  The following items were considered in medical decision making:  Discussion of patient care with other providers  Reviewed clinical lab tests  Reviewed radiology tests  Reviewed other diagnostic tests/interventions    Will be discussed w/  Primary team     Thank you for allowing us to participate in care of Tammy Jordancker   Feel free to contact me,     Naila Spear MD   [facility]  Office : [phone] or through [employer]  Fax :[phone]

## 2024-02-23 NOTE — PLAN OF CARE
Problem: Gastrointestinal - Adult  Goal: Minimal or absence of nausea and vomiting  2/23/2024 0957 by Sowmya Worthington RN  Flowsheets (Taken 2/23/2024 0957)  Minimal or absence of nausea and vomiting:   Administer IV fluids as ordered to ensure adequate hydration   Administer ordered antiemetic medications as needed     Problem: Gastrointestinal - Adult  Goal: Maintains or returns to baseline bowel function  2/23/2024 0013 by Sharron Valderrama RN  Outcome: Progressing  Flowsheets (Taken 2/23/2024 0013)  Maintains or returns to baseline bowel function:   Assess bowel function   Encourage oral fluids to ensure adequate hydration   Administer IV fluids as ordered to ensure adequate hydration   Administer ordered medications as needed   Encourage mobilization and activity       Problem: Safety - Adult  Goal: Free from fall injury  2/23/2024 0957 by Sowmya Worthington RN  Flowsheets (Taken 2/23/2024 0957)  Free From Fall Injury: Instruct family/caregiver on patient safety       Problem: Pain  Goal: Verbalizes/displays adequate comfort level or baseline comfort level  2/23/2024 0957 by Sowmya Worthington RN  Flowsheets (Taken 2/23/2024 0957)  Verbalizes/displays adequate comfort level or baseline comfort level:   Encourage patient to monitor pain and request assistance   Assess pain using appropriate pain scale   Implement non-pharmacological measures as appropriate and evaluate response   Administer analgesics based on type and severity of pain and evaluate response

## 2024-02-23 NOTE — PROGRESS NOTES
[facility] -  Clinical Pharmacy Note    TPN - Management by Pharmacy    Consult Date(s): 2/23/24  Consulting Provider(s): Dr. Mariah Kramer    Assessment / Plan    Parenteral Nutrition  Day of Therapy: #1  Formulation:  Clinimix PLAIN 5/15  Clinimix Rate:  41.7 mL/hr (Total volume = 1000mL/day)  Will plan to increase to goal rate per RD recs (see note from RD 2/23).  Lipids:  20% 250mL over 12 hours on Mon & Thurs only (due to national shortage) - on hold x 10 days per RD recs. Plan to start 3/4 if still on TPN.  Appreciate Clinical Dietitian's recommendations for formulation and goal rate.  Electrolytes:  Currently with SUSY - nephrology following. K elevated again today at 5.3. Will use Plain Clinimix product w/ custom electrolytes so TPN will not contain K today.  Clinimix plain contains Acetate and Chloride (cannot be removed from bag).    Current rate provides Acetate 42 mEq/day and Cl 20 mEq/day.  Other electrolytes added to today's bag:    Desired Amount per Day (mEq, mmoL) Amount To Add to Bag (mEq, mmoL) Change from Previous Day in Amount per Day (Increase/Decrease)   Sodium Acetate 50 50    Sodium Chloride 50 50    Sodium Phosphate 10 10    Potassium Acetate 0 0    Potassium Phosphate 0 0    Potassium Chloride 0 0    Calcium Gluconate 4 4    Magnesium Sulfate 12 12      Maintenance IVF:  Sodium bicarb 75mEq in D5 1/2 NS @ 100mL/hr  Glucose control:  BG < 150 since admission. No documented hx of DM. Will monitor BG with addition of TPN.  Add MVI 10 mL/day in PN on Mon-Wed-Fri only (due to national shortage) & Trace Elements 1 mL/day in PN daily.  Daily renal panel, daily magnesium, and weekly triglycerides ordered per protocol.  PN will be re-ordered daily.    Please call with questions--  Ciara Light, PharmD, BCPS  Wireless: [phone]   2/23/2024 11:09 AM        Subjective/Objective:   Tammy Frederick is a 73 y.o. female with a PMHx significant for arthritis with episode of diverticulitis (Dec 2023)

## 2024-02-23 NOTE — PROGRESS NOTES
Pt aox4, VSS RA. Pt tolerating clears well. Voiding via BRP. Up SBA.     No acute events this shift.  All standard fall and safety precautions remain in place. Plan of care continues.

## 2024-02-23 NOTE — CONSULTS
Consult Note      Tammy Frederick  1950    Consultant: Mandeep  Reason for Consult:  Intractable N/V  Requesting Physician:  Miri    CHIEF COMPLAINT:  None currently    History Obtained From:  patient, electronic medical record    HISTORY OF PRESENT ILLNESS:                The patient is a 73 y.o. female with significant past medical history of perforated sigmoid diverticulitis almost 2 months ago with sigmoidectomy and diverting loop ileostomy 12/28/23 who presents with recurrent nausea and vomiting. She denies abdominal pain. She reports that her emesis is primarily clear or white in color. No h/o hematemesis. Her po intake has been decreased and she reports about a 20-25lb weight loss since her surgery. She had an EGD with my partner Dr Morris on 2/6/24 and it was rather unremarkable - mild gastritis with neg biopsies and neg duodenal biopsies as well. She also had a GES done showing mildly delayed emptying - unknown if she was taking any pain meds at that time. She is not diabetic. She has had a prior cholecystectomy. She reports ongoing use of antibiotics and pain medications in the last 2 months.     Past Medical History:        Diagnosis Date    Arthritis      Past Surgical History:        Procedure Laterality Date    ABDOMEN SURGERY      cholecystectomy    COLOSTOMY N/A 12/28/2023    EXPLORATORY LAPAROTOMY, SIGMOID RESECTION, SMALL BOWEL RESECTION, APPENDECTOMY, DIVERTING LOOP ILEOSTOMY performed by Alban Robertson MD at [facility] OR    CT BONE MARROW ASPIRATION  2/9/2024    CT BONE MARROW ASPIRATION 2/9/2024 [facility] CT SCAN    JOINT REPLACEMENT Right 09/17/2012    right knee    JOINT REPLACEMENT Left     hip    TOTAL KNEE ARTHROPLASTY Left 05/16/2011    Left knee    UPPER GASTROINTESTINAL ENDOSCOPY N/A 2/6/2024    EGD BIOPSY performed by Chidi Morris MD at [facility] ASC ENDOSCOPY     Medications at Home:  Medications Prior to Admission: metroNIDAZOLE (FLAGYL) 500 MG tablet, Take 1 tablet by mouth in the morning and  hours.  HbA1C 6.0 earlier this month  TSH 1.69 in Jan 2024    Imaging:   CT A/P without contrast 2/22 => 1.  No acute CT abnormality in the abdomen or pelvis.  2.  Prior partial colectomy with right lower quadrant ostomy. No evidence of bowel obstruction.  3.  4 mm noncalcified left lower lobe pulmonary nodule.    IMPRESSION/RECOMMENDATIONS:    Intractable N/V that developed after surgery for perforated diverticulitis about 2 months ago  Negative EGD  Mild delayed gastric emptying  Initially suspicious for medication side effect (abx, opioids, etc) but reportedly not taking anymore    Check AM cortisol  Avoid antibiotics, NSAIDs and opioids  Decrease cholestyramine and imodium if okay with surgical team  PPI once daily should be sufficient  Zofran prn  Trial short term reglan if the above unhelpful  Diet as she tolerates      Thank you for allowing me to participate in Tammy Frederick's care.   Almaz Kaufman MD

## 2024-02-23 NOTE — CONSULTS
General Surgery  Resident Consult Note    Reason for Consult: intractable abdominal pain with nausea vomiting and recent extensive abdominal surgery    History of Present Illness:   Tammy Frederick is a 73 y.o. female with Hx of arthritis and perforated sigmoid diverticulitis in Dec 2023. Surgical history significant for cholecystectomy in 2004 and an exploratory laparotomy, splenic flexure, small bowel resection, appendectomy, sigmoidectomy, Low pelivic anastomyosis, diverting loop ileostomy 12/28/2023. She underwent EGD on 2/6 which showed mild antral erosion and gastritis with normal duodenum.  Gastric emptying study done at that time showed mildly delayed emptying.    Since the above abdominal surgery patient has been hospitalized twice for SUSY, abdominal pain, and ongoing NV. She presented to [facility] ED today due to NV without abdominal pain. She was admitted and General Surgery consulted due to ongoing NV in the setting of recent surgery.    Patient denies abdominal pain. States she last had abdominal discomfort over 24 hours ago. States she had vomited today and has been vomiting approx 3 hours after any oral intake since surgery. She reports nausea and vomiting regardless of PO; water, steak, potatoes, apple sauce, cream of wheat. She reports approx 1/4-1/2 ostomy bag filled per day. She states that zofran does not help her nausea.     She denies any CP, SOB, difficulty urinating. She denies abdominal pain. She reports dizziness when moving from seated to standing.     Past Medical History:        Diagnosis Date    Arthritis        Past Surgical History:        Procedure Laterality Date    ABDOMEN SURGERY      cholecystectomy    COLOSTOMY N/A 12/28/2023    EXPLORATORY LAPAROTOMY, SIGMOID RESECTION, SMALL BOWEL RESECTION, APPENDECTOMY, DIVERTING LOOP ILEOSTOMY performed by Alban Robertson MD at [facility] OR    CT BONE MARROW ASPIRATION  2/9/2024    CT BONE MARROW ASPIRATION 2/9/2024 [facility] CT SCAN    JOINT REPLACEMENT  patients younger than 35 years, immunocompromised patients, and patients with cancer. F/u in patients with significant comorbidities as clinically warranted. For lung cancer screening, adhere to Lung-RADS guidelines.     Reference:  Radiology. 2017 Jul; 284(2):228-18            Electronically signed by Chinmay Foster MD            Assessment/Plan:  This is a 73 y.o. female with Hx ofHx of arthritis and perforated sigmoid diverticulitis in Dec 2023. Surgical history significant for cholecystectomy in 2004 and an exploratory laparotomy, splenic flexure, small bowel resection, appendectomy, sigmoidectomy, Low pelivic anastomyosis, diverting loop ileostomy 12/28/2023. General surgery consulted for evaluation of ongoing NV in the setting of recent abdominal surgery.    Patient is currently afebrile, hemodynamically stable. Physical exam shows no concerning findings. Labs show hyponatremia, Cr 1.8, hgb 11.5. Imaging shows no intra abdominal pathology. Currently patient is stable.     Given that the labs, imaging, and physical exam show a stable patient with signs of dehydration, no immediate surgical intervention is indicated at this time.     - Dehydration / SUSY   - patient unable to self hydrate  - continue fluid resuscitation; IVF per primary  - Nutritional optimization  - unknown length of remaining small bowel  - will discuss TPN vs Gastric tube vs J tube with team  - CT with IV and PO contrast   - ok to wait until Cr corrects  - FU GI recs  - IV zofran, IV protonix   - Clear liquid diet with supplements       Patient was seen with senior resident and discussed with Dr. Erasmo Zamorano,   PGY2, General Surgery  02/22/24  9:24 PM  [employer]  Pager: [phone]       I discussed with the resident the patient's diagnosis and concur with the plan.    Lele Zimmerman M.D.  2/23/24   12:02 PM

## 2024-02-23 NOTE — PROGRESS NOTES
Physical Therapy  Facility/Department: [facility] ORTHO/NEURO  Physical Therapy Initial Assessment/Treatment    Name: Tammy Frederick  : 1950  MRN: [id number]  Date of Service: 2024    Discharge Recommendations:  Home with assist PRN, Home with Home health PT   PT Equipment Recommendations  Equipment Needed: No      Patient Diagnosis(es): The primary encounter diagnosis was SUSY (acute kidney injury) (HCC). A diagnosis of Nausea and vomiting, unspecified vomiting type was also pertinent to this visit.  Past Medical History:  has a past medical history of Arthritis.  Past Surgical History:  has a past surgical history that includes Abdomen surgery; Total knee arthroplasty (Left, 2011); joint replacement (Right, 2012); colostomy (N/A, 2023); joint replacement (Left); Upper gastrointestinal endoscopy (N/A, 2024); and CT BONE MARROW ASPIRATION (2024).    Assessment   Assessment: 74 yo adm with nausea & vomiting.  Pt reports recent fatigue/weight loss & has been needing to use a walker at home for the past 2 weeks and was supposed to start home PT this week.  Demo good ability to ambulate in [place] but did appear safer with use of rolling walker. Anticipate pt returning home at [place] with A prn & recommend home PT for strengthening/progressing off walker.  Treatment Diagnosis: impaired mobility  Therapy Prognosis: Good  Decision Making: Low Complexity  Requires PT Follow-Up: Yes  Activity Tolerance  Activity Tolerance: Patient tolerated evaluation without incident     Plan   Physical Therapy Plan  General Plan:  (2-5)  Current Treatment Recommendations: Functional mobility training, Balance training, Gait training, Patient/Caregiver education & training, Endurance training, Transfer training, Strengthening  Safety Devices  Type of Devices: Call light within reach, Chair alarm in place, Nurse notified, Left in chair     Restrictions  Position Activity Restriction  Other position/activity

## 2024-02-23 NOTE — PROCEDURES
DOUBLE PICC PROCEDURE NOTE  Chart reviewed for allergies, diagnosis, labs, known contraindications, reason for line placement and planned length of treatment.  Informed consent noted to be signed and on chart.  Insertion procedure discussed with patient/family member.  Three patient identifiers - Patient name,   and MRN -  completed &  confirmed verbally.         Time out performed.  Hat, mask and eye shield donned.  PICC site cleaned with chlorhexidine wipes then scrubbed with Chloraprep one-step applicator for 30 seconds x 1.   Hand Hygiene  performed with 3% Chlorhexidine surgical scrub x1 min prior to  sterile gloves, sterile gown being donned.  Patient draped using maximal sterile barrier technique ( head to toe ).  PICC site scrubbed a 2nd time with Chloraprep One-Step applicator x 30 sec. Modified Seldinger technique/ultrasound assisted and tip locating system utilized for insertion and 1% Lidocaine 5 ml injected intradermal pre-insertion.  PICC tip location in the SVC confirmed by ECG technology.   Positive brisk blood return obtained from all lumens.  Valves placed to all lumens and  flushed with 10 mls 0.9% Sterile Sodium Chloride.  All lumens flush easily with no resistance.  Skin prep applied to site.   Catheter secured with non-sutured locking device per hospital protocol. Bio-patch/CHG impregnated sterile tegaderm dressing applied.  Alcohol Swab Caps applied to each valve.  Sterile field maintained during procedure.  PICC insertion, rhythm and positioning wire (utilized prn) accounted  for post procedure and disposed of in sharps.  Appearance of site is Clean dry and intact without bleeding or edema. All edges of Tegaderm occlusive.   Site marked with date and initials of RN placing line. Teaching performed to pt/family and noted in education section.   Bed placed in low position post-procedure. Top 2 side rails in up position call

## 2024-02-23 NOTE — PROGRESS NOTES
VSS, A&O x4. Pt tolerating clear liquids. PICC placed, TPN to be started. Up x1. Ileostomy CD&I. Non-skid socks in use, bed alarm in place. Call light within reach

## 2024-02-23 NOTE — PROGRESS NOTES
5,000 Units SubCUTAneous BID      Infusions:    sodium bicarbonate 75 mEq in dextrose 5 % and 0.45 % NaCl 1,000 mL infusion      sodium chloride       PRN Meds: diphenhydrAMINE, 25 mg, Q4H PRN  sodium chloride flush, 5-40 mL, PRN  sodium chloride, , PRN  acetaminophen, 650 mg, Q6H PRN   Or  acetaminophen, 650 mg, Q6H PRN  promethazine, 12.5 mg, Q4H PRN   Or  ondansetron, 4 mg, Q6H PRN  morphine, 2 mg, Q2H PRN   Or  morphine, 4 mg, Q2H PRN        Labs and Imaging   CT ABDOMEN PELVIS WO CONTRAST Additional Contrast? Radiologist Recommendation    Result Date: 2/22/2024  CT ABDOMEN PELVIS WO CONTRAST CLINICAL HISTORY: 73 years Female; \"Intractable abdominal pain with associated nausea vomiting and recent complicated abdominal surgery due to diverticulitis with intra-abdominal abscess\" COMPARISON: CT abdomen pelvis 2/1/2024 TECHNIQUE: CT abdomen pelvis without contrast per standard protocol. Axial images and multiplanar reformatted images are provided for review. Up-to-date CT equipment and radiation dose reduction techniques were employed. IV Contrast: None. FINDINGS: Lack of intravenous contrast compromises evaluation of perfusion and for isodense lesions. Lower chest: 4 mm noncalcified nodule in the left lower lobe. Liver: Unremarkable. Gallbladder and bile ducts: Surgically absent gallbladder. No biliary dilatation. Spleen: Unremarkable. Pancreas: Unremarkable. Adrenals: Unremarkable. Kidneys and ureters: Unremarkable. Bowel: Prior partial colectomy with right lower quadrant ileostomy. Nondilated bowel with no wall thickening. Bladder: Unremarkable. Reproductive organs: Surgically absent uterus. No adnexal masses. Lymph nodes: Unremarkable. Peritoneum: Unremarkable. Vessels: Unremarkable. Abdominal wall: Postsurgical changes in the anterior abdominal wall. Bones: Left hip arthroplasty with associated streak artifact. Degenerative changes of the spine.     1.  No acute CT abnormality in the abdomen or pelvis. 2.   Prior partial colectomy with right lower quadrant ostomy. No evidence of bowel obstruction. 3.  4 mm noncalcified left lower lobe pulmonary nodule. Single Solid lung nodule < 6 mm:  In a low-risk patient, no routine follow-up imaging is recommended.  In a high-risk patient, a non-contrast Chest CT at 12 months is optional. If performed and the nodule is stable at 12 months, no further follow-up is recommended.  These guidelines do not apply to patients younger than 35 years, immunocompromised patients, and patients with cancer. F/u in patients with significant comorbidities as clinically warranted. For lung cancer screening, adhere to Lung-RADS guidelines.  Reference:  Radiology. 2017 Jul; 284(1):228-32 Electronically signed by Chinmay Foster MD      CBC:   Recent Labs     02/22/24  1641 02/23/24  0614   WBC 5.5 2.6*   HGB 11.5* 9.2*    301     BMP:    Recent Labs     02/22/24  1641 02/23/24  0614   * 134*   K 5.0 5.3*   CL 89* 102   CO2 19* 20*   BUN 27* 21*   CREATININE 1.8* 1.5*   GLUCOSE 138* 122*     Hepatic:   Recent Labs     02/22/24  1641 02/23/24  0614   AST 24 18   ALT 14 11   BILITOT 0.4 0.3   ALKPHOS 80 61     Lipids: No results found for: \"CHOL\", \"HDL\", \"TRIG\"  Hemoglobin A1C:   Lab Results   Component Value Date/Time    LABA1C 6.0 02/08/2024 05:17 AM     TSH: No results found for: \"TSH\"  Troponin: No results found for: \"TROPONINT\"  Lactic Acid:   Recent Labs     02/22/24 2125   LACTA 1.2     BNP: No results for input(s): \"PROBNP\" in the last 72 hours.  UA:  Lab Results   Component Value Date/Time    NITRU Negative 02/01/2024 06:06 PM    COLORU Yellow 02/01/2024 06:06 PM    PHUR 5.0 02/01/2024 06:06 PM    WBCUA 3-5 02/01/2024 06:06 PM    RBCUA None seen 02/01/2024 06:06 PM    BACTERIA Rare 02/01/2024 06:06 PM    CLARITYU CLOUDY 02/01/2024 06:06 PM    SPECGRAV 1.015 02/01/2024 06:06 PM    LEUKOCYTESUR Negative 02/01/2024 06:06 PM    UROBILINOGEN 0.2 02/01/2024 06:06 PM    BILIRUBINUR Negative

## 2024-02-24 LAB
ALBUMIN SERPL-MCNC: 3.2 G/DL (ref 3.4–5)
ALP SERPL-CCNC: 53 U/L (ref 40–129)
ALT SERPL-CCNC: 8 U/L (ref 10–40)
ANION GAP SERPL CALCULATED.3IONS-SCNC: 12 MMOL/L (ref 3–16)
AST SERPL-CCNC: 14 U/L (ref 15–37)
BILIRUB DIRECT SERPL-MCNC: <0.2 MG/DL (ref 0–0.3)
BILIRUB INDIRECT SERPL-MCNC: ABNORMAL MG/DL (ref 0–1)
BILIRUB SERPL-MCNC: <0.2 MG/DL (ref 0–1)
BUN SERPL-MCNC: 15 MG/DL (ref 7–20)
CALCIUM SERPL-MCNC: 8.4 MG/DL (ref 8.3–10.6)
CHLORIDE SERPL-SCNC: 100 MMOL/L (ref 99–110)
CO2 SERPL-SCNC: 22 MMOL/L (ref 21–32)
CREAT SERPL-MCNC: 0.8 MG/DL (ref 0.6–1.2)
CREAT UR-MCNC: 122.6 MG/DL (ref 28–259)
DEPRECATED RDW RBC AUTO: 16.4 % (ref 12.4–15.4)
GFR SERPLBLD CREATININE-BSD FMLA CKD-EPI: >60 ML/MIN/{1.73_M2}
GLUCOSE BLD-MCNC: 123 MG/DL (ref 70–99)
GLUCOSE BLD-MCNC: 162 MG/DL (ref 70–99)
GLUCOSE SERPL-MCNC: 121 MG/DL (ref 70–99)
HCT VFR BLD AUTO: 24.7 % (ref 36–48)
HGB BLD-MCNC: 8.2 G/DL (ref 12–16)
IRON SATN MFR SERPL: 35 % (ref 15–50)
IRON SERPL-MCNC: 100 UG/DL (ref 37–145)
MAGNESIUM SERPL-MCNC: 2.3 MG/DL (ref 1.8–2.4)
MCH RBC QN AUTO: 30.4 PG (ref 26–34)
MCHC RBC AUTO-ENTMCNC: 33 G/DL (ref 31–36)
MCV RBC AUTO: 92.2 FL (ref 80–100)
PERFORMED ON: ABNORMAL
PERFORMED ON: ABNORMAL
PHOSPHATE SERPL-MCNC: 3.4 MG/DL (ref 2.5–4.9)
PLATELET # BLD AUTO: 236 K/UL (ref 135–450)
PMV BLD AUTO: 7 FL (ref 5–10.5)
POTASSIUM SERPL-SCNC: 3.6 MMOL/L (ref 3.5–5.1)
PROT SERPL-MCNC: 5.1 G/DL (ref 6.4–8.2)
PROT UR-MCNC: 14 MG/DL
PROT/CREAT UR-RTO: 0.1 MG/DL
RBC # BLD AUTO: 2.68 M/UL (ref 4–5.2)
SODIUM SERPL-SCNC: 134 MMOL/L (ref 136–145)
TIBC SERPL-MCNC: 286 UG/DL (ref 260–445)
TRIGL SERPL-MCNC: 311 MG/DL (ref 0–150)
WBC # BLD AUTO: 2.8 K/UL (ref 4–11)

## 2024-02-24 PROCEDURE — 2500000003 HC RX 250 WO HCPCS

## 2024-02-24 PROCEDURE — 6360000002 HC RX W HCPCS: Performed by: INTERNAL MEDICINE

## 2024-02-24 PROCEDURE — C9113 INJ PANTOPRAZOLE SODIUM, VIA: HCPCS | Performed by: NURSE PRACTITIONER

## 2024-02-24 PROCEDURE — 1200000000 HC SEMI PRIVATE

## 2024-02-24 PROCEDURE — A4216 STERILE WATER/SALINE, 10 ML: HCPCS | Performed by: NURSE PRACTITIONER

## 2024-02-24 PROCEDURE — 6360000002 HC RX W HCPCS: Performed by: NURSE PRACTITIONER

## 2024-02-24 PROCEDURE — 2580000003 HC RX 258: Performed by: NURSE PRACTITIONER

## 2024-02-24 PROCEDURE — 85027 COMPLETE CBC AUTOMATED: CPT

## 2024-02-24 PROCEDURE — 84478 ASSAY OF TRIGLYCERIDES: CPT

## 2024-02-24 PROCEDURE — 6370000000 HC RX 637 (ALT 250 FOR IP): Performed by: INTERNAL MEDICINE

## 2024-02-24 PROCEDURE — 80076 HEPATIC FUNCTION PANEL: CPT

## 2024-02-24 PROCEDURE — 84100 ASSAY OF PHOSPHORUS: CPT

## 2024-02-24 PROCEDURE — 83735 ASSAY OF MAGNESIUM: CPT

## 2024-02-24 PROCEDURE — 99231 SBSQ HOSP IP/OBS SF/LOW 25: CPT | Performed by: SURGERY

## 2024-02-24 PROCEDURE — 36415 COLL VENOUS BLD VENIPUNCTURE: CPT

## 2024-02-24 PROCEDURE — 80048 BASIC METABOLIC PNL TOTAL CA: CPT

## 2024-02-24 PROCEDURE — 99232 SBSQ HOSP IP/OBS MODERATE 35: CPT | Performed by: INTERNAL MEDICINE

## 2024-02-24 PROCEDURE — 2580000003 HC RX 258

## 2024-02-24 RX ADMIN — SODIUM CHLORIDE, PRESERVATIVE FREE 40 MG: 5 INJECTION INTRAVENOUS at 08:54

## 2024-02-24 RX ADMIN — LOPERAMIDE HYDROCHLORIDE 2 MG: 2 CAPSULE ORAL at 08:54

## 2024-02-24 RX ADMIN — HEPARIN SODIUM 5000 UNITS: 5000 INJECTION INTRAVENOUS; SUBCUTANEOUS at 08:54

## 2024-02-24 RX ADMIN — LOPERAMIDE HYDROCHLORIDE 2 MG: 2 CAPSULE ORAL at 17:54

## 2024-02-24 RX ADMIN — CHOLESTYRAMINE 4 G: 4 POWDER, FOR SUSPENSION ORAL at 20:45

## 2024-02-24 RX ADMIN — HEPARIN SODIUM 5000 UNITS: 5000 INJECTION INTRAVENOUS; SUBCUTANEOUS at 20:45

## 2024-02-24 RX ADMIN — TRACE ELEMENTS INJECTION 4: 7.4; .75; 98; 151 INJECTION, SOLUTION INTRAVENOUS at 17:53

## 2024-02-24 RX ADMIN — SODIUM CHLORIDE, PRESERVATIVE FREE 10 ML: 5 INJECTION INTRAVENOUS at 08:55

## 2024-02-24 NOTE — PLAN OF CARE
Problem: Discharge Planning  Goal: Discharge to home or other facility with appropriate resources  Outcome: Progressing     Problem: Gastrointestinal - Adult  Goal: Minimal or absence of nausea and vomiting  Outcome: Progressing  Goal: Maintains or returns to baseline bowel function  Outcome: Progressing  Goal: Maintains adequate nutritional intake  Outcome: Progressing  Goal: Establish and maintain optimal ostomy function  Outcome: Progressing     Problem: Metabolic/Fluid and Electrolytes - Adult  Goal: Electrolytes maintained within normal limits  Outcome: Progressing  Goal: Hemodynamic stability and optimal renal function maintained  Outcome: Progressing     Problem: Safety - Adult  Goal: Free from fall injury  Outcome: Progressing     Problem: Pain  Goal: Verbalizes/displays adequate comfort level or baseline comfort level  Outcome: Progressing     Problem: Nutrition Deficit:  Goal: Optimize nutritional status  Outcome: Progressing

## 2024-02-24 NOTE — PROGRESS NOTES
GI Progress Note      Tammy Frederick is a 73 y.o. female patient.  1. SUSY (acute kidney injury) (HCC)    2. Nausea and vomiting, unspecified vomiting type        Admit Date: 2024    Subjective:       She states that she is feeling better this AM. Denies nausea/ vomiting. Tolerating clears.       ROS:  As per above    Scheduled Meds:   pantoprazole (PROTONIX) 40 mg in sodium chloride (PF) 0.9 % 10 mL injection  40 mg IntraVENous Daily    sodium chloride flush  5-40 mL IntraVENous 2 times per day    cholestyramine light  1 packet Oral 4x daily    loperamide  2 mg Oral TID    sodium chloride flush  5-40 mL IntraVENous 2 times per day    heparin (porcine)  5,000 Units SubCUTAneous BID       Continuous Infusions:   PN-Adult Premix  - Standard Electrolytes - Central Line      PN-Adult Premix 5/15 - Central 41.7 mL/hr at 24 1806    sodium chloride      sodium chloride         PRN Meds:  sodium chloride flush, sodium chloride, diphenhydrAMINE, sodium chloride flush, sodium chloride, acetaminophen **OR** acetaminophen, promethazine **OR** ondansetron, morphine **OR** morphine      Objective:       Patient Vitals for the past 24 hrs:   BP Temp Temp src Pulse Resp SpO2   24 0852 (!) 98/43 97.6 °F (36.4 °C) Oral 77 16 100 %   24 0500 100/67 98 °F (36.7 °C) Oral 70 16 99 %   24 2243 100/64 97.8 °F (36.6 °C) Oral 72 16 99 %   24 103/61 97.8 °F (36.6 °C) Oral 85 16 98 %   24 1630 105/67 97.9 °F (36.6 °C) Oral 75 16 98 %   24 1145 107/68 97.9 °F (36.6 °C) Oral 69 15 97 %       Exam:  VITALS:  BP (!) 98/43   Pulse 77   Temp 97.6 °F (36.4 °C) (Oral)   Resp 16   Ht 1.524 m (5')   Wt 42.6 kg (93 lb 14.7 oz)   SpO2 100%   BMI 18.34 kg/m²   TEMPERATURE:  Current - Temp: 97.6 °F (36.4 °C); Max - Temp  Av.8 °F (36.6 °C)  Min: 97.6 °F (36.4 °C)  Max: 98 °F (36.7 °C)      General appearance: alert, appears stated age, cooperative and no distress  Head: Normocephalic,  supplements    Almaz Kaufman MD  2/24/2024  10:54 AM

## 2024-02-24 NOTE — PROGRESS NOTES
V2.0    [facility] [place]      Name:  Tammy Frederick /Age/Sex: 1950  (73 y.o. female)   MRN & CSN:  [id number] & [id number] Encounter Date/Time: 2024 12:22 PM EST   Location:  53/5319-01 PCP: No primary care provider on file.     Attending:Lele Chery MD       Hospital Day: 3    Assessment and Recommendations   Tammy Frederick is a 73 y.o. female  PMH of Diverticulitis with perforation and multiple abscesses s/p exploratory laparotomy, takedown of splenic flexure, small bowel resection with anastomosis, appendectomy, sigmoidectomy with low pelvic anastomosis and diverting loop ileostomy by Dr. Robertson on  who was recently admitted at [facility] with weakness/SUSY on 2024 -2024.     Patient was seen in the outpatient resident clinic on .  At that time the patient had complaints of nausea and vomiting which had been ongoing for the past several days.  She had an unintentional weight loss of 7 pounds since  and 20 pounds since 2023.  She was then referred to the emergency department.    In the emergency room patient's creatinine was found to be 1.8 from a baseline of 0.7.  Patient was admitted and general surgery, nephrology and gastroenterology were consulted.      Plan:   Intractable nausea and vomiting with abdominal pain-  history of diverticulitis in December complicated by perforation with intra-abdominal abscess status post exploratory laparotomy and splenic flexure resection with small bowel resection and anastomosis and sigmoidectomy with colostomy.  Pain control, IV fluids.  General surgery recommends TPN.  Acute kidney injury-IV fluids.  Nephrology consulted.  Avoid nephrotoxic medications      Patient now receiving TPN. Monitor labs closely for refeeding syndrome.   Diet ADULT ORAL NUTRITION SUPPLEMENT; Breakfast, Lunch, Dinner; Standard High Calorie/High Protein Oral Supplement  PN-Adult Premix 5/15 - Central  ADULT DIET; Full Liquid; 3 carb  the last 72 hours.  UA:  Lab Results   Component Value Date/Time    NITRU Negative 02/23/2024 04:54 PM    COLORU Yellow 02/23/2024 04:54 PM    PHUR 6.0 02/23/2024 04:54 PM    WBCUA 6-9 02/23/2024 04:54 PM    RBCUA None seen 02/23/2024 04:54 PM    BACTERIA 1+ 02/23/2024 04:54 PM    CLARITYU Clear 02/23/2024 04:54 PM    SPECGRAV >=1.030 02/23/2024 04:54 PM    LEUKOCYTESUR SMALL 02/23/2024 04:54 PM    UROBILINOGEN 0.2 02/23/2024 04:54 PM    BILIRUBINUR Negative 02/23/2024 04:54 PM    BLOODU Negative 02/23/2024 04:54 PM    GLUCOSEU Negative 02/23/2024 04:54 PM    KETUA Negative 02/23/2024 04:54 PM     Urine Cultures: No results found for: \"LABURIN\"  Blood Cultures:   Lab Results   Component Value Date/Time    BC No Growth after 4 days of incubation. 12/21/2023 10:49 PM     Lab Results   Component Value Date/Time    BLOODCULT2 No Growth after 4 days of incubation. 12/21/2023 10:49 PM     Organism:   Lab Results   Component Value Date/Time    ORG Diphtheroids 12/28/2023 11:03 AM         Electronically signed by SORAYA Hua CNP on 2/24/2024 at 10:17 AM

## 2024-02-24 NOTE — PROGRESS NOTES
[facility] -  Clinical Pharmacy Note    TPN - Management by Pharmacy    Consult Date(s): 2/23/24  Consulting Provider(s): Dr. Mariah Kramer    Assessment / Plan    Parenteral Nutrition  Day of Therapy: #2  Formulation:  Clinimix-E 5/20  Clinimix Rate:  55 mL/hr (Total volume = 1320mL/day)  Lipids:  20% 250mL over 12 hours on Mon & Thurs only (due to national shortage) - on hold x 10 days per RD recs. Plan to start 3/4 if still on TPN.  Appreciate Clinical Dietitian's recommendations for formulation and goal rate.  Electrolytes:  SUSY resolved today.  K+ down to 3.6 today.  Will change to standard electrolyte Clinimix today.  Clinimix-E includes standard electrolyte formulation.  Recommend further repletion with supplemental IVPBs as needed.  Maintenance IVF:  n/a  Glucose control:  BG < 180 since admission. No documented hx of DM. Will monitor BG with addition of TPN.  Add MVI 10 mL/day in PN on Mon-Wed-Fri only (due to national shortage) & Trace Elements 1 mL/day in PN daily.  Daily renal panel, daily magnesium, and weekly triglycerides ordered per protocol.  PN will be re-ordered daily.    Please call with questions--  Thanks--  Laverne Coon, PharmD, BCPS, BCGP  [id number] (hospitals)   2/24/2024 10:17 AM      Interval update:  Nausea resolved.  Advancing diet.  Remains on TPN for now.  Surgery recommending calorie count.    Subjective/Objective:   Tammy Frederick is a 73 y.o. female with a PMHx significant for arthritis with episode of diverticulitis (Dec 2023) complicated by perforation w/ intra-abdominal abscess s/p ex-lap and splenic fixture resection, small bowel resection. Pt readmitted in early Feb for SUSY and intractable n/v -- EGD showed mild antral erosion, gastritis. Pt readmitted 2/22 with n/v and abdominal pain.    Pharmacy is consulted to dose TPN.    Ht Readings from Last 1 Encounters:   02/22/24 1.524 m (5')     Wt Readings from Last 1 Encounters:   02/22/24 42.6 kg (93 lb 14.7 oz)

## 2024-02-24 NOTE — PROGRESS NOTES
General Surgery   Daily Progress Note  Patient: Tammy Frederick      CC: nausea and vomiting    SUBJECTIVE:   Patient denies nausea or vomiting overnight. Denies abdominal pain. Ostomy functioning.     ROS:   A 14 point review of systems was conducted, significant findings as noted above. All other systems negative.    OBJECTIVE:    PHYSICAL EXAM:    Vitals:    02/23/24 1630 02/23/24 2028 02/23/24 2243 02/24/24 0500   BP: 105/67 103/61 100/64 100/67   Pulse: 75 85 72 70   Resp: 16 16 16 16   Temp: 97.9 °F (36.6 °C) 97.8 °F (36.6 °C) 97.8 °F (36.6 °C) 98 °F (36.7 °C)   TempSrc: Oral Oral Oral Oral   SpO2: 98% 98% 99% 99%   Weight:       Height:           General appearance: Alert, no acute distress  Eyes: No scleral icterus, EOM grossly intact  Neck: Trachea midline, no JVD  Chest/Lungs: Normal effort with no accessory muscle use on RA  Cardiovascular: RRR, well-perfused  Abdomen:  soft, non tender, ostomy with liquid output  Skin: Warm and dry, no rashes  Extremities: No edema, no cyanosis  Neuro: A&Ox3, no focal deficits, sensation intact    LABS:   Recent Labs     02/23/24  0614 02/24/24  0613   WBC 2.6* 2.8*   HGB 9.2* 8.2*   HCT 28.0* 24.7*   MCV 92.9 92.2    236          Recent Labs     02/22/24  1641 02/22/24 2125 02/23/24  0614   *  --  134*   K 5.0  --  5.3*   CL 89*  --  102   CO2 19*  --  20*   PHOS  --  3.8 3.7   BUN 27*  --  21*   CREATININE 1.8*  --  1.5*          Recent Labs     02/22/24  1641 02/23/24  0614   AST 24 18   ALT 14 11   BILIDIR <0.2  --    BILITOT 0.4 0.3   ALKPHOS 80 61          Recent Labs     02/22/24  1641   LIPASE 28.0          Recent Labs     02/22/24  1641 02/23/24  0614   PROT 8.1 6.1*        No results for input(s): \"CKTOTAL\", \"CKMB\", \"CKMBINDEX\", \"TROPONINI\" in the last 72 hours.      ASSESSMENT & PLAN:   This is a 73 y.o. female with Hx of arthritis and perforated sigmoid diverticulitis in Dec 2023. Surgical history significant for cholecystectomy in 2004 and an

## 2024-02-24 NOTE — PROGRESS NOTES
\"LABVLDL\"  ABGs: No results for input(s): \"PHART\", \"PO2ART\", \"UAR0XIC\" in the last 72 hours.  INR: No results for input(s): \"INR\" in the last 72 hours.  UA:  Recent Labs     02/23/24  1654   COLORU Yellow   CLARITYU Clear   GLUCOSEU Negative   BILIRUBINUR Negative   KETUA Negative   SPECGRAV >=1.030   BLOODU Negative   PHUR 6.0   PROTEINU Negative   UROBILINOGEN 0.2   NITRU Negative   LEUKOCYTESUR SMALL*   URINETYPE NotGiven      Urine Microscopic:   Recent Labs     02/23/24  1654   BACTERIA 1+*   WBCUA 6-9*   RBCUA None seen   EPIU 0-1     Urine Culture: No results for input(s): \"LABURIN\" in the last 72 hours.  Urine Chemistry:   Recent Labs     02/23/24  1654   LABCREA 122.6   PROTEINUR 14.00*   NAUR <20         IMAGING:  CT ABDOMEN PELVIS WO CONTRAST Additional Contrast? Radiologist Recommendation   Final Result      1.  No acute CT abnormality in the abdomen or pelvis.   2.  Prior partial colectomy with right lower quadrant ostomy. No evidence of bowel obstruction.   3.  4 mm noncalcified left lower lobe pulmonary nodule. Single Solid lung nodule < 6 mm:  In a low-risk patient, no routine follow-up imaging is recommended.  In a high-risk patient, a non-contrast Chest CT at 12 months is optional. If performed and the    nodule is stable at 12 months, no further follow-up is recommended.        These guidelines do not apply to patients younger than 35 years, immunocompromised patients, and patients with cancer. F/u in patients with significant comorbidities as clinically warranted. For lung cancer screening, adhere to Lung-RADS guidelines.     Reference:  Radiology. 2017 Jul; 284(1):228-43            Electronically signed by Chinmay Foster MD            Medical Decision Making:  The following items were considered in medical decision making:  Discussion of patient care with other providers  Reviewed clinical lab tests  Reviewed radiology tests  Reviewed other diagnostic tests/interventions    Will be discussed w/   Primary team     Thank you for allowing us to participate in care of Tammy Valenzuelaer   Feel free to contact me,     Naila Spear MD   [facility]  Office : [phone] or through [employer]  Fax :[phone]

## 2024-02-25 LAB
ALBUMIN SERPL-MCNC: 3.3 G/DL (ref 3.4–5)
ALP SERPL-CCNC: 69 U/L (ref 40–129)
ALT SERPL-CCNC: 13 U/L (ref 10–40)
ANION GAP SERPL CALCULATED.3IONS-SCNC: 9 MMOL/L (ref 3–16)
AST SERPL-CCNC: 15 U/L (ref 15–37)
BILIRUB DIRECT SERPL-MCNC: <0.2 MG/DL (ref 0–0.3)
BILIRUB INDIRECT SERPL-MCNC: ABNORMAL MG/DL (ref 0–1)
BILIRUB SERPL-MCNC: <0.2 MG/DL (ref 0–1)
BUN SERPL-MCNC: 12 MG/DL (ref 7–20)
CALCIUM SERPL-MCNC: 8.7 MG/DL (ref 8.3–10.6)
CHLORIDE SERPL-SCNC: 109 MMOL/L (ref 99–110)
CO2 SERPL-SCNC: 24 MMOL/L (ref 21–32)
CORTIS AM PEAK SERPL-MCNC: 7.5 UG/DL (ref 4.3–22.4)
CORTIS SERPL-MCNC: 11.4 UG/DL
CREAT SERPL-MCNC: 2 MG/DL (ref 0.6–1.2)
GFR SERPLBLD CREATININE-BSD FMLA CKD-EPI: 26 ML/MIN/{1.73_M2}
GLUCOSE BLD-MCNC: 142 MG/DL (ref 70–99)
GLUCOSE BLD-MCNC: 149 MG/DL (ref 70–99)
GLUCOSE BLD-MCNC: 181 MG/DL (ref 70–99)
GLUCOSE SERPL-MCNC: 86 MG/DL (ref 70–99)
Lab: NORMAL
MAGNESIUM SERPL-MCNC: 1.9 MG/DL (ref 1.8–2.4)
PERFORMED ON: ABNORMAL
PHOSPHATE SERPL-MCNC: 4 MG/DL (ref 2.5–4.9)
POTASSIUM SERPL-SCNC: 3.6 MMOL/L (ref 3.5–5.1)
PROT SERPL-MCNC: 6 G/DL (ref 6.4–8.2)
REPORT: NORMAL
SODIUM SERPL-SCNC: 142 MMOL/L (ref 136–145)
THIS TEST SENT TO: NORMAL

## 2024-02-25 PROCEDURE — C9113 INJ PANTOPRAZOLE SODIUM, VIA: HCPCS | Performed by: NURSE PRACTITIONER

## 2024-02-25 PROCEDURE — 6370000000 HC RX 637 (ALT 250 FOR IP): Performed by: INTERNAL MEDICINE

## 2024-02-25 PROCEDURE — 99232 SBSQ HOSP IP/OBS MODERATE 35: CPT | Performed by: INTERNAL MEDICINE

## 2024-02-25 PROCEDURE — 2580000003 HC RX 258

## 2024-02-25 PROCEDURE — 80048 BASIC METABOLIC PNL TOTAL CA: CPT

## 2024-02-25 PROCEDURE — 36415 COLL VENOUS BLD VENIPUNCTURE: CPT

## 2024-02-25 PROCEDURE — 1200000000 HC SEMI PRIVATE

## 2024-02-25 PROCEDURE — 6360000002 HC RX W HCPCS: Performed by: INTERNAL MEDICINE

## 2024-02-25 PROCEDURE — 2580000003 HC RX 258: Performed by: INTERNAL MEDICINE

## 2024-02-25 PROCEDURE — 84100 ASSAY OF PHOSPHORUS: CPT

## 2024-02-25 PROCEDURE — 83735 ASSAY OF MAGNESIUM: CPT

## 2024-02-25 PROCEDURE — 82533 TOTAL CORTISOL: CPT

## 2024-02-25 PROCEDURE — 6360000002 HC RX W HCPCS: Performed by: NURSE PRACTITIONER

## 2024-02-25 PROCEDURE — A4216 STERILE WATER/SALINE, 10 ML: HCPCS | Performed by: NURSE PRACTITIONER

## 2024-02-25 PROCEDURE — 80076 HEPATIC FUNCTION PANEL: CPT

## 2024-02-25 PROCEDURE — 2580000003 HC RX 258: Performed by: NURSE PRACTITIONER

## 2024-02-25 PROCEDURE — 2500000003 HC RX 250 WO HCPCS

## 2024-02-25 RX ORDER — SODIUM CHLORIDE, SODIUM LACTATE, POTASSIUM CHLORIDE, CALCIUM CHLORIDE 600; 310; 30; 20 MG/100ML; MG/100ML; MG/100ML; MG/100ML
INJECTION, SOLUTION INTRAVENOUS CONTINUOUS
Status: ACTIVE | OUTPATIENT
Start: 2024-02-25 | End: 2024-02-25

## 2024-02-25 RX ADMIN — HEPARIN SODIUM 5000 UNITS: 5000 INJECTION INTRAVENOUS; SUBCUTANEOUS at 22:22

## 2024-02-25 RX ADMIN — CHOLESTYRAMINE 4 G: 4 POWDER, FOR SUSPENSION ORAL at 19:34

## 2024-02-25 RX ADMIN — LOPERAMIDE HYDROCHLORIDE 2 MG: 2 CAPSULE ORAL at 08:17

## 2024-02-25 RX ADMIN — LOPERAMIDE HYDROCHLORIDE 2 MG: 2 CAPSULE ORAL at 17:34

## 2024-02-25 RX ADMIN — SODIUM CHLORIDE, PRESERVATIVE FREE 10 ML: 5 INJECTION INTRAVENOUS at 08:27

## 2024-02-25 RX ADMIN — PROMETHAZINE HYDROCHLORIDE 12.5 MG: 25 TABLET ORAL at 17:59

## 2024-02-25 RX ADMIN — SODIUM CHLORIDE, PRESERVATIVE FREE 40 MG: 5 INJECTION INTRAVENOUS at 08:18

## 2024-02-25 RX ADMIN — HEPARIN SODIUM 5000 UNITS: 5000 INJECTION INTRAVENOUS; SUBCUTANEOUS at 08:18

## 2024-02-25 RX ADMIN — SODIUM CHLORIDE, PRESERVATIVE FREE 10 ML: 5 INJECTION INTRAVENOUS at 08:20

## 2024-02-25 RX ADMIN — SODIUM CHLORIDE, POTASSIUM CHLORIDE, SODIUM LACTATE AND CALCIUM CHLORIDE: 600; 310; 30; 20 INJECTION, SOLUTION INTRAVENOUS at 08:26

## 2024-02-25 RX ADMIN — PROMETHAZINE HYDROCHLORIDE 12.5 MG: 25 TABLET ORAL at 14:00

## 2024-02-25 RX ADMIN — TRACE ELEMENTS INJECTION 4: 7.4; .75; 98; 151 INJECTION, SOLUTION INTRAVENOUS at 17:56

## 2024-02-25 NOTE — PROGRESS NOTES
Nephrology Consult Note                                                                                                                                                                                                                                                                                                                                                               Office : [phone]     Fax :[phone]    Patient's Name: Tammy Frederick  10:57 AM  2/25/2024    Reason for Consult:  SUSY   Requesting Physician:  No primary care provider on file.  Chief Complaint:    Chief Complaint   Patient presents with    Emesis       Assessment/Plan     # SUSY-- recurrence   Unclear reason- probably hypovolemic  Less likely ATN   Off IVF   Cr improved   Plan:  Resume fluids   Strict I/O   TPN per surgery   Monitor BMP including in PM       # Anemia  Normocytic anemia   Hb 8.2 lower   No role for SILAS  Normal iron indices  Per primary team     # Acid- base/ Electrolyte imbalance   Hypr- K mild - Resolved   Met acidosis - resolved  Mild hypo-Na- monitor BMP      # ileostomy   Resolved  w/ N/V  Per suregry     History of Present Ilness:    The patient is a 73 y.o. female with PMH of  diverticulitis in December complicated by perforation with intra-abdominal abscess status post exploratory laparotomy and splenic flexure resection with small bowel resection and anastomosis and sigmoidectomy with colostomy.  Since surgery.  Patient was discharged to rehab but readmitted early February for renal failure and intractable nausea vomiting.  An EGD done 2/6/2024 was noted for mild antral erosion and gastritis with normal duodenum.  Gastric emptying study done at that time showed mildly delayed emptying.  She had a follow-up with GI this afternoon and at the office had 3 episodes of nausea vomiting and associated intermittent abdominal pain.  Further interview of the patient reports daily nausea and vomiting and not able to keep  diagnostic tests/interventions    Will be discussed w/  Primary team     Thank you for allowing us to participate in care of Tammy Jordancker   Feel free to contact me,     Naila Spear MD   [facility]  Office : [phone] or through [employer]  Fax :[phone]

## 2024-02-25 NOTE — PROGRESS NOTES
without obvious abnormality, atraumatic  Neck: supple, symmetrical, trachea midline and thyroid not enlarged, symmetric, no tenderness/mass/nodules  CVS:  RRR, Nl s1s2  Lungs CTA Bilaterally, normal effort  Abdomen: soft, NT, ostomy pink  AAOx3, No asterixis or encephalopathy  Extremities: No edema.      Recent Labs     02/22/24  1641 02/23/24  0614 02/24/24  0613   WBC 5.5 2.6* 2.8*   HGB 11.5* 9.2* 8.2*   HCT 34.2* 28.0* 24.7*   MCV 91.0 92.9 92.2    301 236     Recent Labs     02/23/24  0614 02/24/24  0613 02/25/24  0520   * 134* 142   K 5.3* 3.6 3.6    100 109   CO2 20* 22 24   PHOS 3.7 3.4 4.0   BUN 21* 15 12   CREATININE 1.5* 0.8 2.0*     Recent Labs     02/22/24  1641 02/23/24  0614 02/24/24  0613 02/25/24  0520   AST 24 18 14* 15   ALT 14 11 8* 13   BILIDIR <0.2  --  <0.2 <0.2   BILITOT 0.4 0.3 <0.2 <0.2   ALKPHOS 80 61 53 69     Recent Labs     02/22/24  1641   LIPASE 28.0     Recent Labs     02/23/24  0614 02/24/24  0613 02/25/24  0520   PROT 6.1* 5.1* 6.0*     No results for input(s): \"PTT\" in the last 72 hours.  No results for input(s): \"OCCULTBLD\" in the last 72 hours.  C diff negative  Cortisol level normal    Radiology review:   CT A/P without contrast 2/22 => 1.  No acute CT abnormality in the abdomen or pelvis.  2.  Prior partial colectomy with right lower quadrant ostomy. No evidence of bowel obstruction.  3.  4 mm noncalcified left lower lobe pulmonary nodule.    Assessment:     Nausea/ vomiting - improved - additional testing negative  SUSY - resolved  Normocytic anemia - no s/s bleeding    Recommendations:     Diet as tolerated  TPN per surgical team  Will sign off. Please call with further questions.     Almaz Kaufman MD  2/25/2024  10:44 AM

## 2024-02-25 NOTE — PROGRESS NOTES
V2.0    [facility] [place]      Name:  Tammy Frederick /Age/Sex: 1950  (73 y.o. female)   MRN & CSN:  [id number] & [id number] Encounter Date/Time: 2024 12:22 PM EST   Location:  53/5319-01 PCP: No primary care provider on file.     Attending:Lele Chery MD       Hospital Day: 4    Assessment and Recommendations   Tammy Frederick is a 73 y.o. female  PMH of Diverticulitis with perforation and multiple abscesses s/p exploratory laparotomy, takedown of splenic flexure, small bowel resection with anastomosis, appendectomy, sigmoidectomy with low pelvic anastomosis and diverting loop ileostomy by Dr. Robertson on  who was recently admitted at [facility] with weakness/SUSY on 2024 -2024.     Patient was seen in the outpatient resident clinic on .  At that time the patient had complaints of nausea and vomiting which had been ongoing for the past several days.  She had an unintentional weight loss of 7 pounds since  and 20 pounds since 2023.  She was then referred to the emergency department.    In the emergency room patient's creatinine was found to be 1.8 from a baseline of 0.7.  Patient was admitted and general surgery, nephrology and gastroenterology were consulted.      Plan:   Intractable nausea and vomiting with abdominal pain-  history of diverticulitis in December complicated by perforation with intra-abdominal abscess status post exploratory laparotomy and splenic flexure resection with small bowel resection and anastomosis and sigmoidectomy with colostomy.  Pain control, IV fluids.  General surgery recommends TPN.  Acute kidney injury-IV fluids.  Nephrology consulted.  Avoid nephrotoxic medications      Patient now receiving TPN. Monitor labs closely for refeeding syndrome. Creatinine increased today  Diet ADULT ORAL NUTRITION SUPPLEMENT; Breakfast, Lunch, Dinner; Standard High Calorie/High Protein Oral Supplement  PN-Adult Premix  - Standard  Electrolytes - Central Line  ADULT DIET; Regular; 3 carb choices (45 gm/meal)   DVT Prophylaxis [] Lovenox, []  Heparin, [] SCDs, [] Ambulation,  [] Eliquis, [] Xarelto  [] Coumadin   Code Status [id number]   Disposition From: Home  Expected Disposition: Home  Estimated Date of Discharge: 2 to 3-day  Patient requires continued admission due to intractable nausea and vomiting   Surrogate Decision Maker/ POA  Per EMR     Personally reviewed Lab Studies and Imaging   2/25/2024  Phosphorus, magnesium, CBC, CMP reviewed          Subjective:     Chief Complaint: Nausea and vomiting    Tammy Frederick is a 73 y.o. female who presents with a past medical history as detailed above.     On 2/25/2024 patient is resting comfortably.  Reports nausea has improved.       Review of Systems:      Pertinent positives and negatives discussed in HPI    Objective:     Intake/Output Summary (Last 24 hours) at 2/25/2024 0851  Last data filed at 2/25/2024 0555  Gross per 24 hour   Intake 280 ml   Output 950 ml   Net -670 ml        Vitals:   Vitals:    02/24/24 2032 02/25/24 0117 02/25/24 0555 02/25/24 0816   BP: 106/64 100/61 101/64 118/65   Pulse: 71 69 72 74   Resp: 16 16 16 16   Temp: 98.3 °F (36.8 °C) 98.1 °F (36.7 °C) 97.6 °F (36.4 °C) 97.3 °F (36.3 °C)   TempSrc: Oral Oral Oral Oral   SpO2: 98% 97% 92% 100%   Weight:       Height:             Physical Exam:      General: NAD, ill-appearing  ENT: neck supple  Cardiovascular: Regular rate.  Respiratory: Clear to auscultation  Gastrointestinal: Soft, non tender, ostomy in place  Genitourinary: no suprapubic tenderness  Musculoskeletal: No edema  Skin: warm, dry  Neuro: Alert.  Psych: Mood appropriate.         Medications:   Medications:    pantoprazole (PROTONIX) 40 mg in sodium chloride (PF) 0.9 % 10 mL injection  40 mg IntraVENous Daily    sodium chloride flush  5-40 mL IntraVENous 2 times per day    cholestyramine light  1 packet Oral 4x daily    loperamide  2 mg Oral TID    sodium

## 2024-02-25 NOTE — PROGRESS NOTES
[facility] -  Clinical Pharmacy Note    Parenteral Nutrition - Management by Pharmacy    Consult Date(s): 2/23/24  Consulting Provider(s): Dr. Mariah Kramer    Assessment / Plan    Parenteral Nutrition  Day of Therapy: #3  Formulation:  Clinimix-E 5/20  Clinimix Rate:  55 mL/hr (Total volume = 1320mL/day)  Lipids:  20% 250mL over 12 hours on Mon & Thurs only (due to national shortage) - on hold x 10 days per RD recs. Plan to start 3/4 if still on TPN.  Appreciate Clinical Dietitian's recommendations for formulation and goal rate.  Electrolytes:  Clinimix-E includes standard electrolyte formulation.  Recommend further repletion with supplemental IVPBs as needed.  SCr back up today, but K/Phos WNL.  Continue with Clinimix-E for now - will monitor closely.  Maintenance IVF:  n/a  Glucose control:  BG < 180 since admission. No documented hx of DM. Will monitor.  Add MVI 10 mL/day in PN on Mon-Wed-Fri only (due to national shortage) & Trace Elements 1 mL/day in PN daily.  Daily renal panel, daily magnesium, and weekly triglycerides ordered per protocol.  PN will be re-ordered daily.    Please call with questions--  Thanks--  Laverne Coon, PharmD, BCPS, BCGP  [id number] ([facility])   2/25/2024 10:06 AM      Interval update:  Nausea resolved.  Advancing diet.  Remains on TPN for now.  Surgery recommending calorie count.    Subjective/Objective:   Tammy Frederick is a 73 y.o. female with a PMHx significant for arthritis with episode of diverticulitis (Dec 2023) complicated by perforation w/ intra-abdominal abscess s/p ex-lap and splenic fixture resection, small bowel resection. Pt readmitted in early Feb for SUSY and intractable n/v -- EGD showed mild antral erosion, gastritis. Pt readmitted 2/22 with n/v and abdominal pain.    Pharmacy is consulted to manage parenteral nutrition.    Ht Readings from Last 1 Encounters:   02/22/24 1.524 m (5')     Wt Readings from Last 1 Encounters:   02/22/24 42.6 kg (93 lb 14.7

## 2024-02-25 NOTE — PLAN OF CARE
Problem: Discharge Planning  Goal: Discharge to home or other facility with appropriate resources  Outcome: Progressing     Problem: Gastrointestinal - Adult  Goal: Minimal or absence of nausea and vomiting  Outcome: Progressing  Goal: Maintains adequate nutritional intake  Outcome: Progressing  Goal: Establish and maintain optimal ostomy function  Outcome: Progressing     Problem: Metabolic/Fluid and Electrolytes - Adult  Goal: Electrolytes maintained within normal limits  Outcome: Progressing     Problem: Safety - Adult  Goal: Free from fall injury  Outcome: Progressing     Problem: Pain  Goal: Verbalizes/displays adequate comfort level or baseline comfort level  Outcome: Progressing     Problem: Nutrition Deficit:  Goal: Optimize nutritional status  Outcome: Progressing

## 2024-02-26 LAB
ALBUMIN SERPL-MCNC: 3.1 G/DL (ref 3.4–5)
ALP SERPL-CCNC: 58 U/L (ref 40–129)
ALT SERPL-CCNC: <5 U/L (ref 10–40)
ANION GAP SERPL CALCULATED.3IONS-SCNC: 9 MMOL/L (ref 3–16)
AST SERPL-CCNC: 10 U/L (ref 15–37)
BILIRUB DIRECT SERPL-MCNC: <0.2 MG/DL (ref 0–0.3)
BILIRUB INDIRECT SERPL-MCNC: ABNORMAL MG/DL (ref 0–1)
BILIRUB SERPL-MCNC: <0.2 MG/DL (ref 0–1)
BUN SERPL-MCNC: 24 MG/DL (ref 7–20)
CALCIUM SERPL-MCNC: 8.8 MG/DL (ref 8.3–10.6)
CHLORIDE SERPL-SCNC: 102 MMOL/L (ref 99–110)
CO2 SERPL-SCNC: 23 MMOL/L (ref 21–32)
CREAT SERPL-MCNC: 0.6 MG/DL (ref 0.6–1.2)
GFR SERPLBLD CREATININE-BSD FMLA CKD-EPI: >60 ML/MIN/{1.73_M2}
GLUCOSE BLD-MCNC: 116 MG/DL (ref 70–99)
GLUCOSE BLD-MCNC: 138 MG/DL (ref 70–99)
GLUCOSE SERPL-MCNC: 136 MG/DL (ref 70–99)
MAGNESIUM SERPL-MCNC: 1.6 MG/DL (ref 1.8–2.4)
PERFORMED ON: ABNORMAL
PERFORMED ON: ABNORMAL
PHOSPHATE SERPL-MCNC: 4.1 MG/DL (ref 2.5–4.9)
POTASSIUM SERPL-SCNC: 4.4 MMOL/L (ref 3.5–5.1)
PROT SERPL-MCNC: 5 G/DL (ref 6.4–8.2)
SODIUM SERPL-SCNC: 134 MMOL/L (ref 136–145)

## 2024-02-26 PROCEDURE — 99232 SBSQ HOSP IP/OBS MODERATE 35: CPT | Performed by: INTERNAL MEDICINE

## 2024-02-26 PROCEDURE — A4216 STERILE WATER/SALINE, 10 ML: HCPCS | Performed by: NURSE PRACTITIONER

## 2024-02-26 PROCEDURE — 2580000003 HC RX 258: Performed by: NURSE PRACTITIONER

## 2024-02-26 PROCEDURE — 84100 ASSAY OF PHOSPHORUS: CPT

## 2024-02-26 PROCEDURE — 83735 ASSAY OF MAGNESIUM: CPT

## 2024-02-26 PROCEDURE — 6360000002 HC RX W HCPCS: Performed by: INTERNAL MEDICINE

## 2024-02-26 PROCEDURE — 2580000003 HC RX 258: Performed by: INTERNAL MEDICINE

## 2024-02-26 PROCEDURE — 1200000000 HC SEMI PRIVATE

## 2024-02-26 PROCEDURE — 36415 COLL VENOUS BLD VENIPUNCTURE: CPT

## 2024-02-26 PROCEDURE — C9113 INJ PANTOPRAZOLE SODIUM, VIA: HCPCS | Performed by: NURSE PRACTITIONER

## 2024-02-26 PROCEDURE — 6370000000 HC RX 637 (ALT 250 FOR IP): Performed by: INTERNAL MEDICINE

## 2024-02-26 PROCEDURE — 80048 BASIC METABOLIC PNL TOTAL CA: CPT

## 2024-02-26 PROCEDURE — 2500000003 HC RX 250 WO HCPCS

## 2024-02-26 PROCEDURE — 80076 HEPATIC FUNCTION PANEL: CPT

## 2024-02-26 PROCEDURE — 6360000002 HC RX W HCPCS: Performed by: NURSE PRACTITIONER

## 2024-02-26 PROCEDURE — 2580000003 HC RX 258

## 2024-02-26 RX ORDER — MAGNESIUM SULFATE IN WATER 40 MG/ML
2000 INJECTION, SOLUTION INTRAVENOUS ONCE
Status: COMPLETED | OUTPATIENT
Start: 2024-02-26 | End: 2024-02-26

## 2024-02-26 RX ADMIN — SODIUM CHLORIDE, PRESERVATIVE FREE 10 ML: 5 INJECTION INTRAVENOUS at 08:03

## 2024-02-26 RX ADMIN — LOPERAMIDE HYDROCHLORIDE 2 MG: 2 CAPSULE ORAL at 08:02

## 2024-02-26 RX ADMIN — ASCORBIC ACID, VITAMIN A PALMITATE, CHOLECALCIFEROL, THIAMINE HYDROCHLORIDE, RIBOFLAVIN-5 PHOSPHATE SODIUM, PYRIDOXINE HYDROCHLORIDE, NIACINAMIDE, DEXPANTHENOL, ALPHA-TOCOPHEROL ACETATE, VITAMIN K1, FOLIC ACID, BIOTIN, CYANOCOBALAMIN: 200; 3300; 200; 6; 3.6; 6; 40; 15; 10; 150; 600; 60; 5 INJECTION, SOLUTION INTRAVENOUS at 17:31

## 2024-02-26 RX ADMIN — HEPARIN SODIUM 5000 UNITS: 5000 INJECTION INTRAVENOUS; SUBCUTANEOUS at 08:02

## 2024-02-26 RX ADMIN — SODIUM CHLORIDE, PRESERVATIVE FREE 40 MG: 5 INJECTION INTRAVENOUS at 08:03

## 2024-02-26 RX ADMIN — CHOLESTYRAMINE 4 G: 4 POWDER, FOR SUSPENSION ORAL at 13:50

## 2024-02-26 RX ADMIN — PROMETHAZINE HYDROCHLORIDE 12.5 MG: 25 TABLET ORAL at 16:12

## 2024-02-26 RX ADMIN — HEPARIN SODIUM 5000 UNITS: 5000 INJECTION INTRAVENOUS; SUBCUTANEOUS at 23:15

## 2024-02-26 RX ADMIN — CHOLESTYRAMINE 4 G: 4 POWDER, FOR SUSPENSION ORAL at 17:24

## 2024-02-26 RX ADMIN — LOPERAMIDE HYDROCHLORIDE 2 MG: 2 CAPSULE ORAL at 13:50

## 2024-02-26 RX ADMIN — LOPERAMIDE HYDROCHLORIDE 2 MG: 2 CAPSULE ORAL at 17:24

## 2024-02-26 RX ADMIN — MAGNESIUM SULFATE IN WATER 2000 MG: 40 INJECTION, SOLUTION INTRAVENOUS at 08:02

## 2024-02-26 RX ADMIN — CHOLESTYRAMINE 4 G: 4 POWDER, FOR SUSPENSION ORAL at 23:13

## 2024-02-26 RX ADMIN — CHOLESTYRAMINE 4 G: 4 POWDER, FOR SUSPENSION ORAL at 08:02

## 2024-02-26 NOTE — PROGRESS NOTES
Comprehensive Nutrition Assessment    RECOMMENDATIONS:  PO Diet: Continue Regular; 3 carb choices  ONS: Modify to Ensure +HP bid  Nutrition Education: Education not indicated   Continue with Calorie Count  Nutrition Support; Continue   Clinimix 5/20 @ 55 ml/hr  Lipids 20% 250 ml M/Th(begin3/4)    NUTRITION ASSESSMENT:   Nutritional summary & status: Follow-up. Pt tolerating Clinimix 5/20 @ goal rate of 55 ml/hr. Receiving a Regular 3 carb choice diet. Calorie Count initiated on 2/24, however, only 1 meal ticket saved. Continue with [address] per NP and will reassess adequacy of po intake in next 24 hrs to determine continued need for TPN. Pt with no c/o's n/v; stating that her nausea is controlled now with  phenergan. Reports good tolerance to po diet; dislikes Ensure supplement. Will decrease to bid in hopes of increased acceptance.   Admission // PMH: SUSY \\ arthritis    MALNUTRITION ASSESSMENT  Context of Malnutrition: Acute Illness   Malnutrition Status: Severe malnutrition  Findings of the 6 clinical characteristics of malnutrition (Minimum of 2 out of 6 clinical characteristics is required to make the diagnosis of moderate or severe Protein Calorie Malnutrition based on AND/ASPEN Guidelines):  Energy Intake:  75% or less of estimated energy requirements for 7 or more days  Weight Loss:  5% over 1 month     Body Fat Loss:  Mild body fat loss Orbital   Muscle Mass Loss:  Moderate muscle mass loss Temples (temporalis), Clavicles (pectoralis & deltoids)  Fluid Accumulation:  No significant fluid accumulation     Strength:  Not Performed    NUTRITION DIAGNOSIS   Severe malnutrition related to altered GI function, catabolic illness as evidenced by intake 0-25%, weight loss, moderate muscle loss, mild loss of subcutaneous fat    Nutrition Monitoring and Evaluation:   Food/Nutrient Intake Outcomes:  Food and Nutrient Intake, Supplement Intake, Parenteral Nutrition Intake/Tolerance  Physical Signs/Symptoms Outcomes:

## 2024-02-26 NOTE — PROGRESS NOTES
[facility] -  Clinical Pharmacy Note    Parenteral Nutrition - Management by Pharmacy    Consult Date(s): 2/23/24  Consulting Provider(s): Dr. Mariah Kramer    Assessment / Plan    Parenteral Nutrition  Day of Therapy: #4  Formulation:  Clinimix-E 5/20  Clinimix Rate:  55 mL/hr (Total volume = 1320mL/day)  Lipids:  20% 250mL over 12 hours on Mon & Thurs only (due to national shortage) - on hold x 10 days per RD recs. Plan to start 3/4 if still on TPN.  Appreciate Clinical Dietitian's recommendations for formulation and goal rate.  Electrolytes:  Clinimix-E includes standard electrolyte formulation.  Recommend further repletion with supplemental IVPBs as needed.  SCr fluctuating (2.0-->0.6 today), but K/Phos WNL - will monitor closely.  Maintenance IVF:  n/a  Glucose control:  BG < 180 since admission. No documented hx of DM. Will monitor.  Add MVI 10 mL/day in PN on Mon-Wed-Fri only (due to national shortage) & Trace Elements 1 mL/day in PN daily.  Daily renal panel, daily magnesium, and weekly triglycerides ordered per protocol.  PN will be re-ordered daily.    Please call with questions--  Thanks--  Laverne Coon, PharmD, BCPS, BCGP  [id number] ([facility])   2/26/2024 8:16 AM      Interval update:  Nausea resolved.  Advancing diet.  Remains on TPN for now.  Surgery recommending calorie count, but signed off.    Subjective/Objective:   Tammy Frederick is a 73 y.o. female with a PMHx significant for arthritis with episode of diverticulitis (Dec 2023) complicated by perforation w/ intra-abdominal abscess s/p ex-lap and splenic fixture resection, small bowel resection. Pt readmitted in early Feb for SUSY and intractable n/v -- EGD showed mild antral erosion, gastritis. Pt readmitted 2/22 with n/v and abdominal pain.    Pharmacy is consulted to manage parenteral nutrition.    Ht Readings from Last 1 Encounters:   02/22/24 1.524 m (5')     Wt Readings from Last 1 Encounters:   02/22/24 42.6 kg (93 lb 14.7 oz)

## 2024-02-26 NOTE — PROGRESS NOTES
NUTRITION NOTE: Calorie Count      Diet Orders / Intake / Nutrition Support  Current diet/supplement order: ADULT ORAL NUTRITION SUPPLEMENT; Breakfast, Lunch, Dinner; Standard High Calorie/High Protein Oral Supplement  ADULT DIET; Regular; 3 carb choices (45 gm/meal)  PN-Adult Premix 5/20 - Standard Electrolytes - Central Line  PN-Adult Premix 5/20 - Standard Electrolytes - Central Line       COMPARATIVE STANDARDS  Energy (kcal):  0774-7334 (0331-2145); Weight Used for Energy Requirements:  Current     Protein (g):  64-76 (1.5-1.8); Weight Used for Protein Requirements:  Current        Fluid (ml/day):  1500 ml min; Method Used for Fluid Requirements:  1 ml/kcal      Date Consumed PO Intake Kcal %   Kcal met PO Intake grams protein %  Protein met   Comments   2-24-24  ( 1 meal recorded) 455 42% 25 39% D:50%Ensure +HP, 50% Rotisserie Chicken, 20% Green Beans, 100% Mashed Potatoes, 100% Brown  Gravy                                   **Results will be posted as available.     Jonathan Perry RD  [place]:  [phone]  Office:  [phone]

## 2024-02-26 NOTE — PLAN OF CARE
Problem: Gastrointestinal - Adult  Goal: Minimal or absence of nausea and vomiting  2/26/2024 0317 by Allyn Hernandez, RN  Outcome: Progressing  2/25/2024 1505 by Leena Douglas RN  Outcome: Progressing     Problem: Gastrointestinal - Adult  Goal: Maintains adequate nutritional intake  2/26/2024 0317 by Allyn Hernandez, RN  Outcome: Progressing  2/25/2024 1505 by Leena Douglas, RN  Outcome: Progressing

## 2024-02-26 NOTE — PROGRESS NOTES
25 mg, Q4H PRN  loperamide (IMODIUM) capsule 2 mg, TID  sodium chloride flush 0.9 % injection 5-40 mL, 2 times per day  sodium chloride flush 0.9 % injection 5-40 mL, PRN  0.9 % sodium chloride infusion, PRN  heparin (porcine) injection 5,000 Units, BID  acetaminophen (TYLENOL) tablet 650 mg, Q6H PRN   Or  acetaminophen (TYLENOL) suppository 650 mg, Q6H PRN  promethazine (PHENERGAN) tablet 12.5 mg, Q4H PRN   Or  ondansetron (ZOFRAN) injection 4 mg, Q6H PRN        Review of Systems:   14 point ROS obtained but were negative except mentioned in HPI      Physical exam:     Vitals:  /66   Pulse 74   Temp 98 °F (36.7 °C) (Oral)   Resp 17   Ht 1.524 m (5')   Wt 42.6 kg (93 lb 14.7 oz)   SpO2 98%   BMI 18.34 kg/m²   Constitutional:  OAA X3 NAD Yes  Skin: no rash, turgor wnl  Heent:  eomi, mmm  Neck: no bruits or jvd noted  Cardiovascular:  S1, S2 without m/r/g  Respiratory: CTA B without w/r/r  Abdomen:  , soft, nt, nd, ileostomy - good drainage   Ext:  lower extremity edema No  Psychiatric: mood and affect normal   Musculoskeletal:  Rom, muscular strength intact    Data:   Labs:  CBC:   Recent Labs     02/24/24  0613   WBC 2.8*   HGB 8.2*        BMP:    Recent Labs     02/24/24  0613 02/25/24  0520 02/26/24  0500   * 142 134*   K 3.6 3.6 4.4    109 102   CO2 22 24 23   BUN 15 12 24*   CREATININE 0.8 2.0* 0.6   GLUCOSE 121* 86 136*     Ca/Mg/Phos:   Recent Labs     02/24/24  0613 02/25/24  0520 02/26/24  0500   CALCIUM 8.4 8.7 8.8   MG 2.30 1.90 1.60*   PHOS 3.4 4.0 4.1     Hepatic:   Recent Labs     02/24/24  0613 02/25/24  0520 02/26/24  0500   AST 14* 15 10*   ALT 8* 13 <5*   BILITOT <0.2 <0.2 <0.2   ALKPHOS 53 69 58     Troponin: No results for input(s): \"TROPONINI\" in the last 72 hours.  BNP: No results for input(s): \"BNP\" in the last 72 hours.  Lipids:   Recent Labs     02/24/24  0613   TRIG 311*     ABGs: No results for input(s): \"PHART\", \"PO2ART\", \"RQP7WTL\" in the last 72  hours.  INR: No results for input(s): \"INR\" in the last 72 hours.  UA:  Recent Labs     02/23/24  1654   COLORU Yellow   CLARITYU Clear   GLUCOSEU Negative   BILIRUBINUR Negative   KETUA Negative   SPECGRAV >=1.030   BLOODU Negative   PHUR 6.0   PROTEINU Negative   UROBILINOGEN 0.2   NITRU Negative   LEUKOCYTESUR SMALL*   URINETYPE NotGiven      Urine Microscopic:   Recent Labs     02/23/24  1654   BACTERIA 1+*   WBCUA 6-9*   RBCUA None seen   EPIU 0-1     Urine Culture: No results for input(s): \"LABURIN\" in the last 72 hours.  Urine Chemistry:   Recent Labs     02/23/24  1654   LABCREA 122.6   PROTEINUR 14.00*   NAUR <20         IMAGING:  CT ABDOMEN PELVIS WO CONTRAST Additional Contrast? Radiologist Recommendation   Final Result      1.  No acute CT abnormality in the abdomen or pelvis.   2.  Prior partial colectomy with right lower quadrant ostomy. No evidence of bowel obstruction.   3.  4 mm noncalcified left lower lobe pulmonary nodule. Single Solid lung nodule < 6 mm:  In a low-risk patient, no routine follow-up imaging is recommended.  In a high-risk patient, a non-contrast Chest CT at 12 months is optional. If performed and the    nodule is stable at 12 months, no further follow-up is recommended.        These guidelines do not apply to patients younger than 35 years, immunocompromised patients, and patients with cancer. F/u in patients with significant comorbidities as clinically warranted. For lung cancer screening, adhere to Lung-RADS guidelines.     Reference:  Radiology. 2017 Jul; 284(1):228-43            Electronically signed by Chinmay Foster MD            Medical Decision Making:  The following items were considered in medical decision making:  Discussion of patient care with other providers  Reviewed clinical lab tests  Reviewed other diagnostic tests/interventions    Will be discussed w/  Primary team     Thank you for allowing us to participate in care of Tammy Frederick   Feel free to contact me,

## 2024-02-26 NOTE — PROGRESS NOTES
V2.0    [facility] Progress Note      Name:  Tammy Frederick /Age/Sex: 1950  (73 y.o. female)   MRN & CSN:  [id number] & [id number] Encounter Date/Time: 2024 12:22 PM EST   Location:  5319/5319-01 PCP: No primary care provider on file.     Attending:Lele Chery MD       Hospital Day: 5    Assessment and Recommendations   Tammy Frederick is a 73 y.o. female  PMH of Diverticulitis with perforation and multiple abscesses s/p exploratory laparotomy, takedown of splenic flexure, small bowel resection with anastomosis, appendectomy, sigmoidectomy with low pelvic anastomosis and diverting loop ileostomy by Dr. Robertson on  who was recently admitted at [facility] with weakness/SUSY on 2024 -2024.     Patient was seen in the outpatient resident clinic on .  At that time the patient had complaints of nausea and vomiting which had been ongoing for the past several days.  She had an unintentional weight loss of 7 pounds since  and 20 pounds since 2023.  She was then referred to the emergency department.    In the emergency room patient's creatinine was found to be 1.8 from a baseline of 0.7.  Patient was admitted and general surgery, nephrology and gastroenterology were consulted.      Plan:   Intractable nausea and vomiting with abdominal pain-  history of diverticulitis in December complicated by perforation with intra-abdominal abscess status post exploratory laparotomy and splenic flexure resection with small bowel resection and anastomosis and sigmoidectomy with colostomy.  Pain control, IV fluids.  General surgery recommends TPN.  Acute kidney injury-IV fluids.  Nephrology consulted.  Avoid nephrotoxic medications      Patient N&V improved.  Discussed with dietician today.  Dietary will observe calorie count over the next 24 hours and will then have recommendations concerning if the patient will need to continue TPN at home  Diet ADULT ORAL NUTRITION SUPPLEMENT; Breakfast,  right lower quadrant ileostomy. Nondilated bowel with no wall thickening. Bladder: Unremarkable. Reproductive organs: Surgically absent uterus. No adnexal masses. Lymph nodes: Unremarkable. Peritoneum: Unremarkable. Vessels: Unremarkable. Abdominal wall: Postsurgical changes in the anterior abdominal wall. Bones: Left hip arthroplasty with associated streak artifact. Degenerative changes of the spine.     1.  No acute CT abnormality in the abdomen or pelvis. 2.  Prior partial colectomy with right lower quadrant ostomy. No evidence of bowel obstruction. 3.  4 mm noncalcified left lower lobe pulmonary nodule. Single Solid lung nodule < 6 mm:  In a low-risk patient, no routine follow-up imaging is recommended.  In a high-risk patient, a non-contrast Chest CT at 12 months is optional. If performed and the nodule is stable at 12 months, no further follow-up is recommended.  These guidelines do not apply to patients younger than 35 years, immunocompromised patients, and patients with cancer. F/u in patients with significant comorbidities as clinically warranted. For lung cancer screening, adhere to Lung-RADS guidelines.  Reference:  Radiology. 2017 Jul; 284(1):228-77 Electronically signed by Chinmay Foster MD      CBC:   Recent Labs     02/24/24 0613   WBC 2.8*   HGB 8.2*          BMP:    Recent Labs     02/24/24  0613 02/25/24  0520 02/26/24  0500   * 142 134*   K 3.6 3.6 4.4    109 102   CO2 22 24 23   BUN 15 12 24*   CREATININE 0.8 2.0* 0.6   GLUCOSE 121* 86 136*       Hepatic:   Recent Labs     02/24/24  0613 02/25/24  0520 02/26/24  0500   AST 14* 15 10*   ALT 8* 13 <5*   BILITOT <0.2 <0.2 <0.2   ALKPHOS 53 69 58       Lipids:   Lab Results   Component Value Date/Time    TRIG 311 02/24/2024 06:13 AM     Hemoglobin A1C:   Lab Results   Component Value Date/Time    LABA1C 6.0 02/08/2024 05:17 AM     TSH: No results found for: \"TSH\"  Troponin: No results found for: \"TROPONINT\"  Lactic Acid:   No results

## 2024-02-27 LAB
ANION GAP SERPL CALCULATED.3IONS-SCNC: 8 MMOL/L (ref 3–16)
BUN SERPL-MCNC: 22 MG/DL (ref 7–20)
CALCIUM SERPL-MCNC: 9 MG/DL (ref 8.3–10.6)
CHLORIDE SERPL-SCNC: 104 MMOL/L (ref 99–110)
CO2 SERPL-SCNC: 24 MMOL/L (ref 21–32)
CREAT SERPL-MCNC: 0.9 MG/DL (ref 0.6–1.2)
GFR SERPLBLD CREATININE-BSD FMLA CKD-EPI: >60 ML/MIN/{1.73_M2}
GLUCOSE BLD-MCNC: 111 MG/DL (ref 70–99)
GLUCOSE BLD-MCNC: 130 MG/DL (ref 70–99)
GLUCOSE BLD-MCNC: 143 MG/DL (ref 70–99)
GLUCOSE BLD-MCNC: 174 MG/DL (ref 70–99)
GLUCOSE BLD-MCNC: 86 MG/DL (ref 70–99)
GLUCOSE SERPL-MCNC: 126 MG/DL (ref 70–99)
MAGNESIUM SERPL-MCNC: 1.6 MG/DL (ref 1.8–2.4)
PERFORMED ON: ABNORMAL
PERFORMED ON: NORMAL
PHOSPHATE SERPL-MCNC: 4 MG/DL (ref 2.5–4.9)
POTASSIUM SERPL-SCNC: 4.5 MMOL/L (ref 3.5–5.1)
SODIUM SERPL-SCNC: 136 MMOL/L (ref 136–145)

## 2024-02-27 PROCEDURE — 2580000003 HC RX 258

## 2024-02-27 PROCEDURE — 80048 BASIC METABOLIC PNL TOTAL CA: CPT

## 2024-02-27 PROCEDURE — 2580000003 HC RX 258: Performed by: NURSE PRACTITIONER

## 2024-02-27 PROCEDURE — 6360000002 HC RX W HCPCS: Performed by: INTERNAL MEDICINE

## 2024-02-27 PROCEDURE — 84100 ASSAY OF PHOSPHORUS: CPT

## 2024-02-27 PROCEDURE — 1200000000 HC SEMI PRIVATE

## 2024-02-27 PROCEDURE — 2580000003 HC RX 258: Performed by: INTERNAL MEDICINE

## 2024-02-27 PROCEDURE — 6370000000 HC RX 637 (ALT 250 FOR IP): Performed by: INTERNAL MEDICINE

## 2024-02-27 PROCEDURE — 6360000002 HC RX W HCPCS: Performed by: NURSE PRACTITIONER

## 2024-02-27 PROCEDURE — 83735 ASSAY OF MAGNESIUM: CPT

## 2024-02-27 PROCEDURE — 97116 GAIT TRAINING THERAPY: CPT

## 2024-02-27 PROCEDURE — C9113 INJ PANTOPRAZOLE SODIUM, VIA: HCPCS | Performed by: NURSE PRACTITIONER

## 2024-02-27 PROCEDURE — 99232 SBSQ HOSP IP/OBS MODERATE 35: CPT | Performed by: INTERNAL MEDICINE

## 2024-02-27 RX ORDER — MAGNESIUM SULFATE IN WATER 40 MG/ML
2000 INJECTION, SOLUTION INTRAVENOUS ONCE
Status: COMPLETED | OUTPATIENT
Start: 2024-02-27 | End: 2024-02-27

## 2024-02-27 RX ADMIN — HEPARIN SODIUM 5000 UNITS: 5000 INJECTION INTRAVENOUS; SUBCUTANEOUS at 20:26

## 2024-02-27 RX ADMIN — SODIUM CHLORIDE, PRESERVATIVE FREE 10 ML: 5 INJECTION INTRAVENOUS at 08:13

## 2024-02-27 RX ADMIN — LOPERAMIDE HYDROCHLORIDE 2 MG: 2 CAPSULE ORAL at 16:50

## 2024-02-27 RX ADMIN — LOPERAMIDE HYDROCHLORIDE 2 MG: 2 CAPSULE ORAL at 08:13

## 2024-02-27 RX ADMIN — CHOLESTYRAMINE 4 G: 4 POWDER, FOR SUSPENSION ORAL at 08:13

## 2024-02-27 RX ADMIN — SODIUM CHLORIDE, PRESERVATIVE FREE 10 ML: 5 INJECTION INTRAVENOUS at 20:29

## 2024-02-27 RX ADMIN — HEPARIN SODIUM 5000 UNITS: 5000 INJECTION INTRAVENOUS; SUBCUTANEOUS at 08:13

## 2024-02-27 RX ADMIN — SODIUM CHLORIDE, PRESERVATIVE FREE 40 MG: 5 INJECTION INTRAVENOUS at 08:12

## 2024-02-27 RX ADMIN — MAGNESIUM SULFATE HEPTAHYDRATE 2000 MG: 40 INJECTION, SOLUTION INTRAVENOUS at 08:20

## 2024-02-27 RX ADMIN — LOPERAMIDE HYDROCHLORIDE 2 MG: 2 CAPSULE ORAL at 12:12

## 2024-02-27 RX ADMIN — CHOLESTYRAMINE 4 G: 4 POWDER, FOR SUSPENSION ORAL at 16:50

## 2024-02-27 RX ADMIN — SODIUM CHLORIDE, PRESERVATIVE FREE 10 ML: 5 INJECTION INTRAVENOUS at 20:27

## 2024-02-27 RX ADMIN — CHOLESTYRAMINE 4 G: 4 POWDER, FOR SUSPENSION ORAL at 12:12

## 2024-02-27 RX ADMIN — CHOLESTYRAMINE 4 G: 4 POWDER, FOR SUSPENSION ORAL at 20:40

## 2024-02-27 NOTE — CONSULTS
Clinical Pharmacy Progress Note    TPN has been discontinued. Will sign off pharmacy to dose TPN consult.  If medication is restarted and pharmacy is to manage dosing, please re-consult at that time.    Please call with questions--  Thanks--  Laverne Coon, DagoD, BCPS, [facility]  [id number] ([facility])   2/27/2024 2:05 PM

## 2024-02-27 NOTE — PROGRESS NOTES
02/27/24 1143   Encounter Summary   Encounter Overview/Reason  Initial Encounter   Service Provided For: Patient   Support System Family members   Last Encounter  02/27/24  (CV-MSR)   Complexity of Encounter Low   Begin Time 1120   End Time  1145   Total Time Calculated 25 min   Spiritual/Emotional needs   Type Spiritual Support;Emotional Distress   Assessment/Intervention/Outcome   Assessment Angry   Intervention Active listening;Discussed belief system/Sikhism practices/andrade;Empowerment   Outcome Connection/Belonging;Coping;Engaged in conversation   Plan and Referrals   Plan/Referrals Continue to visit, (comment)      Note:  The  visited with pt to offer emotional and spiritual support. The pt shared her feelings around not being hear,  offered support through listening. Pt was raised Faith and uses her spirituality to support her through prayer. The pt explored her feelings and support while in hospital, at the pts request  shared a supportive prayer. The pt will continue to be offered emotional and spiritual support, with a planned visit on 2/28/24.    Rev. Margarita Harris Mdiv., BCC

## 2024-02-27 NOTE — PROGRESS NOTES
Alert and oriented x4, VSS. UAL. Ambulating in room, to restroom and in  hallway with no issues. Increased appetite today tolerating a regular diet and continuing calorie count. Ostomy draining and CDI. Denies any pain. Call light is within reach.     Electronically signed by Jeanne Wang RN on 2/27/2024 at 5:18 PM       Per SMS diagnosis code is not specific enough to pass medicare. Will need to Call and update Diagnosis code.

## 2024-02-27 NOTE — PROGRESS NOTES
Patient transferred from [address] [place] [address], room 5319 to 3312 at 18:25. No acute concerns noted. Patient alert and oriented X4. Denies being in pain/discomfort. Patient calm and cooperative. Safety measures in place to prevent falls/injuries.

## 2024-02-27 NOTE — PROGRESS NOTES
Nephrology Consult Note                                                                                                                                                                                                                                                                                                                                                               Office : [phone]     Fax :[phone]    Patient's Name: Tammy Frederick  10:36 AM  2/27/2024    Reason for Consult:  DEMI   Requesting Physician:  No primary care provider on file.  Chief Complaint:    Chief Complaint   Patient presents with    Emesis       Assessment/Plan     # DEMI-- resolved   Demi resolved   Pt feels well   Off IVF , on TPN  Cr improved   Plan:  Strict I/O   TPN per surgery   Monitor BMP including in PM   4. No nephrotoxins       # Anemia  Normocytic anemia   Hb 8.2 lower 2/24   No role for SILAS  Normal iron indices  Per primary team     # Acid- base/ Electrolyte imbalance   Hypr- K mild - Resolved   Met acidosis - resolved  Mild hypo-Na- monitor BMP      # ileostomy   Resolved  w/ N/V  Per suregry     History of Present Ilness:    The patient is a 73 y.o. female with PMH of  diverticulitis in December complicated by perforation with intra-abdominal abscess status post exploratory laparotomy and splenic flexure resection with small bowel resection and anastomosis and sigmoidectomy with colostomy.  Since surgery.  Patient was discharged to rehab but readmitted early February for renal failure and intractable nausea vomiting.  An EGD done 2/6/2024 was noted for mild antral erosion and gastritis with normal duodenum.  Gastric emptying study done at that time showed mildly delayed emptying.  She had a follow-up with GI this afternoon and at the office had 3 episodes of nausea vomiting and associated intermittent abdominal pain.  Further interview of the patient reports daily nausea and vomiting and not able to keep anything

## 2024-02-27 NOTE — PROGRESS NOTES
Physical Therapy  Daily Treatment Note    Discharge Recommendation:  Home with assist PRN; Home PT  Equipment Needs:  None    Assessment:  Pt with improved activity tolerance this session, although she reported being fatigued after long walk in [place]. No LOB with ambulation. Plan is for home at D/C. Pt would benefit from continued PT to work on higher level balance and work toward stronger gait. No DME needs.     HOME HEALTH CARE: LEVEL 1 STANDARD  - Initial home health evaluation to occur within 24-48 hours, in patient home   - Therapy to evaluate with goal of regaining prior level of functioning   - Therapy to evaluate if patient has Home Health Aide needs for personal care    Chart Reviewed: Yes     Other position/activity restrictions: up as tolerated   Additional Pertinent Hx: Adm 2/22 with nausea & vomiting. PMH of Diverticulitis with perforation and multiple abscesses s/p exploratory laparotomy, takedown of splenic flexure, small bowel resection with anastomosis, appendectomy, sigmoidectomy with low pelvic anastomosis and diverting loop ileostomy by Dr. Robertson on 12/28 who was admitted with weakness/SUSY  on 2/1/2024 -2/11/2024.  CT abd/pelvis: no acute abnormality      Diagnosis: intractable nausea & vomiting   Treatment Diagnosis: impaired mobility    Subjective: Pt in chair initially. Agreeable to working with PT.   Hopes to go home soon. \"The kidney doctor cleared me. But I don't know about the other ones.\"    Pain: Denied    Objective:    Transfers  Sit to stand: Independent from chair  Stand to sit: Independent into chair    Ambulation  Assistance Level: SBA  Assistive device: None  Distance: ~400 ft in [place]  Quality of gait: Wide FARHAT; minimal arm swing; decreased pace; no LOB    Patient Education  Role of PT. Expressed understanding.     Safety Devices  Pt left with needs in reach. In chair. RN updated. No chair alarm necessary per RN.     AM-PAC score  AM-PAC Inpatient Mobility Raw Score : 22  AM-PAC  Inpatient T-Scale Score : 53.28  Mobility Inpatient CMS 0-100% Score: 20.91  Mobility Inpatient CMS G-Code Modifier : CJ    Goals: (as determined and assessed by primary PT)  Time Frame for Short Term Goals: dc  Short Term Goal 1: Supine<>sit indep   Short Term Goal 2: Sit<>stand indep   Short Term Goal 3: Ambulate 200' with RW mod I.          Plan:  Plan: 2-5x/week  Current Treatment Recommendations: Functional mobility training, Balance training, Gait training, Patient/Caregiver education & training, Endurance training, Transfer training, Strengthening    Therapy Time    Individual  Concurrent  Group  Co-treatment    Time In  1004            Time Out  1019            Minutes  15              Timed Code Treatment Minutes: 15  Total Treatment Minutes: 15    Will continue per plan of care.   If patient is discharged prior to next treatment, this note will serve as the discharge summary.    Martha Jacques, PTA #[id number]

## 2024-02-27 NOTE — PLAN OF CARE
Problem: Gastrointestinal - Adult  Goal: Minimal or absence of nausea and vomiting  Outcome: Progressing  Goal: Maintains adequate nutritional intake  Outcome: Progressing     Problem: Gastrointestinal - Adult  Goal: Maintains adequate nutritional intake  Outcome: Progressing

## 2024-02-27 NOTE — PROGRESS NOTES
V2.0    [facility] Progress Note      Name:  Tammy Frederick /Age/Sex: 1950  (73 y.o. female)   MRN & CSN:  [id number] & [id number] Encounter Date/Time: 2024 12:22 PM EST   Location:  [facility]/5319-01 PCP: No primary care provider on file.     Attending:Rosalinda Chery MD       Hospital Day: 6    Assessment and Recommendations   Tammy Frederick is a 73 y.o. female  PMH of Diverticulitis with perforation and multiple abscesses s/p exploratory laparotomy, takedown of splenic flexure, small bowel resection with anastomosis, appendectomy, sigmoidectomy with low pelvic anastomosis and diverting loop ileostomy by Dr. Robertson on  who was recently admitted at [facility] with weakness/SUSY on 2024 -2024.     Patient was seen in the outpatient resident clinic on .  At that time the patient had complaints of nausea and vomiting which had been ongoing for the past several days.  She had an unintentional weight loss of 7 pounds since  and 20 pounds since 2023.  She was then referred to the emergency department.    In the emergency room patient's creatinine was found to be 1.8 from a baseline of 0.7.  Patient was admitted and general surgery, nephrology and gastroenterology were consulted.      Plan:   Intractable nausea and vomiting with abdominal pain-  history of diverticulitis in December complicated by perforation with intra-abdominal abscess status post exploratory laparotomy and splenic flexure resection with small bowel resection and anastomosis and sigmoidectomy with colostomy.  Pain control, IV fluids.  General surgery recommends TPN.  Discussed with dietitian patient has a good appetite she has been keeping everything down without any further nausea and vomiting.  TPN discontinued today per recommendation of dietary  Acute kidney injury-IV fluids.  Nephrology consulted.  Avoid nephrotoxic medications; resolved at this time.  Strict RAGHAV's monitor vamp      Patient N&V  No results for input(s): \"PROBNP\" in the last 72 hours.  UA:  Lab Results   Component Value Date/Time    NITRU Negative 02/23/2024 04:54 PM    COLORU Yellow 02/23/2024 04:54 PM    PHUR 6.0 02/23/2024 04:54 PM    WBCUA 6-9 02/23/2024 04:54 PM    RBCUA None seen 02/23/2024 04:54 PM    BACTERIA 1+ 02/23/2024 04:54 PM    CLARITYU Clear 02/23/2024 04:54 PM    SPECGRAV >=1.030 02/23/2024 04:54 PM    LEUKOCYTESUR SMALL 02/23/2024 04:54 PM    UROBILINOGEN 0.2 02/23/2024 04:54 PM    BILIRUBINUR Negative 02/23/2024 04:54 PM    BLOODU Negative 02/23/2024 04:54 PM    GLUCOSEU Negative 02/23/2024 04:54 PM    KETUA Negative 02/23/2024 04:54 PM     Urine Cultures: No results found for: \"LABURIN\"  Blood Cultures:   Lab Results   Component Value Date/Time    BC No Growth after 4 days of incubation. 12/21/2023 10:49 PM     Lab Results   Component Value Date/Time    BLOODCULT2 No Growth after 4 days of incubation. 12/21/2023 10:49 PM     Organism:   Lab Results   Component Value Date/Time    ORG Diphtheroids 12/28/2023 11:03 AM         Electronically signed by SORAYA Rowland CNP on 2/27/2024 at 1:55 PM

## 2024-02-27 NOTE — PROGRESS NOTES
[facility] -  Clinical Pharmacy Note    Parenteral Nutrition - Management by Pharmacy    Consult Date(s): 2/23/24  Consulting Provider(s): Dr. Mariah Kramer    Assessment / Plan    Parenteral Nutrition  Day of Therapy: #5  Formulation:  Clinimix-E 5/20  Clinimix Rate:  55 mL/hr (Total volume = 1320mL/day)  Lipids:  20% 250mL over 12 hours on Mon & Thurs only (due to national shortage) - on hold x 10 days per RD recs. Plan to start 3/4 if still on TPN.  Appreciate Clinical Dietitian's recommendations for formulation and goal rate.  Electrolytes:  Clinimix-E includes standard electrolyte formulation.  Recommend further repletion with supplemental IVPBs as needed.  SCr fluctuating (2.0-->0.6-->0.9 today), but K/Phos WNL - will monitor closely.  Maintenance IVF:  n/a  Glucose control:  BG < 180 since admission. No documented hx of DM. Will monitor.  Add MVI 10 mL/day in PN on Mon-Wed-Fri only (due to national shortage) & Trace Elements 1 mL/day in PN daily.  Daily renal panel, daily magnesium, and weekly triglycerides ordered per protocol.  PN will be re-ordered daily.    Please call with questions--  Thanks--  Laverne Coon, PharmD, BCPS, BCGP  [id number] ([facility])   2/27/2024 10:09 AM      Interval update:  Nausea resolved.  Remains on TPN for now.  Calorie count underway to determine if home TPN is needed.    Subjective/Objective:   Tammy Frederick is a 73 y.o. female with a PMHx significant for arthritis with episode of diverticulitis (Dec 2023) complicated by perforation w/ intra-abdominal abscess s/p ex-lap and splenic fixture resection, small bowel resection. Pt readmitted in early Feb for SUSY and intractable n/v -- EGD showed mild antral erosion, gastritis. Pt readmitted 2/22 with n/v and abdominal pain.    Pharmacy is consulted to manage parenteral nutrition.    Ht Readings from Last 1 Encounters:   02/22/24 1.524 m (5')     Wt Readings from Last 1 Encounters:   02/22/24 42.6 kg (93 lb 14.7 oz)

## 2024-02-28 VITALS
HEIGHT: 60 IN | WEIGHT: 93.92 LBS | BODY MASS INDEX: 18.44 KG/M2 | RESPIRATION RATE: 16 BRPM | SYSTOLIC BLOOD PRESSURE: 102 MMHG | HEART RATE: 80 BPM | OXYGEN SATURATION: 98 % | TEMPERATURE: 97.7 F | DIASTOLIC BLOOD PRESSURE: 60 MMHG

## 2024-02-28 PROBLEM — E87.5 HYPERKALEMIA: Status: ACTIVE | Noted: 2024-02-28

## 2024-02-28 PROBLEM — E87.20 METABOLIC ACIDOSIS: Status: ACTIVE | Noted: 2024-02-28

## 2024-02-28 PROBLEM — Z93.2 ILEOSTOMY PRESENT (HCC): Status: ACTIVE | Noted: 2024-02-28

## 2024-02-28 LAB
ALBUMIN SERPL-MCNC: 3.4 G/DL (ref 3.4–5)
ALP SERPL-CCNC: 70 U/L (ref 40–129)
ALT SERPL-CCNC: 10 U/L (ref 10–40)
ANION GAP SERPL CALCULATED.3IONS-SCNC: 13 MMOL/L (ref 3–16)
AST SERPL-CCNC: 18 U/L (ref 15–37)
BILIRUB DIRECT SERPL-MCNC: <0.2 MG/DL (ref 0–0.3)
BILIRUB INDIRECT SERPL-MCNC: ABNORMAL MG/DL (ref 0–1)
BILIRUB SERPL-MCNC: <0.2 MG/DL (ref 0–1)
BUN SERPL-MCNC: 18 MG/DL (ref 7–20)
CALCIUM SERPL-MCNC: 9.1 MG/DL (ref 8.3–10.6)
CHLORIDE SERPL-SCNC: 101 MMOL/L (ref 99–110)
CO2 SERPL-SCNC: 22 MMOL/L (ref 21–32)
CREAT SERPL-MCNC: 0.9 MG/DL (ref 0.6–1.2)
GFR SERPLBLD CREATININE-BSD FMLA CKD-EPI: >60 ML/MIN/{1.73_M2}
GLUCOSE SERPL-MCNC: 91 MG/DL (ref 70–99)
MAGNESIUM SERPL-MCNC: 1.8 MG/DL (ref 1.8–2.4)
PHOSPHATE SERPL-MCNC: 4 MG/DL (ref 2.5–4.9)
POTASSIUM SERPL-SCNC: 4.8 MMOL/L (ref 3.5–5.1)
PROT SERPL-MCNC: 5.9 G/DL (ref 6.4–8.2)
SODIUM SERPL-SCNC: 136 MMOL/L (ref 136–145)

## 2024-02-28 PROCEDURE — 84100 ASSAY OF PHOSPHORUS: CPT

## 2024-02-28 PROCEDURE — 6370000000 HC RX 637 (ALT 250 FOR IP): Performed by: INTERNAL MEDICINE

## 2024-02-28 PROCEDURE — 80076 HEPATIC FUNCTION PANEL: CPT

## 2024-02-28 PROCEDURE — 2580000003 HC RX 258: Performed by: INTERNAL MEDICINE

## 2024-02-28 PROCEDURE — 2580000003 HC RX 258: Performed by: NURSE PRACTITIONER

## 2024-02-28 PROCEDURE — C9113 INJ PANTOPRAZOLE SODIUM, VIA: HCPCS | Performed by: NURSE PRACTITIONER

## 2024-02-28 PROCEDURE — 80048 BASIC METABOLIC PNL TOTAL CA: CPT

## 2024-02-28 PROCEDURE — 99231 SBSQ HOSP IP/OBS SF/LOW 25: CPT | Performed by: INTERNAL MEDICINE

## 2024-02-28 PROCEDURE — 6360000002 HC RX W HCPCS: Performed by: INTERNAL MEDICINE

## 2024-02-28 PROCEDURE — 6360000002 HC RX W HCPCS: Performed by: NURSE PRACTITIONER

## 2024-02-28 PROCEDURE — 2580000003 HC RX 258

## 2024-02-28 PROCEDURE — 83735 ASSAY OF MAGNESIUM: CPT

## 2024-02-28 RX ORDER — PANTOPRAZOLE SODIUM 40 MG/1
40 TABLET, DELAYED RELEASE ORAL
Status: DISCONTINUED | OUTPATIENT
Start: 2024-02-29 | End: 2024-02-28 | Stop reason: HOSPADM

## 2024-02-28 RX ORDER — PANTOPRAZOLE SODIUM 40 MG/1
40 TABLET, DELAYED RELEASE ORAL
Qty: 30 TABLET | Refills: 3 | Status: SHIPPED | OUTPATIENT
Start: 2024-02-29

## 2024-02-28 RX ORDER — PROMETHAZINE HYDROCHLORIDE 12.5 MG/1
12.5 TABLET ORAL EVERY 8 HOURS PRN
Qty: 21 TABLET | Refills: 0 | Status: SHIPPED | OUTPATIENT
Start: 2024-02-28 | End: 2024-03-06

## 2024-02-28 RX ADMIN — PROMETHAZINE HYDROCHLORIDE 12.5 MG: 25 TABLET ORAL at 16:04

## 2024-02-28 RX ADMIN — SODIUM CHLORIDE, PRESERVATIVE FREE 10 ML: 5 INJECTION INTRAVENOUS at 09:34

## 2024-02-28 RX ADMIN — CHOLESTYRAMINE 4 G: 4 POWDER, FOR SUSPENSION ORAL at 12:49

## 2024-02-28 RX ADMIN — HEPARIN SODIUM 5000 UNITS: 5000 INJECTION INTRAVENOUS; SUBCUTANEOUS at 09:33

## 2024-02-28 RX ADMIN — CHOLESTYRAMINE 4 G: 4 POWDER, FOR SUSPENSION ORAL at 09:34

## 2024-02-28 RX ADMIN — LOPERAMIDE HYDROCHLORIDE 2 MG: 2 CAPSULE ORAL at 09:33

## 2024-02-28 RX ADMIN — LOPERAMIDE HYDROCHLORIDE 2 MG: 2 CAPSULE ORAL at 12:49

## 2024-02-28 RX ADMIN — SODIUM CHLORIDE, PRESERVATIVE FREE 40 MG: 5 INJECTION INTRAVENOUS at 09:33

## 2024-02-28 NOTE — PROGRESS NOTES
Pt left unit at ~1610 for discharge to home with family members. Pt alert, stable, and in no apparent distress. All personal belongings returned to pt. Discharge education provided verbally as well as with handouts. Patient verbalized understanding, all questions were answered. Pt expressed no further needs.

## 2024-02-28 NOTE — PROGRESS NOTES
02/28/24 1444   Encounter Summary   Encounter Overview/Reason  Follow-up   Service Provided For: Patient   Last Encounter  02/28/24   Complexity of Encounter Low   Begin Time 1435   End Time  1440   Total Time Calculated 5 min   Spiritual/Emotional needs   Type Spiritual Support   Plan and Referrals   Plan/Referrals No future visits requested      visited with pt, offering support through a presence of listening. Pt has no further needs at this time.   Rev. Margarita Harris Mdiv., BCC.

## 2024-02-28 NOTE — PROGRESS NOTES
4 Eyes Skin Assessment     NAME:  Tammy Frederick  YOB: 1950  MEDICAL RECORD NUMBER:  [id number]    The patient is being assessed for  Admission    I agree that at least one RN has performed a thorough Head to Toe Skin Assessment on the patient. ALL assessment sites listed below have been assessed.      Areas assessed by both nurses:    Head, Face, Ears, Shoulders, Back, Chest, Arms, Elbows, Hands, Sacrum. Buttock, Coccyx, Ischium, Legs. Feet and Heels, and Under Medical Devices         Does the Patient have a Wound? No noted wound(s)       Rodriguez Prevention initiated by RN: No  Wound Care Orders initiated by RN: No    Pressure Injury (Stage 3,4, Unstageable, DTI, NWPT, and Complex wounds) if present, place Wound referral order by RN under : No    New Ostomies, if present place, Ostomy referral order under : No     Nurse 1 eSignature: Electronically signed by Almaz Lowry RN on 2/28/24 at 6:18 AM EST    **SHARE this note so that the co-signing nurse can place an eSignature**    Nurse 2 eSignature: Electronically signed by Bertrand Tanner RN on 2/28/24 at 6:21 AM EST

## 2024-02-28 NOTE — DISCHARGE SUMMARY
V2.0  Discharge Summary    Name:  Tammy Frederick /Age/Sex: 1950 (73 y.o. female)   Admit Date: 2024  Discharge Date: 24    MRN & CSN:  [id number] & [id number] Encounter Date and Time 24 12:57 PM EST    Attending:  Bennie Rondon MD Discharging Provider: SORAYA Rowland - CNP       Hospital Course:     Brief HPI: Tammy Frederick is a 73 y.o. female who presented with  PMH of Diverticulitis with perforation and multiple abscesses s/p exploratory laparotomy, takedown of splenic flexure, small bowel resection with anastomosis, appendectomy, sigmoidectomy with low pelvic anastomosis and diverting loop ileostomy by Dr. Robertson on  who was recently admitted at [facility] with weakness/SUSY on 2024 -2024.      Patient was seen in the outpatient resident clinic on .  At that time the patient had complaints of nausea and vomiting which had been ongoing for the past several days.  She had an unintentional weight loss of 7 pounds since  and 20 pounds since 2023.  She was then referred to the emergency department.     In the emergency room patient's creatinine was found to be 1.8 from a baseline of 0.7.  Patient was admitted and general surgery, nephrology and gastroenterology were consulted.    Brief Problem Based Course:   Intractable nausea and vomiting with abdominal pain-  history of diverticulitis in December complicated by perforation with intra-abdominal abscess status post exploratory laparotomy and splenic flexure resection with small bowel resection and anastomosis and sigmoidectomy with colostomy.  Pain control, IV fluids.  General surgery recommends TPN.  Discussed with dietitian patient has a good appetite she has been keeping everything down without any further nausea and vomiting.  TPN discontinued today per recommendation of dietary; tolerated diet well  Acute kidney injury-IV fluids.  Nephrology consulted.  Avoid nephrotoxic medications;

## 2024-02-28 NOTE — CARE COORDINATION
Addendum:  Patient will not need TPN at d/c and will return to home and resume care with [facility]. Cecilia with [facility] [phone] fax- [phone]   Electronically signed by Alona Brown RN on 2/27/2024 at 4:19 PM  [phone]    Patient is from home with family assist and active with [facility]. She has all needed DME and plans on returning to home with resumption of care. Currently she has TPN and verified benefits. Patient has an OOP of $4900 and coverage pays at 80% until this OOP has been met.   TPN remains and calorie count in place to determine continuous of TPN.   Electronically signed by Alona Brown RN on 2/27/2024 at 9:20 AM  [phone]  
CTN contacted [facility] [phone]. They are currently active with this patient. CM faxing orders for BELINDA to [phone] for BELINDA by 3/1  Electronically signed by Macy Tovar LPN on 2/28/2024 at 2:44 PM    
Case Management Assessment  Initial Evaluation    Date/Time of Evaluation: 2/23/2024 2:49 PM  Assessment Completed by: TESSY Jacobs    If patient is discharged prior to next notation, then this note serves as note for discharge by case management.    Patient Name: Tammy Frederick                   YOB: 1950  Diagnosis: SUSY (acute kidney injury) (HCC) [N17.9]  Intractable nausea and vomiting [R11.2]  Nausea and vomiting, unspecified vomiting type [R11.2]                   Date / Time: 2/22/2024  4:20 PM    Patient Admission Status: Inpatient   Readmission Risk (Low < 19, Mod (19-27), High > 27): Readmission Risk Score: 24.1    Current PCP: No primary care provider on file.  PCP verified by CM? Yes    Chart Reviewed: Yes      History Provided by: Patient  Patient Orientation: Alert and Oriented    Patient Cognition: Alert    Hospitalization in the last 30 days (Readmission):  Yes    If yes, Readmission Assessment in CM Navigator will be completed.    Advance Directives:      Code Status: DNR-CCA   Patient's Primary Decision Maker is: Legal Next of Kin    Primary Decision Maker: Mary Lozada - Brother/Sister - [phone]    Discharge Planning:    Patient lives with: Alone Type of Home: House  Primary Care Giver: Self  Patient Support Systems include: Family Members, Children   Current Financial resources: Medicare  Current community resources: ECF/Home Care ([facility] through [facility])  Current services prior to admission: Durable Medical Equipment, Home Care (C through [facility])            Current DME: Walker            Type of Home Care services:  OT, PT, Nursing Services    ADLS  Prior functional level: Independent in ADLs/IADLs  Current functional level: Independent in ADLs/IADLs    PT AM-PAC: 19 /24  OT AM-PAC: 21 /24    Family can provide assistance at DC: No  Would you like Case Management to discuss the discharge plan with any other family members/significant others, 
Patient is from home and has family assistance as well as home care through [facility]. She is currently on TPN and unsure if she will need this at d/c or not. Referral sent to [facility] to confirm benefits. Patient plans on returning to home and will resume services.     Patient remains on TPN, calorie count in place.   Electronically signed by Alona Brown RN on 2/26/2024 at 3:34 PM  [phone]  
Please call at discharge.  CTN contacted Cecilia with [facility]  [phone] fax- [phone]. They are active with this patient.  Electronically signed by Macy Tovar LPN on 2/23/2024 at 3:02 PM      
added to the paper chart at the nurses station.          Transportation:  Transportation PLAN for discharge: family   Mode of Transport: Private Car  Reason for medical transport: Not Applicable  Name of Transport Company: Not Applicable  Time of Transport: n/a    Transport form completed: Not Indicated    Home Care:  Home Care ordered at discharge: Yes  Home Care Agency:  [facility]  Phone: [phone]  Fax: [phone]  Orders faxed: Yes - Macy Tovar    Referrals made at DISCHARGE for outpatient continued care:  Not Applicable    Additional CM Notes: CM met with pt at bedside. Pt is from home with HHC through [facility]. Pt will return home at discharge with continuation of services through [facility] ([facility] liaison faxing orders). Pt reports her sister and brother-in-law will be attending the hospital later this afternoon to provide transportation home. Family is driving from [place], [place] (about an hour and a half away) to  the pt today. No additional CM needs identified for discharge home today.     COVID Result:    Lab Results   Component Value Date/Time    COVID19 NOT DETECTED 01/23/2024 09:02 PM       The Plan for Transition of Care is related to the following treatment goals of SUSY (acute kidney injury) (HCC) [N17.9]  Intractable nausea and vomiting [R11.2]  Nausea and vomiting, unspecified vomiting type [R11.2]    The Patient and/or patient representative Tammy and her family were provided with a choice of provider and agrees with the discharge plan Yes    Freedom of choice list was provided with basic dialogue that supports the patient's individualized plan of care/goals and shares the quality data associated with the providers. Yes    Care Transitions patient: No    TESSY Jacobs  [facility]  Case Management Department  Ph: [phone]  Fax: [phone]

## 2024-02-28 NOTE — PROGRESS NOTES
patient reports daily nausea and vomiting and not able to keep anything down.  She denies hematemesis.  No fevers or chills.  Earlier this month CT abdomen pelvis noncontrast was without acute findings    Cr 1.8 (base line ) 0.8--> 1.5 --> 0.6 again   K 4.4  Mg 1.6   Hb 8.2  BP controlled     No nausea   No diarrhea  No fever/ chills  No NSAIDS  No hematuria   Good appetite     Interval hx     Cr ok 0.9  K 4.4  Mg 1.8  Pt denies CP/SOB/palpitations/abdominal pain/N/V.   Pt denies fever/ chills  Pt denies dysuria or hematuria   No diarrhea   Lytes ok   BP controlled  UOP?       Past Medical History:   Diagnosis Date    Arthritis        Past Surgical History:   Procedure Laterality Date    ABDOMEN SURGERY      cholecystectomy    COLOSTOMY N/A 12/28/2023    EXPLORATORY LAPAROTOMY, SIGMOID RESECTION, SMALL BOWEL RESECTION, APPENDECTOMY, DIVERTING LOOP ILEOSTOMY performed by Alban Robertson MD at [facility] OR    CT BONE MARROW ASPIRATION  2/9/2024    CT BONE MARROW ASPIRATION 2/9/2024 [facility] CT SCAN    JOINT REPLACEMENT Right 09/17/2012    right knee    JOINT REPLACEMENT Left     hip    TOTAL KNEE ARTHROPLASTY Left 05/16/2011    Left knee    UPPER GASTROINTESTINAL ENDOSCOPY N/A 2/6/2024    EGD BIOPSY performed by Chidi Morris MD at [facility] ASC ENDOSCOPY       History reviewed. No pertinent family history.     reports that she has never smoked. She does not have any smokeless tobacco history on file. She reports that she does not currently use alcohol. She reports that she does not use drugs.    Allergies:  Patient has no known allergies.    Current Medications:    pantoprazole (PROTONIX) 40 mg in sodium chloride (PF) 0.9 % 10 mL injection, Daily  sodium chloride flush 0.9 % injection 5-40 mL, 2 times per day  sodium chloride flush 0.9 % injection 5-40 mL, PRN  0.9 % sodium chloride infusion, PRN  cholestyramine light packet 4 g, 4x daily  diphenhydrAMINE (BENADRYL) tablet 25 mg, Q4H PRN  loperamide (IMODIUM) capsule 2 mg,  TID  sodium chloride flush 0.9 % injection 5-40 mL, 2 times per day  sodium chloride flush 0.9 % injection 5-40 mL, PRN  0.9 % sodium chloride infusion, PRN  heparin (porcine) injection 5,000 Units, BID  acetaminophen (TYLENOL) tablet 650 mg, Q6H PRN   Or  acetaminophen (TYLENOL) suppository 650 mg, Q6H PRN  promethazine (PHENERGAN) tablet 12.5 mg, Q4H PRN   Or  ondansetron (ZOFRAN) injection 4 mg, Q6H PRN        Review of Systems:   14 point ROS obtained but were negative except mentioned in HPI      Physical exam:     Vitals:  /60   Pulse 80   Temp 97.7 °F (36.5 °C) (Oral)   Resp 16   Ht 1.524 m (5')   Wt 42.6 kg (93 lb 14.7 oz)   SpO2 98%   BMI 18.34 kg/m²   Constitutional:  OAA X3 NAD Yes  Skin: no rash, turgor wnl  Heent:  eomi, mmm  Neck: no bruits or jvd noted  Cardiovascular:  S1, S2 without m/r/g  Respiratory: CTA B without w/r/r  Abdomen:  , soft, nt, nd, ileostomy - good drainage   Ext:  lower extremity edema No  Psychiatric: mood and affect normal   Musculoskeletal:  Rom, muscular strength intact    Data:   Labs:  CBC:   No results for input(s): \"WBC\", \"HGB\", \"PLT\" in the last 72 hours.    BMP:    Recent Labs     02/26/24  0500 02/27/24  0545 02/28/24  0424   * 136 136   K 4.4 4.5 4.8    104 101   CO2 23 24 22   BUN 24* 22* 18   CREATININE 0.6 0.9 0.9   GLUCOSE 136* 126* 91     Ca/Mg/Phos:   Recent Labs     02/26/24  0500 02/27/24  0545 02/28/24  0424   CALCIUM 8.8 9.0 9.1   MG 1.60* 1.60* 1.80   PHOS 4.1 4.0 4.0     Hepatic:   Recent Labs     02/26/24  0500 02/28/24  0424   AST 10* 18   ALT <5* 10   BILITOT <0.2 <0.2   ALKPHOS 58 70     Troponin: No results for input(s): \"TROPONINI\" in the last 72 hours.  BNP: No results for input(s): \"BNP\" in the last 72 hours.  Lipids:   No results for input(s): \"CHOL\", \"TRIG\", \"HDL\", \"LDLCALC\" in the last 72 hours.    Invalid input(s): \"LABVLDL\"    ABGs: No results for input(s): \"PHART\", \"PO2ART\", \"BVN2RYK\" in the last 72 hours.  INR: No

## 2024-02-28 NOTE — PROGRESS NOTES
Notification of IV to PO Conversion     IV To Po Conversion:   Will convert pantoprazole to PO based on [facility] IV to PO policy (see below).     Please call with questions.  Iván Umaña, PharmD, [facility]  [id number]  02/28/24 12:13 PM       Criteria for conversion from IV to PO therapy per [facility] IV to PO Protocol:   Patients should meet all of the following inclusion criteria and none of the exclusion criteria    Criteria to initiate medication route switch (Inclusion Criteria)  IV therapy for > 24 hours (antibiotics only)  Tolerating diet more advanced than clear liquids  Tolerating oral (PO) medications  Does not require vasopressor therapy for blood pressure support  No seizures for 72hrs (antiepileptic medications only)      Criteria indicating that the patient is NOT a candidate for IV to PO conversion (Exclusion Criteria)  Infections requiring IV therapy (ie: meningitis, endocarditis, osteomyelitis, pancreatitis)  Nausea and/or vomiting or severe diarrhea within past 24 hours  Has gastrectomy, ileus, gastric outlet or bowel obstruction, or malabsorption syndromes  Has significant, painful oral ulceration  TPN with an NPO order  Active GI bleed  Unable to swallow  Nothing by Mouth (NPO) status  Febrile in the last 24 hours- (antibiotics only)  Clinical deteriorating or unstable - (antibiotics only)  Pediatric patients and patients who are not euthyroid (not on oral levothyroxine/not stabilized on oral levothyroxine) - (Levothyroxine only)  Patients being treated for myxedema coma or during the organ donation process - (Levothyroxine only)    Other notes-   Patients may be given suppositories when available for the product being ordered if no contraindications exist (ie: rectal surgery or infection)   Oral solutions/suspensions may be considered for patients with a functioning enteral tube not on continuous suction (if medication is available in this formulation)

## 2024-03-04 NOTE — PROGRESS NOTES
Physician Progress Note      PATIENT:               SURYA SYKES  [facility] #:                  [id number]  :                       1950  ADMIT DATE:       2024 4:20 PM  DISCH DATE:        2024 4:15 PM  RESPONDING  PROVIDER #:        Rosalinda Chery MD          QUERY TEXT:    Patient admitted with N/V. Noted by dietician to have severe malnutrition per    PN. If possible, please document in progress notes and discharge summary   if you are evaluating and /or treating any of the following:      The medical record reflects the following:  Risk Factors: 74 y/o female with N/V  Clinical Indicators: Dietician consult note:  MALNUTRITION ASSESSMENT  Context of Malnutrition: Acute Illness  Malnutrition Status: Severe malnutrition  Findings of the 6 clinical characteristics of malnutrition (Minimum of 2 out   of 6 clinical characteristics is required to make the diagnosis of moderate or   severe Protein Calorie Malnutrition based on AND/ASPEN Guidelines):  Energy Intake:  75% or less of estimated energy requirements for 7 or more   days  Weight Loss:  5% over 1 month  Body Fat Loss:  Mild body fat loss Orbital  Muscle Mass Loss:  Moderate muscle mass loss Temples (temporalis), Clavicles   (pectoralis & deltoids)  Fluid Accumulation:  No significant fluid accumulation   Strength:  Not Performed  Treatment: 1. PO Diet: Clear liquids  2. ONS: Ensure Plus HP TID  3. Nutrition Education: Education not indicated    ASPEN Criteria:    https://aspenjournals.onlinelibrary.mart.com/doi/full/10.1177/837393480295314  5  Options provided:  -- Protein calorie malnutrition severe  -- Other - I will add my own diagnosis  -- Disagree - Not applicable / Not valid  -- Disagree - Clinically unable to determine / Unknown  -- Refer to Clinical Documentation Reviewer    PROVIDER RESPONSE TEXT:    This patient has severe protein calorie malnutrition.    Query created by: Devora Howard on 2024 11:49

## 2024-03-19 ENCOUNTER — APPOINTMENT (OUTPATIENT)
Dept: CT IMAGING | Age: 74
End: 2024-03-19
Payer: MEDICARE

## 2024-03-19 ENCOUNTER — OFFICE VISIT (OUTPATIENT)
Dept: SURGERY | Age: 74
End: 2024-03-19

## 2024-03-19 ENCOUNTER — HOSPITAL ENCOUNTER (EMERGENCY)
Age: 74
Discharge: HOME | End: 2024-03-19
Attending: EMERGENCY MEDICINE
Payer: MEDICARE

## 2024-03-19 VITALS
TEMPERATURE: 97.5 F | SYSTOLIC BLOOD PRESSURE: 122 MMHG | DIASTOLIC BLOOD PRESSURE: 73 MMHG | HEIGHT: 60 IN | WEIGHT: 98 LBS | RESPIRATION RATE: 18 BRPM | OXYGEN SATURATION: 99 % | BODY MASS INDEX: 19.24 KG/M2 | HEART RATE: 85 BPM

## 2024-03-19 VITALS — SYSTOLIC BLOOD PRESSURE: 122 MMHG | DIASTOLIC BLOOD PRESSURE: 74 MMHG | BODY MASS INDEX: 19.3 KG/M2 | WEIGHT: 98.8 LBS

## 2024-03-19 DIAGNOSIS — Z09 SURGERY FOLLOW-UP: Primary | ICD-10-CM

## 2024-03-19 DIAGNOSIS — R10.11 ABDOMINAL PAIN, RIGHT UPPER QUADRANT: Primary | ICD-10-CM

## 2024-03-19 DIAGNOSIS — E83.42 HYPOMAGNESEMIA: ICD-10-CM

## 2024-03-19 DIAGNOSIS — D64.9 CHRONIC ANEMIA: ICD-10-CM

## 2024-03-19 LAB
ALBUMIN SERPL-MCNC: 4.7 G/DL (ref 3.4–5)
ALBUMIN/GLOB SERPL: 1.5 {RATIO} (ref 1.1–2.2)
ALP SERPL-CCNC: 84 U/L (ref 40–129)
ALT SERPL-CCNC: 14 U/L (ref 10–40)
ANION GAP SERPL CALCULATED.3IONS-SCNC: 14 MMOL/L (ref 3–16)
AST SERPL-CCNC: 17 U/L (ref 15–37)
BACTERIA URNS QL MICRO: NORMAL /HPF
BASOPHILS # BLD: 0 K/UL (ref 0–0.2)
BASOPHILS NFR BLD: 0.7 %
BILIRUB SERPL-MCNC: <0.2 MG/DL (ref 0–1)
BILIRUB UR QL STRIP.AUTO: NEGATIVE
BUN SERPL-MCNC: 12 MG/DL (ref 7–20)
CALCIUM SERPL-MCNC: 9.7 MG/DL (ref 8.3–10.6)
CHLORIDE SERPL-SCNC: 103 MMOL/L (ref 99–110)
CLARITY UR: CLEAR
CO2 SERPL-SCNC: 17 MMOL/L (ref 21–32)
COLOR UR: YELLOW
CREAT SERPL-MCNC: 0.7 MG/DL (ref 0.6–1.2)
DEPRECATED RDW RBC AUTO: 17.5 % (ref 12.4–15.4)
EOSINOPHIL # BLD: 0.1 K/UL (ref 0–0.6)
EOSINOPHIL NFR BLD: 3 %
EPI CELLS #/AREA URNS AUTO: 1 /HPF (ref 0–5)
GFR SERPLBLD CREATININE-BSD FMLA CKD-EPI: >60 ML/MIN/{1.73_M2}
GLUCOSE SERPL-MCNC: 104 MG/DL (ref 70–99)
GLUCOSE UR STRIP.AUTO-MCNC: NEGATIVE MG/DL
HCT VFR BLD AUTO: 29.8 % (ref 36–48)
HGB BLD-MCNC: 10.1 G/DL (ref 12–16)
HGB UR QL STRIP.AUTO: NEGATIVE
HYALINE CASTS #/AREA URNS AUTO: 3 /LPF (ref 0–8)
KETONES UR STRIP.AUTO-MCNC: NEGATIVE MG/DL
LEUKOCYTE ESTERASE UR QL STRIP.AUTO: ABNORMAL
LIPASE SERPL-CCNC: 29 U/L (ref 13–60)
LYMPHOCYTES # BLD: 1.4 K/UL (ref 1–5.1)
LYMPHOCYTES NFR BLD: 28.3 %
MAGNESIUM SERPL-MCNC: 1.2 MG/DL (ref 1.8–2.4)
MCH RBC QN AUTO: 31.2 PG (ref 26–34)
MCHC RBC AUTO-ENTMCNC: 33.8 G/DL (ref 31–36)
MCV RBC AUTO: 92.3 FL (ref 80–100)
MONOCYTES # BLD: 0.5 K/UL (ref 0–1.3)
MONOCYTES NFR BLD: 11.2 %
NEUTROPHILS # BLD: 2.7 K/UL (ref 1.7–7.7)
NEUTROPHILS NFR BLD: 56.8 %
NITRITE UR QL STRIP.AUTO: NEGATIVE
PH UR STRIP.AUTO: 5 [PH] (ref 5–8)
PLATELET # BLD AUTO: 326 K/UL (ref 135–450)
PMV BLD AUTO: 6.5 FL (ref 5–10.5)
POTASSIUM SERPL-SCNC: 3.4 MMOL/L (ref 3.5–5.1)
PROT SERPL-MCNC: 7.8 G/DL (ref 6.4–8.2)
PROT UR STRIP.AUTO-MCNC: NEGATIVE MG/DL
RBC # BLD AUTO: 3.23 M/UL (ref 4–5.2)
RBC CLUMPS #/AREA URNS AUTO: 1 /HPF (ref 0–4)
SODIUM SERPL-SCNC: 134 MMOL/L (ref 136–145)
SP GR UR STRIP.AUTO: 1.02 (ref 1–1.03)
UA COMPLETE W REFLEX CULTURE PNL UR: ABNORMAL
UA DIPSTICK W REFLEX MICRO PNL UR: YES
URN SPEC COLLECT METH UR: ABNORMAL
UROBILINOGEN UR STRIP-ACNC: 0.2 E.U./DL
WBC # BLD AUTO: 4.8 K/UL (ref 4–11)
WBC #/AREA URNS AUTO: 5 /HPF (ref 0–5)

## 2024-03-19 PROCEDURE — 85025 COMPLETE CBC W/AUTO DIFF WBC: CPT

## 2024-03-19 PROCEDURE — 6360000004 HC RX CONTRAST MEDICATION: Performed by: EMERGENCY MEDICINE

## 2024-03-19 PROCEDURE — 74177 CT ABD & PELVIS W/CONTRAST: CPT

## 2024-03-19 PROCEDURE — 99285 EMERGENCY DEPT VISIT HI MDM: CPT

## 2024-03-19 PROCEDURE — 99024 POSTOP FOLLOW-UP VISIT: CPT | Performed by: SURGERY

## 2024-03-19 PROCEDURE — 93005 ELECTROCARDIOGRAM TRACING: CPT | Performed by: PHYSICIAN ASSISTANT

## 2024-03-19 PROCEDURE — 81001 URINALYSIS AUTO W/SCOPE: CPT

## 2024-03-19 PROCEDURE — 83690 ASSAY OF LIPASE: CPT

## 2024-03-19 PROCEDURE — 83735 ASSAY OF MAGNESIUM: CPT

## 2024-03-19 PROCEDURE — 96366 THER/PROPH/DIAG IV INF ADDON: CPT

## 2024-03-19 PROCEDURE — 6370000000 HC RX 637 (ALT 250 FOR IP): Performed by: PHYSICIAN ASSISTANT

## 2024-03-19 PROCEDURE — 6360000002 HC RX W HCPCS: Performed by: PHYSICIAN ASSISTANT

## 2024-03-19 PROCEDURE — 96365 THER/PROPH/DIAG IV INF INIT: CPT

## 2024-03-19 PROCEDURE — 80053 COMPREHEN METABOLIC PANEL: CPT

## 2024-03-19 RX ORDER — MAGNESIUM SULFATE IN WATER 40 MG/ML
2000 INJECTION, SOLUTION INTRAVENOUS ONCE
Status: COMPLETED | OUTPATIENT
Start: 2024-03-19 | End: 2024-03-19

## 2024-03-19 RX ORDER — ONDANSETRON 2 MG/ML
4 INJECTION INTRAMUSCULAR; INTRAVENOUS ONCE
Status: DISCONTINUED | OUTPATIENT
Start: 2024-03-19 | End: 2024-03-19 | Stop reason: HOSPADM

## 2024-03-19 RX ORDER — POTASSIUM CHLORIDE 20 MEQ/1
20 TABLET, EXTENDED RELEASE ORAL ONCE
Status: COMPLETED | OUTPATIENT
Start: 2024-03-19 | End: 2024-03-19

## 2024-03-19 RX ORDER — MORPHINE SULFATE 4 MG/ML
4 INJECTION, SOLUTION INTRAMUSCULAR; INTRAVENOUS ONCE
Status: DISCONTINUED | OUTPATIENT
Start: 2024-03-19 | End: 2024-03-19 | Stop reason: HOSPADM

## 2024-03-19 RX ORDER — BUSPIRONE HYDROCHLORIDE 10 MG/1
10 TABLET ORAL 3 TIMES DAILY
COMMUNITY

## 2024-03-19 RX ORDER — HYDROCODONE BITARTRATE AND ACETAMINOPHEN 5; 325 MG/1; MG/1
1 TABLET ORAL EVERY 6 HOURS PRN
COMMUNITY

## 2024-03-19 RX ADMIN — IOPAMIDOL 75 ML: 755 INJECTION, SOLUTION INTRAVENOUS at 18:45

## 2024-03-19 RX ADMIN — MAGNESIUM SULFATE HEPTAHYDRATE 2000 MG: 40 INJECTION, SOLUTION INTRAVENOUS at 19:23

## 2024-03-19 RX ADMIN — POTASSIUM CHLORIDE 20 MEQ: 1500 TABLET, EXTENDED RELEASE ORAL at 19:20

## 2024-03-19 ASSESSMENT — ENCOUNTER SYMPTOMS
BACK PAIN: 0
DIARRHEA: 0
EYE REDNESS: 0
EYE DISCHARGE: 0
SORE THROAT: 0
VOMITING: 0
RHINORRHEA: 0
STRIDOR: 0
SHORTNESS OF BREATH: 0
EYE ITCHING: 0
COUGH: 0
NAUSEA: 0

## 2024-03-19 NOTE — PATIENT INSTRUCTIONS
Right abdominal pain of unclear etiology. Lives far away and does not feel comfortable returning without known etiology for symptoms. Intends to go to ED for further evaluation  At some point when feels better is ready for contrast evaluation of colorectal anastomosis. Not ready until etiology of current symptoms clarified  Needs to maintain hydration, seems unable to take in enough PO/water   Needs to continue PPI for prior gastritis

## 2024-03-19 NOTE — ED PROVIDER NOTES
In addition to the advanced practice provider, I personally saw Tammy Frederick and performed a substantive portion of the visit including all aspects of the medical decision making.    Medical Decision Making  Patient seen and evaluated at bedside.  Briefly, patient presenting with right flank pain which radiates to her right upper quadrant.  She was seen by her general surgeon, Dr. Zazueta, in the office today and he advised her to go to the emergency department for further workup.  Patient is status post cholecystectomy, colostomy and appendectomy.  Lab workup negative for leukocytosis.  Hemoglobin of 10.1 slightly above patient's baseline.  Urinalysis negative for hematuria or infection.  Mild hypokalemia of 3.4 replaced in the ED.  Normal magnesium of 1.2 replaced in the ED.  Patient's symptoms significantly improved after treatment and reexamination.  Etiology of patient's pain is unknown at this time, however suspect musculoskeletal strain versus gastroenteritis.  She was provided with short-term prescription for Norco as well as daily magnesium supplementation.  She is advised to follow-up with her surgeon as well as her primary care physician.      EKG  Normal sinus rhythm with no acute ST elevations.  Left-sided axis deviation.  Ventricular of 80 bpm.  QTc of 422.    SEP-1  Is this patient to be included in the SEP-1 Core Measure due to severe sepsis or septic shock?   No   Exclusion criteria - the patient is NOT to be included for SEP-1 Core Measure due to:  2+ SIRS criteria are not met    Screenings     Shawn Coma Scale  Eye Opening: Spontaneous  Best Verbal Response: Oriented  Best Motor Response: Obeys commands  Shawn Coma Scale Score: 15             Patient Referrals:  No follow-up provider specified.    Discharge Medications:  New Prescriptions    No medications on file       FINAL IMPRESSION  No diagnosis found.    Blood pressure 131/81, pulse 100, temperature 97.5 °F (36.4 °C), temperature

## 2024-03-19 NOTE — PROGRESS NOTES
thickening. Bladder: Unremarkable. Reproductive organs: Surgically absent uterus. No adnexal masses. Lymph nodes: Unremarkable. Peritoneum: Unremarkable. Vessels: Unremarkable. Abdominal wall: Postsurgical changes in the anterior abdominal wall. Bones: Left hip arthroplasty with associated streak artifact. Degenerative changes of the spine.     1.  No acute CT abnormality in the abdomen or pelvis. 2.  Prior partial colectomy with right lower quadrant ostomy. No evidence of bowel obstruction. 3.  4 mm noncalcified left lower lobe pulmonary nodule. Single Solid lung nodule < 6 mm:  In a low-risk patient, no routine follow-up imaging is recommended.  In a high-risk patient, a non-contrast Chest CT at 12 months is optional. If performed and the nodule is stable at 12 months, no further follow-up is recommended.  These guidelines do not apply to patients younger than 35 years, immunocompromised patients, and patients with cancer. F/u in patients with significant comorbidities as clinically warranted. For lung cancer screening, adhere to Lung-RADS guidelines.  Reference:  Radiology. 2017 Jul; 284(1):228-98 Electronically signed by Chinmay Foster MD    Assessment:  OR Date 12/28/2023, exploratory laparotomy, takedown of splenic flexure, small bowel resection with anastomosis, appendectomy,sigmoidectomy with low pelvic anastomosis, and diverting loop ileostomy for perforated sigmoid diverticulitis with multiple intra-abdominal abscesses    Plan:  Right abdominal pain of unclear etiology. Lives far away and does not feel comfortable returning without known etiology for symptoms. Intends to go to ED for further evaluation  At some point when feels better is ready for contrast evaluation of colorectal anastomosis. Not ready until etiology of current symptoms clarified  Needs to maintain hydration, seems unable to take in enough PO/water   Needs to continue PPI for prior gastritis    Alban Robertson MD, FACS  3/19/2024  1:43 PM

## 2024-03-19 NOTE — ED PROVIDER NOTES
[facility] EMERGENCY DEPARTMENT  EMERGENCY DEPARTMENT ENCOUNTER        Pt Name: Tammy Frederick  MRN: [id number]  Birthdate 1950  Date of evaluation: 3/19/2024  Provider: BRENDAN Swan  PCP: No primary care provider on file.  Note Started: 5:03 PM EDT 3/19/24       I have seen and evaluated this patient with my supervising physician Dr. Adair.      CHIEF COMPLAINT       Chief Complaint   Patient presents with    Flank Pain     Patient states, R sided flank pain since yesterday, was at Dr. Cardoso office to see if her colostomy was reversed and was sent to ER.        HISTORY OF PRESENT ILLNESS: 1 or more Elements     History From: patient, daughter-in-law  Limitations to history : None    Tammy Frederick is a 73 y.o. female who presents to the ED with right upper quadrant abdominal pain since yesterday.  Patient has a complex past medical history recent abdominal surgery 12/28/2023.  Patient had exploratory laparotomy with takedown of splenic flexure, small bowel resection with anastomosis, appendectomy, sigmoidectomy with low pelvic anastomosis and diverting loop ileostomy for perforated sigmoid diverticulitis with multiple intra-abdominal abscesses.  Patient has been hospitalized twice since then for SUSY.  Patient had a routine postop follow-up today with her surgeon, Dr. Robertson.  At that time she reported to him the right upper quadrant abdominal pain.  She did not feel comfortable going home since she lives far away, and decided to come to the ED for further evaluation.  Patient reports chronic nausea/vomiting that has improved since starting Phenergan.  She did not take Phenergan at all today.  Patient did see her PCP yesterday for this pain and was advised to come in, but decided to wait and be seen at her postop appointment.  She was prescribed Norco and did take 1 this morning.  She states it helped some.  Patient denies fever, current nausea, change in ostomy output or dysuria.

## 2024-03-19 NOTE — ED NOTES
Date of Surgery Update:  Bob Balderrama was seen and examined. History and physical has been reviewed. The patient has been examined. There have been no significant clinical changes since the completion of the originally dated History and Physical.  Patient identified by surgeon; surgical site was confirmed by patient and surgeon. Signed By: Efra Gonzalez MD     October 19, 2017 9:31 AM         Please note from the office and include the additional information below:    Past Medical History  Past Medical History:   Diagnosis Date    Adverse effect of anesthesia     patients states he was told post-op that he takes anesthesia very well    Bronchitis     Lipoma of torso 10/9/2017    TB (pulmonary tuberculosis)     at 21 mos. old        Past Surgical History  Past Surgical History:   Procedure Laterality Date    FOREARM/WRIST SURGERY UNLISTED      HX ORTHOPAEDIC      right wrist        Social History  The patient Bob Balderrama  reports that he has been smoking. He has a 40.00 pack-year smoking history. He has never used smokeless tobacco. He reports that he drinks about 3.0 oz of alcohol per week  He reports that he does not use illicit drugs. Family History  History reviewed. No pertinent family history. Per Kianna RN, 2 nurses attempted to get IV access in triage  Yogesh RN at bedside with ultrasound

## 2024-03-20 DIAGNOSIS — Z09 SURGERY FOLLOW-UP: Primary | ICD-10-CM

## 2024-03-20 LAB
EKG ATRIAL RATE: 80 BPM
EKG DIAGNOSIS: NORMAL
EKG P AXIS: 79 DEGREES
EKG P-R INTERVAL: 152 MS
EKG Q-T INTERVAL: 366 MS
EKG QRS DURATION: 88 MS
EKG QTC CALCULATION (BAZETT): 422 MS
EKG R AXIS: -30 DEGREES
EKG T AXIS: 75 DEGREES
EKG VENTRICULAR RATE: 80 BPM

## 2024-03-20 PROCEDURE — 93010 ELECTROCARDIOGRAM REPORT: CPT | Performed by: INTERNAL MEDICINE

## 2024-03-22 ENCOUNTER — TELEPHONE (OUTPATIENT)
Dept: SURGERY | Age: 74
End: 2024-03-22

## 2024-03-22 NOTE — TELEPHONE ENCOUNTER
Spoke with pt regarding Barium study, she wants to have the test closer to home, mailed the order to pt.  Pt states she is seeing Dr Morris for follow up on 4/3/24

## 2024-04-26 ENCOUNTER — TELEPHONE (OUTPATIENT)
Dept: SURGERY | Age: 74
End: 2024-04-26

## 2024-04-26 NOTE — TELEPHONE ENCOUNTER
Pt calling to see if we received the results from testing she had at [facility] this previous Monday. I let her know I didn't see anything from them. She was going to call them to see if they could resend her results. If you need anything from her please call [phone]

## 2024-04-30 ENCOUNTER — TELEPHONE (OUTPATIENT)
Dept: CT IMAGING | Age: 74
End: 2024-04-30

## 2024-04-30 NOTE — TELEPHONE ENCOUNTER
PO 12/28/23- EXPLORATORY LAPAROTOMY, SIGMOID RESECTION, SMALL BOWEL RESECTION, APPENDECTOMY, DIVERTING LOOP ILEOSTOMY     Pt states that burns under/around bag.  Pt states she does not believe it is infected just burning and painful. Prednisone was given to her by PCP. She has taken 2 of the 8 prescribed but asking if Dr. Robertson could call her in something else.   Please call pt and advise.

## 2024-05-01 NOTE — TELEPHONE ENCOUNTER
Notified pt she can finish the prednisone and may also use a zinc ointment.  Pt has appt with Dr Robertson 5/14/24 and can address at that time if not cleared up

## 2024-05-06 ENCOUNTER — TELEPHONE (OUTPATIENT)
Dept: SURGERY | Age: 74
End: 2024-05-06

## 2024-05-07 ENCOUNTER — TELEPHONE (OUTPATIENT)
Dept: SURGERY | Age: 74
End: 2024-05-07

## 2024-05-07 NOTE — TELEPHONE ENCOUNTER
Pt requesting an RX be called in to [facility] ph#[phone]. Pt states that only has one bag left. Pt states bags are painful and the bag is coming loose or off while sleeping and some days in pain all day long. She has an appt on 5/14, I offered her an appt tomorrow 5/7 or Friday 5/10. She was going to call me back.   Please call pt and advise.

## 2024-05-07 NOTE — TELEPHONE ENCOUNTER
Spoke to Abena [phone], she stated that she has the pt set up with a supply co but the pt has already gone through what they have sent.  Abena is going to reach out to pt again to go over her supplies,  pt has an appt 5/10/24 to discuss reversal.

## 2024-05-10 ENCOUNTER — OFFICE VISIT (OUTPATIENT)
Dept: SURGERY | Age: 74
End: 2024-05-10
Payer: MEDICARE

## 2024-05-10 VITALS — DIASTOLIC BLOOD PRESSURE: 60 MMHG | BODY MASS INDEX: 16.37 KG/M2 | WEIGHT: 83.8 LBS | SYSTOLIC BLOOD PRESSURE: 100 MMHG

## 2024-05-10 DIAGNOSIS — Z93.2 ILEOSTOMY PRESENT (HCC): Primary | ICD-10-CM

## 2024-05-10 PROBLEM — Z93.9 HISTORY OF CREATION OF OSTOMY (HCC): Status: ACTIVE | Noted: 2024-01-24

## 2024-05-10 PROCEDURE — 99213 OFFICE O/P EST LOW 20 MIN: CPT | Performed by: SURGERY

## 2024-05-10 PROCEDURE — 1123F ACP DISCUSS/DSCN MKR DOCD: CPT | Performed by: SURGERY

## 2024-05-10 RX ORDER — SODIUM CHLORIDE 0.9 % (FLUSH) 0.9 %
5-40 SYRINGE (ML) INJECTION EVERY 12 HOURS SCHEDULED
OUTPATIENT
Start: 2024-05-10

## 2024-05-10 RX ORDER — SODIUM CHLORIDE 9 MG/ML
INJECTION, SOLUTION INTRAVENOUS PRN
OUTPATIENT
Start: 2024-05-10

## 2024-05-10 RX ORDER — SODIUM CHLORIDE 0.9 % (FLUSH) 0.9 %
5-40 SYRINGE (ML) INJECTION PRN
OUTPATIENT
Start: 2024-05-10

## 2024-05-10 NOTE — PROGRESS NOTES
[facility]  SUBJECTIVE:    Tammy Frederick   1950   74 y.o. female presents for routine postoperative followup after OR Date 12/28/2023, exploratory laparotomy, takedown of splenic flexure, small bowel resection with anastomosis, appendectomy,sigmoidectomy with low pelvic anastomosis, and diverting loop ileostomy for perforated sigmoid diverticulitis with multiple intra-abdominal abscesses. Doing well. Tolerating diet. Ostomy output appropriate. Increasing ambulation. No pain. Presents to discuss ostomy reversal. Reviewed report from contrast enema done in [place] on 4/22/24 which shows patent colorectal anastomosis    Past Medical History:   Diagnosis Date    Arthritis      Past Surgical History:   Procedure Laterality Date    ABDOMEN SURGERY      cholecystectomy    COLOSTOMY N/A 12/28/2023    EXPLORATORY LAPAROTOMY, SIGMOID RESECTION, SMALL BOWEL RESECTION, APPENDECTOMY, DIVERTING LOOP ILEOSTOMY performed by Alban Robertson MD at [facility] OR    CT BONE MARROW ASPIRATION  2/9/2024    CT BONE MARROW ASPIRATION 2/9/2024 [facility] CT SCAN    JOINT REPLACEMENT Right 09/17/2012    right knee    JOINT REPLACEMENT Left     hip    TOTAL KNEE ARTHROPLASTY Left 05/16/2011    Left knee    UPPER GASTROINTESTINAL ENDOSCOPY N/A 2/6/2024    EGD BIOPSY performed by Chidi Morris MD at [facility] ASC ENDOSCOPY     Social History     Socioeconomic History    Marital status:      Spouse name: Not on file    Number of children: Not on file    Years of education: Not on file    Highest education level: Not on file   Occupational History    Not on file   Tobacco Use    Smoking status: Never    Smokeless tobacco: Not on file   Vaping Use    Vaping Use: Never used   Substance and Sexual Activity    Alcohol use: Not Currently    Drug use: Never    Sexual activity: Not on file   Other Topics Concern    Not on file   Social History Narrative    Not on file     Social Determinants of Health

## 2024-05-10 NOTE — PATIENT INSTRUCTIONS
1. We discussed the risks of ileostomy reversal being bleeding, infection, damage to surrounding structures, anastomotic leak, laparotomy, possible development of an incisional hernia, possible requirement of additional procedures as well as the perioperative risks related to general anesthesia. The risk is approximately 5%. Increased risk of complication is associated with smoking, diabetes, obesity as well as heavy lifting over 20lbs sooner than 6 weeks after the surgery. Benefits include resolution of abdominal symptoms and return of normal bowel movements. The details of the procedure were discussed and all questions were answered. The patient understands, agrees, wishes to proceed  2. The importance of strength and nutrition was discussed. The patient will exercise to the best of their ability and optimize their nutrition as well. This will likely include 1-3 protein shakes up to a week preoperatively  3. Will schedule for reversal of loop ileostomy, possible laparotomy with general anesthesia and post-operative admission  4. No bowel prep is necessary

## 2024-05-13 ENCOUNTER — PREP FOR PROCEDURE (OUTPATIENT)
Dept: SURGERY | Age: 74
End: 2024-05-13

## 2024-05-13 DIAGNOSIS — Z93.2 ILEOSTOMY IN PLACE (HCC): ICD-10-CM

## 2024-05-20 ENCOUNTER — PREP FOR PROCEDURE (OUTPATIENT)
Dept: SURGERY | Age: 74
End: 2024-05-20

## 2024-05-23 ENCOUNTER — HOSPITAL ENCOUNTER (INPATIENT)
Age: 74
LOS: 12 days | Discharge: HOME | DRG: 329 | End: 2024-06-04
Attending: SURGERY | Admitting: SURGERY
Payer: MEDICARE

## 2024-05-23 ENCOUNTER — ANESTHESIA EVENT (OUTPATIENT)
Dept: OPERATING ROOM | Age: 74
End: 2024-05-23
Payer: MEDICARE

## 2024-05-23 ENCOUNTER — ANESTHESIA (OUTPATIENT)
Dept: OPERATING ROOM | Age: 74
End: 2024-05-23
Payer: MEDICARE

## 2024-05-23 DIAGNOSIS — Z93.2 ILEOSTOMY IN PLACE (HCC): ICD-10-CM

## 2024-05-23 PROCEDURE — 6370000000 HC RX 637 (ALT 250 FOR IP)

## 2024-05-23 PROCEDURE — 94150 VITAL CAPACITY TEST: CPT

## 2024-05-23 PROCEDURE — 3600000004 HC SURGERY LEVEL 4 BASE: Performed by: SURGERY

## 2024-05-23 PROCEDURE — 6360000002 HC RX W HCPCS: Performed by: SURGERY

## 2024-05-23 PROCEDURE — 6370000000 HC RX 637 (ALT 250 FOR IP): Performed by: HOSPITALIST

## 2024-05-23 PROCEDURE — 0DBB0ZZ EXCISION OF ILEUM, OPEN APPROACH: ICD-10-PCS | Performed by: SURGERY

## 2024-05-23 PROCEDURE — A4217 STERILE WATER/SALINE, 500 ML: HCPCS | Performed by: SURGERY

## 2024-05-23 PROCEDURE — 6370000000 HC RX 637 (ALT 250 FOR IP): Performed by: SURGERY

## 2024-05-23 PROCEDURE — 44625 REPAIR BOWEL OPENING: CPT | Performed by: SURGERY

## 2024-05-23 PROCEDURE — 7100000000 HC PACU RECOVERY - FIRST 15 MIN: Performed by: SURGERY

## 2024-05-23 PROCEDURE — 6360000002 HC RX W HCPCS: Performed by: ANESTHESIOLOGY

## 2024-05-23 PROCEDURE — 2580000003 HC RX 258: Performed by: SURGERY

## 2024-05-23 PROCEDURE — 1200000000 HC SEMI PRIVATE

## 2024-05-23 PROCEDURE — 6360000002 HC RX W HCPCS: Performed by: NURSE ANESTHETIST, CERTIFIED REGISTERED

## 2024-05-23 PROCEDURE — 7100000001 HC PACU RECOVERY - ADDTL 15 MIN: Performed by: SURGERY

## 2024-05-23 PROCEDURE — 2720000010 HC SURG SUPPLY STERILE: Performed by: SURGERY

## 2024-05-23 PROCEDURE — 2500000003 HC RX 250 WO HCPCS: Performed by: NURSE ANESTHETIST, CERTIFIED REGISTERED

## 2024-05-23 PROCEDURE — 3600000014 HC SURGERY LEVEL 4 ADDTL 15MIN: Performed by: SURGERY

## 2024-05-23 PROCEDURE — 3700000000 HC ANESTHESIA ATTENDED CARE: Performed by: SURGERY

## 2024-05-23 PROCEDURE — 6360000002 HC RX W HCPCS

## 2024-05-23 PROCEDURE — 2709999900 HC NON-CHARGEABLE SUPPLY: Performed by: SURGERY

## 2024-05-23 PROCEDURE — 88304 TISSUE EXAM BY PATHOLOGIST: CPT

## 2024-05-23 PROCEDURE — 3700000001 HC ADD 15 MINUTES (ANESTHESIA): Performed by: SURGERY

## 2024-05-23 RX ORDER — MAGNESIUM SULFATE IN WATER 40 MG/ML
2000 INJECTION, SOLUTION INTRAVENOUS PRN
Status: DISCONTINUED | OUTPATIENT
Start: 2024-05-23 | End: 2024-06-04 | Stop reason: HOSPADM

## 2024-05-23 RX ORDER — DEXMEDETOMIDINE HYDROCHLORIDE 100 UG/ML
INJECTION, SOLUTION INTRAVENOUS PRN
Status: DISCONTINUED | OUTPATIENT
Start: 2024-05-23 | End: 2024-05-23 | Stop reason: SDUPTHER

## 2024-05-23 RX ORDER — ONDANSETRON 2 MG/ML
INJECTION INTRAMUSCULAR; INTRAVENOUS PRN
Status: DISCONTINUED | OUTPATIENT
Start: 2024-05-23 | End: 2024-05-23 | Stop reason: SDUPTHER

## 2024-05-23 RX ORDER — MAGNESIUM HYDROXIDE 1200 MG/15ML
LIQUID ORAL CONTINUOUS PRN
Status: COMPLETED | OUTPATIENT
Start: 2024-05-23 | End: 2024-05-23

## 2024-05-23 RX ORDER — SODIUM CHLORIDE 0.9 % (FLUSH) 0.9 %
5-40 SYRINGE (ML) INJECTION EVERY 12 HOURS SCHEDULED
Status: DISCONTINUED | OUTPATIENT
Start: 2024-05-23 | End: 2024-05-23 | Stop reason: HOSPADM

## 2024-05-23 RX ORDER — MEPERIDINE HYDROCHLORIDE 25 MG/ML
12.5 INJECTION INTRAMUSCULAR; INTRAVENOUS; SUBCUTANEOUS EVERY 5 MIN PRN
Status: DISCONTINUED | OUTPATIENT
Start: 2024-05-23 | End: 2024-05-23 | Stop reason: HOSPADM

## 2024-05-23 RX ORDER — METRONIDAZOLE 500 MG/100ML
500 INJECTION, SOLUTION INTRAVENOUS
Status: COMPLETED | OUTPATIENT
Start: 2024-05-23 | End: 2024-05-23

## 2024-05-23 RX ORDER — PHENYLEPHRINE HCL IN 0.9% NACL 1 MG/10 ML
SYRINGE (ML) INTRAVENOUS PRN
Status: DISCONTINUED | OUTPATIENT
Start: 2024-05-23 | End: 2024-05-23 | Stop reason: SDUPTHER

## 2024-05-23 RX ORDER — OXYCODONE HYDROCHLORIDE 5 MG/1
10 TABLET ORAL EVERY 4 HOURS PRN
Status: DISCONTINUED | OUTPATIENT
Start: 2024-05-23 | End: 2024-05-31

## 2024-05-23 RX ORDER — SODIUM CHLORIDE 9 MG/ML
INJECTION, SOLUTION INTRAVENOUS PRN
Status: DISCONTINUED | OUTPATIENT
Start: 2024-05-23 | End: 2024-06-04 | Stop reason: HOSPADM

## 2024-05-23 RX ORDER — POTASSIUM CHLORIDE 20 MEQ/1
40 TABLET, EXTENDED RELEASE ORAL PRN
Status: DISCONTINUED | OUTPATIENT
Start: 2024-05-23 | End: 2024-06-04 | Stop reason: HOSPADM

## 2024-05-23 RX ORDER — SODIUM CHLORIDE 0.9 % (FLUSH) 0.9 %
5-40 SYRINGE (ML) INJECTION EVERY 12 HOURS SCHEDULED
Status: DISCONTINUED | OUTPATIENT
Start: 2024-05-23 | End: 2024-06-04 | Stop reason: HOSPADM

## 2024-05-23 RX ORDER — SODIUM CHLORIDE 9 MG/ML
INJECTION, SOLUTION INTRAVENOUS PRN
Status: DISCONTINUED | OUTPATIENT
Start: 2024-05-23 | End: 2024-05-23 | Stop reason: HOSPADM

## 2024-05-23 RX ORDER — MORPHINE SULFATE 2 MG/ML
2 INJECTION, SOLUTION INTRAMUSCULAR; INTRAVENOUS
Status: DISCONTINUED | OUTPATIENT
Start: 2024-05-23 | End: 2024-06-03

## 2024-05-23 RX ORDER — ONDANSETRON 4 MG/1
4 TABLET, ORALLY DISINTEGRATING ORAL EVERY 8 HOURS PRN
Status: DISCONTINUED | OUTPATIENT
Start: 2024-05-23 | End: 2024-06-04 | Stop reason: HOSPADM

## 2024-05-23 RX ORDER — ACETAMINOPHEN 325 MG/1
650 TABLET ORAL EVERY 6 HOURS
Status: DISCONTINUED | OUTPATIENT
Start: 2024-05-23 | End: 2024-06-04 | Stop reason: HOSPADM

## 2024-05-23 RX ORDER — METRONIDAZOLE 500 MG/100ML
500 INJECTION, SOLUTION INTRAVENOUS EVERY 8 HOURS
Status: COMPLETED | OUTPATIENT
Start: 2024-05-23 | End: 2024-05-24

## 2024-05-23 RX ORDER — ONDANSETRON 2 MG/ML
4 INJECTION INTRAMUSCULAR; INTRAVENOUS
Status: DISCONTINUED | OUTPATIENT
Start: 2024-05-23 | End: 2024-05-23 | Stop reason: HOSPADM

## 2024-05-23 RX ORDER — PROPOFOL 10 MG/ML
INJECTION, EMULSION INTRAVENOUS PRN
Status: DISCONTINUED | OUTPATIENT
Start: 2024-05-23 | End: 2024-05-23 | Stop reason: SDUPTHER

## 2024-05-23 RX ORDER — SODIUM CHLORIDE 0.9 % (FLUSH) 0.9 %
5-40 SYRINGE (ML) INJECTION PRN
Status: DISCONTINUED | OUTPATIENT
Start: 2024-05-23 | End: 2024-05-23 | Stop reason: HOSPADM

## 2024-05-23 RX ORDER — LIDOCAINE HYDROCHLORIDE 20 MG/ML
INJECTION, SOLUTION EPIDURAL; INFILTRATION; INTRACAUDAL; PERINEURAL PRN
Status: DISCONTINUED | OUTPATIENT
Start: 2024-05-23 | End: 2024-05-23 | Stop reason: SDUPTHER

## 2024-05-23 RX ORDER — DOCUSATE SODIUM 100 MG/1
100 CAPSULE, LIQUID FILLED ORAL 2 TIMES DAILY
Status: DISCONTINUED | OUTPATIENT
Start: 2024-05-23 | End: 2024-05-25

## 2024-05-23 RX ORDER — DEXAMETHASONE SODIUM PHOSPHATE 4 MG/ML
INJECTION, SOLUTION INTRA-ARTICULAR; INTRALESIONAL; INTRAMUSCULAR; INTRAVENOUS; SOFT TISSUE PRN
Status: DISCONTINUED | OUTPATIENT
Start: 2024-05-23 | End: 2024-05-23 | Stop reason: SDUPTHER

## 2024-05-23 RX ORDER — HYDROMORPHONE HYDROCHLORIDE 2 MG/ML
0.5 INJECTION, SOLUTION INTRAMUSCULAR; INTRAVENOUS; SUBCUTANEOUS EVERY 5 MIN PRN
Status: DISCONTINUED | OUTPATIENT
Start: 2024-05-23 | End: 2024-05-23

## 2024-05-23 RX ORDER — FENTANYL CITRATE 50 UG/ML
INJECTION, SOLUTION INTRAMUSCULAR; INTRAVENOUS PRN
Status: DISCONTINUED | OUTPATIENT
Start: 2024-05-23 | End: 2024-05-23 | Stop reason: SDUPTHER

## 2024-05-23 RX ORDER — SODIUM CHLORIDE 0.9 % (FLUSH) 0.9 %
5-40 SYRINGE (ML) INJECTION PRN
Status: DISCONTINUED | OUTPATIENT
Start: 2024-05-23 | End: 2024-06-04 | Stop reason: HOSPADM

## 2024-05-23 RX ORDER — NALOXONE HYDROCHLORIDE 0.4 MG/ML
INJECTION, SOLUTION INTRAMUSCULAR; INTRAVENOUS; SUBCUTANEOUS PRN
Status: DISCONTINUED | OUTPATIENT
Start: 2024-05-23 | End: 2024-05-23 | Stop reason: HOSPADM

## 2024-05-23 RX ORDER — SODIUM CHLORIDE 9 MG/ML
INJECTION, SOLUTION INTRAVENOUS CONTINUOUS
Status: DISCONTINUED | OUTPATIENT
Start: 2024-05-23 | End: 2024-05-24

## 2024-05-23 RX ORDER — HYDROMORPHONE HYDROCHLORIDE 2 MG/ML
INJECTION, SOLUTION INTRAMUSCULAR; INTRAVENOUS; SUBCUTANEOUS
Status: COMPLETED
Start: 2024-05-23 | End: 2024-05-23

## 2024-05-23 RX ORDER — ENOXAPARIN SODIUM 100 MG/ML
30 INJECTION SUBCUTANEOUS DAILY
Status: DISCONTINUED | OUTPATIENT
Start: 2024-05-23 | End: 2024-06-04 | Stop reason: HOSPADM

## 2024-05-23 RX ORDER — POLYETHYLENE GLYCOL 3350 17 G/17G
17 POWDER, FOR SOLUTION ORAL DAILY
Status: DISCONTINUED | OUTPATIENT
Start: 2024-05-23 | End: 2024-05-25

## 2024-05-23 RX ORDER — POTASSIUM CHLORIDE 7.45 MG/ML
10 INJECTION INTRAVENOUS PRN
Status: DISCONTINUED | OUTPATIENT
Start: 2024-05-23 | End: 2024-06-04 | Stop reason: HOSPADM

## 2024-05-23 RX ORDER — OXYCODONE HYDROCHLORIDE 5 MG/1
5 TABLET ORAL EVERY 4 HOURS PRN
Status: DISCONTINUED | OUTPATIENT
Start: 2024-05-23 | End: 2024-05-31

## 2024-05-23 RX ORDER — MORPHINE SULFATE 4 MG/ML
4 INJECTION, SOLUTION INTRAMUSCULAR; INTRAVENOUS
Status: DISCONTINUED | OUTPATIENT
Start: 2024-05-23 | End: 2024-06-01

## 2024-05-23 RX ORDER — ONDANSETRON 2 MG/ML
4 INJECTION INTRAMUSCULAR; INTRAVENOUS EVERY 6 HOURS PRN
Status: DISCONTINUED | OUTPATIENT
Start: 2024-05-23 | End: 2024-06-04 | Stop reason: HOSPADM

## 2024-05-23 RX ORDER — HYDROMORPHONE HYDROCHLORIDE 2 MG/ML
0.5 INJECTION, SOLUTION INTRAMUSCULAR; INTRAVENOUS; SUBCUTANEOUS EVERY 5 MIN PRN
Status: COMPLETED | OUTPATIENT
Start: 2024-05-23 | End: 2024-05-23

## 2024-05-23 RX ORDER — ROCURONIUM BROMIDE 10 MG/ML
INJECTION, SOLUTION INTRAVENOUS PRN
Status: DISCONTINUED | OUTPATIENT
Start: 2024-05-23 | End: 2024-05-23 | Stop reason: SDUPTHER

## 2024-05-23 RX ORDER — BUSPIRONE HYDROCHLORIDE 5 MG/1
10 TABLET ORAL 3 TIMES DAILY
Status: DISCONTINUED | OUTPATIENT
Start: 2024-05-23 | End: 2024-06-04 | Stop reason: HOSPADM

## 2024-05-23 RX ADMIN — DOCUSATE SODIUM 100 MG: 100 CAPSULE, LIQUID FILLED ORAL at 22:05

## 2024-05-23 RX ADMIN — METRONIDAZOLE 500 MG: 500 INJECTION, SOLUTION INTRAVENOUS at 18:55

## 2024-05-23 RX ADMIN — ACETAMINOPHEN 650 MG: 325 TABLET ORAL at 22:06

## 2024-05-23 RX ADMIN — DEXAMETHASONE SODIUM PHOSPHATE 8 MG: 4 INJECTION, SOLUTION INTRAMUSCULAR; INTRAVENOUS at 11:06

## 2024-05-23 RX ADMIN — HYDROMORPHONE HYDROCHLORIDE 0.5 MG: 2 INJECTION, SOLUTION INTRAMUSCULAR; INTRAVENOUS; SUBCUTANEOUS at 14:45

## 2024-05-23 RX ADMIN — HYDROMORPHONE HYDROCHLORIDE 0.5 MG: 2 INJECTION, SOLUTION INTRAMUSCULAR; INTRAVENOUS; SUBCUTANEOUS at 12:20

## 2024-05-23 RX ADMIN — ONDANSETRON 4 MG: 2 INJECTION INTRAMUSCULAR; INTRAVENOUS at 12:03

## 2024-05-23 RX ADMIN — SODIUM CHLORIDE: 9 INJECTION, SOLUTION INTRAVENOUS at 09:24

## 2024-05-23 RX ADMIN — SUGAMMADEX 200 MG: 100 INJECTION, SOLUTION INTRAVENOUS at 12:10

## 2024-05-23 RX ADMIN — WATER 2000 MG: 1 INJECTION INTRAMUSCULAR; INTRAVENOUS; SUBCUTANEOUS at 18:51

## 2024-05-23 RX ADMIN — ROCURONIUM BROMIDE 50 MG: 10 INJECTION, SOLUTION INTRAVENOUS at 11:02

## 2024-05-23 RX ADMIN — OXYCODONE 10 MG: 5 TABLET ORAL at 22:05

## 2024-05-23 RX ADMIN — Medication 10 ML: at 22:06

## 2024-05-23 RX ADMIN — LIDOCAINE HYDROCHLORIDE 100 MG: 20 INJECTION, SOLUTION EPIDURAL; INFILTRATION; INTRACAUDAL; PERINEURAL at 11:02

## 2024-05-23 RX ADMIN — CEFAZOLIN 2000 MG: 2 INJECTION, POWDER, FOR SOLUTION INTRAMUSCULAR; INTRAVENOUS at 11:06

## 2024-05-23 RX ADMIN — HYDROMORPHONE HYDROCHLORIDE 0.5 MG: 2 INJECTION, SOLUTION INTRAMUSCULAR; INTRAVENOUS; SUBCUTANEOUS at 16:59

## 2024-05-23 RX ADMIN — DEXMEDETOMIDINE HYDROCHLORIDE 4 MCG: 100 INJECTION, SOLUTION INTRAVENOUS at 11:37

## 2024-05-23 RX ADMIN — PROPOFOL 100 MG: 10 INJECTION, EMULSION INTRAVENOUS at 11:02

## 2024-05-23 RX ADMIN — ACETAMINOPHEN 650 MG: 325 TABLET ORAL at 18:51

## 2024-05-23 RX ADMIN — DEXMEDETOMIDINE HYDROCHLORIDE 4 MCG: 100 INJECTION, SOLUTION INTRAVENOUS at 11:40

## 2024-05-23 RX ADMIN — Medication 100 MCG: at 11:09

## 2024-05-23 RX ADMIN — METRONIDAZOLE 500 MG: 500 INJECTION, SOLUTION INTRAVENOUS at 10:58

## 2024-05-23 RX ADMIN — FENTANYL CITRATE 50 MCG: 50 INJECTION, SOLUTION INTRAMUSCULAR; INTRAVENOUS at 11:23

## 2024-05-23 RX ADMIN — HYDROMORPHONE HYDROCHLORIDE 0.5 MG: 2 INJECTION, SOLUTION INTRAMUSCULAR; INTRAVENOUS; SUBCUTANEOUS at 13:09

## 2024-05-23 RX ADMIN — FENTANYL CITRATE 50 MCG: 50 INJECTION, SOLUTION INTRAMUSCULAR; INTRAVENOUS at 11:02

## 2024-05-23 RX ADMIN — HYDROMORPHONE HYDROCHLORIDE 0.5 MG: 2 INJECTION, SOLUTION INTRAMUSCULAR; INTRAVENOUS; SUBCUTANEOUS at 12:45

## 2024-05-23 RX ADMIN — NALOXEGOL OXALATE 25 MG: 25 TABLET, FILM COATED ORAL at 09:22

## 2024-05-23 RX ADMIN — SODIUM CHLORIDE: 9 INJECTION, SOLUTION INTRAVENOUS at 15:00

## 2024-05-23 RX ADMIN — BUSPIRONE HYDROCHLORIDE 10 MG: 5 TABLET ORAL at 22:05

## 2024-05-23 ASSESSMENT — PAIN DESCRIPTION - DESCRIPTORS
DESCRIPTORS: DISCOMFORT
DESCRIPTORS: SORE
DESCRIPTORS: DISCOMFORT
DESCRIPTORS: SHARP
DESCRIPTORS: SHARP
DESCRIPTORS: ACHING
DESCRIPTORS: BURNING;SHARP

## 2024-05-23 ASSESSMENT — PAIN SCALES - WONG BAKER
WONGBAKER_NUMERICALRESPONSE: HURTS A LITTLE BIT

## 2024-05-23 ASSESSMENT — PAIN DESCRIPTION - LOCATION
LOCATION: ABDOMEN

## 2024-05-23 ASSESSMENT — PAIN SCALES - GENERAL
PAINLEVEL_OUTOF10: 8
PAINLEVEL_OUTOF10: 10
PAINLEVEL_OUTOF10: 8
PAINLEVEL_OUTOF10: 4

## 2024-05-23 ASSESSMENT — PAIN DESCRIPTION - ORIENTATION: ORIENTATION: MID

## 2024-05-23 ASSESSMENT — ENCOUNTER SYMPTOMS: SHORTNESS OF BREATH: 0

## 2024-05-23 ASSESSMENT — PAIN - FUNCTIONAL ASSESSMENT: PAIN_FUNCTIONAL_ASSESSMENT: NONE - DENIES PAIN

## 2024-05-23 NOTE — CONSULTS
TempSrc:    Oral   SpO2: 97%  100% 99%   Weight:             Physical Exam:      General: NAD  Eyes: EOMI  ENT: neck supple  Cardiovascular: Regular rate.  Respiratory: Clear to auscultation  Gastrointestinal: Soft, non tender  Genitourinary: no suprapubic tenderness  Musculoskeletal: No edema  Skin: warm, dry  Neuro: Alert.  Psych: Mood appropriate.     Past Medical History:   PMHx   Past Medical History:   Diagnosis Date    Arthritis      PSHX:  has a past surgical history that includes Abdomen surgery; Total knee arthroplasty (Left, 05/16/2011); joint replacement (Right, 09/17/2012); colostomy (N/A, 12/28/2023); joint replacement (Left); Upper gastrointestinal endoscopy (N/A, 2/6/2024); and CT BONE MARROW ASPIRATION (2/9/2024).  Allergies: No Known Allergies  Fam HX: family history is not on file.  Soc HX:   Social History     Socioeconomic History    Marital status:      Spouse name: None    Number of children: None    Years of education: None    Highest education level: None   Tobacco Use    Smoking status: Never   Vaping Use    Vaping Use: Never used   Substance and Sexual Activity    Alcohol use: Not Currently    Drug use: Never     Social Determinants of Health     Food Insecurity: No Food Insecurity (2/22/2024)    Hunger Vital Sign     Worried About Running Out of Food in the Last Year: Never true     Ran Out of Food in the Last Year: Never true   Recent Concern: Food Insecurity - Food Insecurity Present (1/24/2024)    Hunger Vital Sign     Worried About Running Out of Food in the Last Year: Often true     Ran Out of Food in the Last Year: Often true   Transportation Needs: No Transportation Needs (2/22/2024)    PRAPARE - Transportation     Lack of Transportation (Medical): No     Lack of Transportation (Non-Medical): No   Recent Concern: Transportation Needs - Unmet Transportation Needs (1/24/2024)    PRAPARE - Transportation     Lack of Transportation (Medical): No     Lack of Transportation

## 2024-05-23 NOTE — OP NOTE
dissection and sharp dissection.  Eventually, the small bowel was completely freed from the fascial tract and inside the peritoneal cavity.  The intraperitoneal adhesions were taken down with blunt dissection and sharp dissection and after that the bowel could be widely delivered into the wound.  The proximal and distal areas of the ileostomy were divided with a blue load of the [place] stapler.  The 2 ends of the bowel were lined with multiple interrupted 0 silk sutures.  The corners of staple lines were cut and a common anastomosis was created with another blue load of the [place] stapler.  The common anastomosis was visualized, widely patent and hemostatic, and the common enterotomy was then sealed with another blue load of the TX60, staggering the staple lines to optimize blood flow to the edges.  The bowel was then returned to the abdomen, taking care not to twist the mesentery.  The fascia was closed with a looped #1 PDS suture, taking care not to injure any of the underlying bowel.  The wound was irrigated and the skin was closed loosely with staples, packed in between with iodoform packing.  The wound was then cleaned and dressed with gauze and tape.  The patient tolerated the procedure well and was transferred to the PACU in stable condition.    SPECIMENS:  Ileostomy.    DRAINS:  None.    COMPLICATION:  None.    ESTIMATED BLOOD LOSS:  Less than 50 mL.          HIPOLITO TRONCOSO MD      D:  05/23/2024 12:34:28     T:  05/23/2024 18:32:11     LAINE/JESIKA  Job #:  [id number]     Doc#:  [id number]

## 2024-05-23 NOTE — BRIEF OP NOTE
[facility]  Brief Operative Note    Pt Name: Tammy Frederick  CSN: [id number]  YOB: 1950    Date of Procedure: 5/23/2024    Pre-operative Diagnosis:  History of loop ileostomy    Post-operative Diagnosis: same     Procedure:  Reversal of loop ileostomy with resection and anastomosis    Surgeon(s):  Alban Robertson MD     Surgical Assistant: Srikanth Reyes RN    Anesthesia:  General anesthesia    Findings: Full note dictated, Dictation Job Number: [id number]  Infection Present At Time Of Surgery (PATOS) (choose all levels that have infection present):  No infection present  Other Findings: Higher than average risk of wound infection given nature of ostomy reversal and exposure to bowel contents, mitigated this risk by leaving wound partially open with iodoform packing    Estimated Blood Loss: less than 50  ml    Complications: None    Specimens:  ileostomy  ID Type Source Tests Collected by Time Destination   A : A) OLD OSTOMY SITE Tissue Tissue SURGICAL PATHOLOGY Alban Robertson MD 5/23/2024 1205       Implants:  * No implants in log *    Drains: none   Urinary Catheter 05/23/24 [address];[place] (Active)       [REMOVED] Ileostomy/Jejunostomy RLQ Ileostomy (Removed)       Condition: stable     Disposition and Post-operative plan: PACU, med/surg montes     Alban Robertson MD, FACS  5/23/2024  12:26 PM

## 2024-05-23 NOTE — ANESTHESIA POSTPROCEDURE EVALUATION
Department of Anesthesiology  Postprocedure Note    Patient: Tammy Frederick  MRN: [id number]  YOB: 1950  Date of evaluation: 5/23/2024    Procedure Summary       Date: 05/23/24 Room / Location: [address]    Anesthesia Start: 1058 Anesthesia Stop: 1220    Procedure: OPEN REVERSAL OF LOOP ILEOSTOMY (Abdomen) Diagnosis:       Ileostomy in place (HCC)      (Ileostomy in place (HCC) [Z93.2])    Surgeons: Alban Robertson MD Responsible Provider: Delmar Machuca MD    Anesthesia Type: general ASA Status: 3            Anesthesia Type: No value filed.    Edgard Phase I: Edgard Score: 10    Edgard Phase II:      Anesthesia Post Evaluation    Patient location during evaluation: PACU  Patient participation: complete - patient participated  Level of consciousness: awake  Airway patency: patent  Nausea & Vomiting: no vomiting and no nausea  Cardiovascular status: hemodynamically stable  Respiratory status: acceptable  Hydration status: stable  Multimodal analgesia pain management approach  Pain management: adequate    No notable events documented.

## 2024-05-23 NOTE — ANESTHESIA POSTPROCEDURE EVALUATION
Department of Anesthesiology  Postprocedure Note    Patient: Tammy Frederick  MRN: [id number]  YOB: 1950  Date of evaluation: 5/23/2024    Procedure Summary       Date: 05/23/24 Room / Location: [address]    Anesthesia Start: 1058 Anesthesia Stop: 1220    Procedure: OPEN REVERSAL OF LOOP ILEOSTOMY (Abdomen) Diagnosis:       Ileostomy in place (HCC)      (Ileostomy in place (HCC) [Z93.2])    Surgeons: Alban Robertson MD Responsible Provider: Delmar Machuca MD    Anesthesia Type: general ASA Status: 3            Anesthesia Type: No value filed.    Edgard Phase I: Edgard Score: 8    Edgard Phase II:      Anesthesia Post Evaluation    Patient location during evaluation: PACU  Patient participation: complete - patient participated  Level of consciousness: awake  Airway patency: patent  Nausea & Vomiting: no vomiting and no nausea  Cardiovascular status: hemodynamically stable  Respiratory status: acceptable  Hydration status: stable  Multimodal analgesia pain management approach  Pain management: adequate    No notable events documented.

## 2024-05-23 NOTE — ANESTHESIA PRE PROCEDURE
Department of Anesthesiology  Preprocedure Note       Name:  Tammy Frederick   Age:  74 y.o.  :  1950                                          MRN:  [id number]         Date:  2024      Surgeon: Surgeon(s):  Alban Robertson MD    Procedure: Procedure(s):  OPEN REVERSAL OF LOOP ILEOSTOMY; POSSIBLE LAPAROTOMY    Medications prior to admission:   Prior to Admission medications    Medication Sig Start Date End Date Taking? Authorizing Provider   busPIRone (BUSPAR) 10 MG tablet Take 1 tablet by mouth 3 times daily    Ivone Dockery MD   HYDROcodone-acetaminophen (NORCO) 5-325 MG per tablet Take 1 tablet by mouth every 6 hours as needed for Pain.    Ivone Dockery MD   Magnesium 400 MG CAPS Take 1 capsule by mouth daily 3/19/24   Luiza Arndt PA   pantoprazole (PROTONIX) 40 MG tablet Take 1 tablet by mouth every morning (before breakfast) 24   Duyen Soriano APRN - CNP   Lactobacillus (ACIDOPHILUS/PECTIN PO) Take 1 tablet by mouth in the morning and at bedtime    Ivone Dockery MD   loperamide (IMODIUM) 2 MG capsule Take 1 capsule by mouth 3 times daily    Ivone Dockery MD   famotidine (PEPCID) 20 MG tablet Take 1 tablet by mouth Daily    Ivone Dockery MD   cholestyramine light 4 g packet Take 1 packet by mouth in the morning, at noon, in the evening, and at bedtime 2/10/24   Arnoldo Downs MD       Current medications:    Current Facility-Administered Medications   Medication Dose Route Frequency Provider Last Rate Last Admin    sodium chloride flush 0.9 % injection 5-40 mL  5-40 mL IntraVENous 2 times per day Alban Robertson MD        sodium chloride flush 0.9 % injection 5-40 mL  5-40 mL IntraVENous PRN Alban Robertson MD        0.9 % sodium chloride infusion   IntraVENous PRN Alban Robertson MD        ceFAZolin (ANCEF) 2,000 mg in sterile water 20 mL IV syringe  2,000 mg IntraVENous On Call to OR Alban Robertson MD        And    metroNIDAZOLE (FLAGYL) 500

## 2024-05-23 NOTE — H&P
[facility]    I have reviewed the history and physical and examined the patient and find no relevant changes.    I have reviewed with the patient and/or family the risks, benefits, and alternatives to the procedure.    Alban Robertson MD, FACS  5/23/2024  8:57 AM

## 2024-05-24 PROBLEM — E43 SEVERE MALNUTRITION (HCC): Chronic | Status: ACTIVE | Noted: 2024-01-25

## 2024-05-24 LAB
ANION GAP SERPL CALCULATED.3IONS-SCNC: 11 MMOL/L (ref 3–16)
BASOPHILS # BLD: 0 K/UL (ref 0–0.2)
BASOPHILS NFR BLD: 0.1 %
BUN SERPL-MCNC: 19 MG/DL (ref 7–20)
CALCIUM SERPL-MCNC: 8.3 MG/DL (ref 8.3–10.6)
CHLORIDE SERPL-SCNC: 113 MMOL/L (ref 99–110)
CO2 SERPL-SCNC: 15 MMOL/L (ref 21–32)
CREAT SERPL-MCNC: 0.7 MG/DL (ref 0.6–1.2)
DEPRECATED RDW RBC AUTO: 13.6 % (ref 12.4–15.4)
EOSINOPHIL # BLD: 0 K/UL (ref 0–0.6)
EOSINOPHIL NFR BLD: 0.1 %
GFR SERPLBLD CREATININE-BSD FMLA CKD-EPI: >90 ML/MIN/{1.73_M2}
GLUCOSE SERPL-MCNC: 112 MG/DL (ref 70–99)
HCT VFR BLD AUTO: 25.1 % (ref 36–48)
HGB BLD-MCNC: 8.7 G/DL (ref 12–16)
LYMPHOCYTES # BLD: 0.7 K/UL (ref 1–5.1)
LYMPHOCYTES NFR BLD: 9.4 %
MAGNESIUM SERPL-MCNC: 1.5 MG/DL (ref 1.8–2.4)
MCH RBC QN AUTO: 33.4 PG (ref 26–34)
MCHC RBC AUTO-ENTMCNC: 34.6 G/DL (ref 31–36)
MCV RBC AUTO: 96.4 FL (ref 80–100)
MONOCYTES # BLD: 0.7 K/UL (ref 0–1.3)
MONOCYTES NFR BLD: 9.3 %
NEUTROPHILS # BLD: 6.3 K/UL (ref 1.7–7.7)
NEUTROPHILS NFR BLD: 81.1 %
PHOSPHATE SERPL-MCNC: 3.9 MG/DL (ref 2.5–4.9)
PLATELET # BLD AUTO: 256 K/UL (ref 135–450)
PMV BLD AUTO: 6.3 FL (ref 5–10.5)
POTASSIUM SERPL-SCNC: 4.2 MMOL/L (ref 3.5–5.1)
RBC # BLD AUTO: 2.61 M/UL (ref 4–5.2)
SODIUM SERPL-SCNC: 139 MMOL/L (ref 136–145)
WBC # BLD AUTO: 7.7 K/UL (ref 4–11)

## 2024-05-24 PROCEDURE — 80048 BASIC METABOLIC PNL TOTAL CA: CPT

## 2024-05-24 PROCEDURE — 2580000003 HC RX 258: Performed by: NURSE PRACTITIONER

## 2024-05-24 PROCEDURE — 1200000000 HC SEMI PRIVATE

## 2024-05-24 PROCEDURE — 2500000003 HC RX 250 WO HCPCS: Performed by: SURGERY

## 2024-05-24 PROCEDURE — 6370000000 HC RX 637 (ALT 250 FOR IP): Performed by: HOSPITALIST

## 2024-05-24 PROCEDURE — 6360000002 HC RX W HCPCS: Performed by: NURSE PRACTITIONER

## 2024-05-24 PROCEDURE — 6370000000 HC RX 637 (ALT 250 FOR IP): Performed by: SURGERY

## 2024-05-24 PROCEDURE — 6370000000 HC RX 637 (ALT 250 FOR IP): Performed by: NURSE PRACTITIONER

## 2024-05-24 PROCEDURE — 2580000003 HC RX 258: Performed by: HOSPITALIST

## 2024-05-24 PROCEDURE — 85025 COMPLETE CBC W/AUTO DIFF WBC: CPT

## 2024-05-24 PROCEDURE — APPNB30 APP NON BILLABLE TIME 0-30 MINS: Performed by: NURSE PRACTITIONER

## 2024-05-24 PROCEDURE — 2580000003 HC RX 258: Performed by: SURGERY

## 2024-05-24 PROCEDURE — 6360000002 HC RX W HCPCS: Performed by: HOSPITALIST

## 2024-05-24 PROCEDURE — 99024 POSTOP FOLLOW-UP VISIT: CPT | Performed by: SURGERY

## 2024-05-24 PROCEDURE — 84100 ASSAY OF PHOSPHORUS: CPT

## 2024-05-24 PROCEDURE — 6360000002 HC RX W HCPCS: Performed by: SURGERY

## 2024-05-24 PROCEDURE — 83735 ASSAY OF MAGNESIUM: CPT

## 2024-05-24 RX ORDER — LIDOCAINE 4 G/G
1 PATCH TOPICAL DAILY
Status: DISCONTINUED | OUTPATIENT
Start: 2024-05-24 | End: 2024-06-04 | Stop reason: HOSPADM

## 2024-05-24 RX ORDER — SODIUM CHLORIDE 9 MG/ML
INJECTION, SOLUTION INTRAVENOUS CONTINUOUS
Status: DISCONTINUED | OUTPATIENT
Start: 2024-05-24 | End: 2024-05-24

## 2024-05-24 RX ORDER — DEXTROSE MONOHYDRATE, SODIUM CHLORIDE, AND POTASSIUM CHLORIDE 50; 1.49; 4.5 G/1000ML; G/1000ML; G/1000ML
INJECTION, SOLUTION INTRAVENOUS CONTINUOUS
Status: DISCONTINUED | OUTPATIENT
Start: 2024-05-24 | End: 2024-05-26

## 2024-05-24 RX ORDER — KETOROLAC TROMETHAMINE 15 MG/ML
15 INJECTION, SOLUTION INTRAMUSCULAR; INTRAVENOUS EVERY 6 HOURS
Status: COMPLETED | OUTPATIENT
Start: 2024-05-24 | End: 2024-05-26

## 2024-05-24 RX ADMIN — OXYCODONE 5 MG: 5 TABLET ORAL at 08:48

## 2024-05-24 RX ADMIN — DOCUSATE SODIUM 100 MG: 100 CAPSULE, LIQUID FILLED ORAL at 08:47

## 2024-05-24 RX ADMIN — KETOROLAC TROMETHAMINE 15 MG: 15 INJECTION, SOLUTION INTRAMUSCULAR; INTRAVENOUS at 20:51

## 2024-05-24 RX ADMIN — BUSPIRONE HYDROCHLORIDE 10 MG: 5 TABLET ORAL at 20:50

## 2024-05-24 RX ADMIN — Medication 10 ML: at 20:53

## 2024-05-24 RX ADMIN — BUSPIRONE HYDROCHLORIDE 10 MG: 5 TABLET ORAL at 08:48

## 2024-05-24 RX ADMIN — BUSPIRONE HYDROCHLORIDE 10 MG: 5 TABLET ORAL at 14:22

## 2024-05-24 RX ADMIN — WATER 2000 MG: 1 INJECTION INTRAMUSCULAR; INTRAVENOUS; SUBCUTANEOUS at 01:14

## 2024-05-24 RX ADMIN — PROMETHAZINE HYDROCHLORIDE 6.25 MG: 25 INJECTION INTRAMUSCULAR; INTRAVENOUS at 17:10

## 2024-05-24 RX ADMIN — MAGNESIUM SULFATE HEPTAHYDRATE 2000 MG: 40 INJECTION, SOLUTION INTRAVENOUS at 14:29

## 2024-05-24 RX ADMIN — ACETAMINOPHEN 650 MG: 325 TABLET ORAL at 12:14

## 2024-05-24 RX ADMIN — POTASSIUM CHLORIDE, DEXTROSE MONOHYDRATE AND SODIUM CHLORIDE: 150; 5; 450 INJECTION, SOLUTION INTRAVENOUS at 12:14

## 2024-05-24 RX ADMIN — NALOXEGOL OXALATE 25 MG: 25 TABLET, FILM COATED ORAL at 05:38

## 2024-05-24 RX ADMIN — KETOROLAC TROMETHAMINE 15 MG: 15 INJECTION, SOLUTION INTRAMUSCULAR; INTRAVENOUS at 09:48

## 2024-05-24 RX ADMIN — DOCUSATE SODIUM 100 MG: 100 CAPSULE, LIQUID FILLED ORAL at 20:50

## 2024-05-24 RX ADMIN — PROMETHAZINE HYDROCHLORIDE 12.5 MG: 25 INJECTION INTRAMUSCULAR; INTRAVENOUS at 23:52

## 2024-05-24 RX ADMIN — ENOXAPARIN SODIUM 30 MG: 100 INJECTION SUBCUTANEOUS at 08:47

## 2024-05-24 RX ADMIN — ACETAMINOPHEN 650 MG: 325 TABLET ORAL at 05:38

## 2024-05-24 RX ADMIN — ACETAMINOPHEN 650 MG: 325 TABLET ORAL at 22:50

## 2024-05-24 RX ADMIN — POLYETHYLENE GLYCOL 3350 17 G: 17 POWDER, FOR SOLUTION ORAL at 08:48

## 2024-05-24 RX ADMIN — Medication 10 ML: at 08:48

## 2024-05-24 RX ADMIN — KETOROLAC TROMETHAMINE 15 MG: 15 INJECTION, SOLUTION INTRAMUSCULAR; INTRAVENOUS at 17:07

## 2024-05-24 RX ADMIN — SODIUM CHLORIDE: 9 INJECTION, SOLUTION INTRAVENOUS at 09:48

## 2024-05-24 RX ADMIN — METRONIDAZOLE 500 MG: 500 INJECTION, SOLUTION INTRAVENOUS at 01:19

## 2024-05-24 ASSESSMENT — PAIN SCALES - GENERAL
PAINLEVEL_OUTOF10: 6
PAINLEVEL_OUTOF10: 9
PAINLEVEL_OUTOF10: 7
PAINLEVEL_OUTOF10: 7
PAINLEVEL_OUTOF10: 4
PAINLEVEL_OUTOF10: 4
PAINLEVEL_OUTOF10: 3
PAINLEVEL_OUTOF10: 5
PAINLEVEL_OUTOF10: 7
PAINLEVEL_OUTOF10: 9
PAINLEVEL_OUTOF10: 4

## 2024-05-24 ASSESSMENT — PAIN DESCRIPTION - ORIENTATION
ORIENTATION: MID
ORIENTATION: MID
ORIENTATION: LOWER
ORIENTATION: RIGHT

## 2024-05-24 ASSESSMENT — PAIN DESCRIPTION - DESCRIPTORS
DESCRIPTORS: ACHING;SORE
DESCRIPTORS: DISCOMFORT
DESCRIPTORS: SORE
DESCRIPTORS: SORE
DESCRIPTORS: ACHING;SORE
DESCRIPTORS: DISCOMFORT
DESCRIPTORS: ACHING
DESCRIPTORS: DISCOMFORT

## 2024-05-24 ASSESSMENT — PAIN DESCRIPTION - LOCATION
LOCATION: ABDOMEN

## 2024-05-24 ASSESSMENT — PAIN SCALES - WONG BAKER
WONGBAKER_NUMERICALRESPONSE: HURTS A LITTLE BIT
WONGBAKER_NUMERICALRESPONSE: NO HURT
WONGBAKER_NUMERICALRESPONSE: HURTS A LITTLE BIT
WONGBAKER_NUMERICALRESPONSE: NO HURT
WONGBAKER_NUMERICALRESPONSE: HURTS A LITTLE BIT

## 2024-05-24 NOTE — CARE COORDINATION
Case Management Assessment  Initial Evaluation    Date/Time of Evaluation: 5/24/2024 3:33 PM  Assessment Completed by: Yvon Reyes Jr, RN    If patient is discharged prior to next notation, then this note serves as note for discharge by case management.    Patient Name: Tammy Frederick                   YOB: 1950  Diagnosis: Ileostomy in place (HCC) [Z93.2]  History of creation of ostomy (HCC) [Z93.9]                   Date / Time: 5/23/2024  8:38 AM    Patient Admission Status: Inpatient   Readmission Risk (Low < 19, Mod (19-27), High > 27): Readmission Risk Score: 18.8    Current PCP: Leti Mendieta MD  PCP verified by CM? (P) Yes    Chart Reviewed: Yes      History Provided by: (P) Patient  Patient Orientation: (P) Alert and Oriented    Patient Cognition: (P) Alert    Hospitalization in the last 30 days (Readmission):  No    If yes, Readmission Assessment in CM Navigator will be completed.    Advance Directives:      Code Status: Full Code   Patient's Primary Decision Maker is: (P) Legal Next of Kin    Primary Decision Maker: Mary Lozada - Brother/Sister - [phone]    Discharge Planning:    Patient lives with: (P) Alone Type of Home: (P) House  Primary Care Giver: (P) Self  Patient Support Systems include: (P) Family Members   Current Financial resources: (P) Medicare  Current community resources: (P) None  Current services prior to admission: (P) Home Care            Current DME:              Type of Home Care services:  (P) None    ADLS  Prior functional level: (P) Assistance with the following:, Mobility (cane)  Current functional level: (P) Assistance with the following:, Mobility    PT AM-PAC:   /24  OT AM-PAC:   /24    Family can provide assistance at DC: (P) Yes  Would you like Case Management to discuss the discharge plan with any other family members/significant others, and if so, who? (P) No  Plans to Return to Present Housing: (P) Yes  Other Identified Issues/Barriers

## 2024-05-25 LAB
ALBUMIN SERPL-MCNC: 3.3 G/DL (ref 3.4–5)
ALBUMIN/GLOB SERPL: 1.2 {RATIO} (ref 1.1–2.2)
ALP SERPL-CCNC: 73 U/L (ref 40–129)
ALT SERPL-CCNC: 10 U/L (ref 10–40)
ANION GAP SERPL CALCULATED.3IONS-SCNC: 12 MMOL/L (ref 3–16)
APTT BLD: 27.1 SEC (ref 22.1–36.4)
AST SERPL-CCNC: 13 U/L (ref 15–37)
BASOPHILS # BLD: 0 K/UL (ref 0–0.2)
BASOPHILS NFR BLD: 0.2 %
BILIRUB SERPL-MCNC: 0.5 MG/DL (ref 0–1)
BUN SERPL-MCNC: 14 MG/DL (ref 7–20)
CALCIUM SERPL-MCNC: 8.5 MG/DL (ref 8.3–10.6)
CHLORIDE SERPL-SCNC: 114 MMOL/L (ref 99–110)
CO2 SERPL-SCNC: 15 MMOL/L (ref 21–32)
CREAT SERPL-MCNC: 0.6 MG/DL (ref 0.6–1.2)
DEPRECATED RDW RBC AUTO: 14.2 % (ref 12.4–15.4)
EOSINOPHIL # BLD: 0 K/UL (ref 0–0.6)
EOSINOPHIL NFR BLD: 0.3 %
GFR SERPLBLD CREATININE-BSD FMLA CKD-EPI: >90 ML/MIN/{1.73_M2}
GLUCOSE SERPL-MCNC: 155 MG/DL (ref 70–99)
HCT VFR BLD AUTO: 28.7 % (ref 36–48)
HGB BLD-MCNC: 9.6 G/DL (ref 12–16)
INR PPP: 1.15 (ref 0.85–1.15)
LYMPHOCYTES # BLD: 0.5 K/UL (ref 1–5.1)
LYMPHOCYTES NFR BLD: 7.5 %
MAGNESIUM SERPL-MCNC: 2.3 MG/DL (ref 1.8–2.4)
MCH RBC QN AUTO: 32.8 PG (ref 26–34)
MCHC RBC AUTO-ENTMCNC: 33.4 G/DL (ref 31–36)
MCV RBC AUTO: 98.2 FL (ref 80–100)
MONOCYTES # BLD: 0.5 K/UL (ref 0–1.3)
MONOCYTES NFR BLD: 7.7 %
NEUTROPHILS # BLD: 5.2 K/UL (ref 1.7–7.7)
NEUTROPHILS NFR BLD: 84.3 %
PHOSPHATE SERPL-MCNC: 1.6 MG/DL (ref 2.5–4.9)
PLATELET # BLD AUTO: 294 K/UL (ref 135–450)
PMV BLD AUTO: 6.7 FL (ref 5–10.5)
POTASSIUM SERPL-SCNC: 3.8 MMOL/L (ref 3.5–5.1)
PREALB SERPL-MCNC: 16.4 MG/DL (ref 20–40)
PROT SERPL-MCNC: 6 G/DL (ref 6.4–8.2)
PROTHROMBIN TIME: 14.9 SEC (ref 11.9–14.9)
RBC # BLD AUTO: 2.92 M/UL (ref 4–5.2)
SODIUM SERPL-SCNC: 141 MMOL/L (ref 136–145)
TRANSFERRIN SERPL-MCNC: 224 MG/DL (ref 200–360)
WBC # BLD AUTO: 6.2 K/UL (ref 4–11)

## 2024-05-25 PROCEDURE — 99024 POSTOP FOLLOW-UP VISIT: CPT | Performed by: SURGERY

## 2024-05-25 PROCEDURE — 84134 ASSAY OF PREALBUMIN: CPT

## 2024-05-25 PROCEDURE — 97161 PT EVAL LOW COMPLEX 20 MIN: CPT

## 2024-05-25 PROCEDURE — 97116 GAIT TRAINING THERAPY: CPT

## 2024-05-25 PROCEDURE — 6370000000 HC RX 637 (ALT 250 FOR IP): Performed by: NURSE PRACTITIONER

## 2024-05-25 PROCEDURE — 80053 COMPREHEN METABOLIC PANEL: CPT

## 2024-05-25 PROCEDURE — 6360000002 HC RX W HCPCS: Performed by: SURGERY

## 2024-05-25 PROCEDURE — 85025 COMPLETE CBC W/AUTO DIFF WBC: CPT

## 2024-05-25 PROCEDURE — 6360000002 HC RX W HCPCS: Performed by: NURSE PRACTITIONER

## 2024-05-25 PROCEDURE — 85730 THROMBOPLASTIN TIME PARTIAL: CPT

## 2024-05-25 PROCEDURE — 85610 PROTHROMBIN TIME: CPT

## 2024-05-25 PROCEDURE — 83735 ASSAY OF MAGNESIUM: CPT

## 2024-05-25 PROCEDURE — 2580000003 HC RX 258: Performed by: SURGERY

## 2024-05-25 PROCEDURE — 6370000000 HC RX 637 (ALT 250 FOR IP): Performed by: SURGERY

## 2024-05-25 PROCEDURE — 84100 ASSAY OF PHOSPHORUS: CPT

## 2024-05-25 PROCEDURE — 97530 THERAPEUTIC ACTIVITIES: CPT

## 2024-05-25 PROCEDURE — 97165 OT EVAL LOW COMPLEX 30 MIN: CPT

## 2024-05-25 PROCEDURE — 6370000000 HC RX 637 (ALT 250 FOR IP): Performed by: HOSPITALIST

## 2024-05-25 PROCEDURE — 84466 ASSAY OF TRANSFERRIN: CPT

## 2024-05-25 PROCEDURE — 1200000000 HC SEMI PRIVATE

## 2024-05-25 RX ADMIN — ACETAMINOPHEN 650 MG: 325 TABLET ORAL at 23:56

## 2024-05-25 RX ADMIN — ACETAMINOPHEN 650 MG: 325 TABLET ORAL at 06:17

## 2024-05-25 RX ADMIN — ACETAMINOPHEN 650 MG: 325 TABLET ORAL at 18:47

## 2024-05-25 RX ADMIN — Medication 10 ML: at 10:11

## 2024-05-25 RX ADMIN — ACETAMINOPHEN 650 MG: 325 TABLET ORAL at 10:10

## 2024-05-25 RX ADMIN — BUSPIRONE HYDROCHLORIDE 10 MG: 5 TABLET ORAL at 10:10

## 2024-05-25 RX ADMIN — ENOXAPARIN SODIUM 30 MG: 100 INJECTION SUBCUTANEOUS at 09:30

## 2024-05-25 RX ADMIN — POTASSIUM & SODIUM PHOSPHATES POWDER PACK 280-160-250 MG 250 MG: 280-160-250 PACK at 10:14

## 2024-05-25 RX ADMIN — Medication 10 ML: at 21:00

## 2024-05-25 RX ADMIN — ONDANSETRON 4 MG: 2 INJECTION INTRAMUSCULAR; INTRAVENOUS at 15:19

## 2024-05-25 RX ADMIN — DOCUSATE SODIUM 100 MG: 100 CAPSULE, LIQUID FILLED ORAL at 10:10

## 2024-05-25 RX ADMIN — KETOROLAC TROMETHAMINE 15 MG: 15 INJECTION, SOLUTION INTRAMUSCULAR; INTRAVENOUS at 04:12

## 2024-05-25 RX ADMIN — BUSPIRONE HYDROCHLORIDE 10 MG: 5 TABLET ORAL at 22:00

## 2024-05-25 RX ADMIN — BUSPIRONE HYDROCHLORIDE 10 MG: 5 TABLET ORAL at 15:17

## 2024-05-25 RX ADMIN — KETOROLAC TROMETHAMINE 15 MG: 15 INJECTION, SOLUTION INTRAMUSCULAR; INTRAVENOUS at 15:17

## 2024-05-25 RX ADMIN — KETOROLAC TROMETHAMINE 15 MG: 15 INJECTION, SOLUTION INTRAMUSCULAR; INTRAVENOUS at 10:11

## 2024-05-25 RX ADMIN — KETOROLAC TROMETHAMINE 15 MG: 15 INJECTION, SOLUTION INTRAMUSCULAR; INTRAVENOUS at 22:02

## 2024-05-25 ASSESSMENT — PAIN DESCRIPTION - LOCATION: LOCATION: ABDOMEN

## 2024-05-25 ASSESSMENT — PAIN SCALES - GENERAL
PAINLEVEL_OUTOF10: 0
PAINLEVEL_OUTOF10: 3
PAINLEVEL_OUTOF10: 0
PAINLEVEL_OUTOF10: 0
PAINLEVEL_OUTOF10: 2

## 2024-05-25 ASSESSMENT — PAIN SCALES - WONG BAKER
WONGBAKER_NUMERICALRESPONSE: NO HURT

## 2024-05-25 ASSESSMENT — PAIN DESCRIPTION - DESCRIPTORS: DESCRIPTORS: SORE

## 2024-05-25 ASSESSMENT — PAIN DESCRIPTION - ORIENTATION: ORIENTATION: LOWER

## 2024-05-25 NOTE — PLAN OF CARE
Problem: Discharge Planning  Goal: Discharge to home or other facility with appropriate resources  5/25/2024 1202 by Mike Alexander RN  Outcome: Progressing  5/24/2024 2337 by Rosa Mclean RN  Outcome: Progressing     Problem: Safety - Adult  Goal: Free from fall injury  5/25/2024 1202 by Mike Alexander RN  Outcome: Progressing  5/24/2024 2337 by Rosa Mclean RN  Outcome: Progressing     Problem: ABCDS Injury Assessment  Goal: Absence of physical injury  5/25/2024 1202 by Mike Alexander RN  Outcome: Progressing  5/24/2024 2337 by Rosa Mclean RN  Outcome: Progressing     Problem: Pain  Goal: Verbalizes/displays adequate comfort level or baseline comfort level  5/25/2024 1202 by Mike Alexander RN  Outcome: Progressing  5/24/2024 2337 by Rosa Mclean RN  Outcome: Progressing     Problem: Nutrition Deficit:  Goal: Optimize nutritional status  5/25/2024 1202 by Mike Alexander RN  Outcome: Progressing  5/24/2024 2337 by Rosa Mclean RN  Outcome: Progressing

## 2024-05-26 LAB
ANION GAP SERPL CALCULATED.3IONS-SCNC: 11 MMOL/L (ref 3–16)
BASOPHILS # BLD: 0 K/UL (ref 0–0.2)
BASOPHILS NFR BLD: 0.2 %
BUN SERPL-MCNC: 12 MG/DL (ref 7–20)
CALCIUM SERPL-MCNC: 8.7 MG/DL (ref 8.3–10.6)
CHLORIDE SERPL-SCNC: 113 MMOL/L (ref 99–110)
CO2 SERPL-SCNC: 16 MMOL/L (ref 21–32)
CREAT SERPL-MCNC: 0.6 MG/DL (ref 0.6–1.2)
DEPRECATED RDW RBC AUTO: 13.7 % (ref 12.4–15.4)
EOSINOPHIL # BLD: 0.1 K/UL (ref 0–0.6)
EOSINOPHIL NFR BLD: 2.2 %
GFR SERPLBLD CREATININE-BSD FMLA CKD-EPI: >90 ML/MIN/{1.73_M2}
GLUCOSE SERPL-MCNC: 147 MG/DL (ref 70–99)
HCT VFR BLD AUTO: 25.1 % (ref 36–48)
HGB BLD-MCNC: 8.7 G/DL (ref 12–16)
LYMPHOCYTES # BLD: 0.6 K/UL (ref 1–5.1)
LYMPHOCYTES NFR BLD: 14.1 %
MAGNESIUM SERPL-MCNC: 2.1 MG/DL (ref 1.8–2.4)
MCH RBC QN AUTO: 33.7 PG (ref 26–34)
MCHC RBC AUTO-ENTMCNC: 34.9 G/DL (ref 31–36)
MCV RBC AUTO: 96.7 FL (ref 80–100)
MONOCYTES # BLD: 0.6 K/UL (ref 0–1.3)
MONOCYTES NFR BLD: 13.7 %
NEUTROPHILS # BLD: 2.9 K/UL (ref 1.7–7.7)
NEUTROPHILS NFR BLD: 69.8 %
PHOSPHATE SERPL-MCNC: 1.9 MG/DL (ref 2.5–4.9)
PLATELET # BLD AUTO: 289 K/UL (ref 135–450)
PMV BLD AUTO: 6.5 FL (ref 5–10.5)
POTASSIUM SERPL-SCNC: 4.2 MMOL/L (ref 3.5–5.1)
RBC # BLD AUTO: 2.59 M/UL (ref 4–5.2)
SODIUM SERPL-SCNC: 140 MMOL/L (ref 136–145)
WBC # BLD AUTO: 4.1 K/UL (ref 4–11)

## 2024-05-26 PROCEDURE — 6370000000 HC RX 637 (ALT 250 FOR IP): Performed by: NURSE PRACTITIONER

## 2024-05-26 PROCEDURE — 6370000000 HC RX 637 (ALT 250 FOR IP): Performed by: HOSPITALIST

## 2024-05-26 PROCEDURE — 99024 POSTOP FOLLOW-UP VISIT: CPT | Performed by: SURGERY

## 2024-05-26 PROCEDURE — 80048 BASIC METABOLIC PNL TOTAL CA: CPT

## 2024-05-26 PROCEDURE — 85025 COMPLETE CBC W/AUTO DIFF WBC: CPT

## 2024-05-26 PROCEDURE — 6360000002 HC RX W HCPCS: Performed by: NURSE PRACTITIONER

## 2024-05-26 PROCEDURE — 83735 ASSAY OF MAGNESIUM: CPT

## 2024-05-26 PROCEDURE — 84100 ASSAY OF PHOSPHORUS: CPT

## 2024-05-26 PROCEDURE — 1200000000 HC SEMI PRIVATE

## 2024-05-26 PROCEDURE — 36415 COLL VENOUS BLD VENIPUNCTURE: CPT

## 2024-05-26 PROCEDURE — 2580000003 HC RX 258: Performed by: NURSE PRACTITIONER

## 2024-05-26 PROCEDURE — 2580000003 HC RX 258: Performed by: SURGERY

## 2024-05-26 PROCEDURE — 6370000000 HC RX 637 (ALT 250 FOR IP): Performed by: INTERNAL MEDICINE

## 2024-05-26 PROCEDURE — 6360000002 HC RX W HCPCS: Performed by: SURGERY

## 2024-05-26 PROCEDURE — 6370000000 HC RX 637 (ALT 250 FOR IP): Performed by: SURGERY

## 2024-05-26 RX ORDER — SODIUM BICARBONATE 650 MG/1
650 TABLET ORAL 4 TIMES DAILY
Status: DISCONTINUED | OUTPATIENT
Start: 2024-05-26 | End: 2024-05-26

## 2024-05-26 RX ORDER — SODIUM BICARBONATE 650 MG/1
650 TABLET ORAL 4 TIMES DAILY
Status: COMPLETED | OUTPATIENT
Start: 2024-05-26 | End: 2024-05-27

## 2024-05-26 RX ORDER — PANTOPRAZOLE SODIUM 40 MG/1
40 TABLET, DELAYED RELEASE ORAL
Status: DISCONTINUED | OUTPATIENT
Start: 2024-05-27 | End: 2024-06-04 | Stop reason: HOSPADM

## 2024-05-26 RX ADMIN — Medication 10 ML: at 21:26

## 2024-05-26 RX ADMIN — SODIUM BICARBONATE 650 MG: 650 TABLET ORAL at 11:56

## 2024-05-26 RX ADMIN — ACETAMINOPHEN 650 MG: 325 TABLET ORAL at 18:31

## 2024-05-26 RX ADMIN — ENOXAPARIN SODIUM 30 MG: 100 INJECTION SUBCUTANEOUS at 09:12

## 2024-05-26 RX ADMIN — PROMETHAZINE HYDROCHLORIDE 6.25 MG: 25 INJECTION INTRAMUSCULAR; INTRAVENOUS at 22:39

## 2024-05-26 RX ADMIN — ACETAMINOPHEN 650 MG: 325 TABLET ORAL at 05:34

## 2024-05-26 RX ADMIN — BUSPIRONE HYDROCHLORIDE 10 MG: 5 TABLET ORAL at 09:12

## 2024-05-26 RX ADMIN — BUSPIRONE HYDROCHLORIDE 10 MG: 5 TABLET ORAL at 15:00

## 2024-05-26 RX ADMIN — POTASSIUM & SODIUM PHOSPHATES POWDER PACK 280-160-250 MG 250 MG: 280-160-250 PACK at 09:11

## 2024-05-26 RX ADMIN — Medication 10 ML: at 09:12

## 2024-05-26 RX ADMIN — OXYCODONE 5 MG: 5 TABLET ORAL at 22:43

## 2024-05-26 RX ADMIN — SODIUM BICARBONATE 650 MG: 650 TABLET ORAL at 21:26

## 2024-05-26 RX ADMIN — SODIUM BICARBONATE 650 MG: 650 TABLET ORAL at 18:31

## 2024-05-26 RX ADMIN — OXYCODONE 5 MG: 5 TABLET ORAL at 11:58

## 2024-05-26 RX ADMIN — BUSPIRONE HYDROCHLORIDE 10 MG: 5 TABLET ORAL at 21:26

## 2024-05-26 RX ADMIN — ACETAMINOPHEN 650 MG: 325 TABLET ORAL at 11:56

## 2024-05-26 RX ADMIN — KETOROLAC TROMETHAMINE 15 MG: 15 INJECTION, SOLUTION INTRAMUSCULAR; INTRAVENOUS at 04:46

## 2024-05-26 ASSESSMENT — PAIN SCALES - WONG BAKER
WONGBAKER_NUMERICALRESPONSE: NO HURT

## 2024-05-26 ASSESSMENT — PAIN SCALES - GENERAL
PAINLEVEL_OUTOF10: 3
PAINLEVEL_OUTOF10: 7
PAINLEVEL_OUTOF10: 0
PAINLEVEL_OUTOF10: 6
PAINLEVEL_OUTOF10: 0
PAINLEVEL_OUTOF10: 0
PAINLEVEL_OUTOF10: 6
PAINLEVEL_OUTOF10: 0

## 2024-05-26 ASSESSMENT — PAIN DESCRIPTION - DESCRIPTORS
DESCRIPTORS: ACHING

## 2024-05-26 ASSESSMENT — PAIN DESCRIPTION - LOCATION
LOCATION: ABDOMEN

## 2024-05-26 ASSESSMENT — PAIN - FUNCTIONAL ASSESSMENT: PAIN_FUNCTIONAL_ASSESSMENT: ACTIVITIES ARE NOT PREVENTED

## 2024-05-26 ASSESSMENT — PAIN DESCRIPTION - ORIENTATION
ORIENTATION: LOWER
ORIENTATION: MID
ORIENTATION: LOWER

## 2024-05-26 NOTE — PLAN OF CARE
Problem: Discharge Planning  Goal: Discharge to home or other facility with appropriate resources  5/25/2024 2259 by Abena Cuevas RN  Outcome: Progressing  5/25/2024 1202 by Mike Alexander RN  Outcome: Progressing  Flowsheets (Taken 5/25/2024 0945)  Discharge to home or other facility with appropriate resources: Identify barriers to discharge with patient and caregiver     Problem: Safety - Adult  Goal: Free from fall injury  5/25/2024 2259 by Abena Cuevas RN  Outcome: Progressing  5/25/2024 1202 by Mike Alexander RN  Outcome: Progressing     Problem: ABCDS Injury Assessment  Goal: Absence of physical injury  5/25/2024 2259 by Abena Cuevas RN  Outcome: Progressing  5/25/2024 1202 by Mike Alexander RN  Outcome: Progressing     Problem: Pain  Goal: Verbalizes/displays adequate comfort level or baseline comfort level  5/25/2024 2259 by Abena Cuevas RN  Outcome: Progressing  5/25/2024 1202 by Mike Alexander RN  Outcome: Progressing     Problem: Nutrition Deficit:  Goal: Optimize nutritional status  5/25/2024 2259 by Abena Cuevas RN  Outcome: Progressing  5/25/2024 1202 by Mike Alexander RN  Outcome: Progressing

## 2024-05-26 NOTE — PLAN OF CARE
Problem: Discharge Planning  Goal: Discharge to home or other facility with appropriate resources  Outcome: Progressing     Problem: Safety - Adult  Goal: Free from fall injury  Outcome: Progressing     Problem: ABCDS Injury Assessment  Goal: Absence of physical injury  Outcome: Progressing     Problem: Pain  Goal: Verbalizes/displays adequate comfort level or baseline comfort level  Outcome: Progressing  Flowsheets (Taken 5/26/2024 0900)  Verbalizes/displays adequate comfort level or baseline comfort level: Assess pain using appropriate pain scale     Problem: Nutrition Deficit:  Goal: Optimize nutritional status  Outcome: Progressing

## 2024-05-27 LAB
ANION GAP SERPL CALCULATED.3IONS-SCNC: 8 MMOL/L (ref 3–16)
BASOPHILS # BLD: 0 K/UL (ref 0–0.2)
BASOPHILS NFR BLD: 0.3 %
BUN SERPL-MCNC: 7 MG/DL (ref 7–20)
CALCIUM SERPL-MCNC: 8.6 MG/DL (ref 8.3–10.6)
CHLORIDE SERPL-SCNC: 111 MMOL/L (ref 99–110)
CO2 SERPL-SCNC: 18 MMOL/L (ref 21–32)
CREAT SERPL-MCNC: 0.6 MG/DL (ref 0.6–1.2)
DEPRECATED RDW RBC AUTO: 13.7 % (ref 12.4–15.4)
EOSINOPHIL # BLD: 0.1 K/UL (ref 0–0.6)
EOSINOPHIL NFR BLD: 2.5 %
GFR SERPLBLD CREATININE-BSD FMLA CKD-EPI: >90 ML/MIN/{1.73_M2}
GLUCOSE SERPL-MCNC: 122 MG/DL (ref 70–99)
HCT VFR BLD AUTO: 24.1 % (ref 36–48)
HGB BLD-MCNC: 8.1 G/DL (ref 12–16)
LYMPHOCYTES # BLD: 0.6 K/UL (ref 1–5.1)
LYMPHOCYTES NFR BLD: 16.1 %
MAGNESIUM SERPL-MCNC: 1.8 MG/DL (ref 1.8–2.4)
MCH RBC QN AUTO: 32.9 PG (ref 26–34)
MCHC RBC AUTO-ENTMCNC: 33.8 G/DL (ref 31–36)
MCV RBC AUTO: 97.3 FL (ref 80–100)
MONOCYTES # BLD: 0.5 K/UL (ref 0–1.3)
MONOCYTES NFR BLD: 12.2 %
NEUTROPHILS # BLD: 2.7 K/UL (ref 1.7–7.7)
NEUTROPHILS NFR BLD: 68.9 %
PHOSPHATE SERPL-MCNC: 2.3 MG/DL (ref 2.5–4.9)
PLATELET # BLD AUTO: 324 K/UL (ref 135–450)
PMV BLD AUTO: 6.9 FL (ref 5–10.5)
POTASSIUM SERPL-SCNC: 3.7 MMOL/L (ref 3.5–5.1)
RBC # BLD AUTO: 2.48 M/UL (ref 4–5.2)
SODIUM SERPL-SCNC: 137 MMOL/L (ref 136–145)
WBC # BLD AUTO: 3.8 K/UL (ref 4–11)

## 2024-05-27 PROCEDURE — 36415 COLL VENOUS BLD VENIPUNCTURE: CPT

## 2024-05-27 PROCEDURE — 6370000000 HC RX 637 (ALT 250 FOR IP): Performed by: HOSPITALIST

## 2024-05-27 PROCEDURE — 85025 COMPLETE CBC W/AUTO DIFF WBC: CPT

## 2024-05-27 PROCEDURE — 6360000002 HC RX W HCPCS: Performed by: SURGERY

## 2024-05-27 PROCEDURE — 83735 ASSAY OF MAGNESIUM: CPT

## 2024-05-27 PROCEDURE — 84100 ASSAY OF PHOSPHORUS: CPT

## 2024-05-27 PROCEDURE — 99024 POSTOP FOLLOW-UP VISIT: CPT | Performed by: SURGERY

## 2024-05-27 PROCEDURE — 80048 BASIC METABOLIC PNL TOTAL CA: CPT

## 2024-05-27 PROCEDURE — 6370000000 HC RX 637 (ALT 250 FOR IP): Performed by: NURSE PRACTITIONER

## 2024-05-27 PROCEDURE — 6370000000 HC RX 637 (ALT 250 FOR IP): Performed by: SURGERY

## 2024-05-27 PROCEDURE — 6370000000 HC RX 637 (ALT 250 FOR IP): Performed by: INTERNAL MEDICINE

## 2024-05-27 PROCEDURE — 1200000000 HC SEMI PRIVATE

## 2024-05-27 PROCEDURE — 2580000003 HC RX 258: Performed by: SURGERY

## 2024-05-27 RX ADMIN — ENOXAPARIN SODIUM 30 MG: 100 INJECTION SUBCUTANEOUS at 08:49

## 2024-05-27 RX ADMIN — ACETAMINOPHEN 650 MG: 325 TABLET ORAL at 05:47

## 2024-05-27 RX ADMIN — BUSPIRONE HYDROCHLORIDE 10 MG: 5 TABLET ORAL at 20:04

## 2024-05-27 RX ADMIN — ACETAMINOPHEN 650 MG: 325 TABLET ORAL at 23:59

## 2024-05-27 RX ADMIN — OXYCODONE 5 MG: 5 TABLET ORAL at 11:22

## 2024-05-27 RX ADMIN — BUSPIRONE HYDROCHLORIDE 10 MG: 5 TABLET ORAL at 08:49

## 2024-05-27 RX ADMIN — OXYCODONE 5 MG: 5 TABLET ORAL at 22:27

## 2024-05-27 RX ADMIN — PANTOPRAZOLE SODIUM 40 MG: 40 TABLET, DELAYED RELEASE ORAL at 05:47

## 2024-05-27 RX ADMIN — ACETAMINOPHEN 650 MG: 325 TABLET ORAL at 17:48

## 2024-05-27 RX ADMIN — Medication 10 ML: at 20:05

## 2024-05-27 RX ADMIN — SODIUM BICARBONATE 650 MG: 650 TABLET ORAL at 08:49

## 2024-05-27 RX ADMIN — BUSPIRONE HYDROCHLORIDE 10 MG: 5 TABLET ORAL at 13:41

## 2024-05-27 RX ADMIN — SODIUM BICARBONATE 650 MG: 650 TABLET ORAL at 13:41

## 2024-05-27 RX ADMIN — ACETAMINOPHEN 650 MG: 325 TABLET ORAL at 11:22

## 2024-05-27 RX ADMIN — POTASSIUM & SODIUM PHOSPHATES POWDER PACK 280-160-250 MG 250 MG: 280-160-250 PACK at 11:22

## 2024-05-27 RX ADMIN — Medication 10 ML: at 08:50

## 2024-05-27 ASSESSMENT — PAIN DESCRIPTION - DESCRIPTORS
DESCRIPTORS: ACHING;DISCOMFORT
DESCRIPTORS: ACHING
DESCRIPTORS: ACHING;DISCOMFORT

## 2024-05-27 ASSESSMENT — PAIN SCALES - GENERAL
PAINLEVEL_OUTOF10: 5
PAINLEVEL_OUTOF10: 3
PAINLEVEL_OUTOF10: 6
PAINLEVEL_OUTOF10: 4
PAINLEVEL_OUTOF10: 4
PAINLEVEL_OUTOF10: 0
PAINLEVEL_OUTOF10: 3
PAINLEVEL_OUTOF10: 2

## 2024-05-27 ASSESSMENT — PAIN DESCRIPTION - LOCATION
LOCATION: ABDOMEN

## 2024-05-27 ASSESSMENT — PAIN DESCRIPTION - ORIENTATION
ORIENTATION: RIGHT
ORIENTATION: LOWER

## 2024-05-27 ASSESSMENT — PAIN - FUNCTIONAL ASSESSMENT: PAIN_FUNCTIONAL_ASSESSMENT: ACTIVITIES ARE NOT PREVENTED

## 2024-05-27 ASSESSMENT — PAIN SCALES - WONG BAKER: WONGBAKER_NUMERICALRESPONSE: NO HURT

## 2024-05-27 NOTE — PLAN OF CARE
Problem: Discharge Planning  Goal: Discharge to home or other facility with appropriate resources  5/27/2024 0953 by Eliana Whitley RN  Outcome: Progressing     Problem: Safety - Adult  Goal: Free from fall injury  5/27/2024 0953 by Eliana Whitley RN  Outcome: Progressing     Problem: ABCDS Injury Assessment  Goal: Absence of physical injury  5/27/2024 0953 by Eliana Whitley RN  Outcome: Progressing  Problem: Pain    Goal: Verbalizes/displays adequate comfort level or baseline comfort level  5/27/2024 0953 by Eliana Whitley RN  Outcome: Progressing     Problem: Nutrition Deficit:  Goal: Optimize nutritional status  5/27/2024 0953 by Eliana Whitley RN  Outcome: Progressing

## 2024-05-28 ENCOUNTER — APPOINTMENT (OUTPATIENT)
Dept: GENERAL RADIOLOGY | Age: 74
DRG: 329 | End: 2024-05-28
Attending: SURGERY
Payer: MEDICARE

## 2024-05-28 LAB
ANION GAP SERPL CALCULATED.3IONS-SCNC: 10 MMOL/L (ref 3–16)
APTT BLD: <18 SEC (ref 22.1–36.4)
BASOPHILS # BLD: 0 K/UL (ref 0–0.2)
BASOPHILS NFR BLD: 0.4 %
BUN SERPL-MCNC: 7 MG/DL (ref 7–20)
CALCIUM SERPL-MCNC: 8.3 MG/DL (ref 8.3–10.6)
CHLORIDE SERPL-SCNC: 108 MMOL/L (ref 99–110)
CO2 SERPL-SCNC: 19 MMOL/L (ref 21–32)
CREAT SERPL-MCNC: <0.5 MG/DL (ref 0.6–1.2)
CRP SERPL-MCNC: 28.4 MG/L (ref 0–5.1)
DEPRECATED RDW RBC AUTO: 13.6 % (ref 12.4–15.4)
EOSINOPHIL # BLD: 0.1 K/UL (ref 0–0.6)
EOSINOPHIL NFR BLD: 2.7 %
GFR SERPLBLD CREATININE-BSD FMLA CKD-EPI: >90 ML/MIN/{1.73_M2}
GLUCOSE SERPL-MCNC: 100 MG/DL (ref 70–99)
HCT VFR BLD AUTO: 24.9 % (ref 36–48)
HGB BLD-MCNC: 8.3 G/DL (ref 12–16)
INR PPP: 0.99 (ref 0.85–1.15)
LACTATE BLDV-SCNC: 0.7 MMOL/L (ref 0.4–2)
LYMPHOCYTES # BLD: 0.7 K/UL (ref 1–5.1)
LYMPHOCYTES NFR BLD: 19.2 %
MAGNESIUM SERPL-MCNC: 1.7 MG/DL (ref 1.8–2.4)
MCH RBC QN AUTO: 32.2 PG (ref 26–34)
MCHC RBC AUTO-ENTMCNC: 33.3 G/DL (ref 31–36)
MCV RBC AUTO: 96.8 FL (ref 80–100)
MONOCYTES # BLD: 0.4 K/UL (ref 0–1.3)
MONOCYTES NFR BLD: 11.2 %
NEUTROPHILS # BLD: 2.5 K/UL (ref 1.7–7.7)
NEUTROPHILS NFR BLD: 66.5 %
PHOSPHATE SERPL-MCNC: 3.3 MG/DL (ref 2.5–4.9)
PLATELET # BLD AUTO: 356 K/UL (ref 135–450)
PMV BLD AUTO: 6.7 FL (ref 5–10.5)
POTASSIUM SERPL-SCNC: 3.6 MMOL/L (ref 3.5–5.1)
PROTHROMBIN TIME: 13.3 SEC (ref 11.9–14.9)
RBC # BLD AUTO: 2.57 M/UL (ref 4–5.2)
SODIUM SERPL-SCNC: 137 MMOL/L (ref 136–145)
WBC # BLD AUTO: 3.7 K/UL (ref 4–11)

## 2024-05-28 PROCEDURE — 85025 COMPLETE CBC W/AUTO DIFF WBC: CPT

## 2024-05-28 PROCEDURE — 83605 ASSAY OF LACTIC ACID: CPT

## 2024-05-28 PROCEDURE — 6370000000 HC RX 637 (ALT 250 FOR IP): Performed by: NURSE PRACTITIONER

## 2024-05-28 PROCEDURE — 97530 THERAPEUTIC ACTIVITIES: CPT

## 2024-05-28 PROCEDURE — 85610 PROTHROMBIN TIME: CPT

## 2024-05-28 PROCEDURE — 6360000002 HC RX W HCPCS: Performed by: SURGERY

## 2024-05-28 PROCEDURE — APPNB30 APP NON BILLABLE TIME 0-30 MINS: Performed by: NURSE PRACTITIONER

## 2024-05-28 PROCEDURE — 84100 ASSAY OF PHOSPHORUS: CPT

## 2024-05-28 PROCEDURE — 1200000000 HC SEMI PRIVATE

## 2024-05-28 PROCEDURE — 83735 ASSAY OF MAGNESIUM: CPT

## 2024-05-28 PROCEDURE — 74019 RADEX ABDOMEN 2 VIEWS: CPT

## 2024-05-28 PROCEDURE — 6370000000 HC RX 637 (ALT 250 FOR IP): Performed by: SURGERY

## 2024-05-28 PROCEDURE — APPSS15 APP SPLIT SHARED TIME 0-15 MINUTES: Performed by: NURSE PRACTITIONER

## 2024-05-28 PROCEDURE — 99024 POSTOP FOLLOW-UP VISIT: CPT | Performed by: SURGERY

## 2024-05-28 PROCEDURE — 85730 THROMBOPLASTIN TIME PARTIAL: CPT

## 2024-05-28 PROCEDURE — 6370000000 HC RX 637 (ALT 250 FOR IP): Performed by: HOSPITALIST

## 2024-05-28 PROCEDURE — 86140 C-REACTIVE PROTEIN: CPT

## 2024-05-28 PROCEDURE — 2580000003 HC RX 258: Performed by: SURGERY

## 2024-05-28 PROCEDURE — 80048 BASIC METABOLIC PNL TOTAL CA: CPT

## 2024-05-28 PROCEDURE — 6360000002 HC RX W HCPCS: Performed by: NURSE PRACTITIONER

## 2024-05-28 RX ORDER — MAGNESIUM SULFATE 1 G/100ML
1000 INJECTION INTRAVENOUS ONCE
Status: COMPLETED | OUTPATIENT
Start: 2024-05-28 | End: 2024-05-28

## 2024-05-28 RX ADMIN — BUSPIRONE HYDROCHLORIDE 10 MG: 5 TABLET ORAL at 21:06

## 2024-05-28 RX ADMIN — OXYCODONE 5 MG: 5 TABLET ORAL at 09:52

## 2024-05-28 RX ADMIN — ACETAMINOPHEN 650 MG: 325 TABLET ORAL at 18:42

## 2024-05-28 RX ADMIN — Medication 10 ML: at 09:53

## 2024-05-28 RX ADMIN — BUSPIRONE HYDROCHLORIDE 10 MG: 5 TABLET ORAL at 09:52

## 2024-05-28 RX ADMIN — PANTOPRAZOLE SODIUM 40 MG: 40 TABLET, DELAYED RELEASE ORAL at 05:15

## 2024-05-28 RX ADMIN — ACETAMINOPHEN 650 MG: 325 TABLET ORAL at 12:14

## 2024-05-28 RX ADMIN — ONDANSETRON 4 MG: 4 TABLET, ORALLY DISINTEGRATING ORAL at 09:52

## 2024-05-28 RX ADMIN — Medication 10 ML: at 21:06

## 2024-05-28 RX ADMIN — BUSPIRONE HYDROCHLORIDE 10 MG: 5 TABLET ORAL at 12:14

## 2024-05-28 RX ADMIN — ENOXAPARIN SODIUM 30 MG: 100 INJECTION SUBCUTANEOUS at 09:00

## 2024-05-28 RX ADMIN — ACETAMINOPHEN 650 MG: 325 TABLET ORAL at 05:15

## 2024-05-28 RX ADMIN — MAGNESIUM SULFATE HEPTAHYDRATE 1000 MG: 1 INJECTION, SOLUTION INTRAVENOUS at 12:18

## 2024-05-28 ASSESSMENT — PAIN DESCRIPTION - LOCATION
LOCATION: ABDOMEN

## 2024-05-28 ASSESSMENT — PAIN SCALES - WONG BAKER
WONGBAKER_NUMERICALRESPONSE: HURTS A LITTLE BIT
WONGBAKER_NUMERICALRESPONSE: NO HURT

## 2024-05-28 ASSESSMENT — PAIN SCALES - GENERAL
PAINLEVEL_OUTOF10: 0
PAINLEVEL_OUTOF10: 0
PAINLEVEL_OUTOF10: 2
PAINLEVEL_OUTOF10: 2
PAINLEVEL_OUTOF10: 3
PAINLEVEL_OUTOF10: 2
PAINLEVEL_OUTOF10: 4

## 2024-05-28 ASSESSMENT — PAIN DESCRIPTION - DESCRIPTORS
DESCRIPTORS: SORE
DESCRIPTORS: DISCOMFORT
DESCRIPTORS: DISCOMFORT

## 2024-05-28 ASSESSMENT — PAIN DESCRIPTION - ORIENTATION
ORIENTATION: LOWER
ORIENTATION: LOWER

## 2024-05-28 ASSESSMENT — PAIN DESCRIPTION - ONSET: ONSET: ON-GOING

## 2024-05-28 NOTE — PLAN OF CARE
Problem: Discharge Planning  Goal: Discharge to home or other facility with appropriate resources  5/27/2024 2324 by Leti Alvarez RN  Outcome: Progressing  5/27/2024 0953 by Eliana Whitley RN  Outcome: Progressing     Problem: Safety - Adult  Goal: Free from fall injury  5/27/2024 2324 by Leti Alvarez RN  Outcome: Progressing  5/27/2024 0953 by Eliana Whitley RN  Outcome: Progressing     Problem: ABCDS Injury Assessment  Goal: Absence of physical injury  5/27/2024 2324 by Leti Alvarez RN  Outcome: Progressing  5/27/2024 0953 by Eliana Whitley RN  Outcome: Progressing     Problem: Pain  Goal: Verbalizes/displays adequate comfort level or baseline comfort level  5/27/2024 2324 by Leti Alvarez RN  Outcome: Progressing  5/27/2024 0953 by Eliana Whitley RN  Outcome: Progressing     Problem: Nutrition Deficit:  Goal: Optimize nutritional status  5/27/2024 2324 by Leti Alvarez RN  Outcome: Progressing  5/27/2024 0953 by Eliana Whitley RN  Outcome: Progressing

## 2024-05-29 LAB
ANION GAP SERPL CALCULATED.3IONS-SCNC: 14 MMOL/L (ref 3–16)
BASOPHILS # BLD: 0 K/UL (ref 0–0.2)
BASOPHILS NFR BLD: 0.3 %
BUN SERPL-MCNC: 7 MG/DL (ref 7–20)
CALCIUM SERPL-MCNC: 8.5 MG/DL (ref 8.3–10.6)
CHLORIDE SERPL-SCNC: 104 MMOL/L (ref 99–110)
CO2 SERPL-SCNC: 19 MMOL/L (ref 21–32)
CREAT SERPL-MCNC: 0.5 MG/DL (ref 0.6–1.2)
DEPRECATED RDW RBC AUTO: 13.7 % (ref 12.4–15.4)
EOSINOPHIL # BLD: 0.1 K/UL (ref 0–0.6)
EOSINOPHIL NFR BLD: 2.1 %
GFR SERPLBLD CREATININE-BSD FMLA CKD-EPI: >90 ML/MIN/{1.73_M2}
GLUCOSE SERPL-MCNC: 105 MG/DL (ref 70–99)
HCT VFR BLD AUTO: 27.5 % (ref 36–48)
HGB BLD-MCNC: 9.1 G/DL (ref 12–16)
LYMPHOCYTES # BLD: 0.8 K/UL (ref 1–5.1)
LYMPHOCYTES NFR BLD: 18.8 %
MCH RBC QN AUTO: 32 PG (ref 26–34)
MCHC RBC AUTO-ENTMCNC: 33.1 G/DL (ref 31–36)
MCV RBC AUTO: 96.9 FL (ref 80–100)
MONOCYTES # BLD: 0.5 K/UL (ref 0–1.3)
MONOCYTES NFR BLD: 10.5 %
NEUTROPHILS # BLD: 3 K/UL (ref 1.7–7.7)
NEUTROPHILS NFR BLD: 68.3 %
PLATELET # BLD AUTO: 413 K/UL (ref 135–450)
PMV BLD AUTO: 6.7 FL (ref 5–10.5)
POTASSIUM SERPL-SCNC: 3.6 MMOL/L (ref 3.5–5.1)
RBC # BLD AUTO: 2.83 M/UL (ref 4–5.2)
SODIUM SERPL-SCNC: 137 MMOL/L (ref 136–145)
WBC # BLD AUTO: 4.4 K/UL (ref 4–11)

## 2024-05-29 PROCEDURE — 1200000000 HC SEMI PRIVATE

## 2024-05-29 PROCEDURE — 97535 SELF CARE MNGMENT TRAINING: CPT

## 2024-05-29 PROCEDURE — 99024 POSTOP FOLLOW-UP VISIT: CPT | Performed by: SURGERY

## 2024-05-29 PROCEDURE — 85025 COMPLETE CBC W/AUTO DIFF WBC: CPT

## 2024-05-29 PROCEDURE — 97530 THERAPEUTIC ACTIVITIES: CPT

## 2024-05-29 PROCEDURE — 6370000000 HC RX 637 (ALT 250 FOR IP): Performed by: SURGERY

## 2024-05-29 PROCEDURE — 6370000000 HC RX 637 (ALT 250 FOR IP): Performed by: NURSE PRACTITIONER

## 2024-05-29 PROCEDURE — APPSS15 APP SPLIT SHARED TIME 0-15 MINUTES: Performed by: NURSE PRACTITIONER

## 2024-05-29 PROCEDURE — 6370000000 HC RX 637 (ALT 250 FOR IP): Performed by: HOSPITALIST

## 2024-05-29 PROCEDURE — 80048 BASIC METABOLIC PNL TOTAL CA: CPT

## 2024-05-29 PROCEDURE — 2580000003 HC RX 258: Performed by: SURGERY

## 2024-05-29 PROCEDURE — 6360000002 HC RX W HCPCS: Performed by: SURGERY

## 2024-05-29 PROCEDURE — APPNB30 APP NON BILLABLE TIME 0-30 MINS: Performed by: NURSE PRACTITIONER

## 2024-05-29 RX ADMIN — ACETAMINOPHEN 650 MG: 325 TABLET ORAL at 12:00

## 2024-05-29 RX ADMIN — Medication 10 ML: at 20:55

## 2024-05-29 RX ADMIN — PANTOPRAZOLE SODIUM 40 MG: 40 TABLET, DELAYED RELEASE ORAL at 05:14

## 2024-05-29 RX ADMIN — BUSPIRONE HYDROCHLORIDE 10 MG: 5 TABLET ORAL at 09:43

## 2024-05-29 RX ADMIN — ACETAMINOPHEN 650 MG: 325 TABLET ORAL at 00:04

## 2024-05-29 RX ADMIN — OXYCODONE 5 MG: 5 TABLET ORAL at 09:43

## 2024-05-29 RX ADMIN — BUSPIRONE HYDROCHLORIDE 10 MG: 5 TABLET ORAL at 20:53

## 2024-05-29 RX ADMIN — ACETAMINOPHEN 650 MG: 325 TABLET ORAL at 18:51

## 2024-05-29 RX ADMIN — OXYCODONE 5 MG: 5 TABLET ORAL at 20:53

## 2024-05-29 RX ADMIN — ENOXAPARIN SODIUM 30 MG: 100 INJECTION SUBCUTANEOUS at 09:30

## 2024-05-29 RX ADMIN — Medication 10 ML: at 09:44

## 2024-05-29 RX ADMIN — ACETAMINOPHEN 650 MG: 325 TABLET ORAL at 05:14

## 2024-05-29 RX ADMIN — ACETAMINOPHEN 650 MG: 325 TABLET ORAL at 23:38

## 2024-05-29 RX ADMIN — BUSPIRONE HYDROCHLORIDE 10 MG: 5 TABLET ORAL at 14:21

## 2024-05-29 ASSESSMENT — PAIN SCALES - WONG BAKER
WONGBAKER_NUMERICALRESPONSE: NO HURT
WONGBAKER_NUMERICALRESPONSE: HURTS A LITTLE BIT
WONGBAKER_NUMERICALRESPONSE: NO HURT
WONGBAKER_NUMERICALRESPONSE: HURTS LITTLE MORE
WONGBAKER_NUMERICALRESPONSE: NO HURT

## 2024-05-29 ASSESSMENT — PAIN SCALES - GENERAL
PAINLEVEL_OUTOF10: 5
PAINLEVEL_OUTOF10: 3
PAINLEVEL_OUTOF10: 3
PAINLEVEL_OUTOF10: 0
PAINLEVEL_OUTOF10: 0
PAINLEVEL_OUTOF10: 3
PAINLEVEL_OUTOF10: 0
PAINLEVEL_OUTOF10: 4
PAINLEVEL_OUTOF10: 0

## 2024-05-29 ASSESSMENT — PAIN DESCRIPTION - PAIN TYPE
TYPE: SURGICAL PAIN

## 2024-05-29 ASSESSMENT — PAIN DESCRIPTION - ORIENTATION
ORIENTATION: MID
ORIENTATION: LOWER
ORIENTATION: RIGHT

## 2024-05-29 ASSESSMENT — PAIN DESCRIPTION - DESCRIPTORS
DESCRIPTORS: SORE
DESCRIPTORS: DISCOMFORT
DESCRIPTORS: ACHING;DISCOMFORT
DESCRIPTORS: SORE
DESCRIPTORS: SORE

## 2024-05-29 ASSESSMENT — PAIN - FUNCTIONAL ASSESSMENT
PAIN_FUNCTIONAL_ASSESSMENT: ACTIVITIES ARE NOT PREVENTED

## 2024-05-29 ASSESSMENT — PAIN DESCRIPTION - ONSET
ONSET: ON-GOING
ONSET: ON-GOING

## 2024-05-29 ASSESSMENT — PAIN DESCRIPTION - FREQUENCY: FREQUENCY: INTERMITTENT

## 2024-05-29 ASSESSMENT — PAIN DESCRIPTION - LOCATION
LOCATION: ABDOMEN

## 2024-05-29 NOTE — PLAN OF CARE
Problem: Discharge Planning  Goal: Discharge to home or other facility with appropriate resources  5/29/2024 1448 by Mike Alexander RN  Outcome: Progressing  5/29/2024 0649 by Rosa Mclean RN  Outcome: Progressing     Problem: Safety - Adult  Goal: Free from fall injury  5/29/2024 1448 by Mike Alexander RN  Outcome: Progressing  5/29/2024 0649 by Rosa Mclean RN  Outcome: Progressing     Problem: ABCDS Injury Assessment  Goal: Absence of physical injury  5/29/2024 1448 by Mike Alexander RN  Outcome: Progressing  5/29/2024 0649 by Rosa Mclean RN  Outcome: Progressing     Problem: Pain  Goal: Verbalizes/displays adequate comfort level or baseline comfort level  5/29/2024 1448 by Mike Alexander RN  Outcome: Progressing  Flowsheets (Taken 5/29/2024 0930)  Verbalizes/displays adequate comfort level or baseline comfort level: Assess pain using appropriate pain scale  5/29/2024 0649 by Rosa Mclean RN  Outcome: Progressing     Problem: Nutrition Deficit:  Goal: Optimize nutritional status  5/29/2024 1448 by Mike Alexander RN  Outcome: Progressing  Flowsheets (Taken 5/29/2024 1106 by Veena Thompson, RD, LD)  Nutrient intake appropriate for improving, restoring, or maintaining nutritional needs:   Assess nutritional status and recommend course of action   Monitor oral intake, labs, and treatment plans  5/29/2024 0649 by Rosa Mclean RN  Outcome: Progressing

## 2024-05-30 ENCOUNTER — APPOINTMENT (OUTPATIENT)
Dept: CT IMAGING | Age: 74
DRG: 329 | End: 2024-05-30
Attending: SURGERY
Payer: MEDICARE

## 2024-05-30 LAB
ANION GAP SERPL CALCULATED.3IONS-SCNC: 9 MMOL/L (ref 3–16)
BASOPHILS # BLD: 0 K/UL (ref 0–0.2)
BASOPHILS NFR BLD: 0 %
BUN SERPL-MCNC: 5 MG/DL (ref 7–20)
CALCIUM SERPL-MCNC: 8.4 MG/DL (ref 8.3–10.6)
CHLORIDE SERPL-SCNC: 104 MMOL/L (ref 99–110)
CO2 SERPL-SCNC: 22 MMOL/L (ref 21–32)
CREAT SERPL-MCNC: 0.5 MG/DL (ref 0.6–1.2)
DEPRECATED RDW RBC AUTO: 13.3 % (ref 12.4–15.4)
EOSINOPHIL # BLD: 0.1 K/UL (ref 0–0.6)
EOSINOPHIL NFR BLD: 2 %
GFR SERPLBLD CREATININE-BSD FMLA CKD-EPI: >90 ML/MIN/{1.73_M2}
GLUCOSE SERPL-MCNC: 108 MG/DL (ref 70–99)
HCT VFR BLD AUTO: 25.3 % (ref 36–48)
HGB BLD-MCNC: 8.8 G/DL (ref 12–16)
LYMPHOCYTES # BLD: 0.8 K/UL (ref 1–5.1)
LYMPHOCYTES NFR BLD: 26 %
MCH RBC QN AUTO: 33 PG (ref 26–34)
MCHC RBC AUTO-ENTMCNC: 34.8 G/DL (ref 31–36)
MCV RBC AUTO: 94.8 FL (ref 80–100)
MONOCYTES # BLD: 0.5 K/UL (ref 0–1.3)
MONOCYTES NFR BLD: 15 %
MYELOCYTES NFR BLD MANUAL: 4 %
NEUTROPHILS # BLD: 1.8 K/UL (ref 1.7–7.7)
NEUTROPHILS NFR BLD: 53 %
PLATELET # BLD AUTO: 405 K/UL (ref 135–450)
PLATELET BLD QL SMEAR: ADEQUATE
PMV BLD AUTO: 6.4 FL (ref 5–10.5)
POTASSIUM SERPL-SCNC: 3.2 MMOL/L (ref 3.5–5.1)
RBC # BLD AUTO: 2.67 M/UL (ref 4–5.2)
RBC MORPH BLD: NORMAL
SLIDE REVIEW: ABNORMAL
SODIUM SERPL-SCNC: 135 MMOL/L (ref 136–145)
WBC # BLD AUTO: 3.1 K/UL (ref 4–11)

## 2024-05-30 PROCEDURE — 6360000004 HC RX CONTRAST MEDICATION

## 2024-05-30 PROCEDURE — 74177 CT ABD & PELVIS W/CONTRAST: CPT

## 2024-05-30 PROCEDURE — 6360000002 HC RX W HCPCS: Performed by: SURGERY

## 2024-05-30 PROCEDURE — 80048 BASIC METABOLIC PNL TOTAL CA: CPT

## 2024-05-30 PROCEDURE — 6370000000 HC RX 637 (ALT 250 FOR IP): Performed by: SURGERY

## 2024-05-30 PROCEDURE — 99024 POSTOP FOLLOW-UP VISIT: CPT | Performed by: SURGERY

## 2024-05-30 PROCEDURE — 97535 SELF CARE MNGMENT TRAINING: CPT

## 2024-05-30 PROCEDURE — 6370000000 HC RX 637 (ALT 250 FOR IP): Performed by: HOSPITALIST

## 2024-05-30 PROCEDURE — 85025 COMPLETE CBC W/AUTO DIFF WBC: CPT

## 2024-05-30 PROCEDURE — APPNB30 APP NON BILLABLE TIME 0-30 MINS: Performed by: NURSE PRACTITIONER

## 2024-05-30 PROCEDURE — 2580000003 HC RX 258: Performed by: SURGERY

## 2024-05-30 PROCEDURE — APPSS15 APP SPLIT SHARED TIME 0-15 MINUTES: Performed by: NURSE PRACTITIONER

## 2024-05-30 PROCEDURE — 6360000004 HC RX CONTRAST MEDICATION: Performed by: NURSE PRACTITIONER

## 2024-05-30 PROCEDURE — 6370000000 HC RX 637 (ALT 250 FOR IP): Performed by: NURSE PRACTITIONER

## 2024-05-30 PROCEDURE — 1200000000 HC SEMI PRIVATE

## 2024-05-30 RX ORDER — CIPROFLOXACIN 2 MG/ML
400 INJECTION, SOLUTION INTRAVENOUS EVERY 12 HOURS
Status: DISCONTINUED | OUTPATIENT
Start: 2024-05-30 | End: 2024-06-02

## 2024-05-30 RX ORDER — POTASSIUM CHLORIDE 20 MEQ/1
40 TABLET, EXTENDED RELEASE ORAL ONCE
Status: COMPLETED | OUTPATIENT
Start: 2024-05-30 | End: 2024-05-30

## 2024-05-30 RX ORDER — OXYCODONE HYDROCHLORIDE 5 MG/1
5 TABLET ORAL EVERY 6 HOURS PRN
Qty: 15 TABLET | Refills: 0 | Status: SHIPPED | OUTPATIENT
Start: 2024-05-30 | End: 2024-06-06

## 2024-05-30 RX ORDER — METRONIDAZOLE 500 MG/100ML
500 INJECTION, SOLUTION INTRAVENOUS EVERY 8 HOURS
Status: DISCONTINUED | OUTPATIENT
Start: 2024-05-30 | End: 2024-06-02

## 2024-05-30 RX ADMIN — CIPROFLOXACIN 400 MG: 2 INJECTION, SOLUTION INTRAVENOUS at 20:35

## 2024-05-30 RX ADMIN — BUSPIRONE HYDROCHLORIDE 10 MG: 5 TABLET ORAL at 20:36

## 2024-05-30 RX ADMIN — BUSPIRONE HYDROCHLORIDE 10 MG: 5 TABLET ORAL at 14:58

## 2024-05-30 RX ADMIN — OXYCODONE 10 MG: 5 TABLET ORAL at 11:30

## 2024-05-30 RX ADMIN — PANTOPRAZOLE SODIUM 40 MG: 40 TABLET, DELAYED RELEASE ORAL at 05:26

## 2024-05-30 RX ADMIN — DIATRIZOATE MEGLUMINE AND DIATRIZOATE SODIUM 20 ML: 660; 100 LIQUID ORAL; RECTAL at 12:18

## 2024-05-30 RX ADMIN — ACETAMINOPHEN 650 MG: 325 TABLET ORAL at 10:33

## 2024-05-30 RX ADMIN — IOPAMIDOL 75 ML: 755 INJECTION, SOLUTION INTRAVENOUS at 15:51

## 2024-05-30 RX ADMIN — ENOXAPARIN SODIUM 30 MG: 100 INJECTION SUBCUTANEOUS at 10:32

## 2024-05-30 RX ADMIN — ACETAMINOPHEN 650 MG: 325 TABLET ORAL at 05:26

## 2024-05-30 RX ADMIN — SODIUM CHLORIDE: 9 INJECTION, SOLUTION INTRAVENOUS at 18:10

## 2024-05-30 RX ADMIN — BUSPIRONE HYDROCHLORIDE 10 MG: 5 TABLET ORAL at 10:33

## 2024-05-30 RX ADMIN — Medication 10 ML: at 10:32

## 2024-05-30 RX ADMIN — METRONIDAZOLE 500 MG: 500 INJECTION, SOLUTION INTRAVENOUS at 18:11

## 2024-05-30 RX ADMIN — Medication 10 ML: at 20:39

## 2024-05-30 RX ADMIN — POTASSIUM CHLORIDE 40 MEQ: 1500 TABLET, EXTENDED RELEASE ORAL at 10:33

## 2024-05-30 ASSESSMENT — PAIN DESCRIPTION - ORIENTATION
ORIENTATION: RIGHT

## 2024-05-30 ASSESSMENT — PAIN DESCRIPTION - DESCRIPTORS
DESCRIPTORS: SHARP;ACHING;DISCOMFORT
DESCRIPTORS: STABBING;SHARP
DESCRIPTORS: DISCOMFORT

## 2024-05-30 ASSESSMENT — PAIN DESCRIPTION - LOCATION
LOCATION: ABDOMEN

## 2024-05-30 ASSESSMENT — PAIN SCALES - GENERAL
PAINLEVEL_OUTOF10: 8
PAINLEVEL_OUTOF10: 2
PAINLEVEL_OUTOF10: 2
PAINLEVEL_OUTOF10: 3
PAINLEVEL_OUTOF10: 3
PAINLEVEL_OUTOF10: 8
PAINLEVEL_OUTOF10: 0
PAINLEVEL_OUTOF10: 0

## 2024-05-30 ASSESSMENT — PAIN - FUNCTIONAL ASSESSMENT: PAIN_FUNCTIONAL_ASSESSMENT: PREVENTS OR INTERFERES SOME ACTIVE ACTIVITIES AND ADLS

## 2024-05-30 NOTE — PLAN OF CARE
Problem: Discharge Planning  Goal: Discharge to home or other facility with appropriate resources  5/30/2024 1057 by Faye Parker RN  Outcome: Progressing  5/29/2024 2227 by Vivi Caldwell RN  Outcome: Progressing     Problem: Safety - Adult  Goal: Free from fall injury  5/30/2024 1057 by Faye Parker RN  Outcome: Progressing  5/29/2024 2227 by Vivi Caldwell RN  Outcome: Progressing     Problem: ABCDS Injury Assessment  Goal: Absence of physical injury  5/30/2024 1057 by Faye Parker RN  Outcome: Progressing  5/29/2024 2227 by Vivi Caldwell RN  Outcome: Progressing     Problem: Pain  Goal: Verbalizes/displays adequate comfort level or baseline comfort level  5/30/2024 1057 by Faye Parker RN  Outcome: Progressing  5/29/2024 2227 by Vivi Caldwell RN  Outcome: Progressing     Problem: Nutrition Deficit:  Goal: Optimize nutritional status  Outcome: Progressing

## 2024-05-30 NOTE — PLAN OF CARE
Problem: Discharge Planning  Goal: Discharge to home or other facility with appropriate resources  5/29/2024 2227 by Vivi Caldwell RN  Outcome: Progressing     Problem: Safety - Adult  Goal: Free from fall injury  5/29/2024 2227 by Vivi Caldwell RN  Outcome: Progressing     Problem: ABCDS Injury Assessment  Goal: Absence of physical injury  5/29/2024 2227 by Vivi Caldwell RN  Outcome: Progressing     Problem: Pain  Goal: Verbalizes/displays adequate comfort level or baseline comfort level  5/29/2024 2227 by Vivi Caldwell RN  Outcome: Progressing

## 2024-05-31 LAB
ANION GAP SERPL CALCULATED.3IONS-SCNC: 10 MMOL/L (ref 3–16)
BASOPHILS # BLD: 0 K/UL (ref 0–0.2)
BASOPHILS NFR BLD: 0 %
BUN SERPL-MCNC: 3 MG/DL (ref 7–20)
CALCIUM SERPL-MCNC: 8.1 MG/DL (ref 8.3–10.6)
CHLORIDE SERPL-SCNC: 106 MMOL/L (ref 99–110)
CO2 SERPL-SCNC: 22 MMOL/L (ref 21–32)
CREAT SERPL-MCNC: <0.5 MG/DL (ref 0.6–1.2)
DEPRECATED RDW RBC AUTO: 13.5 % (ref 12.4–15.4)
EOSINOPHIL # BLD: 0.1 K/UL (ref 0–0.6)
EOSINOPHIL NFR BLD: 2 %
GFR SERPLBLD CREATININE-BSD FMLA CKD-EPI: >90 ML/MIN/{1.73_M2}
GLUCOSE SERPL-MCNC: 89 MG/DL (ref 70–99)
HCT VFR BLD AUTO: 22.6 % (ref 36–48)
HGB BLD-MCNC: 7.9 G/DL (ref 12–16)
LYMPHOCYTES # BLD: 0.7 K/UL (ref 1–5.1)
LYMPHOCYTES NFR BLD: 22 %
MCHC RBC AUTO-ENTMCNC: 35.1 G/DL (ref 31–36)
MONOCYTES # BLD: 0.4 K/UL (ref 0–1.3)
MONOCYTES NFR BLD: 12 %
MYELOCYTES NFR BLD MANUAL: 1 %
NEUTROPHILS # BLD: 2 K/UL (ref 1.7–7.7)
NEUTROPHILS NFR BLD: 62 %
NEUTS BAND NFR BLD MANUAL: 1 % (ref 0–7)
PLATELET BLD QL SMEAR: ADEQUATE
PMV BLD AUTO: 6.4 FL (ref 5–10.5)
POTASSIUM SERPL-SCNC: 3.1 MMOL/L (ref 3.5–5.1)
RBC # BLD AUTO: 2.37 M/UL (ref 4–5.2)
SLIDE REVIEW: ABNORMAL
WBC # BLD AUTO: 3.2 K/UL (ref 4–11)

## 2024-05-31 PROCEDURE — 85025 COMPLETE CBC W/AUTO DIFF WBC: CPT

## 2024-05-31 PROCEDURE — APPSS15 APP SPLIT SHARED TIME 0-15 MINUTES: Performed by: NURSE PRACTITIONER

## 2024-05-31 PROCEDURE — 6370000000 HC RX 637 (ALT 250 FOR IP): Performed by: NURSE PRACTITIONER

## 2024-05-31 PROCEDURE — 36415 COLL VENOUS BLD VENIPUNCTURE: CPT

## 2024-05-31 PROCEDURE — 6370000000 HC RX 637 (ALT 250 FOR IP): Performed by: HOSPITALIST

## 2024-05-31 PROCEDURE — 6370000000 HC RX 637 (ALT 250 FOR IP): Performed by: SURGERY

## 2024-05-31 PROCEDURE — 97116 GAIT TRAINING THERAPY: CPT

## 2024-05-31 PROCEDURE — 6360000002 HC RX W HCPCS: Performed by: SURGERY

## 2024-05-31 PROCEDURE — 1200000000 HC SEMI PRIVATE

## 2024-05-31 PROCEDURE — APPNB30 APP NON BILLABLE TIME 0-30 MINS: Performed by: NURSE PRACTITIONER

## 2024-05-31 PROCEDURE — 80048 BASIC METABOLIC PNL TOTAL CA: CPT

## 2024-05-31 PROCEDURE — 97530 THERAPEUTIC ACTIVITIES: CPT

## 2024-05-31 PROCEDURE — 97110 THERAPEUTIC EXERCISES: CPT

## 2024-05-31 PROCEDURE — 99024 POSTOP FOLLOW-UP VISIT: CPT | Performed by: SURGERY

## 2024-05-31 RX ORDER — OXYCODONE HCL 5 MG/5 ML
10 SOLUTION, ORAL ORAL EVERY 4 HOURS PRN
Status: DISCONTINUED | OUTPATIENT
Start: 2024-05-31 | End: 2024-06-01

## 2024-05-31 RX ORDER — OXYCODONE HCL 5 MG/5 ML
5 SOLUTION, ORAL ORAL EVERY 4 HOURS PRN
Status: DISCONTINUED | OUTPATIENT
Start: 2024-05-31 | End: 2024-06-04 | Stop reason: HOSPADM

## 2024-05-31 RX ADMIN — PANTOPRAZOLE SODIUM 40 MG: 40 TABLET, DELAYED RELEASE ORAL at 07:00

## 2024-05-31 RX ADMIN — ACETAMINOPHEN 650 MG: 325 TABLET ORAL at 06:59

## 2024-05-31 RX ADMIN — ENOXAPARIN SODIUM 30 MG: 100 INJECTION SUBCUTANEOUS at 08:33

## 2024-05-31 RX ADMIN — ACETAMINOPHEN 650 MG: 325 TABLET ORAL at 15:54

## 2024-05-31 RX ADMIN — POTASSIUM CHLORIDE 40 MEQ: 1500 TABLET, EXTENDED RELEASE ORAL at 08:32

## 2024-05-31 RX ADMIN — METRONIDAZOLE 500 MG: 500 INJECTION, SOLUTION INTRAVENOUS at 02:53

## 2024-05-31 RX ADMIN — METRONIDAZOLE 500 MG: 500 INJECTION, SOLUTION INTRAVENOUS at 11:29

## 2024-05-31 RX ADMIN — METRONIDAZOLE 500 MG: 500 INJECTION, SOLUTION INTRAVENOUS at 18:54

## 2024-05-31 RX ADMIN — BUSPIRONE HYDROCHLORIDE 10 MG: 5 TABLET ORAL at 08:33

## 2024-05-31 RX ADMIN — BUSPIRONE HYDROCHLORIDE 10 MG: 5 TABLET ORAL at 13:17

## 2024-05-31 RX ADMIN — CIPROFLOXACIN 400 MG: 2 INJECTION, SOLUTION INTRAVENOUS at 17:53

## 2024-05-31 RX ADMIN — CIPROFLOXACIN 400 MG: 2 INJECTION, SOLUTION INTRAVENOUS at 07:19

## 2024-05-31 RX ADMIN — BUSPIRONE HYDROCHLORIDE 10 MG: 5 TABLET ORAL at 20:25

## 2024-05-31 ASSESSMENT — PAIN SCALES - GENERAL
PAINLEVEL_OUTOF10: 0
PAINLEVEL_OUTOF10: 0
PAINLEVEL_OUTOF10: 2
PAINLEVEL_OUTOF10: 2
PAINLEVEL_OUTOF10: 4

## 2024-05-31 ASSESSMENT — PAIN SCALES - WONG BAKER: WONGBAKER_NUMERICALRESPONSE: HURTS A LITTLE BIT

## 2024-05-31 ASSESSMENT — PAIN DESCRIPTION - DESCRIPTORS: DESCRIPTORS: DULL

## 2024-05-31 ASSESSMENT — PAIN DESCRIPTION - LOCATION: LOCATION: ABDOMEN

## 2024-05-31 NOTE — PLAN OF CARE
Problem: Discharge Planning  Goal: Discharge to home or other facility with appropriate resources  5/31/2024 0954 by Faye Parker RN  Outcome: Progressing  5/30/2024 2242 by Joy Cross RN  Outcome: Progressing     Problem: Safety - Adult  Goal: Free from fall injury  5/31/2024 0954 by Faye Parker RN  Outcome: Progressing  5/30/2024 2242 by Joy Cross RN  Outcome: Progressing     Problem: ABCDS Injury Assessment  Goal: Absence of physical injury  5/31/2024 0954 by Faye Parker RN  Outcome: Progressing  5/30/2024 2242 by Joy Cross RN  Outcome: Progressing     Problem: Pain  Goal: Verbalizes/displays adequate comfort level or baseline comfort level  5/31/2024 0954 by Faye Parker RN  Outcome: Progressing  5/30/2024 2242 by Joy Cross RN  Outcome: Progressing     Problem: Nutrition Deficit:  Goal: Optimize nutritional status  5/31/2024 0954 by Faye Parker RN  Outcome: Progressing  5/30/2024 2242 by Joy Cross RN  Outcome: Progressing

## 2024-05-31 NOTE — PLAN OF CARE
Problem: Discharge Planning  Goal: Discharge to home or other facility with appropriate resources  5/30/2024 2242 by Joy Cross, RN  Outcome: Progressing     Problem: Safety - Adult  Goal: Free from fall injury  5/30/2024 2242 by Jyo Cross, RN  Outcome: Progressing     Problem: ABCDS Injury Assessment  Goal: Absence of physical injury  5/30/2024 2242 by Joy Cross, RN  Outcome: Progressing     Problem: Pain  Goal: Verbalizes/displays adequate comfort level or baseline comfort level  5/30/2024 2242 by Joy Cross, RN  Outcome: Progressing     Problem: Nutrition Deficit:  Goal: Optimize nutritional status  5/30/2024 2242 by Joy Cross, RN  Outcome: Progressing

## 2024-06-01 LAB
ANION GAP SERPL CALCULATED.3IONS-SCNC: 9 MMOL/L (ref 3–16)
BASOPHILS # BLD: 0 K/UL (ref 0–0.2)
BASOPHILS NFR BLD: 0 %
BUN SERPL-MCNC: <2 MG/DL (ref 7–20)
CHLORIDE SERPL-SCNC: 106 MMOL/L (ref 99–110)
CO2 SERPL-SCNC: 22 MMOL/L (ref 21–32)
CREAT SERPL-MCNC: 0.5 MG/DL (ref 0.6–1.2)
DEPRECATED RDW RBC AUTO: 13.8 % (ref 12.4–15.4)
EOSINOPHIL # BLD: 0.1 K/UL (ref 0–0.6)
EOSINOPHIL NFR BLD: 3 %
GFR SERPLBLD CREATININE-BSD FMLA CKD-EPI: >90 ML/MIN/{1.73_M2}
GLUCOSE SERPL-MCNC: 103 MG/DL (ref 70–99)
HCT VFR BLD AUTO: 23 % (ref 36–48)
HGB BLD-MCNC: 7.8 G/DL (ref 12–16)
LYMPHOCYTES # BLD: 0.9 K/UL (ref 1–5.1)
LYMPHOCYTES NFR BLD: 29 %
MCHC RBC AUTO-ENTMCNC: 33.7 G/DL (ref 31–36)
MCV RBC AUTO: 95.3 FL (ref 80–100)
MONOCYTES # BLD: 0.5 K/UL (ref 0–1.3)
MONOCYTES NFR BLD: 15 %
MYELOCYTES NFR BLD MANUAL: 1 %
NEUTROPHILS # BLD: 1.6 K/UL (ref 1.7–7.7)
NEUTROPHILS NFR BLD: 52 %
PLATELET # BLD AUTO: 404 K/UL (ref 135–450)
PLATELET BLD QL SMEAR: ADEQUATE
POTASSIUM SERPL-SCNC: 3.3 MMOL/L (ref 3.5–5.1)
RBC # BLD AUTO: 2.42 M/UL (ref 4–5.2)
RBC MORPH BLD: NORMAL
SODIUM SERPL-SCNC: 137 MMOL/L (ref 136–145)

## 2024-06-01 PROCEDURE — 80048 BASIC METABOLIC PNL TOTAL CA: CPT

## 2024-06-01 PROCEDURE — 85025 COMPLETE CBC W/AUTO DIFF WBC: CPT

## 2024-06-01 PROCEDURE — 99024 POSTOP FOLLOW-UP VISIT: CPT | Performed by: SURGERY

## 2024-06-01 PROCEDURE — 6370000000 HC RX 637 (ALT 250 FOR IP): Performed by: SURGERY

## 2024-06-01 PROCEDURE — 6370000000 HC RX 637 (ALT 250 FOR IP): Performed by: NURSE PRACTITIONER

## 2024-06-01 PROCEDURE — 36415 COLL VENOUS BLD VENIPUNCTURE: CPT

## 2024-06-01 PROCEDURE — 6370000000 HC RX 637 (ALT 250 FOR IP): Performed by: HOSPITALIST

## 2024-06-01 PROCEDURE — 6360000002 HC RX W HCPCS: Performed by: HOSPITALIST

## 2024-06-01 PROCEDURE — 2580000003 HC RX 258: Performed by: SURGERY

## 2024-06-01 PROCEDURE — 1200000000 HC SEMI PRIVATE

## 2024-06-01 PROCEDURE — 6360000002 HC RX W HCPCS: Performed by: SURGERY

## 2024-06-01 RX ORDER — HYDROCORTISONE 25 MG/G
CREAM TOPICAL 2 TIMES DAILY
Status: DISCONTINUED | OUTPATIENT
Start: 2024-06-01 | End: 2024-06-04 | Stop reason: HOSPADM

## 2024-06-01 RX ORDER — POTASSIUM CHLORIDE 7.45 MG/ML
10 INJECTION INTRAVENOUS
Status: DISPENSED | OUTPATIENT
Start: 2024-06-01 | End: 2024-06-01

## 2024-06-01 RX ADMIN — CIPROFLOXACIN 400 MG: 2 INJECTION, SOLUTION INTRAVENOUS at 18:13

## 2024-06-01 RX ADMIN — ACETAMINOPHEN 650 MG: 325 TABLET ORAL at 18:07

## 2024-06-01 RX ADMIN — ACETAMINOPHEN 650 MG: 325 TABLET ORAL at 06:26

## 2024-06-01 RX ADMIN — BUSPIRONE HYDROCHLORIDE 10 MG: 5 TABLET ORAL at 20:50

## 2024-06-01 RX ADMIN — METRONIDAZOLE 500 MG: 500 INJECTION, SOLUTION INTRAVENOUS at 03:18

## 2024-06-01 RX ADMIN — METRONIDAZOLE 500 MG: 500 INJECTION, SOLUTION INTRAVENOUS at 10:48

## 2024-06-01 RX ADMIN — POTASSIUM CHLORIDE 10 MEQ: 7.46 INJECTION, SOLUTION INTRAVENOUS at 14:07

## 2024-06-01 RX ADMIN — BUSPIRONE HYDROCHLORIDE 10 MG: 5 TABLET ORAL at 08:28

## 2024-06-01 RX ADMIN — ENOXAPARIN SODIUM 30 MG: 100 INJECTION SUBCUTANEOUS at 08:28

## 2024-06-01 RX ADMIN — ACETAMINOPHEN 650 MG: 325 TABLET ORAL at 23:49

## 2024-06-01 RX ADMIN — Medication 10 ML: at 10:43

## 2024-06-01 RX ADMIN — ACETAMINOPHEN 650 MG: 325 TABLET ORAL at 10:43

## 2024-06-01 RX ADMIN — CIPROFLOXACIN 400 MG: 2 INJECTION, SOLUTION INTRAVENOUS at 06:23

## 2024-06-01 RX ADMIN — Medication 10 ML: at 21:32

## 2024-06-01 RX ADMIN — PANTOPRAZOLE SODIUM 40 MG: 40 TABLET, DELAYED RELEASE ORAL at 06:25

## 2024-06-01 RX ADMIN — HYDROCORTISONE: 25 CREAM TOPICAL at 21:30

## 2024-06-01 RX ADMIN — POTASSIUM CHLORIDE 10 MEQ: 7.46 INJECTION, SOLUTION INTRAVENOUS at 15:26

## 2024-06-01 RX ADMIN — METRONIDAZOLE 500 MG: 500 INJECTION, SOLUTION INTRAVENOUS at 18:14

## 2024-06-01 RX ADMIN — BUSPIRONE HYDROCHLORIDE 10 MG: 5 TABLET ORAL at 14:08

## 2024-06-01 ASSESSMENT — PAIN SCALES - GENERAL
PAINLEVEL_OUTOF10: 2
PAINLEVEL_OUTOF10: 4
PAINLEVEL_OUTOF10: 0

## 2024-06-02 LAB
BASOPHILS # BLD: 0 K/UL (ref 0–0.2)
BASOPHILS NFR BLD: 0 %
BUN SERPL-MCNC: <2 MG/DL (ref 7–20)
CALCIUM SERPL-MCNC: 7.9 MG/DL (ref 8.3–10.6)
CHLORIDE SERPL-SCNC: 105 MMOL/L (ref 99–110)
CO2 SERPL-SCNC: 21 MMOL/L (ref 21–32)
CREAT SERPL-MCNC: <0.5 MG/DL (ref 0.6–1.2)
DEPRECATED RDW RBC AUTO: 13.9 % (ref 12.4–15.4)
EOSINOPHIL NFR BLD: 2 %
GFR SERPLBLD CREATININE-BSD FMLA CKD-EPI: >90 ML/MIN/{1.73_M2}
GLUCOSE SERPL-MCNC: 105 MG/DL (ref 70–99)
HCT VFR BLD AUTO: 25.4 % (ref 36–48)
HGB BLD-MCNC: 8.4 G/DL (ref 12–16)
MACROCYTES BLD QL SMEAR: ABNORMAL
MCH RBC QN AUTO: 31.7 PG (ref 26–34)
MCHC RBC AUTO-ENTMCNC: 32.8 G/DL (ref 31–36)
MCV RBC AUTO: 96.5 FL (ref 80–100)
MONOCYTES # BLD: 0.3 K/UL (ref 0–1.3)
MONOCYTES NFR BLD: 11 %
MYELOCYTES NFR BLD MANUAL: 1 %
NEUTROPHILS # BLD: 1.7 K/UL (ref 1.7–7.7)
NEUTROPHILS NFR BLD: 58 %
NEUTS BAND NFR BLD MANUAL: 2 % (ref 0–7)
PLATELET # BLD AUTO: 457 K/UL (ref 135–450)
PLATELET BLD QL SMEAR: ABNORMAL
PMV BLD AUTO: 6.4 FL (ref 5–10.5)
POTASSIUM SERPL-SCNC: 2.9 MMOL/L (ref 3.5–5.1)
RBC # BLD AUTO: 2.64 M/UL (ref 4–5.2)
SLIDE REVIEW: ABNORMAL
SODIUM SERPL-SCNC: 137 MMOL/L (ref 136–145)
WBC # BLD AUTO: 2.8 K/UL (ref 4–11)

## 2024-06-02 PROCEDURE — 80048 BASIC METABOLIC PNL TOTAL CA: CPT

## 2024-06-02 PROCEDURE — 6370000000 HC RX 637 (ALT 250 FOR IP): Performed by: SURGERY

## 2024-06-02 PROCEDURE — 6370000000 HC RX 637 (ALT 250 FOR IP): Performed by: NURSE PRACTITIONER

## 2024-06-02 PROCEDURE — 6370000000 HC RX 637 (ALT 250 FOR IP): Performed by: HOSPITALIST

## 2024-06-02 PROCEDURE — 36415 COLL VENOUS BLD VENIPUNCTURE: CPT

## 2024-06-02 PROCEDURE — 1200000000 HC SEMI PRIVATE

## 2024-06-02 PROCEDURE — 6360000002 HC RX W HCPCS: Performed by: SURGERY

## 2024-06-02 PROCEDURE — 2580000003 HC RX 258: Performed by: SURGERY

## 2024-06-02 PROCEDURE — 99024 POSTOP FOLLOW-UP VISIT: CPT | Performed by: SURGERY

## 2024-06-02 PROCEDURE — 85025 COMPLETE CBC W/AUTO DIFF WBC: CPT

## 2024-06-02 RX ADMIN — ACETAMINOPHEN 650 MG: 325 TABLET ORAL at 06:12

## 2024-06-02 RX ADMIN — POTASSIUM CHLORIDE 10 MEQ: 7.46 INJECTION, SOLUTION INTRAVENOUS at 06:11

## 2024-06-02 RX ADMIN — BUSPIRONE HYDROCHLORIDE 10 MG: 5 TABLET ORAL at 08:14

## 2024-06-02 RX ADMIN — HYDROCORTISONE: 25 CREAM TOPICAL at 21:34

## 2024-06-02 RX ADMIN — BUSPIRONE HYDROCHLORIDE 10 MG: 5 TABLET ORAL at 21:33

## 2024-06-02 RX ADMIN — METRONIDAZOLE 500 MG: 500 INJECTION, SOLUTION INTRAVENOUS at 10:40

## 2024-06-02 RX ADMIN — POTASSIUM CHLORIDE 10 MEQ: 7.46 INJECTION, SOLUTION INTRAVENOUS at 13:11

## 2024-06-02 RX ADMIN — ACETAMINOPHEN 650 MG: 325 TABLET ORAL at 23:36

## 2024-06-02 RX ADMIN — ACETAMINOPHEN 650 MG: 325 TABLET ORAL at 11:51

## 2024-06-02 RX ADMIN — POTASSIUM CHLORIDE 10 MEQ: 7.46 INJECTION, SOLUTION INTRAVENOUS at 08:21

## 2024-06-02 RX ADMIN — POTASSIUM CHLORIDE 10 MEQ: 7.46 INJECTION, SOLUTION INTRAVENOUS at 11:53

## 2024-06-02 RX ADMIN — ACETAMINOPHEN 650 MG: 325 TABLET ORAL at 17:44

## 2024-06-02 RX ADMIN — HYDROCORTISONE: 25 CREAM TOPICAL at 08:14

## 2024-06-02 RX ADMIN — CIPROFLOXACIN 400 MG: 2 INJECTION, SOLUTION INTRAVENOUS at 06:08

## 2024-06-02 RX ADMIN — ENOXAPARIN SODIUM 30 MG: 100 INJECTION SUBCUTANEOUS at 08:14

## 2024-06-02 RX ADMIN — METRONIDAZOLE 500 MG: 500 INJECTION, SOLUTION INTRAVENOUS at 02:21

## 2024-06-02 RX ADMIN — PANTOPRAZOLE SODIUM 40 MG: 40 TABLET, DELAYED RELEASE ORAL at 06:12

## 2024-06-02 RX ADMIN — POTASSIUM CHLORIDE 10 MEQ: 7.46 INJECTION, SOLUTION INTRAVENOUS at 09:23

## 2024-06-02 RX ADMIN — BUSPIRONE HYDROCHLORIDE 10 MG: 5 TABLET ORAL at 14:23

## 2024-06-02 RX ADMIN — Medication 10 ML: at 21:35

## 2024-06-02 RX ADMIN — POTASSIUM CHLORIDE 10 MEQ: 7.46 INJECTION, SOLUTION INTRAVENOUS at 14:24

## 2024-06-02 ASSESSMENT — PAIN SCALES - GENERAL
PAINLEVEL_OUTOF10: 0
PAINLEVEL_OUTOF10: 4
PAINLEVEL_OUTOF10: 4

## 2024-06-02 ASSESSMENT — PAIN DESCRIPTION - LOCATION
LOCATION: ABDOMEN
LOCATION: ABDOMEN

## 2024-06-02 NOTE — DISCHARGE INSTR - COC
Continuity of Care Form    Patient Name: Tammy Frederick   :  1950  MRN:  [id number]    Admit date:  2024  Discharge date:  ***    Code Status Order: Full Code   Advance Directives:   Advance Care Flowsheet Documentation       Date/Time Healthcare Directive Type of Healthcare Directive Copy in Chart Healthcare Agent Appointed Healthcare Agent's Name Healthcare Agent's Phone Number    [phone] No, patient does not have an advance directive for healthcare treatment -- -- -- -- --            Admitting Physician:  Alban Robertson MD  PCP: Leti Mendieta MD    Discharging Nurse: ***  Discharging Hospital Unit/Room#: 4TN-4473/4473-01  Discharging Unit Phone Number: ***    Emergency Contact:   Extended Emergency Contact Information  Primary Emergency Contact: Heidy Frederick  Home Phone: [phone]  Mobile Phone: [phone]  Relation: Other Relative  Secondary Emergency Contact: Mary Lozada  [place] Phone: [phone]  Relation: Brother/Sister    Past Surgical History:  Past Surgical History:   Procedure Laterality Date    ABDOMEN SURGERY      cholecystectomy    COLOSTOMY N/A 2023    EXPLORATORY LAPAROTOMY, SIGMOID RESECTION, SMALL BOWEL RESECTION, APPENDECTOMY, DIVERTING LOOP ILEOSTOMY performed by Alban Robertson MD at [facility] OR    CT BONE MARROW ASPIRATION  2024    CT BONE MARROW ASPIRATION 2024 [facility] CT SCAN    JOINT REPLACEMENT Right 2012    right knee    JOINT REPLACEMENT Left     hip    SMALL INTESTINE SURGERY N/A 2024    OPEN REVERSAL OF LOOP ILEOSTOMY performed by Alban Robertson MD at [facility] OR    TOTAL KNEE ARTHROPLASTY Left 2011    Left knee    UPPER GASTROINTESTINAL ENDOSCOPY N/A 2024    EGD BIOPSY performed by Chidi Morris MD at [facility] ENDOSCOPY       Immunization History:   Immunization History   Administered Date(s) Administered    Influenza Virus Vaccine 10/01/2011, 2012    Pneumococcal Conjugate 7-valent (Prevnar7) 10/01/2011       Active

## 2024-06-03 LAB
ANION GAP SERPL CALCULATED.3IONS-SCNC: 11 MMOL/L (ref 3–16)
BASOPHILS # BLD: 0 K/UL (ref 0–0.2)
BASOPHILS NFR BLD: 0 %
BUN SERPL-MCNC: <2 MG/DL (ref 7–20)
CALCIUM SERPL-MCNC: 8 MG/DL (ref 8.3–10.6)
CHLORIDE SERPL-SCNC: 106 MMOL/L (ref 99–110)
CO2 SERPL-SCNC: 21 MMOL/L (ref 21–32)
CREAT SERPL-MCNC: 0.5 MG/DL (ref 0.6–1.2)
DEPRECATED RDW RBC AUTO: 13.4 % (ref 12.4–15.4)
EOSINOPHIL # BLD: 0.1 K/UL (ref 0–0.6)
EOSINOPHIL NFR BLD: 2 %
GFR SERPLBLD CREATININE-BSD FMLA CKD-EPI: >90 ML/MIN/{1.73_M2}
GLUCOSE SERPL-MCNC: 92 MG/DL (ref 70–99)
HCT VFR BLD AUTO: 23.5 % (ref 36–48)
HGB BLD-MCNC: 8.1 G/DL (ref 12–16)
LYMPHOCYTES # BLD: 0.6 K/UL (ref 1–5.1)
LYMPHOCYTES NFR BLD: 24 %
MACROCYTES BLD QL SMEAR: ABNORMAL
MCH RBC QN AUTO: 32.9 PG (ref 26–34)
MCHC RBC AUTO-ENTMCNC: 34.5 G/DL (ref 31–36)
MCV RBC AUTO: 95.3 FL (ref 80–100)
MONOCYTES # BLD: 0.5 K/UL (ref 0–1.3)
MONOCYTES NFR BLD: 19 %
NEUTROPHILS # BLD: 1.5 K/UL (ref 1.7–7.7)
NEUTROPHILS NFR BLD: 53 %
NEUTS BAND NFR BLD MANUAL: 1 % (ref 0–7)
PLATELET # BLD AUTO: 457 K/UL (ref 135–450)
PLATELET BLD QL SMEAR: ABNORMAL
PMV BLD AUTO: 6.6 FL (ref 5–10.5)
POTASSIUM SERPL-SCNC: 3.2 MMOL/L (ref 3.5–5.1)
PROMYELOCYTES NFR BLD MANUAL: 1 %
RBC # BLD AUTO: 2.47 M/UL (ref 4–5.2)
SLIDE REVIEW: ABNORMAL
SODIUM SERPL-SCNC: 138 MMOL/L (ref 136–145)
WBC # BLD AUTO: 2.7 K/UL (ref 4–11)

## 2024-06-03 PROCEDURE — APPNB30 APP NON BILLABLE TIME 0-30 MINS: Performed by: NURSE PRACTITIONER

## 2024-06-03 PROCEDURE — 97530 THERAPEUTIC ACTIVITIES: CPT

## 2024-06-03 PROCEDURE — 1200000000 HC SEMI PRIVATE

## 2024-06-03 PROCEDURE — 6360000002 HC RX W HCPCS: Performed by: SURGERY

## 2024-06-03 PROCEDURE — 6370000000 HC RX 637 (ALT 250 FOR IP): Performed by: HOSPITALIST

## 2024-06-03 PROCEDURE — 6370000000 HC RX 637 (ALT 250 FOR IP): Performed by: SURGERY

## 2024-06-03 PROCEDURE — 36415 COLL VENOUS BLD VENIPUNCTURE: CPT

## 2024-06-03 PROCEDURE — 99024 POSTOP FOLLOW-UP VISIT: CPT | Performed by: SURGERY

## 2024-06-03 PROCEDURE — 80048 BASIC METABOLIC PNL TOTAL CA: CPT

## 2024-06-03 PROCEDURE — 6370000000 HC RX 637 (ALT 250 FOR IP): Performed by: NURSE PRACTITIONER

## 2024-06-03 PROCEDURE — APPSS15 APP SPLIT SHARED TIME 0-15 MINUTES: Performed by: NURSE PRACTITIONER

## 2024-06-03 PROCEDURE — 2580000003 HC RX 258: Performed by: SURGERY

## 2024-06-03 PROCEDURE — 85025 COMPLETE CBC W/AUTO DIFF WBC: CPT

## 2024-06-03 RX ADMIN — ACETAMINOPHEN 650 MG: 325 TABLET ORAL at 18:18

## 2024-06-03 RX ADMIN — PANTOPRAZOLE SODIUM 40 MG: 40 TABLET, DELAYED RELEASE ORAL at 06:10

## 2024-06-03 RX ADMIN — ACETAMINOPHEN 650 MG: 325 TABLET ORAL at 06:10

## 2024-06-03 RX ADMIN — BUSPIRONE HYDROCHLORIDE 10 MG: 5 TABLET ORAL at 14:00

## 2024-06-03 RX ADMIN — HYDROCORTISONE: 25 CREAM TOPICAL at 20:21

## 2024-06-03 RX ADMIN — HYDROCORTISONE: 25 CREAM TOPICAL at 08:20

## 2024-06-03 RX ADMIN — ACETAMINOPHEN 650 MG: 325 TABLET ORAL at 10:49

## 2024-06-03 RX ADMIN — BUSPIRONE HYDROCHLORIDE 10 MG: 5 TABLET ORAL at 08:19

## 2024-06-03 RX ADMIN — POTASSIUM CHLORIDE 40 MEQ: 1500 TABLET, EXTENDED RELEASE ORAL at 08:19

## 2024-06-03 RX ADMIN — BUSPIRONE HYDROCHLORIDE 10 MG: 5 TABLET ORAL at 20:21

## 2024-06-03 RX ADMIN — ENOXAPARIN SODIUM 30 MG: 100 INJECTION SUBCUTANEOUS at 08:20

## 2024-06-03 RX ADMIN — Medication 10 ML: at 08:20

## 2024-06-03 RX ADMIN — Medication 10 ML: at 20:22

## 2024-06-03 ASSESSMENT — PAIN SCALES - GENERAL
PAINLEVEL_OUTOF10: 0
PAINLEVEL_OUTOF10: 4
PAINLEVEL_OUTOF10: 0

## 2024-06-03 ASSESSMENT — PAIN DESCRIPTION - LOCATION
LOCATION: ABDOMEN
LOCATION: ABDOMEN

## 2024-06-03 ASSESSMENT — PAIN DESCRIPTION - FREQUENCY: FREQUENCY: INTERMITTENT

## 2024-06-03 ASSESSMENT — PAIN DESCRIPTION - PAIN TYPE: TYPE: SURGICAL PAIN

## 2024-06-03 ASSESSMENT — PAIN SCALES - WONG BAKER
WONGBAKER_NUMERICALRESPONSE: NO HURT

## 2024-06-03 ASSESSMENT — PAIN - FUNCTIONAL ASSESSMENT: PAIN_FUNCTIONAL_ASSESSMENT: ACTIVITIES ARE NOT PREVENTED

## 2024-06-03 ASSESSMENT — PAIN DESCRIPTION - DESCRIPTORS: DESCRIPTORS: ACHING

## 2024-06-03 ASSESSMENT — PAIN DESCRIPTION - ONSET: ONSET: ON-GOING

## 2024-06-03 ASSESSMENT — PAIN DESCRIPTION - ORIENTATION: ORIENTATION: RIGHT

## 2024-06-03 NOTE — CARE COORDINATION
IMM Letter       06/03/24 1158   IMM Letter   IMM Letter given to Patient/Family/Significant other/Guardian/POA/by: Patient   IMM Letter date given: 06/03/24   IMM Letter time given: 1146     CECILIA Wilkerson RN    [facility]  Phone: [phone]

## 2024-06-03 NOTE — PLAN OF CARE
Problem: Discharge Planning  Goal: Discharge to home or other facility with appropriate resources  Outcome: Progressing  Flowsheets (Taken 6/2/2024 2115)  Discharge to home or other facility with appropriate resources: Refer to discharge planning if patient needs post-hospital services based on physician order or complex needs related to functional status, cognitive ability or social support system     Problem: Safety - Adult  Goal: Free from fall injury  Outcome: Progressing     Problem: ABCDS Injury Assessment  Goal: Absence of physical injury  Outcome: Progressing     Problem: Pain  Goal: Verbalizes/displays adequate comfort level or baseline comfort level  Outcome: Progressing     Problem: Nutrition Deficit:  Goal: Optimize nutritional status  Outcome: Progressing     Problem: Skin/Tissue Integrity - Adult  Goal: Incisions, wounds, or drain sites healing without S/S of infection  Outcome: Progressing     Problem: Gastrointestinal - Adult  Goal: Minimal or absence of nausea and vomiting  Outcome: Progressing  Goal: Maintains adequate nutritional intake  Outcome: Progressing

## 2024-06-03 NOTE — PLAN OF CARE
Problem: Discharge Planning  Goal: Discharge to home or other facility with appropriate resources  6/3/2024 1130 by Mike Alexander RN  Outcome: Progressing  6/3/2024 0421 by Martha Mckeon RN  Outcome: Progressing  Flowsheets (Taken 6/2/2024 2115)  Discharge to home or other facility with appropriate resources: Refer to discharge planning if patient needs post-hospital services based on physician order or complex needs related to functional status, cognitive ability or social support system     Problem: Safety - Adult  Goal: Free from fall injury  6/3/2024 1130 by Mike Alexander RN  Outcome: Progressing  6/3/2024 0421 by Martha Mckeon RN  Outcome: Progressing     Problem: ABCDS Injury Assessment  Goal: Absence of physical injury  6/3/2024 1130 by Mike Alexander RN  Outcome: Progressing  6/3/2024 0421 by Martha Mckeon RN  Outcome: Progressing     Problem: Pain  Goal: Verbalizes/displays adequate comfort level or baseline comfort level  6/3/2024 1130 by Mike Alexander RN  Outcome: Progressing  Flowsheets (Taken 6/3/2024 0815)  Verbalizes/displays adequate comfort level or baseline comfort level: Assess pain using appropriate pain scale  6/3/2024 0421 by Martha Mckeon RN  Outcome: Progressing     Problem: Nutrition Deficit:  Goal: Optimize nutritional status  6/3/2024 1130 by Mike Alexander RN  Outcome: Progressing  6/3/2024 0421 by Martha Mckeon RN  Outcome: Progressing     Problem: Skin/Tissue Integrity - Adult  Goal: Incisions, wounds, or drain sites healing without S/S of infection  6/3/2024 1130 by Mike Alexander RN  Outcome: Progressing  6/3/2024 0421 by Martha Mckeon RN  Outcome: Progressing     Problem: Gastrointestinal - Adult  Goal: Minimal or absence of nausea and vomiting  6/3/2024 1130 by Mike Alexander RN  Outcome: Progressing  6/3/2024 0421 by Martha Mckeon RN  Outcome: Progressing  Goal: Maintains

## 2024-06-04 VITALS
DIASTOLIC BLOOD PRESSURE: 71 MMHG | BODY MASS INDEX: 18.1 KG/M2 | OXYGEN SATURATION: 100 % | HEIGHT: 60 IN | TEMPERATURE: 98.2 F | WEIGHT: 92.2 LBS | SYSTOLIC BLOOD PRESSURE: 121 MMHG | HEART RATE: 84 BPM | RESPIRATION RATE: 16 BRPM

## 2024-06-04 LAB
ANION GAP SERPL CALCULATED.3IONS-SCNC: 9 MMOL/L (ref 3–16)
BASOPHILS # BLD: 0 K/UL (ref 0–0.2)
BASOPHILS NFR BLD: 0 %
BUN SERPL-MCNC: 7 MG/DL (ref 7–20)
CALCIUM SERPL-MCNC: 8 MG/DL (ref 8.3–10.6)
CHLORIDE SERPL-SCNC: 108 MMOL/L (ref 99–110)
CO2 SERPL-SCNC: 23 MMOL/L (ref 21–32)
CREAT SERPL-MCNC: 0.7 MG/DL (ref 0.6–1.2)
DEPRECATED RDW RBC AUTO: 13.9 % (ref 12.4–15.4)
EOSINOPHIL # BLD: 0 K/UL (ref 0–0.6)
EOSINOPHIL NFR BLD: 1 %
GFR SERPLBLD CREATININE-BSD FMLA CKD-EPI: >90 ML/MIN/{1.73_M2}
GLUCOSE SERPL-MCNC: 122 MG/DL (ref 70–99)
HCT VFR BLD AUTO: 23.3 % (ref 36–48)
HGB BLD-MCNC: 8.1 G/DL (ref 12–16)
LYMPHOCYTES # BLD: 0.6 K/UL (ref 1–5.1)
LYMPHOCYTES NFR BLD: 16 %
MCH RBC QN AUTO: 33.4 PG (ref 26–34)
MCHC RBC AUTO-ENTMCNC: 34.8 G/DL (ref 31–36)
MCV RBC AUTO: 96 FL (ref 80–100)
METAMYELOCYTES NFR BLD MANUAL: 2 %
MONOCYTES # BLD: 0.4 K/UL (ref 0–1.3)
MONOCYTES NFR BLD: 10 %
NEUTROPHILS # BLD: 2.7 K/UL (ref 1.7–7.7)
NEUTROPHILS NFR BLD: 69 %
NEUTS BAND NFR BLD MANUAL: 1 % (ref 0–7)
PLATELET # BLD AUTO: 423 K/UL (ref 135–450)
PLATELET BLD QL SMEAR: ADEQUATE
PMV BLD AUTO: 6.3 FL (ref 5–10.5)
POTASSIUM SERPL-SCNC: 3.7 MMOL/L (ref 3.5–5.1)
RBC # BLD AUTO: 2.43 M/UL (ref 4–5.2)
RBC MORPH BLD: NORMAL
SLIDE REVIEW: ABNORMAL
SODIUM SERPL-SCNC: 140 MMOL/L (ref 136–145)
VARIANT LYMPHS NFR BLD MANUAL: 1 % (ref 0–6)
WBC # BLD AUTO: 3.8 K/UL (ref 4–11)

## 2024-06-04 PROCEDURE — APPNB30 APP NON BILLABLE TIME 0-30 MINS: Performed by: NURSE PRACTITIONER

## 2024-06-04 PROCEDURE — 6370000000 HC RX 637 (ALT 250 FOR IP): Performed by: NURSE PRACTITIONER

## 2024-06-04 PROCEDURE — 85025 COMPLETE CBC W/AUTO DIFF WBC: CPT

## 2024-06-04 PROCEDURE — 94760 N-INVAS EAR/PLS OXIMETRY 1: CPT

## 2024-06-04 PROCEDURE — 6360000002 HC RX W HCPCS: Performed by: SURGERY

## 2024-06-04 PROCEDURE — 99024 POSTOP FOLLOW-UP VISIT: CPT | Performed by: SURGERY

## 2024-06-04 PROCEDURE — 80048 BASIC METABOLIC PNL TOTAL CA: CPT

## 2024-06-04 PROCEDURE — 6370000000 HC RX 637 (ALT 250 FOR IP): Performed by: HOSPITALIST

## 2024-06-04 PROCEDURE — 6370000000 HC RX 637 (ALT 250 FOR IP): Performed by: SURGERY

## 2024-06-04 PROCEDURE — 2580000003 HC RX 258: Performed by: SURGERY

## 2024-06-04 PROCEDURE — APPSS30 APP SPLIT SHARED TIME 16-30 MINUTES: Performed by: NURSE PRACTITIONER

## 2024-06-04 RX ADMIN — ENOXAPARIN SODIUM 30 MG: 100 INJECTION SUBCUTANEOUS at 09:29

## 2024-06-04 RX ADMIN — BUSPIRONE HYDROCHLORIDE 10 MG: 5 TABLET ORAL at 12:34

## 2024-06-04 RX ADMIN — ACETAMINOPHEN 650 MG: 325 TABLET ORAL at 12:34

## 2024-06-04 RX ADMIN — PANTOPRAZOLE SODIUM 40 MG: 40 TABLET, DELAYED RELEASE ORAL at 05:29

## 2024-06-04 RX ADMIN — Medication 10 ML: at 09:32

## 2024-06-04 RX ADMIN — HYDROCORTISONE: 25 CREAM TOPICAL at 09:28

## 2024-06-04 RX ADMIN — ACETAMINOPHEN 650 MG: 325 TABLET ORAL at 05:29

## 2024-06-04 RX ADMIN — BUSPIRONE HYDROCHLORIDE 10 MG: 5 TABLET ORAL at 09:28

## 2024-06-04 ASSESSMENT — PAIN DESCRIPTION - PAIN TYPE: TYPE: SURGICAL PAIN

## 2024-06-04 ASSESSMENT — PAIN SCALES - GENERAL
PAINLEVEL_OUTOF10: 4
PAINLEVEL_OUTOF10: 0
PAINLEVEL_OUTOF10: 4

## 2024-06-04 ASSESSMENT — PAIN SCALES - WONG BAKER
WONGBAKER_NUMERICALRESPONSE: NO HURT

## 2024-06-04 ASSESSMENT — PAIN - FUNCTIONAL ASSESSMENT: PAIN_FUNCTIONAL_ASSESSMENT: ACTIVITIES ARE NOT PREVENTED

## 2024-06-04 ASSESSMENT — PAIN DESCRIPTION - DESCRIPTORS
DESCRIPTORS: ACHING
DESCRIPTORS: ACHING

## 2024-06-04 ASSESSMENT — PAIN DESCRIPTION - FREQUENCY: FREQUENCY: INTERMITTENT

## 2024-06-04 ASSESSMENT — PAIN DESCRIPTION - ORIENTATION
ORIENTATION: RIGHT
ORIENTATION: RIGHT

## 2024-06-04 ASSESSMENT — PAIN DESCRIPTION - LOCATION
LOCATION: ABDOMEN
LOCATION: ABDOMEN

## 2024-06-04 ASSESSMENT — PAIN DESCRIPTION - ONSET: ONSET: ON-GOING

## 2024-06-04 NOTE — DISCHARGE INSTRUCTIONS
[facility]    Discharge Instructions      Activity  - activity as tolerated, no driving while on analgesics, and no heavy lifting for 6 weeks; it is OK to be up walking around--walking up and down stairs is also OK. Do what is comfortable: stop and rest if you feel tired.    Diet  - regular diet  - Drink plenty of fluids     Pain  - You may use narcotic pain medication as prescribed for breakthrough pain  - You can use OTC Tylenol or Ibuprofen for 1-2 weeks (may need to adjust the dose as directed on the bottle if enteric coated ibuprofen chosen)  - Avoid taking narcotic pain medication on an empty stomach which may result in nausea, if pain medication is causing nausea try decreasing the dose  - Narcotic pain medication is not necessary, please contact the office if pain is not controlled  - Narcotic pain medication will not be refilled on weekends and after hours  - Please contact the office Monday-Friday 8a-4p if a refill is requested    Nausea  - Avoid taking narcotic pain medication on an empty stomach which may result in nausea  - If pain medication is causing nausea you may try decreasing the dose  - Nausea can be common for 24 hours after surgery--if nausea uncontrolled or worsening, contact the office or go to the ED    Constipation  - Pain medication can be constipating  - Often, it helps to take a stool softener with the goal of at least one soft bowel movement daily with minimal straining  - You may also need to increase water and fiber intake--may supplement with Benefiber and increasing your activity will also be helpful  - If a stool softener is needed, the following OTC medications are recommend: Colace 100 mg by mouth twice daily, Miralax 17 gm by mouth 1-3 times daily with glass of water, dulcolax suppositories, and Fleets enemas    Wound Care  - keep wound clean and dry  - If incisional bleeding is noted, hold firm pressure for 15-20 minutes. If remains

## 2024-06-04 NOTE — CARE COORDINATION
Discharge Planning Note:    Met with the patient.    - The patient is declining any HHC at this time.    Will continue to follow.    CECILIA Wilkerson RN    [place]  Phone: [phone]

## 2024-06-04 NOTE — ADT AUTH CERT
EDT potassium bicarb-citric acid (EFFER-K) effervescent tablet 40 mEq -- Oral See Alternative Abena Waggoner RN    06/02/2024 0611 EDT potassium bicarb-citric acid (EFFER-K) effervescent tablet 40 mEq -- Oral See Alternative Abena Cuevas RN    06/03/2024 0819 EDT potassium chloride 10 mEq/100 mL IVPB (Peripheral Line) -- IntraVENous See Alternative Mike Alexander RN    06/02/2024 1424 EDT potassium chloride 10 mEq/100 mL IVPB (Peripheral Line) 10 mEq IntraVENous New Bag Abena Waggoner RN    06/02/2024 1311 EDT potassium chloride 10 mEq/100 mL IVPB (Peripheral Line) 10 mEq IntraVENous New Bag Abena Waggoner RN    06/02/2024 1153 EDT potassium chloride 10 mEq/100 mL IVPB (Peripheral Line) 10 mEq IntraVENous New Bag Abena Waggoner RN    06/02/2024 0923 EDT potassium chloride 10 mEq/100 mL IVPB (Peripheral Line) 10 mEq IntraVENous New Bag Abena Waggoner RN    06/02/2024 0821 EDT potassium chloride 10 mEq/100 mL IVPB (Peripheral Line) 10 mEq IntraVENous New Bag Abena Waggoner RN    06/02/2024 0611 EDT potassium chloride 10 mEq/100 mL IVPB (Peripheral Line) 10 mEq IntraVENous New Bag Abena Cuevas RN    06/04/2024 0929 EDT enoxaparin Sodium (LOVENOX) injection 30 mg 30 mg SubCUTAneous Given Mike Alexander RN    06/03/2024 0820 EDT enoxaparin Sodium (LOVENOX) injection 30 mg 30 mg SubCUTAneous Given Mike Alexander RN    06/02/2024 0814 EDT enoxaparin Sodium (LOVENOX) injection 30 mg 30 mg SubCUTAneous Given Abena Waggoner RN    06/04/2024 1234 EDT acetaminophen (TYLENOL) tablet 650 mg 650 mg Oral Given Mike Alexander RN    06/04/2024 0529 EDT acetaminophen (TYLENOL) tablet 650 mg 650 mg Oral Given Martha Mckeon RN    06/04/2024 0040 EDT acetaminophen (TYLENOL) tablet 650 mg 650 mg Oral Not Given Martha Mckeon RN    06/03/2024 1818 EDT acetaminophen (TYLENOL) tablet 650 mg 650 mg Oral Given Mike Alexander RN    06/03/2024

## 2024-06-04 NOTE — PROGRESS NOTES
[facility]        Post-op Note    Pt Name: Tammy Frederick  MRN: [id number]  YOB: 1950  Date of Evaluation: 6/3/2024    Postoperative Day #11    Subjective:    No acute events overnight  Pain controlled without narcotics  No nausea or vomiting, tolerating regular diet  Passing flatus and stool  Resting in bed at this time      Vital Signs:  Patient Vitals for the past 24 hrs:   BP Temp Temp src Pulse Resp SpO2   24 0815 131/78 98.4 °F (36.9 °C) Oral 95 18 98 %   24 0501 119/66 98.7 °F (37.1 °C) Oral 79 16 97 %   24 2115 109/63 98.9 °F (37.2 °C) Oral 78 16 96 %   24 1706 (!) 146/88 99 °F (37.2 °C) Oral 85 17 100 %   24 1209 135/85 97.3 °F (36.3 °C) Axillary 85 17 100 %        TEMPERATURE HISTORY 24H: Temp (24hrs), Av.5 °F (36.9 °C), Min:97.3 °F (36.3 °C), Max:99 °F (37.2 °C)    BLOOD PRESSURE HISTORY: Systolic (36hrs), Av , Min:109 , Max:146    Diastolic (36hrs), Av, Min:63, Max:88      Intake/Output:    I/O last 3 completed shifts:  In: 1731.3 [P.O.:600; IV Piggyback:1131.3]  Out: -   No intake/output data recorded.  Drain/tube Output:           Physical Exam:  General: awake, alert, no acute distress, oriented to person, place, time and situation  Abdomen: soft, non-distended, appropriate incisional tenderness, bowel sounds active  Skin/Wound: healing well, no drainage, no erythema, well approximated with staples except for areas left intentionally open--dressing changed    Labs:  CBC:    Recent Labs     24  0609 24  0506 24  0451   WBC 3.0* 2.8* 2.7*   HGB 7.8* 8.4* 8.1*   HCT 23.0* 25.4* 23.5*    457* 457*       BMP:    Recent Labs     24  0609 24  0506 24  0451    137 138   K 3.3* 2.9* 3.2*    105 106   CO2 22 21 21   BUN <2* <2* <2*   CREATININE 0.5* <0.5* 0.5*   GLUCOSE 103* 105* 92       Hepatic: No results for input(s): \"AST\", \"ALT\", \"BILITOT\", \"ALKPHOS\" in 
          [facility]        Post-op Note    Pt Name: Tammy Frederick  MRN: [id number]  YOB: 1950  Date of Evaluation: 2024    Postoperative Day #7    Subjective:    No acute events overnight  Report severe, sharp right-sided pain that began this morning while in bathroom  No nausea or vomiting, reports poor intake because does not care for liquids  Passing flatus and stool  Resting in bed at this time, tearful about pain      Vital Signs:  Patient Vitals for the past 24 hrs:   BP Temp Temp src Pulse Resp SpO2 Height Weight   24 1206 139/77 98.5 °F (36.9 °C) Oral 85 16 98 % -- --   24 1200 -- -- -- -- 16 -- -- --   24 1029 (!) 108/58 98.2 °F (36.8 °C) Oral 83 16 97 % -- --   24 0329 (!) 113/44 98.2 °F (36.8 °C) Oral 74 16 98 % -- --   24 2330 116/70 98.1 °F (36.7 °C) Oral 81 16 97 % -- --   24 -- -- -- -- 17 -- -- --   24 -- -- -- -- 16 -- -- --   24 2000 106/64 98.2 °F (36.8 °C) Oral 74 15 98 % -- --   24 1634 109/68 98.1 °F (36.7 °C) Oral 80 17 100 % -- --   24 1445 -- -- -- -- -- -- 1.524 m (5') 41.8 kg (92 lb 3.2 oz)        TEMPERATURE HISTORY 24H: Temp (24hrs), Av.2 °F (36.8 °C), Min:98.1 °F (36.7 °C), Max:98.5 °F (36.9 °C)    BLOOD PRESSURE HISTORY: Systolic (36hrs), Av , Min:106 , Max:139    Diastolic (36hrs), Av, Min:44, Max:77      Intake/Output:    I/O last 3 completed shifts:  In: 240 [P.O.:240]  Out: -   No intake/output data recorded.  Drain/tube Output:           Physical Exam:  General: awake, alert, no acute distress, oriented to person, place, time and situation  Abdomen: soft, non-distended, appropriate incisional tenderness, bowel sounds active  Skin/Wound: healing well, no drainage, no erythema, well approximated with staples except for areas left intentionally open--dressing changed    Labs:  CBC:    Recent Labs     24  0531 24  0508 24  0522   WBC 
          [facility]        Post-op Note    Pt Name: Tammy Frederick  MRN: [id number]  YOB: 1950  Date of Evaluation: 2024    Postoperative Day #6    Subjective:    No acute events overnight  Pain controlled without narcotics  No nausea or vomiting, tolerating clear liquids  Passing flatus and stool  Resting in bed at this time      Vital Signs:  Patient Vitals for the past 24 hrs:   BP Temp Temp src Pulse Resp SpO2 Height   24 1150 117/73 98.8 °F (37.1 °C) Oral 75 17 97 % --   24 1106 -- -- -- -- -- -- 1.524 m (5')   24 1013 -- -- -- -- 18 -- --   24 0930 112/66 98.6 °F (37 °C) Oral 82 18 -- --   24 0500 117/69 98.3 °F (36.8 °C) Oral 76 16 98 % --   24 0400 117/69 98.3 °F (36.8 °C) Oral 76 16 98 % --   24 0000 115/67 98.1 °F (36.7 °C) Oral 78 16 99 % --   24 2015 113/70 98.3 °F (36.8 °C) Oral 78 16 98 % --   24 1615 107/64 98.2 °F (36.8 °C) Oral 82 18 100 % --        TEMPERATURE HISTORY 24H: Temp (24hrs), Av.4 °F (36.9 °C), Min:98.1 °F (36.7 °C), Max:98.8 °F (37.1 °C)    BLOOD PRESSURE HISTORY: Systolic (36hrs), Av , Min:107 , Max:119    Diastolic (36hrs), Av, Min:64, Max:74      Intake/Output:    No intake/output data recorded.  No intake/output data recorded.  Drain/tube Output:           Physical Exam:  General: awake, alert, no acute distress, oriented to person, place, time and situation  Abdomen: soft, non-distended, appropriate incisional tenderness, bowel sounds active  Skin/Wound: healing well, no drainage, no erythema, well approximated with staples except for areas left intentionally open--dressing changed    Labs:  CBC:    Recent Labs     24  0509 24  0531 24  0508   WBC 3.8* 3.7* 4.4   HGB 8.1* 8.3* 9.1*   HCT 24.1* 24.9* 27.5*    356 413       BMP:    Recent Labs     24  0509 24  0531 24  0508    137 137   K 3.7 3.6 3.6   * 108 104 
          [facility]        Post-op Note    Pt Name: Tammy Frederick  MRN: [id number]  YOB: 1950  Date of Evaluation: 2024    Postoperative Day # 10    Subjective:    No acute events overnight  Pain controlled  No nausea or vomiting, tolerating diet  Passing flatus and stool        Vital Signs:  Patient Vitals for the past 24 hrs:   BP Temp Temp src Pulse Resp SpO2   24 1209 135/85 97.3 °F (36.3 °C) Axillary 85 17 100 %   24 0812 125/70 98.4 °F (36.9 °C) Oral 80 16 98 %   24 0030 114/75 98.1 °F (36.7 °C) Oral 76 16 --   24 2045 113/73 98.1 °F (36.7 °C) Oral 72 16 98 %   24 1736 113/67 98.5 °F (36.9 °C) Oral 79 -- 97 %        TEMPERATURE HISTORY 24H: Temp (24hrs), Av.1 °F (36.7 °C), Min:97.3 °F (36.3 °C), Max:98.5 °F (36.9 °C)    BLOOD PRESSURE HISTORY: Systolic (36hrs), Av , Min:99 , Max:135    Diastolic (36hrs), Av, Min:62, Max:85      Intake/Output:    I/O last 3 completed shifts:  In: 1371.3 [P.O.:240; IV Piggyback:1131.3]  Out: -   No intake/output data recorded.  Drain/tube Output:           Physical Exam:  General: awake, alert, no acute distress, oriented to person, place, time and situation  Abdomen: soft, non-distended, appropriate incisional tenderness, bowel sounds active  Skin/Wound: healing well, no drainage, no erythema, well approximated, dressing changed    Labs:  CBC:    Recent Labs     24  0541 24  0609 24  0506   WBC 3.2* 3.0* 2.8*   HGB 7.9* 7.8* 8.4*   HCT 22.6* 23.0* 25.4*    404 457*       BMP:    Recent Labs     24  0541 24  0609 24  0506    137 137   K 3.1* 3.3* 2.9*    106 105   CO2 22 22 21   BUN 3* <2* <2*   CREATININE <0.5* 0.5* <0.5*   GLUCOSE 89 103* 105*       Hepatic: No results for input(s): \"AST\", \"ALT\", \"BILITOT\", \"ALKPHOS\" in the last 72 hours.    Invalid input(s): \"ALB\"  Amylase: No results for input(s): \"AMYLASE\" in the last 72 
          [facility]        Post-op Note    Pt Name: Tammy Frederick  MRN: [id number]  YOB: 1950  Date of evaluation: 5/24/2024    Post-op Day #4    Subjective:    No acute events overnight  Pain controlled  Some continued nausea, needing medication  Passing flatus and loose stool    Vital Signs:  Patient Vitals for the past 24 hrs:   BP Temp Temp src Pulse Resp SpO2 Weight   05/24/24 0841 113/68 97.8 °F (36.6 °C) Oral 81 16 100 % --   05/24/24 0345 100/62 97.8 °F (36.6 °C) Oral 78 16 100 % --   05/24/24 0015 95/60 97.5 °F (36.4 °C) Oral 83 16 97 % --   05/23/24 2235 -- -- -- -- 16 -- --   05/23/24 2205 -- -- -- -- 18 -- --   05/23/24 1930 116/70 98 °F (36.7 °C) Axillary 90 18 99 % --   05/23/24 1845 103/65 -- -- 89 -- 99 % --   05/23/24 1826 113/69 -- -- 87 -- 97 % --   05/23/24 1722 137/78 98 °F (36.7 °C) Oral 90 18 99 % --   05/23/24 1700 126/76 -- -- 96 17 100 % --   05/23/24 1659 -- -- -- -- 16 -- --   05/23/24 1645 108/70 -- -- 90 11 97 % --   05/23/24 1630 106/68 -- -- 89 10 98 % --   05/23/24 1615 109/71 -- -- 90 11 97 % --   05/23/24 1600 105/72 -- -- 95 17 98 % --   05/23/24 1545 109/66 -- -- 91 10 97 % --   05/23/24 1530 105/69 -- -- 91 11 97 % --   05/23/24 1515 109/67 -- -- 92 (!) 9 97 % --   05/23/24 1500 115/70 -- -- 92 21 98 % --   05/23/24 1445 120/76 -- -- 95 26 96 % --   05/23/24 1430 123/76 -- -- 86 12 97 % --   05/23/24 1415 120/79 -- -- 85 11 97 % --   05/23/24 1400 131/85 -- -- 88 10 97 % --   05/23/24 1345 129/79 -- -- 84 14 98 % --   05/23/24 1330 128/75 -- -- 83 10 97 % --   05/23/24 1315 126/76 -- -- 82 12 97 % --   05/23/24 1300 132/80 -- -- 80 12 98 % --   05/23/24 1250 -- -- -- 78 12 98 % --   05/23/24 1245 132/81 -- -- 79 13 99 % --   05/23/24 1240 -- -- -- 79 15 99 % --   05/23/24 1235 -- -- -- 79 18 99 % --   05/23/24 1230 119/72 97.5 °F (36.4 °C) Temporal 78 16 100 % --   05/23/24 1225 127/78 -- -- 79 23 99 % --   05/23/24 1220 134/79 -- 
          [facility]        Post-op Note    Pt Name: Tammy Frederick  MRN: [id number]  YOB: 1950  Date of Evaluation: 2024    Postoperative Day #5    Subjective:    No acute events overnight  Pain controlled  No further nausea, no vomiting  Passing flatus and stool, less bloating  Resting in bed at this time      Vital Signs:  Patient Vitals for the past 24 hrs:   BP Temp Temp src Pulse Resp SpO2   24 1022 -- -- -- -- 18 --   24 0930 116/71 98.3 °F (36.8 °C) Oral 86 18 98 %   24 0515 119/74 97.4 °F (36.3 °C) Oral 85 18 99 %   24 2345 109/68 98.3 °F (36.8 °C) Oral 88 16 98 %   24 2257 -- -- -- -- 16 --   24 2227 -- -- -- -- 16 --   24 2000 115/74 98.5 °F (36.9 °C) Oral 82 -- 97 %        TEMPERATURE HISTORY 24H: Temp (24hrs), Av.1 °F (36.7 °C), Min:97.4 °F (36.3 °C), Max:98.5 °F (36.9 °C)    BLOOD PRESSURE HISTORY: Systolic (36hrs), Av , Min:101 , Max:132    Diastolic (36hrs), Av, Min:65, Max:79      Intake/Output:    I/O last 3 completed shifts:  In: 120 [P.O.:120]  Out: -   No intake/output data recorded.  Drain/tube Output:           Physical Exam:  General: awake, alert, no acute distress, oriented to person, place, time and situation  Abdomen: soft, non-distended, appropriate incisional tenderness, bowel sounds active  Skin/Wound: healing well, no drainage, no erythema, well approximated with staples except for areas left intentionally open--remaining wick removed, dressing changed    Labs:  CBC:    Recent Labs     24  0519 24  0509 24  0531   WBC 4.1 3.8* 3.7*   HGB 8.7* 8.1* 8.3*   HCT 25.1* 24.1* 24.9*    324 356       BMP:    Recent Labs     24  0519 24  0509 24  0531    137 137   K 4.2 3.7 3.6   * 111* 108   CO2 16* 18* 19*   BUN 12 7 7   CREATININE 0.6 0.6 <0.5*   GLUCOSE 147* 122* 100*       Hepatic: No results for input(s): \"AST\", \"ALT\", \"BILITOT\", 
        Hospitalist Progress Note      PCP: Leti Mendieta MD    Date of Admission: 5/23/2024    Chief Complaint: Reversal of loop ileostomy    Hospital Course:   Feels well  Had some loose stools  Some bloating but no pain  No nausea vomit      Medications:  Reviewed      Exam:    /70   Pulse 81   Temp 99 °F (37.2 °C) (Oral)   Resp 18   Ht 1.524 m (5')   Wt 40.4 kg (89 lb)   SpO2 100%   BMI 17.38 kg/m²     General appearance: No apparent distress, appears stated age and cooperative.  HEENT: Pupils equal, round, and reactive to light. Conjunctivae/corneas clear.  Neck: Supple, with full range of motion. No jugular venous distention. Trachea midline.  Respiratory:  Normal respiratory effort. Clear to auscultation, bilaterally without RALES/WHEEZES/Rhonchi.  Cardiovascular: Regular rate and rhythm with normal S1/S2 without MURMURS, rubs or gallops.  Abdomen: Surgical site dressed mild distention but good bowel sounds  Musculoskeletal: No clubbing, cyanosis or EDEMA bilaterally.  Full range of motion without deformity.  Skin: Skin color, texture, turgor normal.  No rashes or lesions.  Neurologic:  Neurovascularly intact without any focal sensory/motor deficits. Cranial nerves: II-XII intact, grossly non-focal.        Labs:   Recent Labs     05/24/24  0513 05/25/24  0553 05/26/24  0519   WBC 7.7 6.2 4.1   HGB 8.7* 9.6* 8.7*   HCT 25.1* 28.7* 25.1*    294 289     Recent Labs     05/24/24  0513 05/25/24  0553 05/26/24  0519    141 140   K 4.2 3.8 4.2   * 114* 113*   CO2 15* 15* 16*   BUN 19 14 12   CREATININE 0.7 0.6 0.6   CALCIUM 8.3 8.5 8.7   PHOS 3.9 1.6* 1.9*     Recent Labs     05/25/24  0553   AST 13*   ALT 10   BILITOT 0.5   ALKPHOS 73     Recent Labs     05/25/24  0553   INR 1.15     No results for input(s): \"CKTOTAL\", \"TROPONINI\" in the last 72 hours.    Urinalysis:      Lab Results   Component Value Date/Time    NITRU Negative 03/19/2024 03:16 PM    WBCUA 5 03/19/2024 
        Hospitalist Progress Note      PCP: Leti Mendieta MD    Date of Admission: 5/23/2024    Chief Complaint: Reversal of loop ileostomy    Hospital Course:   Feels well  No new complaint      Medications:  Reviewed      Exam:    /79   Pulse 86   Temp 98 °F (36.7 °C) (Oral)   Resp 16   Ht 1.524 m (5')   Wt 40.4 kg (89 lb)   SpO2 100%   BMI 17.38 kg/m²     General appearance: No apparent distress, appears stated age and cooperative.  HEENT: Pupils equal, round, and reactive to light. Conjunctivae/corneas clear.  Neck: Supple, with full range of motion. No jugular venous distention. Trachea midline.  Respiratory:  Normal respiratory effort. Clear to auscultation, bilaterally without RALES/WHEEZES/Rhonchi.  Cardiovascular: Regular rate and rhythm with normal S1/S2 without MURMURS, rubs or gallops.  Abdomen: Surgical site dressed mild distention but good bowel sounds  Musculoskeletal: No clubbing, cyanosis or EDEMA bilaterally.  Full range of motion without deformity.  Skin: Skin color, texture, turgor normal.  No rashes or lesions.  Neurologic:  Neurovascularly intact without any focal sensory/motor deficits. Cranial nerves: II-XII intact, grossly non-focal.  Physical exam unchanged from 5/26      Labs:   Recent Labs     05/25/24  0553 05/26/24  0519 05/27/24  0509   WBC 6.2 4.1 3.8*   HGB 9.6* 8.7* 8.1*   HCT 28.7* 25.1* 24.1*    289 324       Recent Labs     05/25/24  0553 05/26/24  0519 05/27/24  0509    140 137   K 3.8 4.2 3.7   * 113* 111*   CO2 15* 16* 18*   BUN 14 12 7   CREATININE 0.6 0.6 0.6   CALCIUM 8.5 8.7 8.6   PHOS 1.6* 1.9* 2.3*       Recent Labs     05/25/24  0553   AST 13*   ALT 10   BILITOT 0.5   ALKPHOS 73       Recent Labs     05/25/24  0553   INR 1.15       No results for input(s): \"CKTOTAL\", \"TROPONINI\" in the last 72 hours.    Urinalysis:      Lab Results   Component Value Date/Time    NITRU Negative 03/19/2024 03:16 PM    WBCUA 5 03/19/2024 03:16 PM    
        Hospitalist Progress Note      PCP: Leti Mendieta MD    Date of Admission: 5/23/2024    Chief Complaint: Reversal of loop ileostomy    Hospital Course:   Feels well  No new complaint  No nausea.  Pain control having flatus and bowel movement full    Medications:  Reviewed      Exam:    /64   Pulse 82   Temp 98.2 °F (36.8 °C) (Oral)   Resp 18   Ht 1.524 m (5')   Wt 40.4 kg (89 lb)   SpO2 100%   BMI 17.38 kg/m²     General appearance: No apparent distress, appears stated age and cooperative.  HEENT: Pupils equal, round, and reactive to light. Conjunctivae/corneas clear.  Neck: Supple, with full range of motion. No jugular venous distention. Trachea midline.  Respiratory:  Normal respiratory effort. Clear to auscultation, bilaterally without RALES/WHEEZES/Rhonchi.  Cardiovascular: Regular rate and rhythm with normal S1/S2 without MURMURS, rubs or gallops.  Abdomen: Surgical site dressed mild distention but good bowel sounds  Musculoskeletal: No clubbing, cyanosis or EDEMA bilaterally.  Full range of motion without deformity.  Skin: Skin color, texture, turgor normal.  No rashes or lesions.  Neurologic:  Neurovascularly intact without any focal sensory/motor deficits. Cranial nerves: II-XII intact, grossly non-focal.  Physical exam unchanged from 5/26      Labs:   Recent Labs     05/26/24  0519 05/27/24  0509 05/28/24  0531   WBC 4.1 3.8* 3.7*   HGB 8.7* 8.1* 8.3*   HCT 25.1* 24.1* 24.9*    324 356     Recent Labs     05/26/24  0519 05/27/24  0509 05/28/24  0531    137 137   K 4.2 3.7 3.6   * 111* 108   CO2 16* 18* 19*   BUN 12 7 7   CREATININE 0.6 0.6 <0.5*   CALCIUM 8.7 8.6 8.3   PHOS 1.9* 2.3* 3.3     No results for input(s): \"AST\", \"ALT\", \"BILIDIR\", \"BILITOT\", \"ALKPHOS\" in the last 72 hours.  Recent Labs     05/28/24  0531   INR 0.99     No results for input(s): \"CKTOTAL\", \"TROPONINI\" in the last 72 hours.    Urinalysis:      Lab Results   Component Value Date/Time    
        Hospitalist Progress Note      PCP: Leti Mendieta MD    Date of Admission: 5/23/2024    Chief Complaint: Reversal of loop ileostomy    Hospital Course:   Feels well  No new complaint  No nausea.  Pain control having flatus and bowel movement full    Medications:  Reviewed      Exam:    /77   Pulse 85   Temp 98.5 °F (36.9 °C) (Oral)   Resp 16   Ht 1.524 m (5')   Wt 41.8 kg (92 lb 3.2 oz)   SpO2 98%   BMI 18.01 kg/m²     General appearance: No apparent distress, appears stated age and cooperative.  HEENT: Pupils equal, round, and reactive to light. Conjunctivae/corneas clear.  Neck: Supple, with full range of motion. No jugular venous distention. Trachea midline.  Respiratory:  Normal respiratory effort. Clear to auscultation, bilaterally without RALES/WHEEZES/Rhonchi.  Cardiovascular: Regular rate and rhythm with normal S1/S2 without MURMURS, rubs or gallops.  Abdomen: Surgical site dressed mild distention but good bowel sounds  Musculoskeletal: No clubbing, cyanosis or EDEMA bilaterally.  Full range of motion without deformity.  Skin: Skin color, texture, turgor normal.  No rashes or lesions.  Neurologic:  Neurovascularly intact without any focal sensory/motor deficits. Cranial nerves: II-XII intact, grossly non-focal.  Physical exam unchanged from 5/26      Labs:   Recent Labs     05/28/24  0531 05/29/24  0508 05/30/24  0522   WBC 3.7* 4.4 3.1*   HGB 8.3* 9.1* 8.8*   HCT 24.9* 27.5* 25.3*    413 405     Recent Labs     05/28/24  0531 05/29/24  0508 05/30/24  0522    137 135*   K 3.6 3.6 3.2*    104 104   CO2 19* 19* 22   BUN 7 7 5*   CREATININE <0.5* 0.5* 0.5*   CALCIUM 8.3 8.5 8.4   PHOS 3.3  --   --      No results for input(s): \"AST\", \"ALT\", \"BILIDIR\", \"BILITOT\", \"ALKPHOS\" in the last 72 hours.  Recent Labs     05/28/24  0531   INR 0.99     No results for input(s): \"CKTOTAL\", \"TROPONINI\" in the last 72 hours.    Urinalysis:      Lab Results   Component Value 
    ADVANCED CARE PLANNING    Name:Tammy Frederick       :  1950              MRN:  [id number]      Purpose of Encounter: Advanced care planning in light of problem listed above   Parties in attendance: :Yuliana Moss MD, Family members:  Decisional Capacity:Yes    Diagnosis: Principal Problem:    Ileostomy in place (HCC)  Active Problems:    History of creation of ostomy (HCC)    Severe malnutrition (HCC)  Resolved Problems:    * No resolved hospital problems. *    Patients Medical Story:Presented with worsening symptom of dx above. With at risk for life threatening event. Procedure and testing as noted in progress noted. We discussed patient long term goal and also wishes and aggressive care. Discussed in detail about code status and what it means with detailed explanation.   Goals of Care Determinations: Patient wishes to focus on full code with aggressive care, CPR, intubation long term vent and facility as well.   Plan: Will notify Leti Mendieta MD of change in care plan. Will look at further interventions as needed.   Code Status: At this time patient wishes to be Full Code  Time Spent with Patient: 18 minutes      Electronically signed by Yuliana Rondon MD on 2024 at 10:03 AM  Thank you Leti Mendieta MD for the opportunity to be involved in this patient's care.     
  [facility] - Inpatient Rehabilitation Department   Phone: [phone]    Physical Therapy    [] Initial Evaluation            [] Daily Treatment Note         [] Discharge Summary      Patient: Tammy Frederick   : 1950   MRN: [id number]   Date of Service:  2024  Admitting Diagnosis: Ileostomy in place (HCC)  Current Admission Summary: ***  Past Medical History:  has a past medical history of Arthritis.  Past Surgical History:  has a past surgical history that includes Abdomen surgery; Total knee arthroplasty (Left, 2011); joint replacement (Right, 2012); colostomy (N/A, 2023); joint replacement (Left); Upper gastrointestinal endoscopy (N/A, 2024); and CT BONE MARROW ASPIRATION (2024).  Discharge Recommendations: ***  DME Required For Discharge: {FFOTD/C EQUIPMENT:83393}  Precautions/Restrictions: {FFRESTRICTIONS:47540}  Weight Bearing Restrictions: {FFWBRESTRICTIONS:86193}  [] Right Upper Extremity  [] Left Upper Extremity [] Right Lower Extremity  [] Left Lower Extremity     Required Braces/Orthotics: {ffbraces:87554}   [] Right  [] Left  Positional Restrictions:{ffpositionrestrictions:28728}    Pre-Admission Information   Lives With: Alone  Type of Home: House  Home Layout: One level  Home Access: Level entry  Bathroom Shower/Tub: Tub/Shower unit  Bathroom Toilet: Standard (sink nearby)  Bathroom Equipment: Shower chair, Grab bars in shower, Hand-held shower  Home Equipment: Cane, Walker, rolling, Walker, standard, Reacher  Has the patient had two or more falls in the past year or any fall with injury in the past year?: No  ADL Assistance: Independent (with shower chair)  Homemaking Assistance: Independent  Ambulation Assistance: Independent with use of cane  Transfer Assistance: Independent  Active : Yes  Occupation: Retired  Leisure & Hobbies: has 2 dogs & 2 cats- l   Additional Comments:      Examination   Vision:   Vision Gross Assessment: Impaired and 
  [facility] - Inpatient Rehabilitation Department   Phone: [phone]    Physical Therapy    [] Initial Evaluation            [x] Daily Treatment Note         [] Discharge Summary      Patient: Tammy Frederick   : 1950   MRN: [id number]   Date of Service:  2024  Admitting Diagnosis: Ileostomy in place (HCC)  Current Admission Summary: Pt is s/p open reversal of loop ileostomy on 24.  Past Medical History:  has a past medical history of Arthritis.  Past Surgical History:  has a past surgical history that includes Abdomen surgery; Total knee arthroplasty (Left, 2011); joint replacement (Right, 2012); colostomy (N/A, 2023); joint replacement (Left); Upper gastrointestinal endoscopy (N/A, 2024); CT BONE MARROW ASPIRATION (2024); and Small intestine surgery (N/A, 2024).  Discharge Recommendations: Tammy Frederick scored a 22/ on the AM-PAC short mobility form. Current research shows that an AM-PAC score of 18 or greater is typically associated with a discharge to the patient's home setting. Based on the patient's AM-PAC score and their current functional mobility deficits, it is recommended that the patient have 2-3 sessions per week of Physical Therapy at d/c to increase the patient's independence.  At this time, this patient demonstrates the endurance and safety to discharge home with HHPT and a follow up treatment frequency of 2-3x/wk.  Please see assessment section for further patient specific details. If patient discharges prior to next session this note will serve as a discharge summary.  Please see below for the latest assessment towards goals.       HOME HEALTH CARE: LEVEL 1 STANDARD    - Initial home health evaluation to occur within 24-48 hours, in patient home   - Therapy to evaluate with goal of regaining prior level of functioning   - Therapy to evaluate if patient has Home Health Aide needs for personal care    DME Required For Discharge: no 
  [facility] - Inpatient Rehabilitation Department   Phone: [phone]    Occupational Therapy    [] Initial Evaluation            [x] Daily Treatment Note         [] Discharge Summary      Patient: Tammy Frederick   : 1950   MRN: [id number]   Date of Service:  2024    Admitting Diagnosis:  Ileostomy in place (HCC)  Current Admission Summary: Procedure:   Procedure(s):  OPEN REVERSAL OF LOOP ILEOSTOMY    Past Medical History:  has a past medical history of Arthritis.  Past Surgical History:  has a past surgical history that includes Abdomen surgery; Total knee arthroplasty (Left, 2011); joint replacement (Right, 2012); colostomy (N/A, 2023); joint replacement (Left); Upper gastrointestinal endoscopy (N/A, 2024); CT BONE MARROW ASPIRATION (2024); and Small intestine surgery (N/A, 2024).    Discharge Recommendations: Tammy Frederick scored a 21/24 on the AM-PAC ADL Inpatient form. Current research shows that an AM-PAC score of 18 or greater is typically associated with a discharge to the patient's home setting. Based on the patient's AM-PAC score, and their current ADL deficits, it is recommended that the patient have 2-3 sessions per week of Occupational Therapy at d/c to increase the patient's independence.  At this time, this patient demonstrates the endurance and safety to discharge home with HHOT (home vs OP services) and a follow up treatment frequency of 2-3x/wk.   Please see assessment section for further patient specific details.    If patient discharges prior to next session this note will serve as a discharge summary.  Please see below for the latest assessment towards goals.      DME Required For Discharge: no DME required at discharge    Precautions/Restrictions: high fall risk  Weight Bearing Restrictions: no restrictions  [] Right Upper Extremity  [] Left Upper Extremity [] Right Lower Extremity  [] Left Lower Extremity     Required 
  [facility] - Inpatient Rehabilitation Department   Phone: [phone]    Physical Therapy    [x] Initial Evaluation            [] Daily Treatment Note         [] Discharge Summary      Patient: Tammy Frederick   : 1950   MRN: [id number]   Date of Service:  2024  Admitting Diagnosis: Ileostomy in place (HCC)  Current Admission Summary: Pt is s/p open reversal of loop ileostomy on 24.  Past Medical History:  has a past medical history of Arthritis.  Past Surgical History:  has a past surgical history that includes Abdomen surgery; Total knee arthroplasty (Left, 2011); joint replacement (Right, 2012); colostomy (N/A, 2023); joint replacement (Left); Upper gastrointestinal endoscopy (N/A, 2024); CT BONE MARROW ASPIRATION (2024); and Small intestine surgery (N/A, 2024).  Discharge Recommendations: Tammy Frederick scored a 19/24 on the AM-PAC short mobility form. Current research shows that an AM-PAC score of 18 or greater is typically associated with a discharge to the patient's home setting. Based on the patient's AM-PAC score and their current functional mobility deficits, it is recommended that the patient have 2-3 sessions per week of Physical Therapy at d/c to increase the patient's independence.  At this time, this patient demonstrates the endurance and safety to discharge home with HHPT and a follow up treatment frequency of 2-3x/wk.  Please see assessment section for further patient specific details.    If patient discharges prior to next session this note will serve as a discharge summary.  Please see below for the latest assessment towards goals.     HOME HEALTH CARE: LEVEL 1 STANDARD    - Initial home health evaluation to occur within 24-48 hours, in patient home   - Therapy to evaluate with goal of regaining prior level of functioning   - Therapy to evaluate if patient has Home Health Aide needs for personal care    DME Required For Discharge: no 
  [facility] - Inpatient Rehabilitation Department   Phone: [phone]    Occupational Therapy    [] Initial Evaluation            [x] Daily Treatment Note         [] Discharge Summary      Patient: Tammy Frederick   : 1950   MRN: [id number]   Date of Service:  2024    Admitting Diagnosis:  Ileostomy in place (HCC)  Current Admission Summary: Procedure:   Procedure(s):  OPEN REVERSAL OF LOOP ILEOSTOMY    Past Medical History:  has a past medical history of Arthritis.  Past Surgical History:  has a past surgical history that includes Abdomen surgery; Total knee arthroplasty (Left, 2011); joint replacement (Right, 2012); colostomy (N/A, 2023); joint replacement (Left); Upper gastrointestinal endoscopy (N/A, 2024); CT BONE MARROW ASPIRATION (2024); and Small intestine surgery (N/A, 2024).    Discharge Recommendations: Tammy Frederick scored a 21/24 on the AM-PAC ADL Inpatient form. Current research shows that an AM-PAC score of 18 or greater is typically associated with a discharge to the patient's home setting. Based on the patient's AM-PAC score, and their current ADL deficits, it is recommended that the patient have 2-3 sessions per week of Occupational Therapy at d/c to increase the patient's independence.  At this time, this patient demonstrates the endurance and safety to discharge home with HHOT (home vs OP services) and a follow up treatment frequency of 2-3x/wk.   Please see assessment section for further patient specific details.    If patient discharges prior to next session this note will serve as a discharge summary.  Please see below for the latest assessment towards goals.      DME Required For Discharge: no DME required at discharge    Precautions/Restrictions: high fall risk  Weight Bearing Restrictions: no restrictions  [] Right Upper Extremity  [] Left Upper Extremity [] Right Lower Extremity  [] Left Lower Extremity     Required 
  [facility] - Inpatient Rehabilitation Department   Phone: [phone]    Occupational Therapy    [x] Initial Evaluation            [] Daily Treatment Note         [] Discharge Summary      Patient: Tammy Frederick   : 1950   MRN: [id number]   Date of Service:  2024    Admitting Diagnosis:  Ileostomy in place (HCC)  Current Admission Summary: Procedure:   Procedure(s):  OPEN REVERSAL OF LOOP ILEOSTOMY    Past Medical History:  has a past medical history of Arthritis.  Past Surgical History:  has a past surgical history that includes Abdomen surgery; Total knee arthroplasty (Left, 2011); joint replacement (Right, 2012); colostomy (N/A, 2023); joint replacement (Left); Upper gastrointestinal endoscopy (N/A, 2024); CT BONE MARROW ASPIRATION (2024); and Small intestine surgery (N/A, 2024).    Discharge Recommendations: Tammy Frederick scored a 21/24 on the AM-PAC ADL Inpatient form. Current research shows that an AM-PAC score of 18 or greater is typically associated with a discharge to the patient's home setting. Based on the patient's AM-PAC score, and their current ADL deficits, it is recommended that the patient have 2-3 sessions per week of Occupational Therapy at d/c to increase the patient's independence.  At this time, this patient demonstrates the endurance and safety to discharge home with HHOT (home vs OP services) and a follow up treatment frequency of 2-3x/wk.   Please see assessment section for further patient specific details.    If patient discharges prior to next session this note will serve as a discharge summary.  Please see below for the latest assessment towards goals.      DME Required For Discharge: no DME required at discharge    Precautions/Restrictions: high fall risk  Weight Bearing Restrictions: no restrictions  [] Right Upper Extremity  [] Left Upper Extremity [] Right Lower Extremity  [] Left Lower Extremity     Required 
  Physician Progress Note      PATIENT:               SURYA SYKES  CSN #:                  [id number]  :                       1950  ADMIT DATE:       2024 8:38 AM  DISCH DATE:  RESPONDING  PROVIDER #:        Alban TRONCOSO MD          QUERY TEXT:    Patient admitted for reversal of loop ileostomy. Noted to have \"Severe   malnutrition\" by RD on . If possible, please document in progress notes   and discharge summary if you are evaluating and /or treating any of the   following:    The medical record reflects the following:  Risk Factors: 74 year old female, BMI 17.38  Clinical Indicators:  RD : \"Malnutrition Status: Severe malnutrition.  Findings of the 6 clinical characteristics of malnutrition:  Energy Intake:  Mild decrease in energy intake (Comment)  Weight Loss:  Greater than 10% over 6 months (31.5% x5 months)  Body Fat Loss:  Mild body fat loss Triceps, Fat Overlying Ribs  Muscle Mass Loss:  Mild muscle mass loss Thigh (quadriceps), Calf   (gastrocnemius), Clavicles (pectoralis & deltoids), Temples (temporalis)  Fluid Accumulation:  No significant fluid accumulation   Strength:  Normal  strength  Severe malnutrition, In context of chronic illness related to altered GI   function as evidenced by Criteria as identified in malnutrition assessment  Treatment: RD consult, IVFs 125ml/hr, ADULT ORAL NUTRITION SUPPLEMENT;   Breakfast, Lunch, Dinner; Clear Liquid Oral Supplement. ADULT DIET; Clear   Liquid; No tea or coffee.      ASPEN Criteria:    https://aspenjournals.onlinelibrary.mart.com/doi/full/10.1177/087074056291421  5  Options provided:  -- Protein calorie malnutrition severe  -- Other - I will add my own diagnosis  -- Disagree - Not applicable / Not valid  -- Disagree - Clinically unable to determine / Unknown  -- Refer to Clinical Documentation Reviewer    PROVIDER RESPONSE TEXT:    I will order labs tomorrow to assess nutrition. She is thin at baseline    Query created 
  [facility] - Inpatient Rehabilitation Department   Phone: [phone]    Occupational Therapy    [] Initial Evaluation            [x] Daily Treatment Note         [] Discharge Summary      Patient: Tammy Frederick   : 1950   MRN: [id number]   Date of Service:  2024    Admitting Diagnosis:  Ileostomy in place (HCC)  Current Admission Summary: Procedure:   Procedure(s):  OPEN REVERSAL OF LOOP ILEOSTOMY    Past Medical History:  has a past medical history of Arthritis.  Past Surgical History:  has a past surgical history that includes Abdomen surgery; Total knee arthroplasty (Left, 2011); joint replacement (Right, 2012); colostomy (N/A, 2023); joint replacement (Left); Upper gastrointestinal endoscopy (N/A, 2024); CT BONE MARROW ASPIRATION (2024); and Small intestine surgery (N/A, 2024).    Discharge Recommendations: Tammy Frederick scored a 22/24 on the AM-PAC ADL Inpatient form. Current research shows that an AM-PAC score of 18 or greater is typically associated with a discharge to the patient's home setting. Based on the patient's AM-PAC score, and their current ADL deficits, it is recommended that the patient have 2-3 sessions per week of Occupational Therapy at d/c to increase the patient's independence.  At this time, this patient demonstrates the endurance and safety to discharge home with HHOT (home vs OP services) and a follow up treatment frequency of 2-3x/wk.   Please see assessment section for further patient specific details.    If patient discharges prior to next session this note will serve as a discharge summary.  Please see below for the latest assessment towards goals.      DME Required For Discharge: no DME required at discharge    Precautions/Restrictions: high fall risk  Weight Bearing Restrictions: no restrictions  [] Right Upper Extremity  [] Left Upper Extremity [] Right Lower Extremity  [] Left Lower Extremity     Required 
  [facility] - Inpatient Rehabilitation Department   Phone: [phone]    Occupational Therapy    [] Initial Evaluation            [x] Daily Treatment Note         [] Discharge Summary      Patient: Tammy Frederick   : 1950   MRN: [id number]   Date of Service:  2024    Admitting Diagnosis:  Ileostomy in place (HCC)  Current Admission Summary: Procedure:   Procedure(s):  OPEN REVERSAL OF LOOP ILEOSTOMY    Past Medical History:  has a past medical history of Arthritis.  Past Surgical History:  has a past surgical history that includes Abdomen surgery; Total knee arthroplasty (Left, 2011); joint replacement (Right, 2012); colostomy (N/A, 2023); joint replacement (Left); Upper gastrointestinal endoscopy (N/A, 2024); CT BONE MARROW ASPIRATION (2024); and Small intestine surgery (N/A, 2024).    Discharge Recommendations: Tammy Frederick scored a 21/24 on the AM-PAC ADL Inpatient form. Current research shows that an AM-PAC score of 18 or greater is typically associated with a discharge to the patient's home setting. Based on the patient's AM-PAC score, and their current ADL deficits, it is recommended that the patient have 2-3 sessions per week of Occupational Therapy at d/c to increase the patient's independence.  At this time, this patient demonstrates the endurance and safety to discharge home with HHOT (home vs OP services) and a follow up treatment frequency of 2-3x/wk.   Please see assessment section for further patient specific details.    If patient discharges prior to next session this note will serve as a discharge summary.  Please see below for the latest assessment towards goals.      DME Required For Discharge: no DME required at discharge    Precautions/Restrictions: high fall risk  Weight Bearing Restrictions: no restrictions  [] Right Upper Extremity  [] Left Upper Extremity [] Right Lower Extremity  [] Left Lower Extremity     Required 
2030: Assessment complete, VSS, BS active, abd rounded, surgical dressing CDI, pt having loose BM's and passing flatus, reports abd pain 3/10, states she is hungry and asking for  turkey sandwich, informed pt she is on clear liquid diet d/t increased pain earlier today, offered clear liquid options, pt declined at this time, discussed care plan, pt mutually agrees, call light and belongings in reach, no other needs at this time.  Joy Cross, RN    
CLINICAL PHARMACY NOTE: MEDS TO BEDS    Total # of Prescriptions Filled: 1   The following medications were delivered to the patient:  OXYCODONE HCL 5MG TABS    Additional Documentation: Daniela CLANCY picked up=signed  Sirisha Samuelsls Pharmacy Tech  
Critical potassium 2.9. IVPB replacement started per MAR.  
Dressing to abdomen CDI. VSS. Bed ready on 4T, will transfer  
Incentive Spirometry education and demonstration completed by Respiratory Therapy Yes      Response to education: Very Good     Teaching Time: 5 minutes    Minimum Predicted Vital Capacity - 478 mL.  Patient's Actual Vital Capacity - 750 mL. Turning over to Nursing for routine follow-up Yes.    Electronically signed by JOSE MARIA RCP on 5/23/2024 at 5:50 PM    
Nutrition Note    RECOMMENDATIONS  PO Diet: advance to low fiber as tolerated / per general surgery  ONS: discontinue Ensure Clear; begin Ensure (vanilla) when diet advanced past clears      ASSESSMENT   Nutrition assessment triggered based on admission MST score of 3, indicating weight loss and decreased oral intake prior to admission.  Pt with hx of ex lap, sigmoid resection SBR with ileostomy back in December.  Since that time pt has lost ~41 lb / 31.5%.  Pt states she was nauseated and not able to eat after surgery; once medications were adjusted then pt began to increase PO intake.  Diet is clear liquid s/p ileostomy reversal on 5/23.  Ensure Clear ordered but is not drinking it; she will accept vanilla Ensure once diet advanced past clears.       Malnutrition Status: Severe malnutrition  Chronic Illness  Findings of the 6 clinical characteristics of malnutrition:  Energy Intake:  Mild decrease in energy intake (Comment)  Weight Loss:  Greater than 10% over 6 months (31.5% x5 months)     Body Fat Loss:  Mild body fat loss Triceps, Fat Overlying Ribs   Muscle Mass Loss:  Mild muscle mass loss Thigh (quadriceps), Calf (gastrocnemius), Clavicles (pectoralis & deltoids), Temples (temporalis)  Fluid Accumulation:  No significant fluid accumulation     Strength:  Normal  strength      NUTRITION DIAGNOSIS   Severe malnutrition, In context of chronic illness related to altered GI function as evidenced by Criteria as identified in malnutrition assessment    Goals: PO intake 50% or greater, prior to discharge     NUTRITION RELATED FINDINGS  Objective: hypoactive BS; NS at 125 mL/hr  Wounds: Surgical Incision    CURRENT NUTRITION THERAPIES  ADULT ORAL NUTRITION SUPPLEMENT; Breakfast, Lunch, Dinner; Clear Liquid Oral Supplement  ADULT DIET; Clear Liquid; No tea or coffee     PO Intake: 1-25%   PO Supplement Intake:0%      ANTHROPOMETRICS  Current Height: 152.4 cm (5')  Current Weight - Scale: 40.4 kg (89 lb)  
Nutrition Note    RECOMMENDATIONS  PO Diet: continue clear liquid.  Recommend low fiber when medically appropriate  ONS: discontinue Ensure Clear (pt dislikes); begin Ensure Plus High Protein when diet advanced  Weight: obtain current weight      ASSESSMENT   Pt's diet was initially advanced to regular but then downgraded to full liquid and eventually NPO. Pt was c/o loose BM and feeling bloated.  Diet returned to clear liquid today and pt has tolerated breakfast well thus far.  Denies abdominal pain or nausea.  Dislikes Ensure Clear, will accept Ensure Plus High Protein when diet advanced beyond clears.       Malnutrition Status: Severe malnutrition  Chronic Illness  Findings of the 6 clinical characteristics of malnutrition:  Energy Intake:  Mild decrease in energy intake (Comment)  Weight Loss:  Greater than 10% over 6 months (31.5% x5 months)     Body Fat Loss:  Mild body fat loss Triceps, Fat Overlying Ribs   Muscle Mass Loss:  Mild muscle mass loss Thigh (quadriceps), Calf (gastrocnemius), Clavicles (pectoralis & deltoids), Temples (temporalis)  Fluid Accumulation:  No significant fluid accumulation     Strength:  Normal  strength      NUTRITION DIAGNOSIS   Severe malnutrition related to altered GI function as evidenced by Criteria as identified in malnutrition assessment    Goals: PO intake 50% or greater, prior to discharge     NUTRITION RELATED FINDINGS  Objective: active BS  Wounds: Surgical Incision    CURRENT NUTRITION THERAPIES  ADULT DIET; Clear Liquid  ADULT ORAL NUTRITION SUPPLEMENT; Breakfast, Lunch, Dinner; Clear Liquid Oral Supplement     PO Intake: 51-75%   PO Supplement Intake:0%      ANTHROPOMETRICS  Current Height: 152.4 cm (5')  Current Weight - Scale: 40.4 kg (89 lb)    Ideal Body Weight (IBW): 100 lbs  (45 kg)    Usual Bodyweight 59 kg (130 lb)       BMI: 17.4      COMPARATIVE STANDARDS  Total Energy Requirements (kcals/day): 1163     Protein (g):  61       Fluid (mL/day):  
Nutrition Note    RECOMMENDATIONS  PO Diet: low fiber  ONS: decrease Ensure to BID      ASSESSMENT   Diet advanced to low fiber on 6/1 and regular today.  Pt consuming lunch during RD visit.  She stated good appetite and tolerance to her meals.  Inconsistently drinking Ensure, will reduce to BID.       Malnutrition Status: Severe malnutrition  Chronic Illness  Findings of the 6 clinical characteristics of malnutrition:  Energy Intake:  Mild decrease in energy intake (Comment)  Weight Loss:  Greater than 10% over 6 months (31.5% x5 months)     Body Fat Loss:  Mild body fat loss Triceps, Fat Overlying Ribs   Muscle Mass Loss:  Mild muscle mass loss Thigh (quadriceps), Calf (gastrocnemius), Clavicles (pectoralis & deltoids), Temples (temporalis)  Fluid Accumulation:  No significant fluid accumulation     Strength:  Normal  strength      NUTRITION DIAGNOSIS   Severe malnutrition related to altered GI function as evidenced by Criteria as identified in malnutrition assessment    Goals: PO intake 50% or greater, prior to discharge     NUTRITION RELATED FINDINGS  Objective: active BS; +BM  Wounds: Surgical Incision    CURRENT NUTRITION THERAPIES  ADULT ORAL NUTRITION SUPPLEMENT; Breakfast, Lunch, Dinner; Standard High Calorie/High Protein Oral Supplement  ADULT DIET; Regular     PO Intake: 26-50%   PO Supplement Intake:1-25%, 26-50%      ANTHROPOMETRICS  Current Height: 152.4 cm (5')  Current Weight - Scale: 41.8 kg (92 lb 3.2 oz)    Ideal Body Weight (IBW): 100 lbs  (45 kg)    Usual Bodyweight 59 kg (130 lb)       BMI: 18      COMPARATIVE STANDARDS  Total Energy Requirements (kcals/day): 1163     Protein (g):  61       Fluid (mL/day):  1500    EDUCATION  Education not indicated     The patient will be monitored per nutrition standards of care. Consult dietitian if additional nutrition interventions are needed prior to RD reassessment.     OMARI AJ, FREDERIC, LD    Contact: [phone]      
Occupational Therapy    [facility] - Inpatient Rehabilitation Department   Phone: [phone]    Occupational Therapy    [x] Initial Evaluation            [] Daily Treatment Note         [] Discharge Summary      Patient: Tammy Frederick   : 1950   MRN: [id number]   Date of Service:  2024    Admitting Diagnosis:  Ileostomy in place (HCC)  Current Admission Summary: Reversal of loop ileostomy with resection of anastomosis.   Past Medical History:  has a past medical history of Arthritis.  Past Surgical History:  has a past surgical history that includes Abdomen surgery; Total knee arthroplasty (Left, 2011); joint replacement (Right, 2012); colostomy (N/A, 2023); joint replacement (Left); Upper gastrointestinal endoscopy (N/A, 2024); and CT BONE MARROW ASPIRATION (2024).    Discharge Recommendations: ***    DME Required For Discharge: {FFOTD/C EQUIPMENT:22670}    Precautions/Restrictions: {FFRESTRICTIONS:14489}  Weight Bearing Restrictions: {FFWBRESTRICTIONS:28166}  [] Right Upper Extremity  [] Left Upper Extremity [] Right Lower Extremity  [] Left Lower Extremity     Required Braces/Orthotics: {ffbraces:20413}   [] Right  [] Left  Positional Restrictions:{ffpositionrestrictions:36084}      Pre-Admission Information     Lives With: Alone  Type of Home: House  Home Layout: One level  Home Access: Level entry  Bathroom Shower/Tub: Tub/Shower unit  Bathroom Toilet: Standard (sink nearby)  Bathroom Equipment: Shower chair, Grab bars in shower, Hand-held shower  Home Equipment: Cane, Walker, rolling, Walker, standard, Reacher  Has the patient had two or more falls in the past year or any fall with injury in the past year?: No  ADL Assistance: Independent (with shower chair)  Homemaking Assistance: Independent  Ambulation Assistance: Independent with use of cane  Transfer Assistance: Independent  Active : Yes  Occupation: Retired  Leisure & Hobbies: has 2 dogs & 2 
Occupational Therapy    OT briefly spoke with pt in room regarding pertinent concerns for discharge to home. Per nurse, pt is up ad-susan and ambulating independently to/from bathroom with DME. At this time, the patient is less concerned about her ability to safely participate in ADL and functional mobility at home. Rather, insists she is more concerned about feeling perpetually dizzy and nauseous at home s/p surgery. Per OT session conducted on 5/25, pt is SBA for functional mobility and ambulated 100ft. OT to continue with current POC and identify no additional discharge needs beyond home health OT at this time. Thank you. No charges billed.   Chapo Arthur OTR/L  [id number]      
Occupational Therapy/ Physical Therapy Tammy Otoniel    Patient refused treatment due to pain on first attempt.  Made plan with patient and nursing to come back after pain medication in one hour.  Patient complained of intense pain under ribs on second attempt and stated she was not getting up even with max encouragement.       Will re attempt later on this date as schedule permits.            
Oral CT contrast order verified with Gey in CT. Oral contrast started at 1220 pm. Patient verbalized understanding of POC and education, and all questions answered. Patient is tolerating a clear liquid diet. Patient stable and denied needs when writer left room.  
PM assessment complete, vitals stable, medications given per MAR, dressing CDI to abdomen, pain controlled at this time, patent independent in the room.   
PM assessment complete, vitals stable, medications given per MAR, dressing to abdomen CDI, no pain or nausea at this time.   
PRN roxicodone given for 5/10 abd pain following dressing change with surgery. No further needs expressed.   
Patient currently is comfortably resting in bed. Patient previously denied any nausea. X1 abd incision. Dressing is CDI. Currently awaiting bed in 4T.  
Patient to PACU from OR. Patient arousable to voice. Patient appears to be visibly in pain. X1 abd icisions w/ staples and 4x4 gauze and tape which is CDI. VSS  
Shift assessment complete. A&Ox4, VSS. Medications administered per MAR, tolerated well. The care plan and patient education has been reviewed with the patient. Patient's potassium low 3.1, replaced. Patient has been c/o diarrhea.The patient denies any current needs. All safety precautions are in place. NATALIE JUAN RN   
Shift assessment complete. A&Ox4. VSS. Medications administered tolerated well. The care plan and patient education has been reviewed with the patient. Patient c/o diarrhea and R sided abdomen pain, stabbing, 8/10. MD was in room. MD stated that a CT scan will happen today and that he will place patient back to a clear liquid diet due to pain. Oral contrast started. Potassium 3.2, replaced. The patient denies any current needs. All safety precautions are in place. NATALIE JUAN RN   
Shift assessment completed, A&OX4, vitals stable, ambulate to bathroom had loose Bm, voiding in the bathroom, pain managed with scheduled acetaminophen and toradol, c/o nausea, perfect serve to place an order of phenergan, care plan reviewed with the patient, dextrose 5% with 0.45%nacl infusing, all her evening medications administered as per mar.  Rosa Mclean RN     
Shift assessment completed, vitals, pain assessed, abd dressing clean, dry and intact, saldana in place, all medications given as per mar, ambulate in the room, care plan reviewed with the patient, pain managed with prn oxycodone and scheduled acetaminophen.  Rosa Mclean RN    
Shift assessment completed. Routine vitals stable. Scheduled medications given. Patient is awake, alert and oriented. Respirations are easy and unlabored. Patient does not appear to be in distress, resting comfortably at this time. Reports pain 4/10, declines non-pharm interventions, /66, holding on PRN pain meds at this time, pt agreeable. IS encouraged. Dressing to abd CDI. No needs expressed. Call light within reach.    
Shift assessment completed. VSS. Alert and oriented x 4. Dressing to abdomen clean, dry and intact. Medications given per MAR. Denies any further needs at this time. Call light in reach.  
Shift assessment completed. VSS. Alert and oriented x 4. Dressing to abdomen clean, dry and intact. Medications given per MAR. The care plan and education have been reviewed and mutually agreed upon with the patient. Call light in reach.  
Shift assessment, vitals monitored, ambulate in the room independently, pain managed with scheduled acetaminophen, all her evening medications administered asper mar, care plan reviewed with the patient, patient appropriately call for any needs, all safety precautions applied, dressing In the abd dry, clean and intact.   Rosa Mclean RN     
Teaching / education initiated regarding perioperative experience, expectations, and pain management during stay. Patient verbalized understanding.   
  24 1220 134/79 -- -- 80 18 100 % --   24 1215 124/73 97.6 °F (36.4 °C) Temporal 78 16 -- --   24 0933 (!) 106/59 98.6 °F (37 °C) Temporal 76 16 99 % 40.4 kg (89 lb)      TEMPERATURE HISTORY 24H: Temp (24hrs), Av.9 °F (36.6 °C), Min:97.5 °F (36.4 °C), Max:98.6 °F (37 °C)    BLOOD PRESSURE HISTORY: Systolic (36hrs), Av , Min:95 , Max:137    Diastolic (36hrs), Av, Min:59, Max:85      Intake/Output:    I/O last 3 completed shifts:  In: 100 [IV Piggyback:100]  Out: 450 [Urine:450]  I/O this shift:  In: 120 [P.O.:120]  Out: -   Drain/tube Output:           Physical Exam:  General: awake, alert, no acute distress, oriented to person, place, time and situation  Abdomen: soft, non-distended, appropriate incisional tenderness, dressing clean dry and intact    Labs:  CBC:    Recent Labs     24  0513   WBC 7.7   HGB 8.7*   HCT 25.1*        BMP:    Recent Labs     24  0513      K 4.2   *   CO2 15*   BUN 19   CREATININE 0.7   GLUCOSE 112*     Hepatic: No results for input(s): \"AST\", \"ALT\", \"BILITOT\", \"ALKPHOS\" in the last 72 hours.    Invalid input(s): \"ALB\"  Amylase: No results for input(s): \"AMYLASE\" in the last 72 hours.  Lipase: No results for input(s): \"LIPASE\" in the last 72 hours.  Mag:      Recent Labs     24  0513   MG 1.50*      Phos:     Recent Labs     24   PHOS 3.9      Coags: No results for input(s): \"INR\", \"APTT\" in the last 72 hours.    Cultures:  Anaerobic culture  No results for input(s): \"LABANAE\" in the last 72 hours.    Blood culture  No results for input(s): \"BC\" in the last 72 hours.    Blood culture 2  No results for input(s): \"BLOODCULT2\" in the last 72 hours.    Body fluid culture  No results for input(s): \"BLOODCULT2\" in the last 72 hours.    Surgical culture  No results for input(s): \"CXSURG\" in the last 72 hours.    Fecal occult  No results for input(s): \"OCCULTBLDFEC\" in the last 72 hours.    Gram stain  No results for 
18 100 % --   24 1215 124/73 97.6 °F (36.4 °C) Temporal 78 16 -- --   24 0933 (!) 106/59 98.6 °F (37 °C) Temporal 76 16 99 % 40.4 kg (89 lb)      TEMPERATURE HISTORY 24H: Temp (24hrs), Av.9 °F (36.6 °C), Min:97.5 °F (36.4 °C), Max:98.6 °F (37 °C)    BLOOD PRESSURE HISTORY: Systolic (36hrs), Av , Min:95 , Max:137    Diastolic (36hrs), Av, Min:59, Max:85      Intake/Output:    I/O last 3 completed shifts:  In: 100 [IV Piggyback:100]  Out: 450 [Urine:450]  I/O this shift:  In: 120 [P.O.:120]  Out: -   Drain/tube Output:           Physical Exam:  General: awake, alert, no acute distress, oriented to person, place, time and situation  Abdomen: soft, non-distended, appropriate incisional tenderness, incision clean dry and intact except for areas left intentionally open and pavel advanced    Labs:  CBC:    Recent Labs     24  0513   WBC 7.7   HGB 8.7*   HCT 25.1*        BMP:    Recent Labs     24  0513      K 4.2   *   CO2 15*   BUN 19   CREATININE 0.7   GLUCOSE 112*     Hepatic: No results for input(s): \"AST\", \"ALT\", \"BILITOT\", \"ALKPHOS\" in the last 72 hours.    Invalid input(s): \"ALB\"  Amylase: No results for input(s): \"AMYLASE\" in the last 72 hours.  Lipase: No results for input(s): \"LIPASE\" in the last 72 hours.  Mag:      Recent Labs     24  05   MG 1.50*      Phos:     Recent Labs     24   PHOS 3.9      Coags: No results for input(s): \"INR\", \"APTT\" in the last 72 hours.    Cultures:  Anaerobic culture  No results for input(s): \"LABANAE\" in the last 72 hours.    Blood culture  No results for input(s): \"BC\" in the last 72 hours.    Blood culture 2  No results for input(s): \"BLOODCULT2\" in the last 72 hours.    Body fluid culture  No results for input(s): \"BLOODCULT2\" in the last 72 hours.    Surgical culture  No results for input(s): \"CXSURG\" in the last 72 hours.    Fecal occult  No results for input(s): \"OCCULTBLDFEC\" in the last 72 
50 mL IVPB  6.25 mg IntraVENous Once    sodium chloride flush  5-40 mL IntraVENous 2 times per day    enoxaparin  30 mg SubCUTAneous Daily    docusate sodium  100 mg Oral BID    polyethylene glycol  17 g Oral Daily    naloxegol  25 mg Oral QAM AC    acetaminophen  650 mg Oral Q6H    busPIRone  10 mg Oral TID      Infusions:    dextrose 5% and 0.45% NaCl with KCl 20 mEq 75 mL/hr at 05/24/24 1214    sodium chloride       PRN Meds: sodium chloride flush, 5-40 mL, PRN  sodium chloride, , PRN  magnesium sulfate, 2,000 mg, PRN  potassium chloride, 40 mEq, PRN   Or  potassium alternative oral replacement, 40 mEq, PRN   Or  potassium chloride, 10 mEq, PRN  ondansetron, 4 mg, Q8H PRN   Or  ondansetron, 4 mg, Q6H PRN  morphine, 2 mg, Q2H PRN   Or  morphine, 4 mg, Q2H PRN  oxyCODONE, 5 mg, Q4H PRN   Or  oxyCODONE, 10 mg, Q4H PRN        Labs and Imaging   No results found.    CBC:   Recent Labs     05/24/24  0513   WBC 7.7   HGB 8.7*        BMP:    Recent Labs     05/24/24  0513      K 4.2   *   CO2 15*   BUN 19   CREATININE 0.7   GLUCOSE 112*     Hepatic: No results for input(s): \"AST\", \"ALT\", \"BILITOT\", \"ALKPHOS\" in the last 72 hours.    Invalid input(s): \"ALB\"  Lipids:   Lab Results   Component Value Date/Time    TRIG 311 02/24/2024 06:13 AM     Hemoglobin A1C:   Lab Results   Component Value Date/Time    LABA1C 6.0 02/08/2024 05:17 AM     TSH:   Lab Results   Component Value Date/Time    TSH 1.69 01/24/2024 07:00 AM     Troponin: No results found for: \"TROPONINT\"  Lactic Acid: No results for input(s): \"LACTA\" in the last 72 hours.  BNP: No results for input(s): \"PROBNP\" in the last 72 hours.  UA:  Lab Results   Component Value Date/Time    NITRU Negative 03/19/2024 03:16 PM    COLORU Yellow 03/19/2024 03:16 PM    PHUR 5.0 03/19/2024 03:16 PM    WBCUA 5 03/19/2024 03:16 PM    RBCUA 1 03/19/2024 03:16 PM    BACTERIA None Seen 03/19/2024 03:16 PM    CLARITYU Clear 03/19/2024 03:16 PM    LEUKOCYTESUR TRACE 
03/19/2024 03:16 PM    BILIRUBINUR Negative 03/19/2024 03:16 PM    BLOODU Negative 03/19/2024 03:16 PM    GLUCOSEU Negative 03/19/2024 03:16 PM    KETUA Negative 03/19/2024 03:16 PM     Urine Cultures: No results found for: \"LABURIN\"  Blood Cultures:   Lab Results   Component Value Date/Time    BC No Growth after 4 days of incubation. 12/21/2023 10:49 PM     Lab Results   Component Value Date/Time    BLOODCULT2 No Growth after 4 days of incubation. 12/21/2023 10:49 PM     Organism:   Lab Results   Component Value Date/Time    ORG Diphtheroids 12/28/2023 11:03 AM         Electronically signed by Yuliana Rondon MD on 5/25/2024 at 9:14 AM  
hours.    Gram stain  No results for input(s): \"LABGRAM\" in the last 72 hours.    Stool culture 1  No results for input(s): \"CXST\" in the last 72 hours.    Stool culture 2  No results for input(s): \"STOOLCULT2\" in the last 72 hours.    Urine culture  No results for input(s): \"LABURIN\" in the last 72 hours.    Wound abscess  No results for input(s): \"WNDABS\" in the last 72 hours.    C Diff   No results for input(s): \"CDIFFTOXAB\" in the last 72 hours.      Pathology:   pending    Imaging:  I have personally reviewed the following films:    No results found.      Scheduled Meds:   sodium chloride flush  5-40 mL IntraVENous 2 times per day    enoxaparin  30 mg SubCUTAneous Daily    docusate sodium  100 mg Oral BID    polyethylene glycol  17 g Oral Daily    naloxegol  25 mg Oral QAM AC    acetaminophen  650 mg Oral Q6H    busPIRone  10 mg Oral TID     Continuous Infusions:   sodium chloride      sodium chloride 125 mL/hr at 05/23/24 1500     PRN Meds:.sodium chloride flush, sodium chloride, magnesium sulfate, potassium chloride **OR** potassium alternative oral replacement **OR** potassium chloride, ondansetron **OR** ondansetron, morphine **OR** morphine, oxyCODONE **OR** oxyCODONE      Assessment:  Patient Active Problem List   Diagnosis    OA (osteoarthritis) of knee    Right TKR    Abscess of sigmoid colon    Intra-abdominal abscess (HCC)    E coli infection    Fecal peritonitis (HCC)    Streptococcal infection group C    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 
picture ID and insurance card.             19.  Visit our web site for additional information:  [email or web address]              20.During flu season no children under the age of 14 are permitted in the hospital for the safety of all patients.                              21. If you take a long acting insulin in the evening only  take half of your usual  dose the night  before your procedure              22. If you use a c-pap please bring DOS if staying overnight,             23.For your convenience [facility] has a pharmacy on site to fill your prescriptions.             24. If you use oxygen and have a portable tank please bring it  with you the DOS             25. Bring a complete list of all your medications with name and dose include any supplements.             26. Other__________________________________________   *Please call pre admission testing if you any further questions   [place]         [phone]   [place] [phone]   [place]            [phone]    [facility]  [phone]   [facility]   [phone]       VISITOR POLICY(subject to change)    Current policy is 2 visitors per patient. No children. Mask is  at the discretion of the facility. Visiting hours are 8a-8p. Overnight visitors will be at the discretion of the nurse. All policies subject to change.      All above information reviewed with patient in person or by phone.Patient verbalizes understanding.All questions and concerns addressed.                                                                                                 Patient/Rep__patient__________________                                                                                                                                    PRE OP INSTRUCTIONS   
Recommend d/c home with HHOT for home safety evaluation as pt lives alone, but anticipate minimal needs otherwise at d/c. No further acute OT indicated at this time    Safety Interventions: patient left in bed, call light within reach, and patient up ad susan     Plan  Frequency: d/c  Current Treatment Recommendations: strengthening, balance training, functional mobility training, transfer training, endurance training, patient/caregiver education, ADL/self-care training, and IADL training    Goals  Patient Goals: to return home   Short Term Goals:  Time Frame: discharge  Patient will complete upper body ADL at modified independent   Patient will complete lower body ADL at modified independent   Patient will complete toileting at modified independent   Patient will complete grooming at modified independent   Patient will complete functional transfers at modified independent - goal met 5/31  Patient will complete functional mobility at modified independent     Above goals reviewed on 6/3/2024.  All goals are met based on pt being up ad susan, completing ADL with IND as well as mobility with and without device.       Therapy Session Time     Individual Group Co-treatment   Time In 1515     Time Out 1538     Minutes 23     Timed Code Treatment Minutes: 23 min  Total Treatment Minutes:  23 minutes total       Electronically Signed By: NAHEED Goode, OTR/L, CNS ([id number])            
are patent. Portal vein is patent. PELVIS GI: Large amount of high-density material within the rectum.  This is causing a large amount of streaking.  It is not obstructive.  No small bowel dilation.  Small amount of free fluid in the pelvis. : The bladder is unremarkable in appearance.  No large mass.  Uterus is not seen.  Phleboliths in the pelvis. Osseous: Left total hip arthroplasty.  Varun sacralization of L5 on the right. Mild levoscoliosis.  Sclerosis at L1-2.     1. Diffuse thickening of the transverse colon. This is consistent with colitis.  Negative for bowel obstruction. 2. Small amount of free fluid in the abdomen and pelvis. 3. Small bilateral pleural effusions.      Scheduled Meds:   ciprofloxacin  400 mg IntraVENous Q12H    metroNIDAZOLE  500 mg IntraVENous Q8H    pantoprazole  40 mg Oral QAM AC    lidocaine  1 patch TransDERmal Daily    sodium chloride flush  5-40 mL IntraVENous 2 times per day    enoxaparin  30 mg SubCUTAneous Daily    acetaminophen  650 mg Oral Q6H    busPIRone  10 mg Oral TID     Continuous Infusions:   sodium chloride 25 mL/hr at 05/30/24 1810     PRN Meds:.sodium chloride flush, sodium chloride, magnesium sulfate, potassium chloride **OR** potassium alternative oral replacement **OR** potassium chloride, ondansetron **OR** ondansetron, morphine **OR** morphine, oxyCODONE **OR** oxyCODONE      Assessment:  OR Date 5/23/2024, reversal of loop ileostomy with resection of anastomosis  Anxiety  Colitis      Plan:  1. Pain better today, controlled, incisional tenderness only on exam, incision healing well, no nausea or vomiting, tolerating clear liquids, passing flatus and stool, vitals/labs stable; continued supportive care, local wound care  2. Advance to full liquid diet with supplements as tolerated; monitor bowel function  3. Monitor and correct electrolytes  4. Antibiotics  5. Activity as tolerated, ambulate TID, up to chair for all meals--PT/OT following  6. Pulmonary toilet, 
high-density material within the rectum.  This is causing a large amount of streaking.  It is not obstructive.  No small bowel dilation.  Small amount of free fluid in the pelvis. : The bladder is unremarkable in appearance.  No large mass.  Uterus is not seen.  Phleboliths in the pelvis. Osseous: Left total hip arthroplasty.  Varun sacralization of L5 on the right. Mild levoscoliosis.  Sclerosis at L1-2.     1. Diffuse thickening of the transverse colon. This is consistent with colitis.  Negative for bowel obstruction. 2. Small amount of free fluid in the abdomen and pelvis. 3. Small bilateral pleural effusions.      Scheduled Meds:   potassium chloride  10 mEq IntraVENous Q1H    ciprofloxacin  400 mg IntraVENous Q12H    metroNIDAZOLE  500 mg IntraVENous Q8H    pantoprazole  40 mg Oral QAM AC    lidocaine  1 patch TransDERmal Daily    sodium chloride flush  5-40 mL IntraVENous 2 times per day    enoxaparin  30 mg SubCUTAneous Daily    acetaminophen  650 mg Oral Q6H    busPIRone  10 mg Oral TID     Continuous Infusions:   sodium chloride 25 mL/hr at 05/30/24 1810     PRN Meds:.oxyCODONE **OR** [DISCONTINUED] oxyCODONE, sodium chloride flush, sodium chloride, magnesium sulfate, potassium chloride **OR** potassium alternative oral replacement **OR** potassium chloride, ondansetron **OR** ondansetron, morphine **OR** [DISCONTINUED] morphine      Assessment:  OR Date 5/23/2024, reversal of loop ileostomy with resection of anastomosis  Anxiety  Colitis      Plan:  1. Pt stable overall, continue supportive care, local wound care  2. Advance diet, off IVF  3. Monitor and correct electrolytes  4. Antibiotics  5. Activity as tolerated, ambulate TID, up to chair for all meals--PT/OT following  6. Pulmonary toilet, incentive spirometry  7. PRN analgesics and antiemetics--minimizing narcotics as tolerated  8. DVT prophylaxis with  Lovenox and SCD's  9. Management of medical comorbid etiologies per consulting services  10.

## 2024-06-04 NOTE — PLAN OF CARE
Problem: Discharge Planning  Goal: Discharge to home or other facility with appropriate resources  6/4/2024 1225 by Mike Alexander RN  Outcome: Completed  6/4/2024 1117 by Mike Alexander RN  Outcome: Progressing     Problem: Safety - Adult  Goal: Free from fall injury  6/4/2024 1225 by Mike Alexander RN  Outcome: Completed  6/4/2024 1117 by Mike Alexander RN  Outcome: Progressing     Problem: ABCDS Injury Assessment  Goal: Absence of physical injury  6/4/2024 1225 by Mike Alexander RN  Outcome: Completed  6/4/2024 1117 by Mike Alexander RN  Outcome: Progressing     Problem: Pain  Goal: Verbalizes/displays adequate comfort level or baseline comfort level  6/4/2024 1225 by Mike Alexander RN  Outcome: Completed  6/4/2024 1117 by Mike Alexander RN  Outcome: Progressing  Flowsheets (Taken 6/4/2024 0915)  Verbalizes/displays adequate comfort level or baseline comfort level: Assess pain using appropriate pain scale     Problem: Nutrition Deficit:  Goal: Optimize nutritional status  6/4/2024 1225 by Mike Alexander RN  Outcome: Completed  6/4/2024 1117 by Mike Alexander RN  Outcome: Progressing     Problem: Skin/Tissue Integrity - Adult  Goal: Incisions, wounds, or drain sites healing without S/S of infection  6/4/2024 1225 by Mike Alexander RN  Outcome: Completed  Flowsheets (Taken 6/4/2024 1118)  Incisions, Wounds, or Drain Sites Healing Without Sign and Symptoms of Infection: ADMISSION and DAILY: Assess and document risk factors for pressure ulcer development  6/4/2024 1117 by Mike Alexander RN  Outcome: Progressing     Problem: Gastrointestinal - Adult  Goal: Minimal or absence of nausea and vomiting  6/4/2024 1225 by Mike Alexander RN  Outcome: Completed  6/4/2024 1117 by Mike Alexander RN  Outcome: Progressing  Goal: Maintains adequate nutritional intake  6/4/2024 1225 by Mike Alexander RN  Outcome:

## 2024-06-04 NOTE — PLAN OF CARE
Problem: Discharge Planning  Goal: Discharge to home or other facility with appropriate resources  Outcome: Progressing     Problem: Safety - Adult  Goal: Free from fall injury  Outcome: Progressing     Problem: ABCDS Injury Assessment  Goal: Absence of physical injury  Outcome: Progressing     Problem: Pain  Goal: Verbalizes/displays adequate comfort level or baseline comfort level  Outcome: Progressing  Flowsheets (Taken 6/4/2024 6917)  Verbalizes/displays adequate comfort level or baseline comfort level: Assess pain using appropriate pain scale     Problem: Nutrition Deficit:  Goal: Optimize nutritional status  Outcome: Progressing     Problem: Skin/Tissue Integrity - Adult  Goal: Incisions, wounds, or drain sites healing without S/S of infection  Outcome: Progressing     Problem: Gastrointestinal - Adult  Goal: Minimal or absence of nausea and vomiting  Outcome: Progressing  Goal: Maintains adequate nutritional intake  Outcome: Progressing

## 2024-06-04 NOTE — DISCHARGE SUMMARY
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    Encounter for medication counseling    Receiving intravenous antibiotic treatment as outpatient    CRP elevated    Elevated erythrocyte sedimentation rate    SUSY (acute kidney injury) (HCC)    History of creation of ostomy (HCC)    Peripherally inserted central catheter (PICC) in place    Severe malnutrition (HCC)    Weakness    GI bleed    Nausea & vomiting    Intractable nausea and vomiting    Intractable abdominal pain    Weight loss    Hyponatremia    Ileostomy present (HCC)    Hyperkalemia    Metabolic acidosis    Ileostomy in place ([facility])      Consultants: IP CONSULT TO SOCIAL WORK  IP CONSULT TO HOSPITALIST  IP CONSULT TO FINANCIAL COUNSELOR    Procedures/Diagnostic Test(s):  CT ABDOMEN PELVIS W IV CONTRAST Additional Contrast? Oral   Final Result   1. Diffuse thickening of the transverse colon. This is consistent with   colitis.  Negative for bowel obstruction.   2. Small amount of free fluid in the abdomen and pelvis.   3. Small bilateral pleural effusions.         XR ABDOMEN (2 VIEWS)   Final Result   Equivocal free air under the diaphragm and recommend repeat CT scan of the   abdomen and pelvis.             Discharge Condition: good    HOSPITAL COURSE: Tammy originally presented to the hospital on 5/23/2024  8:38

## 2024-06-18 ENCOUNTER — OFFICE VISIT (OUTPATIENT)
Dept: SURGERY | Age: 74
End: 2024-06-18

## 2024-06-18 VITALS — WEIGHT: 96 LBS | SYSTOLIC BLOOD PRESSURE: 134 MMHG | BODY MASS INDEX: 18.75 KG/M2 | DIASTOLIC BLOOD PRESSURE: 70 MMHG

## 2024-06-18 DIAGNOSIS — Z09 SURGERY FOLLOW-UP: Primary | ICD-10-CM

## 2024-06-18 PROCEDURE — 99024 POSTOP FOLLOW-UP VISIT: CPT | Performed by: SURGERY

## 2024-06-18 NOTE — PROGRESS NOTES
true   Transportation Needs: No Transportation Needs (5/24/2024)    PRAPARE - Transportation     Lack of Transportation (Medical): No     Lack of Transportation (Non-Medical): No   Physical Activity: Not on file   Stress: Not on file   Social Connections: Not on file   Intimate Partner Violence: Not on file   Housing Stability: Low Risk  (5/24/2024)    Housing Stability Vital Sign     Unable to Pay for Housing in the Last Year: No     Number of Places Lived in the Last Year: 1     Unstable Housing in the Last Year: No      No family history on file.  Current Outpatient Medications   Medication Sig Dispense Refill    busPIRone (BUSPAR) 10 MG tablet Take 1 tablet by mouth 3 times daily      Magnesium 400 MG CAPS Take 1 capsule by mouth daily 14 capsule 0    pantoprazole (PROTONIX) 40 MG tablet Take 1 tablet by mouth every morning (before breakfast) 30 tablet 3    Lactobacillus (ACIDOPHILUS/PECTIN PO) Take 1 tablet by mouth in the morning and at bedtime      famotidine (PEPCID) 20 MG tablet Take 1 tablet by mouth Daily       No current facility-administered medications for this visit.      No Known Allergies     Review of Systems:  Review of systems performed and negative with the exception of the above findings    OBJECTIVE:  /70   Wt 43.5 kg (96 lb)   BMI 18.75 kg/m²      Physical Exam:  General appearance: alert, appears stated age, cooperative, and no distress  Abdomen: soft, non-distended, appropriate incisional tenderness, incision clean dry and intact, staples removed and replaced with steristrips without incident    No results displayed because visit has over 200 results.          CT ABDOMEN PELVIS W IV CONTRAST Additional Contrast? Oral    Result Date: 5/30/2024  EXAMINATION: CT OF THE ABDOMEN AND PELVIS WITH CONTRAST 5/30/2024 3:52 pm TECHNIQUE: CT of the abdomen and pelvis was performed with the administration of intravenous contrast. Multiplanar reformatted images are provided for review. Automated

## 2024-06-18 NOTE — PATIENT INSTRUCTIONS
No heavy lifting over 20lbs for 6 weeks total postop  Diet and activity as tolerated otherwise  Follow up with general surgery office as needed  Follow up with GI near [place] town for screening colonoscopy

## 2025-07-15 NOTE — CARE COORDINATION
Patient has pre-existing ileostomy.  She uses [employer] products. Called central supply to have  the following supplies placed in room:    Sensura Will Flex convex #11171 (5/8-1-9/16\" RED) (4 wafers)  SenSura Will Flex Drainable Pouch #49476 RED  (4 pouches)    Brava protective Seal thin #73643 (4 rings)  Brava Elastic Barrier Strips #[id number] (8 strips)    Empty pouch when it is 1/3 to 1/2 full of gas or/and stool, this will be approximately 5 to 8 times daily.    Change ileostomy dressing every 3 to 4 days.    Change ileostomy dressing whenever it leaks to prevent skin damage.   1. Empty pouch for changing. Remove pouch and clean skin with water only and dry. NO WIPES!!!  2.Measure stoma with each dressing change . Transfer measurements to wafer. Cut to fit stoma. Place over stoma.   3.Remove paper from pouch coupling. Attach to wafer, press wafer and pouch together inside of blue line on wafer.   4.Close end of pouch.   5.Place warm blanket over pouch for 10 minutes.  to help with adhesion.        Time spent  coordinating the patient's care. This includes reviewing documentation pertinent to this admission, results and imaging. Further tests, medications, and procedures have been ordered as indicated. Laboratory results and the plan of care were communicated to the patient. Discussed care plan with consultants including . Supporting documentation was completed and added to the patient's chart.

## (undated) DEVICE — RELOAD STPL L60MM H1.5-3.6MM REG TISS BLU GRIPPING SURF B

## (undated) DEVICE — SEALER ENDOSCP NANO COAT OPN DIV CRV L JAW LIGASURE IMPACT

## (undated) DEVICE — Device

## (undated) DEVICE — HYPODERMIC SAFETY NEEDLE: Brand: MAGELLAN

## (undated) DEVICE — COLLECTOR SPEC RAYON LIQ STUART DBL FOR THRT VAG SKIN WND

## (undated) DEVICE — ENDOSCOPIC KIT 6X3/16 FT COLON W/ 1.1 OZ 2 GWN W/O BRSH

## (undated) DEVICE — SUTURE PERMAHAND SZ 3-0 L18IN NONABSORBABLE BLK L26MM SH C013D

## (undated) DEVICE — COVER LT HNDL BLU PLAS

## (undated) DEVICE — APPLICATOR MEDICATED 26 CC SOLUTION HI LT ORNG CHLORAPREP

## (undated) DEVICE — MOUTHPIECE ENDOSCP L CTRL OPN AND SIDE PORTS DISP

## (undated) DEVICE — SHEET,DRAPE,53X77,STERILE: Brand: MEDLINE

## (undated) DEVICE — SUTURE VCRL + SZ 0 L18IN ABSRB CLR VCP112G

## (undated) DEVICE — SUTURE PERMAHAND SZ 3-0 L30IN NONABSORBABLE BLK SILK BRAID A304H

## (undated) DEVICE — STAPLER INT L60MM REG TISS BLU B FRM 8 FIRING 2 ROW AUTO

## (undated) DEVICE — STAPLER SKIN H3.9MM WIRE DIA0.58MM CRWN 6.9MM 35 STPL ROT

## (undated) DEVICE — SWAB CULT SGL AMIES W/O CHAR FOR THRT VAG SKIN HRT CULTSWAB

## (undated) DEVICE — SUTURE VICRYL SZ 3-0 L18IN ABSRB UD L26MM SH 1/2 CIR J864D

## (undated) DEVICE — SUTURE PDS + SZ 1 L96IN ABSRB VLT L65MM TP-1 1/2 CIR PDP880G

## (undated) DEVICE — DRAIN CHN 19FR L0.25IN DIA6.3MM SIL RND HUBLESS FULL FLUT

## (undated) DEVICE — ELECTRODE PT RET AD L9FT HI MOIST COND ADH HYDRGEL CORDED

## (undated) DEVICE — AIR/WATER CLEANING ADAPTER FOR OLYMPUS® GI ENDOSCOPE: Brand: BULLDOG®

## (undated) DEVICE — BW-412T DISP COMBO CLEANING BRUSH: Brand: SINGLE USE COMBINATION CLEANING BRUSH

## (undated) DEVICE — GOWN,AURORA,NONREINF,RAGLAN,XXL,STERILE: Brand: MEDLINE

## (undated) DEVICE — FORCEPS BX L240CM WRK CHN 2.8MM STD CAP W/ NDL MIC MESH

## (undated) DEVICE — MERCY FAIRFIELD TURNOVER KIT: Brand: MEDLINE INDUSTRIES, INC.

## (undated) DEVICE — STAPLER INT L34CM 60MM LNG ENDOSCP ARTC PWR + ECHELON FLX

## (undated) DEVICE — SPONGE LAP W18XL18IN WHT COT 4 PLY FLD STRUNG RADPQ DISP ST 2 PER PACK

## (undated) DEVICE — GAUZE,SPONGE,4"X4",8PLY,STRL,LF,10/TRAY: Brand: MEDLINE

## (undated) DEVICE — SUTURE PERMA-HAND SZ 2-0 L30IN NONABSORBABLE BLK L26MM SH K833H

## (undated) DEVICE — COVER,MAYO STAND,STERILE: Brand: MEDLINE

## (undated) DEVICE — BLANKET WRM W29.9XL79.1IN UP BODY FORC AIR MISTRAL-AIR

## (undated) DEVICE — WET SKIN PREP TRAY: Brand: MEDLINE INDUSTRIES, INC.

## (undated) DEVICE — BASIC SINGLE BASIN 1-LF: Brand: MEDLINE INDUSTRIES, INC.

## (undated) DEVICE — STERILE POLYISOPRENE POWDER-FREE SURGICAL GLOVES: Brand: PROTEXIS

## (undated) DEVICE — SOLUTION IRRIG 1000ML 0.9% SOD CHL USP POUR PLAS BTL

## (undated) DEVICE — WOUND RETRACTOR AND PROTECTOR: Brand: ALEXIS WOUND PROTECTOR-RETRACTOR

## (undated) DEVICE — STAPLER EXT 65MM S STL AUTO DISP PURSTRING

## (undated) DEVICE — GAUZE,PACKING STRIP,IODOFORM,1"X5YD,STRL: Brand: CURAD

## (undated) DEVICE — VALVE SUCTION AIR H2O SET ORCA POD + DISP

## (undated) DEVICE — SOLUTION IV IRRIG WATER 500ML POUR BRL ST 2F7113

## (undated) DEVICE — GOWN AURORA NONREINF LG: Brand: MEDLINE INDUSTRIES, INC.

## (undated) DEVICE — SYRINGE MED 10ML TRNSLUC BRL PLUNG BLK MRK POLYPR CTRL

## (undated) DEVICE — SUTURE VCRL + SZ 3-0 L18IN ABSRB UD SH 1/2 CIR TAPERCUT NDL VCP864D

## (undated) DEVICE — TOWEL,OR,DSP,ST,BLUE,DLX,10/PK,8PK/CS: Brand: MEDLINE

## (undated) DEVICE — DRESSING,GAUZE,XEROFORM,CURAD,5"X9",ST: Brand: CURAD

## (undated) DEVICE — RELOAD STPL H4.1X2MM DIA60MM THCK TISS GRN 6 ROW PWR GST B

## (undated) DEVICE — STAPLER INT DIA29MM CLS STPL H1.5-2.2MM OPN LEG L5.2MM 26

## (undated) DEVICE — BLADE ES ELASTOMERIC COAT INSUL DURABLE BEND UPTO 90DEG

## (undated) DEVICE — DRAPE,LAP,CHOLE,W/TROUGHS,STERILE: Brand: MEDLINE

## (undated) DEVICE — RELOAD STPL L75MM OPN H3.8MM CLS 1.5MM WIRE DIA0.2MM REG

## (undated) DEVICE — SYRINGE 60ML BULB IRRIG ST LF

## (undated) DEVICE — MAJOR SET UP PK

## (undated) DEVICE — SUTURE PERMAHAND SZ 0 L30IN NONABSORBABLE BLK L26MM SH 1/2 K834H

## (undated) DEVICE — STAPLER SKIN LEG L3.9MM DIA0.53MM WIDE ROT HD FOR WND CLSR

## (undated) DEVICE — SHEET, T, LAPAROTOMY, STERILE: Brand: MEDLINE

## (undated) DEVICE — PAD ABSRB W8XL10IN ABD HYDROPHOBIC NONWOVEN THCK LAYR CELOS

## (undated) DEVICE — CATHETER URETH 14FR L16IN RED RUB RND HLLW TIP W/ TWO EYE

## (undated) DEVICE — SUTURE PERMAHAND SZ 2-0 L30IN NONABSORBABLE BLK SILK W/O A305H

## (undated) DEVICE — TOWEL,OR,DSP,ST,BLUE,STD,4/PK,20PK/CS: Brand: MEDLINE

## (undated) DEVICE — SUTURE VCRL + SZ 0 L27IN ABSRB UD L36MM CT-1 1/2 CIR VCPP41D

## (undated) DEVICE — APPLIER CLP L L13IN TI MULT RNG HNDL 20 CLP STR LIGACLP

## (undated) DEVICE — 1LYRTR 16FR10ML 100%SILI SNAP: Brand: MEDLINE INDUSTRIES, INC.